# Patient Record
Sex: MALE | Race: WHITE | NOT HISPANIC OR LATINO | Employment: OTHER | URBAN - METROPOLITAN AREA
[De-identification: names, ages, dates, MRNs, and addresses within clinical notes are randomized per-mention and may not be internally consistent; named-entity substitution may affect disease eponyms.]

---

## 2017-01-11 ENCOUNTER — OFFICE VISIT (OUTPATIENT)
Dept: INTERNAL MEDICINE | Facility: CLINIC | Age: 82
End: 2017-01-11
Payer: MEDICARE

## 2017-01-11 ENCOUNTER — LAB VISIT (OUTPATIENT)
Dept: LAB | Facility: HOSPITAL | Age: 82
End: 2017-01-11
Attending: INTERNAL MEDICINE
Payer: MEDICARE

## 2017-01-11 VITALS
HEART RATE: 68 BPM | BODY MASS INDEX: 26.92 KG/M2 | DIASTOLIC BLOOD PRESSURE: 70 MMHG | SYSTOLIC BLOOD PRESSURE: 120 MMHG | WEIGHT: 188.06 LBS | HEIGHT: 70 IN

## 2017-01-11 DIAGNOSIS — E55.9 VITAMIN D DEFICIENCY: ICD-10-CM

## 2017-01-11 DIAGNOSIS — I10 ESSENTIAL HYPERTENSION: ICD-10-CM

## 2017-01-11 DIAGNOSIS — I77.9 BILATERAL CAROTID ARTERY DISEASE: ICD-10-CM

## 2017-01-11 DIAGNOSIS — E04.2 MULTINODULAR GOITER: ICD-10-CM

## 2017-01-11 DIAGNOSIS — I35.0 NONRHEUMATIC AORTIC VALVE STENOSIS: ICD-10-CM

## 2017-01-11 DIAGNOSIS — I65.23 BILATERAL CAROTID ARTERY STENOSIS: ICD-10-CM

## 2017-01-11 DIAGNOSIS — Z00.00 ENCOUNTER FOR WELLNESS EXAMINATION IN ADULT: Primary | ICD-10-CM

## 2017-01-11 LAB
BASOPHILS # BLD AUTO: 0.05 K/UL
BASOPHILS NFR BLD: 0.8 %
BILIRUB UR QL STRIP: NEGATIVE
CLARITY UR REFRACT.AUTO: CLEAR
COLOR UR AUTO: NORMAL
DIFFERENTIAL METHOD: ABNORMAL
EOSINOPHIL # BLD AUTO: 0.1 K/UL
EOSINOPHIL NFR BLD: 1.4 %
ERYTHROCYTE [DISTWIDTH] IN BLOOD BY AUTOMATED COUNT: 14.7 %
GLUCOSE UR QL STRIP: NEGATIVE
HCT VFR BLD AUTO: 41.5 %
HGB BLD-MCNC: 13.8 G/DL
HGB UR QL STRIP: NEGATIVE
KETONES UR QL STRIP: NEGATIVE
LEUKOCYTE ESTERASE UR QL STRIP: NEGATIVE
LYMPHOCYTES # BLD AUTO: 0.9 K/UL
LYMPHOCYTES NFR BLD: 14.6 %
MCH RBC QN AUTO: 31 PG
MCHC RBC AUTO-ENTMCNC: 33.3 %
MCV RBC AUTO: 93 FL
MONOCYTES # BLD AUTO: 0.6 K/UL
MONOCYTES NFR BLD: 9.2 %
NEUTROPHILS # BLD AUTO: 4.7 K/UL
NEUTROPHILS NFR BLD: 74 %
NITRITE UR QL STRIP: NEGATIVE
PH UR STRIP: 6 [PH] (ref 5–8)
PLATELET # BLD AUTO: 192 K/UL
PMV BLD AUTO: 11.7 FL
PROT UR QL STRIP: NEGATIVE
RBC # BLD AUTO: 4.45 M/UL
SP GR UR STRIP: 1 (ref 1–1.03)
URN SPEC COLLECT METH UR: NORMAL
UROBILINOGEN UR STRIP-ACNC: NEGATIVE EU/DL
WBC # BLD AUTO: 6.39 K/UL

## 2017-01-11 PROCEDURE — 85025 COMPLETE CBC W/AUTO DIFF WBC: CPT

## 2017-01-11 PROCEDURE — 36415 COLL VENOUS BLD VENIPUNCTURE: CPT

## 2017-01-11 PROCEDURE — 99999 PR PBB SHADOW E&M-EST. PATIENT-LVL III: CPT | Mod: PBBFAC,,, | Performed by: INTERNAL MEDICINE

## 2017-01-11 PROCEDURE — G0439 PPPS, SUBSEQ VISIT: HCPCS | Mod: ,,, | Performed by: INTERNAL MEDICINE

## 2017-01-11 NOTE — PROGRESS NOTES
CHIEF COMPLAINT:  Physical exam.    HISTORY OF PRESENT ILLNESS:  The patient is an 87-year-old gentleman here for   wellness exam.  The patient does have a history of aortic stenosis.  He does see   Dr. White for this.  His last cardiac echo was in May 2016.  It showed   mild-to-moderate aortic stenosis with JANESSA of 1.23 cm2.  He does have   hyperlipidemia.  He is on CoQ10 as well as pravastatin.  His total cholesterol   was 161, LDL was 84.6.  He does have a history of vitamin D deficiency.  His   last vitamin D level was in November, was 48.  He does have a history of mild   carotid stenosis of 20-39% bilaterally.  The patient is due for a repeat.  He   does have a history of carpal tunnel syndrome involving the left hand as well as   trigger finger involving the left thumb.  He is going to see Dr. Lewis for   this.  In regards to his skin, he sees [facility].  He had Mohs surgery   done twice, once on the nose and once on the left hand.  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  He last saw Dermatology three   months ago.  The patient does see Urology with Dr. Colby and Dr. Hill at   [facility].  He is on testosterone replacement.  He gets a testosterone level every   four months.  He does have implants in place.  In regards to his vision, he does   have macular degeneration.  He sees Dr. Bower who is located near [facility].    REVIEW OF SYSTEMS:  The patient reports weight has been stable between 185-188.    He cannot read small print.  He does have something that blows it up for him to   read.  No auditory change, no dysphagia, no chest pain, no shortness of breath.    The patient does exercise three times a week.  He does do hamstring exercises   for lower and upper extremity.  He does report no nausea, vomiting, no abdominal   pain.  He does have acid reflux.  No problems with bowel or bladder.  He does   see Dr. Hall for his hamstrings.  His skin is going to be  checked as   mentioned above.  The patient reports carpal tunnel involving the left hand.    PHYSICAL EXAMINATION:  GENERAL APPEARANCE:  No acute distress.  HEENT:  Conjunctivae clear.  Pupils were equal.  He does have bilateral   cataracts.  TMs are clear.  Nasal septum is midline without discharge.    Oropharynx is without erythema.  NECK:  Trachea is midline without thyromegaly, without JVD.  PULMONARY:  Good inspiratory, expiratory breath sounds are heard.  Lungs are   clear to auscultation.  CARDIOVASCULAR:  S1, S2 with a II-III/VI systolic ejection murmur heard best at   the right upper sternal border.  2+ carotid pulses without bruits, 2+ dorsal   pedal pulse involving the left foot.  ABDOMEN:  Nontender, nondistended, without hepatosplenomegaly.  :  Exam was deferred since the patient sees Urology at [facility].  EXTREMITIES:  The patient's left great toe was evaluated.  He was seen there   after he injured his toe.  He was being treated for cellulitis that appears to   have resolved.    ASSESSMENT:  1.  Wellness exam.  2.  Hypertension.  3.  Aortic stenosis.  4.  Bilateral carotid stenosis.  5.  History of multinodular goiter.  6.  Vitamin D deficiency.    PLAN:  We will put the patient in for CBC, UA, ultrasound of the carotids.  The   patient already has blood tests scheduled for Dr. White to check a CMP, vitamin   D and lipid panel.      JDS/HN  dd: 01/11/2017 14:19:45 (CST)  td: 01/12/2017 10:18:10 (CST)  Doc ID   #[id number]  Job ID #[id number]    CC:

## 2017-01-11 NOTE — MR AVS SNAPSHOT
[facility] - Internal Medicine  [address]  [facility] [address]  Phone: [phone]  Fax: [phone]                  Misha Marte   2017 11:00 AM   Office Visit    Description:  Male : 1929   Provider:  Ike Ivan MD   Department:  [facility] - [facility] Medicine           Reason for Visit     Annual Exam           Diagnoses this Visit        Comments    Encounter for wellness examination in adult    -  Primary     Essential hypertension         Nonrheumatic aortic valve stenosis         Bilateral carotid artery disease         Multinodular goiter         Vitamin D deficiency         Bilateral carotid artery stenosis                To Do List           Future Appointments        Provider Department Dept Phone    2017 3:00 PM Nikita Ortega MD [facility] Neurology [phone]    2017 11:30 AM Tracee Lewis MD [facility] [phone]      Goals (5 Years of Data)     None      [facility] On Call     [facility] On Call Nurse [facility] -  Assistance  Registered nurses in the [facility] On Call Center provide clinical advisement, health education, appointment booking, and other advisory services.  Call for this free service at [phone].             Medications           Message regarding Medications     Verify the changes and/or additions to your medication regime listed below are the same as discussed with your clinician today.  If any of these changes or additions are incorrect, please notify your healthcare provider.        STOP taking these medications     baclofen (LIORESAL) 10 MG tablet Take 1 tablet (10 mg total) by mouth 3 (three) times daily.           Verify that the below list of medications is an accurate representation of the medications you are currently taking.  If none reported, the list may be blank. If incorrect, please contact your healthcare provider. Carry this list with you in case of emergency.           Current  "Medications     acetaminophen (TYLENOL) 500 MG tablet Take 500 mg by mouth every 6 (six) hours as needed for Pain.    aspirin 81 mg Tab Take 81 mg by mouth once daily.     CALCIUM CARBONATE/VITAMIN D3 (CALCIUM 600 + D,3, ORAL) Take 1 capsule by mouth once daily.    clobetasol (TEMOVATE) 0.05 % external solution Apply topically 2 (two) times daily.     coenzyme Q10 (CO Q-10) 10 mg capsule Take 10 mg by mouth once daily.     desoximetasone (TOPICORT) 0.25 % cream Apply topically as needed.    DOCUSATE CALCIUM (STOOL SOFTENER ORAL) Take by mouth once daily.    ergocalciferol (ERGOCALCIFEROL) 50,000 unit Cap 1 tab every 2 weeks    furosemide (LASIX) 20 MG tablet TAKE 1 TABLET(20 MG) BY MOUTH DAILY AS NEEDED    multivitamin-minerals-lutein (CENTRUM SILVER) Tab Take 1 tablet by mouth once daily.     MUSE 1,000 mcg pellet as needed.     omeprazole (PRILOSEC) 20 MG capsule Take 1 capsule (20 mg total) by mouth once daily.    pravastatin (PRAVACHOL) 20 MG tablet One at nite    ramipril (ALTACE) 10 MG capsule Take 1 capsule (10 mg total) by mouth once daily.    tretinoin (RETIN-A) 0.05 % cream Apply 1 application topically nightly.     VIT A,C & E/LUTEIN/MINERALS (OCUVITE WITH LUTEIN ORAL) Take 1 capsule by mouth once daily.           Clinical Reference Information           Vital Signs - Last Recorded  Most recent update: 1/11/2017 11:50 AM by Chantelle Black MA    BP Pulse Ht Wt BMI    120/70 (BP Location: Right arm, Patient Position: Sitting, BP Method: Manual) 68 5' 10" (1.778 m) 85.3 kg (188 lb 0.8 oz) 26.98 kg/m2      Blood Pressure          Most Recent Value    BP  120/70      Allergies as of 1/11/2017     Codeine    Cortisone    Dexamethasone      Immunizations Administered on Date of Encounter - 1/11/2017     None      Orders Placed During Today's Visit      Normal Orders This Visit    Urinalysis     Future Labs/Procedures Expected by Expires    CBC auto differential  1/11/2017 1/11/2018    US Carotid Bilateral  " 1/11/2017 1/11/2018

## 2017-01-17 ENCOUNTER — HOSPITAL ENCOUNTER (OUTPATIENT)
Dept: RADIOLOGY | Facility: HOSPITAL | Age: 82
Discharge: HOME | End: 2017-01-17
Attending: INTERNAL MEDICINE
Payer: MEDICARE

## 2017-01-17 DIAGNOSIS — I77.9 BILATERAL CAROTID ARTERY DISEASE: ICD-10-CM

## 2017-01-17 DIAGNOSIS — I65.23 BILATERAL CAROTID ARTERY STENOSIS: ICD-10-CM

## 2017-01-17 PROCEDURE — 93880 EXTRACRANIAL BILAT STUDY: CPT | Mod: TC

## 2017-01-17 PROCEDURE — 93880 EXTRACRANIAL BILAT STUDY: CPT | Mod: 26,GC,, | Performed by: RADIOLOGY

## 2017-01-18 ENCOUNTER — TELEPHONE (OUTPATIENT)
Dept: ORTHOPEDICS | Facility: CLINIC | Age: 82
End: 2017-01-18

## 2017-01-18 NOTE — TELEPHONE ENCOUNTER
----- Message from Alie Mckinnon sent at 1/18/2017  1:07 PM CST -----  Contact: Sandra Marte (Spouse)  X   1st Request  _  2nd Request  _  3rd Request        Who: Sandra Marte (Spouse)    Why: Pt spouse states the EMG appt has been rescheduled to 01/25. Pt's spouse states she needs an appt for the pt to be a week from 01/25 for the pt to get his results. An attempt was made but first available is 02/23. Please call, thanks!    What Number to Call Back: [phone]    When to Expect a call back: (Before the end of the day)   -- if call after 3:00 call back will be tomorrow.

## 2017-01-19 ENCOUNTER — PATIENT MESSAGE (OUTPATIENT)
Dept: INTERNAL MEDICINE | Facility: CLINIC | Age: 82
End: 2017-01-19

## 2017-01-23 ENCOUNTER — TELEPHONE (OUTPATIENT)
Dept: INTERNAL MEDICINE | Facility: CLINIC | Age: 82
End: 2017-01-23

## 2017-01-23 NOTE — TELEPHONE ENCOUNTER
----- Message from Ike Ivan MD sent at 1/21/2017  2:43 PM CST -----  Please contact patient. I would like to see him in 6 months along with his wife.

## 2017-01-23 NOTE — TELEPHONE ENCOUNTER
Notified pt and pt states he will call to schedule. Pt and wife was put on the hod list because Dr Ivan schedule is not open yet

## 2017-01-25 ENCOUNTER — PROCEDURE VISIT (OUTPATIENT)
Dept: NEUROLOGY | Facility: CLINIC | Age: 82
End: 2017-01-25
Payer: MEDICARE

## 2017-01-25 ENCOUNTER — TELEPHONE (OUTPATIENT)
Dept: ORTHOPEDICS | Facility: CLINIC | Age: 82
End: 2017-01-25

## 2017-01-25 DIAGNOSIS — R20.0 FINGER NUMBNESS: ICD-10-CM

## 2017-01-25 PROCEDURE — 95870 NDL EMG LMTD STD MUSC 1 XTR: CPT | Mod: PBBFAC | Performed by: PSYCHIATRY & NEUROLOGY

## 2017-01-25 PROCEDURE — 95911 NRV CNDJ TEST 9-10 STUDIES: CPT | Mod: PBBFAC | Performed by: PSYCHIATRY & NEUROLOGY

## 2017-01-25 PROCEDURE — 95886 MUSC TEST DONE W/N TEST COMP: CPT | Mod: 26,S$PBB,, | Performed by: PSYCHIATRY & NEUROLOGY

## 2017-01-25 PROCEDURE — 95885 MUSC TST DONE W/NERV TST LIM: CPT | Mod: 26,59,S$PBB, | Performed by: PSYCHIATRY & NEUROLOGY

## 2017-01-25 PROCEDURE — 95911 NRV CNDJ TEST 9-10 STUDIES: CPT | Mod: 26,S$PBB,, | Performed by: PSYCHIATRY & NEUROLOGY

## 2017-01-29 ENCOUNTER — PATIENT MESSAGE (OUTPATIENT)
Dept: ORTHOPEDICS | Facility: CLINIC | Age: 82
End: 2017-01-29

## 2017-01-31 RX ORDER — OSELTAMIVIR PHOSPHATE 75 MG/1
75 CAPSULE ORAL DAILY
Qty: 10 CAPSULE | Refills: 0 | Status: SHIPPED | OUTPATIENT
Start: 2017-01-31 | End: 2017-02-10

## 2017-02-01 ENCOUNTER — OFFICE VISIT (OUTPATIENT)
Dept: INTERNAL MEDICINE | Facility: CLINIC | Age: 82
End: 2017-02-01
Payer: MEDICARE

## 2017-02-01 VITALS
HEART RATE: 70 BPM | HEIGHT: 70 IN | OXYGEN SATURATION: 98 % | SYSTOLIC BLOOD PRESSURE: 98 MMHG | DIASTOLIC BLOOD PRESSURE: 58 MMHG | TEMPERATURE: 98 F

## 2017-02-01 DIAGNOSIS — J98.8 VIRAL RESPIRATORY ILLNESS: Primary | ICD-10-CM

## 2017-02-01 DIAGNOSIS — B97.89 VIRAL RESPIRATORY ILLNESS: Primary | ICD-10-CM

## 2017-02-01 PROCEDURE — 99213 OFFICE O/P EST LOW 20 MIN: CPT | Mod: PBBFAC | Performed by: INTERNAL MEDICINE

## 2017-02-01 PROCEDURE — 99999 PR PBB SHADOW E&M-EST. PATIENT-LVL III: CPT | Mod: PBBFAC,,, | Performed by: INTERNAL MEDICINE

## 2017-02-01 PROCEDURE — 99213 OFFICE O/P EST LOW 20 MIN: CPT | Mod: S$PBB,,, | Performed by: INTERNAL MEDICINE

## 2017-02-01 RX ORDER — BENZONATATE 200 MG/1
200 CAPSULE ORAL 2 TIMES DAILY PRN
Qty: 20 CAPSULE | Refills: 0 | Status: SHIPPED | OUTPATIENT
Start: 2017-02-01 | End: 2017-02-08

## 2017-02-01 NOTE — PROGRESS NOTES
Subjective:       Patient ID: CLARY Marte Jr. is a 87 y.o. male.    Chief Complaint: URI    HPI Comments: Wife and sis-in -law have influenza.  Dr Ivan rx'd tamiflu for patient.  He has had 2 doses and says he is better.  Used his ventolin inhaler this am and got good relief of wheezing.  Zicam doesn't seem to help    URI    This is a new problem. The current episode started yesterday. The problem has been waxing and waning. There has been no fever. Associated symptoms include congestion, coughing and wheezing. Pertinent negatives include no abdominal pain, chest pain, diarrhea, headaches, nausea, sore throat or vomiting. Associated symptoms comments: fatigue. The treatment provided moderate relief.     Review of Systems   Constitutional: Negative for activity change, appetite change and fever.   HENT: Positive for congestion. Negative for postnasal drip and sore throat.    Respiratory: Positive for cough and wheezing. Negative for shortness of breath.    Cardiovascular: Negative for chest pain and palpitations.   Gastrointestinal: Negative for abdominal pain, blood in stool, constipation, diarrhea, nausea and vomiting.   Genitourinary: Negative for decreased urine volume, difficulty urinating, flank pain and frequency.   Musculoskeletal: Negative for arthralgias.   Neurological: Negative for dizziness, weakness and headaches.       Objective:      Physical Exam   Constitutional: He is oriented to person, place, and time. He appears well-developed and well-nourished. No distress.   HENT:   Head: Normocephalic and atraumatic.   Right Ear: External ear normal.   Left Ear: External ear normal.   Eyes: Conjunctivae and EOM are normal. Pupils are equal, round, and reactive to light.   Neck: Normal range of motion. Neck supple. No thyromegaly present.   Cardiovascular: Normal rate and regular rhythm.    Pulmonary/Chest: Effort normal and breath sounds normal.   Abdominal: Soft. Bowel sounds are normal. He exhibits  no mass. There is no tenderness. There is no rebound and no guarding.   Musculoskeletal: Normal range of motion.   Lymphadenopathy:     He has no cervical adenopathy.   Neurological: He is alert and oriented to person, place, and time. He has normal reflexes. He displays normal reflexes. No cranial nerve deficit. He exhibits normal muscle tone. Coordination normal.   Skin: Skin is warm and dry.       Assessment:       1. Viral respiratory illness        Plan:   CLARY was seen today for uri.    Diagnoses and all orders for this visit:    Viral respiratory illness    Other orders  -     benzonatate (TESSALON) 200 MG capsule; Take 1 capsule (200 mg total) by mouth 2 (two) times daily as needed for Cough.

## 2017-02-01 NOTE — MR AVS SNAPSHOT
[facility]  [address]  [facility] [address]  Phone: [phone]  Fax: [phone]                  CLARY Marte Jr.   2017 2:00 PM   Office Visit    Description:  Male : 1929   Provider:  Rosangela Bowser MD   Department:  [facility] - Internal Medicine           Reason for Visit     URI           Diagnoses this Visit        Comments    Viral respiratory illness    -  Primary            To Do List           Future Appointments        Provider Department Dept Phone    2017 2:15 PM Tracee Lewis MD [facility] [phone]      Goals (5 Years of Data)     None       These Medications        Disp Refills Start End    benzonatate (TESSALON) 200 MG capsule 20 capsule 0 2017    Take 1 capsule (200 mg total) by mouth 2 (two) times daily as needed for Cough. - Oral    Pharmacy: [facility] [address] [place] [address] [address] [place] [facility] [place] Ph #: [phone]         [facility] On Call     [facility] On Call Nurse Care Line -  Assistance  Registered nurses in the [facility] On Call Center provide clinical advisement, health education, appointment booking, and other advisory services.  Call for this free service at [phone].             Medications           Message regarding Medications     Verify the changes and/or additions to your medication regime listed below are the same as discussed with your clinician today.  If any of these changes or additions are incorrect, please notify your healthcare provider.        START taking these NEW medications        Refills    benzonatate (TESSALON) 200 MG capsule 0    Sig: Take 1 capsule (200 mg total) by mouth 2 (two) times daily as needed for Cough.    Class: Normal    Route: Oral           Verify that the below list of medications is an accurate representation of the medications you are currently taking.  If none reported, the list may be blank. If  "incorrect, please contact your healthcare provider. Carry this list with you in case of emergency.           Current Medications     acetaminophen (TYLENOL) 500 MG tablet Take 500 mg by mouth every 6 (six) hours as needed for Pain.    aspirin 81 mg Tab Take 81 mg by mouth once daily.     CALCIUM CARBONATE/VITAMIN D3 (CALCIUM 600 + D,3, ORAL) Take 1 capsule by mouth once daily.    clobetasol (TEMOVATE) 0.05 % external solution Apply topically 2 (two) times daily.     coenzyme Q10 (CO Q-10) 10 mg capsule Take 10 mg by mouth once daily.     desoximetasone (TOPICORT) 0.25 % cream Apply topically as needed.    DOCUSATE CALCIUM (STOOL SOFTENER ORAL) Take by mouth once daily.    ergocalciferol (ERGOCALCIFEROL) 50,000 unit Cap 1 tab every 2 weeks    furosemide (LASIX) 20 MG tablet TAKE 1 TABLET(20 MG) BY MOUTH DAILY AS NEEDED    multivitamin-minerals-lutein (CENTRUM SILVER) Tab Take 1 tablet by mouth once daily.     MUSE 1,000 mcg pellet as needed.     omeprazole (PRILOSEC) 20 MG capsule Take 1 capsule (20 mg total) by mouth once daily.    oseltamivir (TAMIFLU) 75 MG capsule Take 1 capsule (75 mg total) by mouth once daily.    pravastatin (PRAVACHOL) 20 MG tablet One at nite    ramipril (ALTACE) 10 MG capsule Take 1 capsule (10 mg total) by mouth once daily.    tretinoin (RETIN-A) 0.05 % cream Apply 1 application topically nightly.     VIT A,C & E/LUTEIN/MINERALS (OCUVITE WITH LUTEIN ORAL) Take 1 capsule by mouth once daily.    benzonatate (TESSALON) 200 MG capsule Take 1 capsule (200 mg total) by mouth 2 (two) times daily as needed for Cough.           Clinical Reference Information           Vital Signs - Last Recorded  Most recent update: 2/1/2017  2:11 PM by Sandy Dillon MA    BP Pulse Temp Ht SpO2       (!) 98/58 (BP Location: Right arm, Patient Position: Sitting, BP Method: Manual) 70 97.7 °F (36.5 °C) (Oral) 5' 10" (1.778 m) 98%       Blood Pressure          Most Recent Value    BP  (!)  98/58      Allergies as " of 2/1/2017     Codeine    Cortisone    Dexamethasone      Immunizations Administered on Date of Encounter - 2/1/2017     None

## 2017-02-08 ENCOUNTER — HOSPITAL ENCOUNTER (OUTPATIENT)
Dept: RADIOLOGY | Facility: HOSPITAL | Age: 82
Discharge: HOME | End: 2017-02-08
Attending: INTERNAL MEDICINE
Payer: MEDICARE

## 2017-02-08 DIAGNOSIS — R05.9 COUGH: ICD-10-CM

## 2017-02-08 DIAGNOSIS — R05.9 COUGH: Primary | ICD-10-CM

## 2017-02-08 PROCEDURE — 71020 XR CHEST PA AND LATERAL: CPT | Mod: TC

## 2017-02-08 PROCEDURE — 71020 XR CHEST PA AND LATERAL: CPT | Mod: 26,,, | Performed by: RADIOLOGY

## 2017-02-13 ENCOUNTER — TELEPHONE (OUTPATIENT)
Dept: ORTHOPEDICS | Facility: CLINIC | Age: 82
End: 2017-02-13

## 2017-02-14 ENCOUNTER — OFFICE VISIT (OUTPATIENT)
Dept: ORTHOPEDICS | Facility: CLINIC | Age: 82
End: 2017-02-14
Payer: MEDICARE

## 2017-02-14 VITALS
SYSTOLIC BLOOD PRESSURE: 125 MMHG | BODY MASS INDEX: 26.92 KG/M2 | RESPIRATION RATE: 16 BRPM | WEIGHT: 188 LBS | HEIGHT: 70 IN | DIASTOLIC BLOOD PRESSURE: 76 MMHG | HEART RATE: 64 BPM

## 2017-02-14 DIAGNOSIS — R20.0 FINGER NUMBNESS: ICD-10-CM

## 2017-02-14 DIAGNOSIS — M65.30 TRIGGER FINGER OF LEFT HAND, UNSPECIFIED FINGER: Primary | ICD-10-CM

## 2017-02-14 PROCEDURE — 99213 OFFICE O/P EST LOW 20 MIN: CPT | Mod: S$PBB,,, | Performed by: ORTHOPAEDIC SURGERY

## 2017-02-14 PROCEDURE — 99999 PR PBB SHADOW E&M-EST. PATIENT-LVL II: CPT | Mod: PBBFAC,,, | Performed by: ORTHOPAEDIC SURGERY

## 2017-02-14 PROCEDURE — 99212 OFFICE O/P EST SF 10 MIN: CPT | Mod: PBBFAC | Performed by: ORTHOPAEDIC SURGERY

## 2017-02-14 NOTE — PROGRESS NOTES
The patient was seen to go over the EMG results. The patient is still having constant numbness symptoms.     The EMG is positive for Carpal Tunnel Syndrome. It is severe in nature.    Plan: We discussed there results of the EMG in detail. WE discussed conservative treatment as well as surgical treatment.  The Patient is interested in surgery at this time.    I have explained the risks, benefits, and alternatives of the procedure to the patient in great detail. The patient voices understanding and all questions have been answered. The patient agrees with to proceed as planned. Consents were performed in clinic. Pt understands that sensation may not come   Pt also wants his left thumb trigger finger released.

## 2017-03-21 ENCOUNTER — PATIENT MESSAGE (OUTPATIENT)
Dept: ORTHOPEDICS | Facility: CLINIC | Age: 82
End: 2017-03-21

## 2017-03-22 ENCOUNTER — PATIENT MESSAGE (OUTPATIENT)
Dept: INTERNAL MEDICINE | Facility: CLINIC | Age: 82
End: 2017-03-22

## 2017-03-22 DIAGNOSIS — M65.312 TRIGGER FINGER OF LEFT THUMB: Primary | ICD-10-CM

## 2017-03-22 DIAGNOSIS — G56.02 LEFT CARPAL TUNNEL SYNDROME: ICD-10-CM

## 2017-03-23 DIAGNOSIS — M25.529 ELBOW PAIN, UNSPECIFIED LATERALITY: Primary | ICD-10-CM

## 2017-03-24 ENCOUNTER — TELEPHONE (OUTPATIENT)
Dept: INTERNAL MEDICINE | Facility: CLINIC | Age: 82
End: 2017-03-24

## 2017-03-24 ENCOUNTER — TELEPHONE (OUTPATIENT)
Dept: ORTHOPEDICS | Facility: CLINIC | Age: 82
End: 2017-03-24

## 2017-03-24 DIAGNOSIS — R52 PAIN: Primary | ICD-10-CM

## 2017-03-24 NOTE — TELEPHONE ENCOUNTER
Spoke w/patient and his wife. Patient states he fell on his right elbow 3 weeks ago denies pain to wrist or shoulder. Patient offered appointments next week with Dr Sylvester and Danielle/Trixie, they declined appointments offered. Appt for xray scheduled per their request at [facility] then on 4/4/17 w/LS.

## 2017-03-24 NOTE — TELEPHONE ENCOUNTER
----- Message from Ike Ivan MD sent at 3/23/2017  7:52 PM CDT -----  Please contact and refer to Orthopedics. Referral is in. This is for his elbow.

## 2017-03-24 NOTE — TELEPHONE ENCOUNTER
----- Message from Maria Eugenia Richardson sent at 3/24/2017 11:43 AM CDT -----  Contact: pt's wife stephanie [phone]  _  1st Request  _  2nd Request  _  3rd Request        Who: pt    Why: pt's needs to speak to the nurse regarding her 's elbows. Please call stephanie    What Number to Call Back:pt's wife stephanie [phone]    When to Expect a call back: (Before the end of the day)   -- if the call is after 12:00, the call back will be tomorrow.

## 2017-03-28 ENCOUNTER — HOSPITAL ENCOUNTER (OUTPATIENT)
Dept: RADIOLOGY | Facility: HOSPITAL | Age: 82
Discharge: HOME | End: 2017-03-28
Attending: ORTHOPAEDIC SURGERY
Payer: MEDICARE

## 2017-03-28 DIAGNOSIS — R52 PAIN: ICD-10-CM

## 2017-03-28 PROCEDURE — 73080 X-RAY EXAM OF ELBOW: CPT | Mod: TC,RT

## 2017-03-28 PROCEDURE — 73080 X-RAY EXAM OF ELBOW: CPT | Mod: 26,RT,, | Performed by: RADIOLOGY

## 2017-04-04 ENCOUNTER — OFFICE VISIT (OUTPATIENT)
Dept: ORTHOPEDICS | Facility: CLINIC | Age: 82
End: 2017-04-04
Payer: MEDICARE

## 2017-04-04 VITALS
HEIGHT: 70 IN | RESPIRATION RATE: 18 BRPM | DIASTOLIC BLOOD PRESSURE: 70 MMHG | BODY MASS INDEX: 26.92 KG/M2 | HEART RATE: 90 BPM | SYSTOLIC BLOOD PRESSURE: 136 MMHG | WEIGHT: 188 LBS

## 2017-04-04 DIAGNOSIS — G56.02 LEFT CARPAL TUNNEL SYNDROME: ICD-10-CM

## 2017-04-04 DIAGNOSIS — M65.312 TRIGGER FINGER OF LEFT THUMB: ICD-10-CM

## 2017-04-04 DIAGNOSIS — M70.21 OLECRANON BURSITIS OF RIGHT ELBOW: Primary | ICD-10-CM

## 2017-04-04 PROCEDURE — 99213 OFFICE O/P EST LOW 20 MIN: CPT | Mod: S$PBB,,, | Performed by: PHYSICIAN ASSISTANT

## 2017-04-04 PROCEDURE — 99999 PR PBB SHADOW E&M-EST. PATIENT-LVL III: CPT | Mod: PBBFAC,,, | Performed by: PHYSICIAN ASSISTANT

## 2017-04-04 PROCEDURE — 99213 OFFICE O/P EST LOW 20 MIN: CPT | Mod: PBBFAC | Performed by: PHYSICIAN ASSISTANT

## 2017-04-05 RX ORDER — MUPIROCIN 20 MG/G
1 OINTMENT TOPICAL
Status: CANCELLED | OUTPATIENT
Start: 2017-04-05

## 2017-04-05 NOTE — PROGRESS NOTES
This office note has been dictated.   Dictation Confirmation Code: [id number]  Danielle Eduardo PA-C  04/05/2017  7:30 AM  Supervising Physician:  MD CLARY Starks was seen today for pain.    Diagnoses and all orders for this visit:    Olecranon bursitis of right elbow    Left carpal tunnel syndrome    Trigger finger of left thumb

## 2017-04-06 ENCOUNTER — TELEPHONE (OUTPATIENT)
Dept: ORTHOPEDICS | Facility: CLINIC | Age: 82
End: 2017-04-06

## 2017-04-06 NOTE — PROGRESS NOTES
CHIEF COMPLAINT:  Left hand numbness, left thumb locking, but improved and new   right elbow pain.    HISTORY OF PRESENT ILLNESS:  Mr. Marte is a very pleasant 88-year-old   right-hand dominant male presenting today for followup evaluation of his carpal   tunnel and trigger thumb, but he has a new problem in which he fell on to his   elbow four days ago.  He reports that it is not painful and there is just some   sensitivity over the elbow.  He fell directly on to it.  He feels that the   motion is improved.  He has been using ice and that has been helping not taking   any new pain medication for it.  He reports that the trigger thumb has improved   and he is concerned about having surgery on it because he feels that it is   better, but he is still having numbness and tingling in the left hand.  He also   reports that his left hand is still weak and his wife has many questions today,   also about the surgery as well as proceeding with his new right elbow injury.    Past Medical History:   Diagnosis Date    Heart murmur     Hyperlipidemia     Hypertension     Insomnia        Past Surgical History:   Procedure Laterality Date    APPENDECTOMY         Social History     Social History    Marital status:      Spouse name: N/A    Number of children: N/A    Years of education: N/A     Occupational History    Not on file.     Social History Main Topics    Smoking status: Never Smoker    Smokeless tobacco: Never Used    Alcohol use 3.0 oz/week     5 Glasses of wine per week    Drug use: No    Sexual activity: No     Other Topics Concern    Not on file     Social History Narrative       Current Outpatient Prescriptions on File Prior to Visit   Medication Sig Dispense Refill    acetaminophen (TYLENOL) 500 MG tablet Take 500 mg by mouth every 6 (six) hours as needed for Pain.      aspirin 81 mg Tab Take 81 mg by mouth once daily.       CALCIUM CARBONATE/VITAMIN D3 (CALCIUM 600 + D,3, ORAL) Take 1  capsule by mouth once daily.      clobetasol (TEMOVATE) 0.05 % external solution Apply topically 2 (two) times daily.       coenzyme Q10 (CO Q-10) 10 mg capsule Take 10 mg by mouth once daily.       desoximetasone (TOPICORT) 0.25 % cream Apply topically as needed.      DOCUSATE CALCIUM (STOOL SOFTENER ORAL) Take by mouth once daily.      ergocalciferol (ERGOCALCIFEROL) 50,000 unit Cap 1 tab every 2 weeks 6 capsule 3    furosemide (LASIX) 20 MG tablet TAKE 1 TABLET(20 MG) BY MOUTH DAILY AS NEEDED 30 tablet 11    multivitamin-minerals-lutein (CENTRUM SILVER) Tab Take 1 tablet by mouth once daily.       MUSE 1,000 mcg pellet as needed.   6    omeprazole (PRILOSEC) 20 MG capsule Take 1 capsule (20 mg total) by mouth once daily. 30 capsule 11    pravastatin (PRAVACHOL) 20 MG tablet One at nite 90 tablet 3    ramipril (ALTACE) 10 MG capsule Take 1 capsule (10 mg total) by mouth once daily. 90 capsule 3    tretinoin (RETIN-A) 0.05 % cream Apply 1 application topically nightly.       VIT A,C & E/LUTEIN/MINERALS (OCUVITE WITH LUTEIN ORAL) Take 1 capsule by mouth once daily.       No current facility-administered medications on file prior to visit.        Review of patient's allergies indicates:   Allergen Reactions    Codeine      Other reaction(s): Nausea    Cortisone      Other reaction(s): Unknown    Dexamethasone Other (See Comments)     Constant hiccups for days       Review of Systems:  Constitutional: no fever or chills  ENT: no nasal congestion or sore throat  Respiratory: no cough or shortness of breath  Cardiovascular: no chest pain or palpitations  Gastrointestinal: no nausea or vomiting  Genitourinary: no hematuria or dysuria  Integument/Breast: no rash or pruritis  Hematologic/Lymphatic: no easy bruising or lymphadenopathy  Musculoskeletal: see HPI  Neurological: no seizures or tremors  Behavioral/Psych: no auditory or visual hallucinations      PHYSICAL EXAM    Vitals:    04/04/17 1314   BP:  "136/70   Pulse: 90   Resp: 18   Weight: 85.3 kg (188 lb)   Height: 5' 10" (1.778 m)   PainSc: 0-No pain       GENERAL:  Well developed, well nourished, no acute distress.  CARDIOVASCULAR:  Regular rate and rhythm.  LUNGS:  Respirations equal and unlabored.  BEHAVIORAL AND PSYCH:  Mood and affect appropriate.  NEUROLOGIC:  No tremor.  HEENT:  Normocephalic, atraumatic.  MUSCULOSKELETAL:  Upper extremity exam:  Distally, he is neurovascularly intact.    He does have a positive Tinel's and positive Durkan's on the left.  Muscle   atrophy is appreciated at the thenar eminence.  While he does not have   tenderness at the A1 pulley, there is a catch and snap visualized at the IP as   well as the MP joint.  There is significant MP stiffness compared to the other   side.  The elbow on the right, there is only minimal edema.  There is no   erythema, no open wound.  No significant tenderness to palpation.  The olecranon   bursa is slightly edematous, but again no tenderness to palpation.  No   erythema, no warmth.    ASSESSMENT:  1.  Olecranon bursitis of the right elbow status post fall, resolving.  2.  Left carpal tunnel syndrome.  3.  Left trigger thumb, improving.    PLAN:  I discussed with Mr. Marte at this time, the right elbow seems to be   okay.  I reviewed the x-rays, which did not show any fractures or dislocations.    He does had some olecranon spurring as well as on the coronoid process, no   joint effusion was seen.  We discussed that continuing to rest, ice and elevate   the elbow to help improve his pain, as good conservative treatment.  I do not   feel that this will interfere with his surgery on the 19th for his carpal tunnel   and his trigger thumb.  They also wanted to know some information about taking   a trigger thumb off of the surgery consent.  Although as it is not painful, I   did discuss with him that it is still snapping him.  My concern is that with   postoperative swelling, some of the " triggering might return, if we just do the   carpal tunnel.  I still appreciate a visualized saltatory motion of the thumb.    Therefore, I discussed with him continuing to proceed with a trigger thumb   release as it will not affect his recovery postoperatively to have that extra   small incision.  If the trigger thumb completely resolves, we can discontinue   it; however, at this time it is still triggering.  His wife and the patient had   many questions, took me approximately 30 minutes to answer all their questions   about surgery.  We went into occupational therapy and activities of daily living   modifications to help with that and things that they can do.  He will follow up   with me postoperatively.    SUPERVISING PHYSICIAN: Tracee Lewis M.D.    DICTATED BY: Tressa Eduardo PA-C.      CHRISTINAA/IN  dd: 04/05/2017 07:35:54 (CDT)  td: 04/06/2017 00:22:45 (CDT)  Doc ID   #[id number]  Job ID #[id number]    CC:

## 2017-04-06 NOTE — TELEPHONE ENCOUNTER
----- Message from Gem Marshall sent at 4/6/2017  8:13 AM CDT -----  Contact: pt wife   x_  1st Request  _  2nd Request  _  3rd Request      Who:Mrs. Marte     Why: calling in regards to pt surgery     What Number to Call Back: [phone]    When to Expect a call back: (Before the end of the day)   -- if call after 3:00 call back will be tomorrow.

## 2017-04-13 ENCOUNTER — TELEPHONE (OUTPATIENT)
Dept: ORTHOPEDICS | Facility: CLINIC | Age: 82
End: 2017-04-13

## 2017-04-13 NOTE — TELEPHONE ENCOUNTER
----- Message from Ginette Francis sent at 4/13/2017  8:04 AM CDT -----  Contact: Sandra Mrs Marte (Spouse)  x_  1st Request  _  2nd Request  _  3rd Request        Who: Sandra Marte (Spouse)    Why: Pt spouse would like to reschedule surgery because patient was diagnosed with skin cancer on hand . Please call to discuss, Thanks!    What Number to Call Back: [phone] or [phone]    When to Expect a call back: (Before the end of the day)   -- if the call is after 12:00, the call back will be tomorrow.

## 2017-05-29 ENCOUNTER — TELEPHONE (OUTPATIENT)
Dept: ORTHOPEDICS | Facility: CLINIC | Age: 82
End: 2017-05-29

## 2017-05-29 NOTE — TELEPHONE ENCOUNTER
----- Message from Ale Alvarado sent at 5/29/2017  1:30 PM CDT -----  x_  1st Request  _  2nd Request  _  3rd Request        Who: CLARY DUARTE JR. [[id number]]    Why: Pt called they would like to reschedule his surgery. Please call him     What Number to Call Back: [phone]    When to Expect a call back: (Before the end of the day)   -- if the call is after 12:00, the call back will be tomorrow.

## 2017-05-29 NOTE — TELEPHONE ENCOUNTER
ROSLYN on  for Mr. Marte to call us to schedule an office appt. He will need to sign a new consent before we can reschedule his surgery. He can be scheduled with our PA.

## 2017-05-29 NOTE — TELEPHONE ENCOUNTER
Pt wanted to fill out a new consent form by mail, which I explained is not legal. We also need to have a new H&P within 30 days of surgery as well as a valid consent since the previous one will  on the . Pt verbalized understanding and will coordinate appts with his wife and call us to reschedule.

## 2017-05-30 ENCOUNTER — TELEPHONE (OUTPATIENT)
Dept: CARDIOLOGY | Facility: CLINIC | Age: 82
End: 2017-05-30

## 2017-05-31 ENCOUNTER — TELEPHONE (OUTPATIENT)
Dept: INTERNAL MEDICINE | Facility: CLINIC | Age: 82
End: 2017-05-31

## 2017-05-31 NOTE — TELEPHONE ENCOUNTER
----- Message from [employer] sent at 5/30/2017 12:20 PM CDT -----  Contact: Wife of pt called  Wife of pt called, requesting to speak with Kate only. Ph [phone] and [phone]. Pt of Dr. White and LOV 7/1/16. Thank you

## 2017-05-31 NOTE — TELEPHONE ENCOUNTER
----- Message from Ale Arriaga sent at 5/31/2017  3:32 PM CDT -----  Contact: self/ [phone]  Patient called in regards needing to schedule annual appointment but patient also want to know if his wife ([phone]) which is due his annual too can be seen the same day around the same time (I checked through the schedule but there is a gap from 9 to 11:30 am  And they would like to know if doctor can do something about it. Please call and advise.           Thank you!!!

## 2017-07-12 ENCOUNTER — HOSPITAL ENCOUNTER (OUTPATIENT)
Dept: CARDIOLOGY | Facility: CLINIC | Age: 82
Discharge: HOME | End: 2017-07-12
Payer: MEDICARE

## 2017-07-12 DIAGNOSIS — I35.0 NONRHEUMATIC AORTIC VALVE STENOSIS: ICD-10-CM

## 2017-07-12 DIAGNOSIS — I10 ESSENTIAL HYPERTENSION: ICD-10-CM

## 2017-07-12 LAB
AORTIC VALVE STENOSIS: ABNORMAL
DIASTOLIC DYSFUNCTION: YES
ESTIMATED PA SYSTOLIC PRESSURE: 28.94
MITRAL VALVE REGURGITATION: ABNORMAL
RETIRED EF AND QEF - SEE NOTES: 60 (ref 55–65)
TRICUSPID VALVE REGURGITATION: ABNORMAL

## 2017-07-12 PROCEDURE — 93010 ELECTROCARDIOGRAM REPORT: CPT | Mod: S$PBB,,, | Performed by: INTERNAL MEDICINE

## 2017-07-12 PROCEDURE — 93306 TTE W/DOPPLER COMPLETE: CPT | Mod: PBBFAC | Performed by: INTERNAL MEDICINE

## 2017-07-14 ENCOUNTER — TELEPHONE (OUTPATIENT)
Dept: ENDOCRINOLOGY | Facility: CLINIC | Age: 82
End: 2017-07-14

## 2017-07-14 NOTE — TELEPHONE ENCOUNTER
Spoke with patient wife whom states that she is trying to schedule patient and her sister for follow up appointment in November it has to be a Tuesday at her request because she is the only one who drives and she works on Mondays informed patient wife will pass it along to Lucretia and see what she can come up with patient wife verbalized understanding

## 2017-07-17 ENCOUNTER — TELEPHONE (OUTPATIENT)
Dept: ENDOCRINOLOGY | Facility: CLINIC | Age: 82
End: 2017-07-17

## 2017-07-17 NOTE — PROGRESS NOTES
Subjective:   Patient ID:  CLARY Marte Jr. is a 88 y.o. male who presents for evaluation of VHD    HPI:Patient is here for valvular heart disease and atherosclerosis.    The patient has no chest pain, SOB, TIA, palpitations, syncope or pre-syncope.Patient does not exercise.        Review of Systems   Constitution: Negative for chills, decreased appetite, diaphoresis, fever, weakness, malaise/fatigue, night sweats, weight gain and weight loss.   HENT: Negative for congestion, headaches, hoarse voice, nosebleeds, sore throat and tinnitus.    Eyes: Negative for blurred vision, double vision, vision loss in left eye, vision loss in right eye, visual disturbance and visual halos.   Cardiovascular: Negative for chest pain, claudication, cyanosis, dyspnea on exertion, irregular heartbeat, leg swelling, near-syncope, orthopnea, palpitations, paroxysmal nocturnal dyspnea and syncope.   Respiratory: Negative for cough, hemoptysis, shortness of breath, sleep disturbances due to breathing, snoring, sputum production and wheezing.    Endocrine: Negative for cold intolerance, heat intolerance, polydipsia, polyphagia and polyuria.   Hematologic/Lymphatic: Negative for adenopathy and bleeding problem. Does not bruise/bleed easily.   Skin: Negative for color change, dry skin, flushing, itching, nail changes, poor wound healing, rash, skin cancer, suspicious lesions and unusual hair distribution.   Musculoskeletal: Negative for arthritis, back pain, falls, gout, joint pain, joint swelling, muscle cramps, muscle weakness, myalgias and stiffness.   Gastrointestinal: Negative for abdominal pain, anorexia, change in bowel habit, constipation, diarrhea, dysphagia, heartburn, hematemesis, hematochezia, melena and vomiting.   Genitourinary: Negative for decreased libido, dysuria, hematuria, hesitancy and urgency.   Neurological: Negative for excessive daytime sleepiness, dizziness, focal weakness, light-headedness, loss of balance,  "numbness, paresthesias, seizures, sensory change, tremors and vertigo.   Psychiatric/Behavioral: Negative for altered mental status, depression, hallucinations, memory loss, substance abuse and suicidal ideas. The patient does not have insomnia and is not nervous/anxious.    Allergic/Immunologic: Negative for environmental allergies and hives.       Objective: BP (!) 140/65 (BP Location: Left arm, Patient Position: Sitting, BP Method: Automatic)   Pulse 68   Ht 5' 9" (1.753 m)   Wt 87.7 kg (193 lb 5.5 oz)   BMI 28.55 kg/m²      Physical Exam   Constitutional: He is oriented to person, place, and time. He appears well-developed and well-nourished. No distress.   HENT:   Head: Normocephalic.   Eyes: EOM are normal. Pupils are equal, round, and reactive to light.   Neck: Normal range of motion. No thyromegaly present.   Cardiovascular: Normal rate, regular rhythm and intact distal pulses.  Exam reveals no gallop and no friction rub.    Murmur heard.   Harsh midsystolic murmur is present with a grade of 2/6  at the upper right sternal border radiating to the neck  Pulses:       Carotid pulses are 3+ on the right side, and 3+ on the left side.       Radial pulses are 3+ on the right side, and 3+ on the left side.        Femoral pulses are 3+ on the right side, and 3+ on the left side.       Popliteal pulses are 3+ on the right side, and 3+ on the left side.        Dorsalis pedis pulses are 3+ on the right side, and 3+ on the left side.        Posterior tibial pulses are 3+ on the right side, and 3+ on the left side.   Pulmonary/Chest: Effort normal and breath sounds normal. No respiratory distress. He has no wheezes. He has no rales. He exhibits no tenderness.   Abdominal: Soft. He exhibits no distension and no mass. There is no tenderness.   Musculoskeletal: Normal range of motion.   Lymphadenopathy:     He has no cervical adenopathy.   Neurological: He is alert and oriented to person, place, and time.   Skin: Skin " is warm. He is not diaphoretic. No cyanosis. Nails show no clubbing.   Psychiatric: He has a normal mood and affect. His speech is normal and behavior is normal. Judgment and thought content normal. Cognition and memory are normal.       Assessment:     1. Nonrheumatic aortic valve stenosis    2. Carotid artery disease, unspecified laterality    3. Essential hypertension    4. Vitamin D deficiency    5. Overweight (BMI 25.0-29.9)        Plan:   Discussed diet , achieving and maintaining ideal body weight, and exercise.   We reviewed meds in detail.  Reassured      CLARY was seen today for nonrheumatic aortic valve stenosis.    Diagnoses and all orders for this visit:    Nonrheumatic aortic valve stenosis  -     EKG 12-lead; Future; Expected date: 07/18/2018  -     2D echo with color flow doppler; Future; Expected date: 07/18/2018  -     CAR Ultrasound doppler carotid bliateral; Future; Expected date: 07/18/2018    Carotid artery disease, unspecified laterality  -     TSH; Future; Expected date: 07/18/2018  -     2D echo with color flow doppler; Future; Expected date: 07/18/2018  -     CAR Ultrasound doppler carotid bliateral; Future; Expected date: 07/18/2018    Essential hypertension  -     TSH; Future; Expected date: 07/18/2018  -     EKG 12-lead; Future; Expected date: 07/18/2018  -     2D echo with color flow doppler; Future; Expected date: 07/18/2018    Vitamin D deficiency    Overweight (BMI 25.0-29.9)            Return in about 1 year (around 7/18/2018) for with ECG, CFD Cindy White to read, carotids and labs.

## 2017-07-18 ENCOUNTER — OFFICE VISIT (OUTPATIENT)
Dept: CARDIOLOGY | Facility: CLINIC | Age: 82
End: 2017-07-18
Payer: MEDICARE

## 2017-07-18 VITALS
HEART RATE: 68 BPM | DIASTOLIC BLOOD PRESSURE: 65 MMHG | HEIGHT: 69 IN | SYSTOLIC BLOOD PRESSURE: 140 MMHG | WEIGHT: 193.31 LBS | BODY MASS INDEX: 28.63 KG/M2

## 2017-07-18 DIAGNOSIS — I10 ESSENTIAL HYPERTENSION: ICD-10-CM

## 2017-07-18 DIAGNOSIS — I77.9 CAROTID ARTERY DISEASE, UNSPECIFIED LATERALITY: ICD-10-CM

## 2017-07-18 DIAGNOSIS — I35.0 NONRHEUMATIC AORTIC VALVE STENOSIS: Primary | ICD-10-CM

## 2017-07-18 DIAGNOSIS — E55.9 VITAMIN D DEFICIENCY: ICD-10-CM

## 2017-07-18 DIAGNOSIS — E66.3 OVERWEIGHT (BMI 25.0-29.9): ICD-10-CM

## 2017-07-18 PROCEDURE — 99214 OFFICE O/P EST MOD 30 MIN: CPT | Mod: PBBFAC | Performed by: INTERNAL MEDICINE

## 2017-07-18 PROCEDURE — 99999 PR PBB SHADOW E&M-EST. PATIENT-LVL IV: CPT | Mod: PBBFAC,,, | Performed by: INTERNAL MEDICINE

## 2017-07-18 PROCEDURE — 1126F AMNT PAIN NOTED NONE PRSNT: CPT | Mod: ,,, | Performed by: INTERNAL MEDICINE

## 2017-07-18 PROCEDURE — 99214 OFFICE O/P EST MOD 30 MIN: CPT | Mod: S$PBB,,, | Performed by: INTERNAL MEDICINE

## 2017-07-18 PROCEDURE — 1159F MED LIST DOCD IN RCRD: CPT | Mod: ,,, | Performed by: INTERNAL MEDICINE

## 2017-07-18 NOTE — PATIENT INSTRUCTIONS
Discussed diet , achieving and maintaining ideal body weight, and exercise.   We reviewed meds in detail.  Reassured

## 2017-07-19 ENCOUNTER — OFFICE VISIT (OUTPATIENT)
Dept: INTERNAL MEDICINE | Facility: CLINIC | Age: 82
End: 2017-07-19
Payer: MEDICARE

## 2017-07-19 VITALS
DIASTOLIC BLOOD PRESSURE: 62 MMHG | HEIGHT: 71 IN | BODY MASS INDEX: 27.13 KG/M2 | HEART RATE: 64 BPM | SYSTOLIC BLOOD PRESSURE: 124 MMHG | WEIGHT: 193.81 LBS

## 2017-07-19 DIAGNOSIS — I10 ESSENTIAL HYPERTENSION: ICD-10-CM

## 2017-07-19 DIAGNOSIS — E78.5 HYPERLIPIDEMIA, UNSPECIFIED HYPERLIPIDEMIA TYPE: ICD-10-CM

## 2017-07-19 DIAGNOSIS — I35.0 NONRHEUMATIC AORTIC VALVE STENOSIS: Primary | ICD-10-CM

## 2017-07-19 PROCEDURE — 99213 OFFICE O/P EST LOW 20 MIN: CPT | Mod: S$PBB,,, | Performed by: INTERNAL MEDICINE

## 2017-07-19 PROCEDURE — 1159F MED LIST DOCD IN RCRD: CPT | Mod: ,,, | Performed by: INTERNAL MEDICINE

## 2017-07-19 PROCEDURE — 99999 PR PBB SHADOW E&M-EST. PATIENT-LVL III: CPT | Mod: PBBFAC,,, | Performed by: INTERNAL MEDICINE

## 2017-07-19 PROCEDURE — 1126F AMNT PAIN NOTED NONE PRSNT: CPT | Mod: ,,, | Performed by: INTERNAL MEDICINE

## 2017-07-19 PROCEDURE — 99213 OFFICE O/P EST LOW 20 MIN: CPT | Mod: PBBFAC | Performed by: INTERNAL MEDICINE

## 2017-07-20 NOTE — PROGRESS NOTES
CHIEF COMPLAINT:  Followup.    HISTORY OF PRESENT ILLNESS:  The patient is an 88-year-old gentleman who we see   for moderate aortic stenosis, hypertension, hyperlipidemia who comes in today   for six-month checkup.  The patient did see Dr. White yesterday.  He has a   repeat echo scheduled for next year as well as a carotid ultrasound.  The   patient has been checking his blood pressure once a week.  The patient did have   blood tests done prior to today's visit for Dr. White, total cholesterol was   144, LDL 79.8.    REVIEW OF SYSTEMS:  He does report about a five-pound weight gain.  He is not   sure where it is, is eating the same, drinking the same.  He does have problems   with macular degeneration.  No chest pain or shortness of breath.  He does   exercise three times a week.  He does not walk with a walker or cane, no nausea   or vomiting, no abdominal pain, no bowel changes.  He does have problems with   carpal tunnel syndrome involving the left hand.  He does state that he has got   prior surgery for this around September.    PHYSICAL EXAMINATION:  GENERAL APPEARANCE:  No acute distress.  HEENT:  Conjunctivae clear.  He does have bilateral cataracts.  TMs are clear.    Nasal septum is midline without discharge.  Oropharynx is without erythema.  NECK:  Trachea is midline without JVD.  PULMONARY:  Good inspiratory, expiratory breath sounds are heard.  Lungs are   clear to auscultation.  CARDIOVASCULAR:  S1, S2 with a 2-3/6 systolic ejection murmur heard best at   right upper sternal border.  EXTREMITIES:  Did show that he had trace to 1+ ankle edema with the right being   worse than the left.  ABDOMEN:  Nontender, nondistended, without hepatosplenomegaly.    ASSESSMENT:  1. Moderate aortic stenosis.  2. Hypertension.  3. Hyperlipidemia.    PLAN:  The patient's blood tests from Dr. White were reviewed from 07/12/2017, I   will not need to order anything today.  The patient is to follow up in six   months for a  physical.      ABDIRAHMAN/NAVEED  dd: 07/19/2017 20:10:28 (CDT)  td: 07/20/2017 02:13:58 (CDT)  Doc ID   #[id number]  Job ID #[id number]    CC:

## 2017-07-24 ENCOUNTER — TELEPHONE (OUTPATIENT)
Dept: ENDOCRINOLOGY | Facility: CLINIC | Age: 82
End: 2017-07-24

## 2017-07-24 DIAGNOSIS — E04.2 MULTINODULAR GOITER: Primary | ICD-10-CM

## 2017-07-25 RX ORDER — FUROSEMIDE 20 MG/1
TABLET ORAL
Qty: 90 TABLET | Refills: 3 | Status: SHIPPED | OUTPATIENT
Start: 2017-07-25 | End: 2019-02-03 | Stop reason: SDUPTHER

## 2017-07-30 DIAGNOSIS — I35.0 NONRHEUMATIC AORTIC VALVE STENOSIS: ICD-10-CM

## 2017-07-31 RX ORDER — PRAVASTATIN SODIUM 20 MG/1
TABLET ORAL
Qty: 90 TABLET | Refills: 0 | Status: SHIPPED | OUTPATIENT
Start: 2017-07-31 | End: 2017-10-29 | Stop reason: SDUPTHER

## 2017-08-16 ENCOUNTER — TELEPHONE (OUTPATIENT)
Dept: ORTHOPEDICS | Facility: CLINIC | Age: 82
End: 2017-08-16

## 2017-08-16 NOTE — TELEPHONE ENCOUNTER
----- Message from Ramiro Peng sent at 8/16/2017 12:41 PM CDT -----  Contact: Pat, patient's wife  X_  1st Request  _  2nd Request  _  3rd Request        Who: Pat, patient's wife    Why: Patient needs to reschedule her 's surgery. Patient also needs to have consent appt 60 days and physical 30 days before. Please call back to follow up. Patient would like to be seen on Oct 4.    What Number to Call Back: [phone]    When to Expect a call back: (With in 24 hours)

## 2017-10-01 DIAGNOSIS — K21.9 GASTROESOPHAGEAL REFLUX DISEASE, ESOPHAGITIS PRESENCE NOT SPECIFIED: ICD-10-CM

## 2017-10-02 DIAGNOSIS — K21.9 GASTROESOPHAGEAL REFLUX DISEASE, ESOPHAGITIS PRESENCE NOT SPECIFIED: ICD-10-CM

## 2017-10-02 RX ORDER — OMEPRAZOLE 20 MG/1
CAPSULE, DELAYED RELEASE ORAL
Qty: 30 CAPSULE | Refills: 6 | Status: SHIPPED | OUTPATIENT
Start: 2017-10-02 | End: 2017-10-02

## 2017-10-02 RX ORDER — OMEPRAZOLE 20 MG/1
20 CAPSULE, DELAYED RELEASE ORAL DAILY
Qty: 30 CAPSULE | Refills: 5 | Status: SHIPPED | OUTPATIENT
Start: 2017-10-02 | End: 2018-05-18

## 2017-10-03 ENCOUNTER — OFFICE VISIT (OUTPATIENT)
Dept: ORTHOPEDICS | Facility: CLINIC | Age: 82
End: 2017-10-03
Payer: MEDICARE

## 2017-10-03 VITALS — DIASTOLIC BLOOD PRESSURE: 83 MMHG | HEART RATE: 68 BPM | RESPIRATION RATE: 18 BRPM | SYSTOLIC BLOOD PRESSURE: 145 MMHG

## 2017-10-03 DIAGNOSIS — G56.02 CARPAL TUNNEL SYNDROME OF LEFT WRIST: Primary | ICD-10-CM

## 2017-10-03 PROCEDURE — 99999 PR PBB SHADOW E&M-EST. PATIENT-LVL III: CPT | Mod: PBBFAC,,, | Performed by: ORTHOPAEDIC SURGERY

## 2017-10-03 PROCEDURE — 99214 OFFICE O/P EST MOD 30 MIN: CPT | Mod: S$PBB,,, | Performed by: ORTHOPAEDIC SURGERY

## 2017-10-03 PROCEDURE — 99213 OFFICE O/P EST LOW 20 MIN: CPT | Mod: PBBFAC | Performed by: ORTHOPAEDIC SURGERY

## 2017-10-03 RX ORDER — POLYETHYLENE GLYCOL 3350 17 G/17G
POWDER, FOR SOLUTION ORAL
Status: ON HOLD | COMMUNITY
End: 2021-12-16 | Stop reason: SDUPTHER

## 2017-10-03 RX ORDER — MUPIROCIN 20 MG/G
1 OINTMENT TOPICAL
Status: CANCELLED | OUTPATIENT
Start: 2017-10-03

## 2017-10-03 NOTE — PROGRESS NOTES
Subjective:      Patient ID: CLARY Marte Jr. is a 88 y.o. male.    Chief Complaint: Pain of the Left Hand      HPI  CLARY Marte Jr. is a  88 y.o. male presenting today for follow up of left carpal tunnel syndrome and left thumb trigger finger.  He reports that he had to cancel his surgery scheduled for 5/2017 due to having a skin cancer on the dorsal aspect of the left hand that needed to be removed.  He is here today to schedule his left carpal tunnel release.  He reports that the left finger numbness and pain have increased since his last visit.  He has difficulty tying his shoes which is a change since May 2017.  He reports that his finger numbness is now constant in the left long, index, and thumb.  He reports intermittent forearm pain.  He states that the thumb triggering has resolved since his last visit.        Review of patient's allergies indicates:   Allergen Reactions    Codeine      Other reaction(s): Nausea    Cortisone      Other reaction(s): Unknown    Dexamethasone Other (See Comments)     Constant hiccups for days         Current Outpatient Prescriptions   Medication Sig Dispense Refill    acetaminophen (TYLENOL) 500 MG tablet Take 500 mg by mouth every 6 (six) hours as needed for Pain.      aspirin 81 mg Tab Take 81 mg by mouth once daily.       CALCIUM CARBONATE/VITAMIN D3 (CALCIUM 600 + D,3, ORAL) Take 1 capsule by mouth once daily.      clobetasol (TEMOVATE) 0.05 % external solution Apply topically 2 (two) times daily.       coenzyme Q10 (CO Q-10) 10 mg capsule Take 10 mg by mouth once daily.       desoximetasone (TOPICORT) 0.25 % cream Apply topically as needed.      ergocalciferol (ERGOCALCIFEROL) 50,000 unit Cap 1 tab every 2 weeks 6 capsule 3    furosemide (LASIX) 20 MG tablet TAKE 1 TABLET(20 MG) BY MOUTH DAILY AS NEEDED 90 tablet 3    multivitamin-minerals-lutein (CENTRUM SILVER) Tab Take 1 tablet by mouth once daily.       omeprazole (PRILOSEC) 20 MG capsule  Take 1 capsule (20 mg total) by mouth once daily. 30 capsule 5    polyethylene glycol (GLYCOLAX) 17 gram PwPk Take by mouth.      pravastatin (PRAVACHOL) 20 MG tablet TAKE 1 TABLET BY MOUTH AT NIGHT 90 tablet 0    ramipril (ALTACE) 10 MG capsule Take 1 capsule (10 mg total) by mouth once daily. 90 capsule 3    tretinoin (RETIN-A) 0.05 % cream Apply 1 application topically nightly.       VIT A,C & E/LUTEIN/MINERALS (OCUVITE WITH LUTEIN ORAL) Take 1 capsule by mouth once daily.      MUSE 1,000 mcg pellet as needed.   6     No current facility-administered medications for this visit.        Past Medical History:   Diagnosis Date    Heart murmur     Hyperlipidemia     Hypertension     Insomnia        Past Surgical History:   Procedure Laterality Date    APPENDECTOMY           Review of Systems:  Review of Systems   Constitution: Negative for chills and fever.   Skin: Negative for rash and suspicious lesions.   Musculoskeletal:        See HPI   Neurological: Negative for dizziness, headaches and light-headedness.   Psychiatric/Behavioral: Negative for depression. The patient is not nervous/anxious.          OBJECTIVE:     PHYSICAL EXAM:          Vitals:    10/03/17 1040   BP: (!) 145/83   Pulse: 68   Resp: 18     General    Vitals reviewed.  Constitutional: He is oriented to person, place, and time. He appears well-developed and well-nourished.   HENT:   Head: Normocephalic and atraumatic.   Neck: Normal range of motion.   Cardiovascular: Normal rate.    Pulmonary/Chest: Effort normal. No respiratory distress.   Neurological: He is alert and oriented to person, place, and time.   Psychiatric: He has a normal mood and affect. His behavior is normal. Judgment and thought content normal.             Musculoskeletal: Mild thenar atrophy appreciated on the left.  Good range of motion of the fingers and wrist.  No tenderness to palpation.  No left thumb triggering appreciated on exam today.  Neurovascularly  intact-decreased sensation is noted in the median distribution on the left.  Good motor function and 2+ radial pulses.  Positive carpal tunnel compression test on the left.        EMG, 1/2017:  Impression   This study is abnormal. There is electrophysiologic evidence for bilateral median neuropathies at the wrist (carpal tunnel syndrome).     ASSESSMENT/PLAN:   CLARY was seen today for pain.    Diagnoses and all orders for this visit:    Carpal tunnel syndrome of left wrist  -     Vital signs; Standing  -     Diet NPO; Standing  -     Place in Outpatient; Standing  -     Insert peripheral IV; Standing  -     Patient may leave on underwear for knee, ankle, and upper extremity surgery; Standing  -     Place JAYNA hose; Standing  -     Place sequential compression device; Standing    Other orders  -     mupirocin 2 % ointment 1 g; 1 g by Nasal route On call Procedure (Surgery).  -     Medium Risk of VTE; Standing  -     ceFAZolin (ANCEF) 2 g in dextrose 5 % 100 mL IVPB; Inject 2 g into the vein On call Procedure (Surgery).        - Again the risks and indications of carpal tunnel release were discussed with the patient.  His questions were answered.  He signed consent form stating clinic and scheduled surgery.  - Call with any questions or concerns

## 2017-10-09 NOTE — PROGRESS NOTES
I have personally taken the history and examined the patient. I agree with the Hand Surgery PA's note. The plan will be surgical treatment of carpal tunnel syndrome. Pt understands risk and benefits of surgical treatment. He understands that the strength may not return

## 2017-10-16 DIAGNOSIS — G56.02 LEFT CARPAL TUNNEL SYNDROME: Primary | ICD-10-CM

## 2017-10-24 NOTE — PRE-PROCEDURE INSTRUCTIONS
PreOp Instructions given:     - Verbal medication information (what to hold and what to take)   - NPO guidelines   - Arrival place directions given; time to be given the day before procedure by the   Surgeon's Office   - Bathing with antibacterial soap   - Don't wear any jewelry or bring any valuables AM of surgery   - No makeup or moisturizer to face   - No perfume/cologne, powder, lotions or aftershave     Pt. verbalized understanding.     Denies any family history of side effects or issues with anesthesia or sedation.

## 2017-10-25 ENCOUNTER — SURGERY (OUTPATIENT)
Age: 82
End: 2017-10-25

## 2017-10-25 ENCOUNTER — ANESTHESIA EVENT (OUTPATIENT)
Dept: SURGERY | Facility: HOSPITAL | Age: 82
End: 2017-10-25
Payer: MEDICARE

## 2017-10-25 ENCOUNTER — ANESTHESIA (OUTPATIENT)
Dept: SURGERY | Facility: HOSPITAL | Age: 82
End: 2017-10-25
Payer: MEDICARE

## 2017-10-25 ENCOUNTER — HOSPITAL ENCOUNTER (OUTPATIENT)
Facility: HOSPITAL | Age: 82
Discharge: HOME | End: 2017-10-25
Attending: ORTHOPAEDIC SURGERY | Admitting: ORTHOPAEDIC SURGERY
Payer: MEDICARE

## 2017-10-25 VITALS
OXYGEN SATURATION: 98 % | SYSTOLIC BLOOD PRESSURE: 124 MMHG | WEIGHT: 188 LBS | RESPIRATION RATE: 18 BRPM | DIASTOLIC BLOOD PRESSURE: 68 MMHG | HEART RATE: 62 BPM | BODY MASS INDEX: 26.92 KG/M2 | TEMPERATURE: 97 F | HEIGHT: 70 IN

## 2017-10-25 DIAGNOSIS — G56.02 CARPAL TUNNEL SYNDROME OF LEFT WRIST: ICD-10-CM

## 2017-10-25 PROCEDURE — D9220A PRA ANESTHESIA: Mod: ANES,,, | Performed by: ANESTHESIOLOGY

## 2017-10-25 PROCEDURE — 25000003 PHARM REV CODE 250: Performed by: ORTHOPAEDIC SURGERY

## 2017-10-25 PROCEDURE — 25000003 PHARM REV CODE 250: Performed by: PHYSICIAN ASSISTANT

## 2017-10-25 PROCEDURE — 37000009 HC ANESTHESIA EA ADD 15 MINS: Performed by: ORTHOPAEDIC SURGERY

## 2017-10-25 PROCEDURE — 64721 CARPAL TUNNEL SURGERY: CPT | Mod: LT,GC,, | Performed by: ORTHOPAEDIC SURGERY

## 2017-10-25 PROCEDURE — 25000003 PHARM REV CODE 250: Performed by: ANESTHESIOLOGY

## 2017-10-25 PROCEDURE — 37000008 HC ANESTHESIA 1ST 15 MINUTES: Performed by: ORTHOPAEDIC SURGERY

## 2017-10-25 PROCEDURE — 71000033 HC RECOVERY, INTIAL HOUR: Performed by: ORTHOPAEDIC SURGERY

## 2017-10-25 PROCEDURE — D9220A PRA ANESTHESIA: Mod: CRNA,,, | Performed by: NURSE ANESTHETIST, CERTIFIED REGISTERED

## 2017-10-25 PROCEDURE — 36000706: Performed by: ORTHOPAEDIC SURGERY

## 2017-10-25 PROCEDURE — 27201423 OPTIME MED/SURG SUP & DEVICES STERILE SUPPLY: Performed by: ORTHOPAEDIC SURGERY

## 2017-10-25 PROCEDURE — 25000003 PHARM REV CODE 250

## 2017-10-25 PROCEDURE — 63600175 PHARM REV CODE 636 W HCPCS: Performed by: NURSE ANESTHETIST, CERTIFIED REGISTERED

## 2017-10-25 PROCEDURE — 63600175 PHARM REV CODE 636 W HCPCS: Performed by: PHYSICIAN ASSISTANT

## 2017-10-25 PROCEDURE — 71000015 HC POSTOP RECOV 1ST HR: Performed by: ORTHOPAEDIC SURGERY

## 2017-10-25 PROCEDURE — 36000707: Performed by: ORTHOPAEDIC SURGERY

## 2017-10-25 PROCEDURE — 25000003 PHARM REV CODE 250: Performed by: NURSE ANESTHETIST, CERTIFIED REGISTERED

## 2017-10-25 RX ORDER — PROPOFOL 10 MG/ML
VIAL (ML) INTRAVENOUS
Status: DISCONTINUED | OUTPATIENT
Start: 2017-10-25 | End: 2017-10-25

## 2017-10-25 RX ORDER — PROMETHAZINE HYDROCHLORIDE 25 MG/1
25 TABLET ORAL EVERY 6 HOURS PRN
Status: DISCONTINUED | OUTPATIENT
Start: 2017-10-25 | End: 2017-10-25 | Stop reason: HOSPADM

## 2017-10-25 RX ORDER — ONDANSETRON 8 MG/1
8 TABLET, ORALLY DISINTEGRATING ORAL EVERY 8 HOURS PRN
Qty: 42 TABLET | Refills: 0 | Status: SHIPPED | OUTPATIENT
Start: 2017-10-25 | End: 2017-11-14

## 2017-10-25 RX ORDER — LIDOCAINE HYDROCHLORIDE 10 MG/ML
INJECTION, SOLUTION EPIDURAL; INFILTRATION; INTRACAUDAL; PERINEURAL
Status: COMPLETED
Start: 2017-10-25 | End: 2017-10-25

## 2017-10-25 RX ORDER — LIDOCAINE HCL/PF 100 MG/5ML
SYRINGE (ML) INTRAVENOUS
Status: DISCONTINUED | OUTPATIENT
Start: 2017-10-25 | End: 2017-10-25

## 2017-10-25 RX ORDER — ACETAMINOPHEN 325 MG/1
650 TABLET ORAL EVERY 4 HOURS PRN
Status: DISCONTINUED | OUTPATIENT
Start: 2017-10-25 | End: 2017-10-25 | Stop reason: HOSPADM

## 2017-10-25 RX ORDER — HYDROCODONE BITARTRATE AND ACETAMINOPHEN 5; 325 MG/1; MG/1
1 TABLET ORAL EVERY 4 HOURS PRN
Status: DISCONTINUED | OUTPATIENT
Start: 2017-10-25 | End: 2017-10-25 | Stop reason: HOSPADM

## 2017-10-25 RX ORDER — FENTANYL CITRATE 50 UG/ML
25 INJECTION, SOLUTION INTRAMUSCULAR; INTRAVENOUS EVERY 5 MIN PRN
Status: DISCONTINUED | OUTPATIENT
Start: 2017-10-25 | End: 2017-10-25 | Stop reason: HOSPADM

## 2017-10-25 RX ORDER — LIDOCAINE HYDROCHLORIDE 10 MG/ML
INJECTION, SOLUTION EPIDURAL; INFILTRATION; INTRACAUDAL; PERINEURAL
Status: DISCONTINUED | OUTPATIENT
Start: 2017-10-25 | End: 2017-10-25 | Stop reason: HOSPADM

## 2017-10-25 RX ORDER — HYDROCODONE BITARTRATE AND ACETAMINOPHEN 10; 325 MG/1; MG/1
1 TABLET ORAL EVERY 4 HOURS PRN
Qty: 37 TABLET | Refills: 0 | Status: SHIPPED | OUTPATIENT
Start: 2017-10-25 | End: 2017-11-14

## 2017-10-25 RX ORDER — LIDOCAINE HYDROCHLORIDE 10 MG/ML
1 INJECTION, SOLUTION EPIDURAL; INFILTRATION; INTRACAUDAL; PERINEURAL ONCE
Status: COMPLETED | OUTPATIENT
Start: 2017-10-25 | End: 2017-10-25

## 2017-10-25 RX ORDER — BACITRACIN 500 [USP'U]/G
OINTMENT TOPICAL
Status: DISCONTINUED
Start: 2017-10-25 | End: 2017-10-25 | Stop reason: HOSPADM

## 2017-10-25 RX ORDER — MUPIROCIN 20 MG/G
1 OINTMENT TOPICAL 2 TIMES DAILY
Status: DISCONTINUED | OUTPATIENT
Start: 2017-10-25 | End: 2017-10-25 | Stop reason: HOSPADM

## 2017-10-25 RX ORDER — MUPIROCIN 20 MG/G
1 OINTMENT TOPICAL
Status: COMPLETED | OUTPATIENT
Start: 2017-10-25 | End: 2017-10-25

## 2017-10-25 RX ORDER — LIDOCAINE HYDROCHLORIDE 10 MG/ML
INJECTION INFILTRATION; PERINEURAL
Status: DISCONTINUED
Start: 2017-10-25 | End: 2017-10-25 | Stop reason: HOSPADM

## 2017-10-25 RX ORDER — SODIUM CHLORIDE 0.9 % (FLUSH) 0.9 %
3 SYRINGE (ML) INJECTION
Status: DISCONTINUED | OUTPATIENT
Start: 2017-10-25 | End: 2017-10-25 | Stop reason: HOSPADM

## 2017-10-25 RX ORDER — SODIUM CHLORIDE 9 MG/ML
INJECTION, SOLUTION INTRAVENOUS CONTINUOUS
Status: DISCONTINUED | OUTPATIENT
Start: 2017-10-25 | End: 2017-10-25 | Stop reason: HOSPADM

## 2017-10-25 RX ORDER — BACITRACIN ZINC 500 UNIT/G
OINTMENT (GRAM) TOPICAL
Status: DISCONTINUED | OUTPATIENT
Start: 2017-10-25 | End: 2017-10-25 | Stop reason: HOSPADM

## 2017-10-25 RX ORDER — BUPIVACAINE HYDROCHLORIDE 2.5 MG/ML
INJECTION, SOLUTION EPIDURAL; INFILTRATION; INTRACAUDAL
Status: DISCONTINUED
Start: 2017-10-25 | End: 2017-10-25 | Stop reason: HOSPADM

## 2017-10-25 RX ORDER — ONDANSETRON 2 MG/ML
4 INJECTION INTRAMUSCULAR; INTRAVENOUS EVERY 12 HOURS PRN
Status: DISCONTINUED | OUTPATIENT
Start: 2017-10-25 | End: 2017-10-25 | Stop reason: HOSPADM

## 2017-10-25 RX ORDER — CEFAZOLIN SODIUM 2 G/50ML
2 SOLUTION INTRAVENOUS
Status: COMPLETED | OUTPATIENT
Start: 2017-10-25 | End: 2017-10-25

## 2017-10-25 RX ORDER — BUPIVACAINE HYDROCHLORIDE 2.5 MG/ML
INJECTION, SOLUTION EPIDURAL; INFILTRATION; INTRACAUDAL
Status: DISCONTINUED | OUTPATIENT
Start: 2017-10-25 | End: 2017-10-25 | Stop reason: HOSPADM

## 2017-10-25 RX ORDER — HYDROCODONE BITARTRATE AND ACETAMINOPHEN 10; 325 MG/1; MG/1
1 TABLET ORAL EVERY 4 HOURS PRN
Status: DISCONTINUED | OUTPATIENT
Start: 2017-10-25 | End: 2017-10-25 | Stop reason: HOSPADM

## 2017-10-25 RX ORDER — DOCUSATE SODIUM 100 MG/1
100 CAPSULE, LIQUID FILLED ORAL 3 TIMES DAILY
Qty: 90 CAPSULE | Refills: 0 | Status: SHIPPED | OUTPATIENT
Start: 2017-10-25 | End: 2017-11-14

## 2017-10-25 RX ORDER — FENTANYL CITRATE 50 UG/ML
INJECTION, SOLUTION INTRAMUSCULAR; INTRAVENOUS
Status: DISCONTINUED | OUTPATIENT
Start: 2017-10-25 | End: 2017-10-25

## 2017-10-25 RX ORDER — PROPOFOL 10 MG/ML
VIAL (ML) INTRAVENOUS CONTINUOUS PRN
Status: DISCONTINUED | OUTPATIENT
Start: 2017-10-25 | End: 2017-10-25

## 2017-10-25 RX ORDER — ETOMIDATE 2 MG/ML
INJECTION INTRAVENOUS
Status: DISCONTINUED | OUTPATIENT
Start: 2017-10-25 | End: 2017-10-25

## 2017-10-25 RX ADMIN — CEFAZOLIN SODIUM 2 G: 2 SOLUTION INTRAVENOUS at 07:10

## 2017-10-25 RX ADMIN — LIDOCAINE HYDROCHLORIDE 10 ML: 10 INJECTION, SOLUTION EPIDURAL; INFILTRATION; INTRACAUDAL; PERINEURAL at 08:10

## 2017-10-25 RX ADMIN — BACITRACIN ZINC 1 TUBE: 500 OINTMENT TOPICAL at 07:10

## 2017-10-25 RX ADMIN — SODIUM CHLORIDE: 0.9 INJECTION, SOLUTION INTRAVENOUS at 07:10

## 2017-10-25 RX ADMIN — MUPIROCIN 1 G: 20 OINTMENT TOPICAL at 07:10

## 2017-10-25 RX ADMIN — PROPOFOL 50 MG: 10 INJECTION, EMULSION INTRAVENOUS at 07:10

## 2017-10-25 RX ADMIN — PROPOFOL 100 MCG/KG/MIN: 10 INJECTION, EMULSION INTRAVENOUS at 07:10

## 2017-10-25 RX ADMIN — FENTANYL CITRATE 25 MCG: 50 INJECTION, SOLUTION INTRAMUSCULAR; INTRAVENOUS at 08:10

## 2017-10-25 RX ADMIN — LIDOCAINE HYDROCHLORIDE 10 MG: 10 INJECTION, SOLUTION EPIDURAL; INFILTRATION; INTRACAUDAL; PERINEURAL at 07:10

## 2017-10-25 RX ADMIN — LIDOCAINE HYDROCHLORIDE 75 MG: 20 INJECTION, SOLUTION INTRAVENOUS at 07:10

## 2017-10-25 RX ADMIN — ETOMIDATE 4 MG: 2 INJECTION, SOLUTION INTRAVENOUS at 08:10

## 2017-10-25 RX ADMIN — BUPIVACAINE HYDROCHLORIDE 10 ML: 2.5 INJECTION, SOLUTION EPIDURAL; INFILTRATION; INTRACAUDAL; PERINEURAL at 08:10

## 2017-10-25 NOTE — DISCHARGE INSTRUCTIONS
Discharge Instructions for Carpal Tunnel Release  You had a carpal tunnel release procedure to help relieve the symptoms of carpal tunnel syndrome. In carpal tunnel syndrome, a nerve in the wrist is compressed and irritated. This causes numbness and pain in the fingers and hand. Carpal tunnel release relieves the compression of the nerve. Here are instructions that will help you care for your arm and wrist when you are at home.  Home care  · Avoid gripping objects tightly or lifting with your affected arm.  · Wear your bandage, splint, or cast as directed by your doctor.  · Always keep the dressing, splint, or cast dry and clean.  · When showering, cover your hand and  wrist with plastic and use tape or rubberbands to keep the dressing, splint, or cast dry. Shower as necessary.    · Use an ice pack or bag of frozen peas -- or something similar -- wrapped in a thin towel on your wrist to reduce swelling for the first 48 hours. Leave the ice pack on for 20 minutes; then take it off for 20 minutes. Repeat as needed.  · Keep your arm elevated above your heart for 24 to 48 hours after surgery.  · Do the exercises you learned in the hospital, or as instructed by your doctor.  · Take pain medicine as directed.  · Dont drive until your doctor says its OK. Never drive while you are taking opioid pain medicine.  · Ask your doctor when you can return to work. If your job requires heavy lifting, you may not be able to begin working again for several weeks.  Follow-up care  Make a follow-up appointment as directed by your doctor.     When to seek medical care  Call 911 right away if you have any of the following:  · Chest pain  · Shortness of breath  Otherwise, call your doctor immediately if you have any of the following:  · A splint, cast, or dressing that has gotten wet  · Increased bleeding or drainage from the incision (cut)  · Opening of the incision  · Fever above 100.4°F (38.9°C) taken by mouth, or shaking  chills  · Any new numbness in the fingers or thumb  · Blue hand or fingers  · Increased pain with or without activity  · Increased redness, tenderness, or swelling of the incision   Date Last Reviewed: 11/15/2015  © 2789-8930 [employer]. [address] [place] [address]. All rights reserved. This information is not intended as a substitute for professional medical care. Always follow your healthcare professional's instructions.

## 2017-10-25 NOTE — BRIEF OP NOTE
[facility]  Brief Operative Note     SUMMARY     Surgery Date: 10/25/2017     Surgeon(s) and Role:     * Tracee Lewis MD - Primary     * Amos Fonseca MD - Resident - Assisting        Pre-op Diagnosis:  Left carpal tunnel syndrome [G56.02]    Post-op Diagnosis:  Post-Op Diagnosis Codes:     * Left carpal tunnel syndrome [G56.02]    Procedure(s) (LRB):  RELEASE-CARPAL TUNNEL left (Left)    Anesthesia: Local MAC    Description of the findings of the procedure: see op note    Estimated Blood Loss: * No values recorded between 10/25/2017  8:08 AM and 10/25/2017  8:28 AM *         Specimens:   Specimen (12h ago through future)    None          Discharge Note    SUMMARY     Admit Date: 10/25/2017    Discharge Date and Time:  10/25/2017 8:35 AM    Hospital Course (synopsis of major diagnoses, care, treatment, and services provided during the course of the hospital stay): Patient presented for above procedure.  Tolerated it well and was discharged home POD0 after voiding, tolerating diet, ambulating, pain controlled.  Discharge instructions, follow-up appointment, and med rec are below.     Final Diagnosis: Post-Op Diagnosis Codes:     * Left carpal tunnel syndrome [G56.02]    Disposition: Home or Self Care    Follow Up/Patient Instructions:     Medications:  Reconciled Home Medications:   Current Discharge Medication List      START taking these medications    Details   docusate sodium (COLACE) 100 MG capsule Take 1 capsule (100 mg total) by mouth 3 (three) times daily.  Qty: 90 capsule, Refills: 0      hydrocodone-acetaminophen 10-325mg (NORCO)  mg Tab Take 1 tablet by mouth every 4 (four) hours as needed for Pain.  Qty: 37 tablet, Refills: 0      ondansetron (ZOFRAN-ODT) 8 MG TbDL Take 1 tablet (8 mg total) by mouth every 8 (eight) hours as needed.  Qty: 42 tablet, Refills: 0         CONTINUE these medications which have NOT CHANGED    Details   CALCIUM CARBONATE/VITAMIN D3  (CALCIUM 600 + D,3, ORAL) Take 1 capsule by mouth once daily.      coenzyme Q10 (CO Q-10) 10 mg capsule Take 10 mg by mouth once daily.       desoximetasone (TOPICORT) 0.25 % cream Apply topically as needed.      ergocalciferol (ERGOCALCIFEROL) 50,000 unit Cap 1 tab every 2 weeks  Qty: 6 capsule, Refills: 3      furosemide (LASIX) 20 MG tablet TAKE 1 TABLET(20 MG) BY MOUTH DAILY AS NEEDED  Qty: 90 tablet, Refills: 3      multivitamin-minerals-lutein (CENTRUM SILVER) Tab Take 1 tablet by mouth once daily.       omeprazole (PRILOSEC) 20 MG capsule Take 1 capsule (20 mg total) by mouth once daily.  Qty: 30 capsule, Refills: 5    Associated Diagnoses: Gastroesophageal reflux disease, esophagitis presence not specified      pravastatin (PRAVACHOL) 20 MG tablet TAKE 1 TABLET BY MOUTH AT NIGHT  Qty: 90 tablet, Refills: 0    Associated Diagnoses: Nonrheumatic aortic valve stenosis      ramipril (ALTACE) 10 MG capsule Take 1 capsule (10 mg total) by mouth once daily.  Qty: 90 capsule, Refills: 3      tretinoin (RETIN-A) 0.05 % cream Apply 1 application topically nightly.       VIT A,C & E/LUTEIN/MINERALS (OCUVITE WITH LUTEIN ORAL) Take 1 capsule by mouth once daily.      aspirin 81 mg Tab Take 81 mg by mouth once daily.       clobetasol (TEMOVATE) 0.05 % external solution Apply topically 2 (two) times daily.       polyethylene glycol (GLYCOLAX) 17 gram PwPk Take by mouth.         STOP taking these medications       acetaminophen (TYLENOL) 500 MG tablet Comments:   Reason for Stopping:         MUSE 1,000 mcg pellet Comments:   Reason for Stopping:               Discharge Procedure Orders  Diet general     Call MD for:  temperature >100.4     Call MD for:  persistent nausea and vomiting     Call MD for:  severe uncontrolled pain     Call MD for:  difficulty breathing, headache or visual disturbances     Call MD for:  redness, tenderness, or signs of infection (pain, swelling, redness, odor or green/yellow discharge around  incision site)     Call MD for:  hives     Call MD for:  persistent dizziness or light-headedness     Call MD for:  extreme fatigue     Sponge bath only until clinic visit     Keep surgical extremity elevated     Lifting restrictions   Scheduling Instructions: No heavy lifting LUE     No driving, operating heavy equipment or signing legal documents while taking pain medication.     Leave dressing on - Keep it clean, dry, and intact until clinic visit       Follow-up Information     Tracee Lewis MD In 2 weeks.    Specialties:  Hand Surgery, Orthopedic Surgery  Contact information:  [address]  [address]  [facility]  [facility] [id number] [phone]

## 2017-10-25 NOTE — ANESTHESIA PREPROCEDURE EVALUATION
10/25/2017  Misha Marte Jr. is a 88 y.o., male.    Anesthesia Evaluation    I have reviewed the Patient Summary Reports.     I have reviewed the Medications.     Review of Systems  Anesthesia Hx:  No problems with previous Anesthesia Denies Hx of Anesthetic complications  History of prior surgery of interest to airway management or planning:  Denies Personal Hx of Anesthesia complications.   Social:  Non-Smoker    Hematology/Oncology:  Hematology Normal   Oncology Normal     EENT/Dental:EENT/Dental Normal   Cardiovascular:   Hypertension Valvular problems/Murmurs, AS  Denies Angina. PVD ECG has been reviewed.    Pulmonary:   Denies Shortness of breath.    Hepatic/GI:  Hepatic/GI Normal    Musculoskeletal:  Musculoskeletal Normal    Neurological:  Neurology Normal    Endocrine:  Endocrine Normal    Dermatological:  Skin Normal    Psych:  Psychiatric Normal           Physical Exam  General:  Well nourished    Airway/Jaw/Neck:  Airway Findings: Mouth Opening: Normal Tongue: Normal  General Airway Assessment: Adult  Mallampati: II  TM Distance: Normal, at least 6 cm  Jaw/Neck Findings:  Neck ROM: Normal ROM      Dental:  Dental Findings: In tact   Chest/Lungs:  Chest/Lungs Findings: Clear to auscultation, Normal Respiratory Rate     Heart/Vascular:  Heart Findings: Rate: Normal  Rhythm: Regular Rhythm  Heart Murmur  Systolic  Systolic Heart Murmur Description: Holosystolic        Mental Status:  Mental Status Findings:  Cooperative, Alert and Oriented         Anesthesia Plan  Type of Anesthesia, risks & benefits discussed:  Anesthesia Type:  general, MAC  Patient's Preference:   Intra-op Monitoring Plan: standard ASA monitors  Intra-op Monitoring Plan Comments:   Post Op Pain Control Plan:   Post Op Pain Control Plan Comments:   Induction:   IV  Beta Blocker:  Patient is not currently on a Beta-Blocker (No  further documentation required).       Informed Consent: Patient understands risks and agrees with Anesthesia plan.  Questions answered. Anesthesia consent signed with patient.  ASA Score: 3     Day of Surgery Review of History & Physical:    H&P update referred to the surgeon.         Ready For Surgery From Anesthesia Perspective.

## 2017-10-25 NOTE — PLAN OF CARE
Problem: Patient Care Overview  Goal: Plan of Care Review  Outcome: Outcome(s) achieved Date Met: 10/25/17  Discharge instructions given to patient and spouse. Verbalize understanding Tolerating fluids. No complaints noted. Adequate for discharge at this time.

## 2017-10-25 NOTE — OP NOTE
DATE OF PROCEDURE: 10/25/17  SERVICE: Orthopedics.   ATTENDING SURGEON: Tracee Lewis M.D.   ASSISTANT SURGEON: Layton  PREOPERATIVE DIAGNOSIS: left carpal tunnel syndrome.   POSTOPERATIVE DIAGNOSIS: left carpal tunnel syndrome.   PROCEDURE: left carpal tunnel release.   ANESTHESIA: Local placed by surgeon and MAC.   FLUIDS: Lactated Ringers.   BLOOD LOSS: None. No blood was given.   TOURNIQUET TIME: 12 minutes.   PACKS AND DRAINS: None.   IMPLANTS: None.   SPECIMENS: None.   COMPLICATIONS: None.   INDICATIONS FOR PROCEDURE: Mr. Marc is a 88-year-old male who had numbness   and tingling into her left hand. He has failed conservative treatment, EMG   was positive for carpal tunnel syndrome. Therefore, operative intervention was   deemed necessary. Risks and benefits were explained to the patient in clinic   since performed in clinic. Of note he has significant thenar atrophy.   PROCEDURE IN DETAIL: After correct site was marked with the patient's   participation in the holding area, the patient was brought to the Operating   Room, placed in supine position, underwent MAC anesthesia. A well-padded   nonsterile tourniquet was placed on the left arm. A time-out was called for   correct site, procedure and patient to be indicated. Under sterile conditions,   an injection of lidocaine 1% was injected into the carpal tunnel space. The arm   was prepped and draped in normal sterile fashion. The incision was marked out   using Kramer cardinal lines. The arm was exsanguinated using Esmarch.   Tourniquet was insufflated to 250 mmHg and that is where it remained for a total   of 10 minutes. The incision was made down to the palmar fascia. The palmar   fascia was sharply incised. The transverse ligament was identified. It was   very thick in nature. It was sharply incised. Median nerve was identified. A   Mosquito hemostat was passed from the undersurface of the transverse ligament   proximally and distally to free it  from the median nerve. Metzenbaum scissors   were utilized to cut the transverse ligament proximally and distally. Once that   was completely released, a Mosquito hemostat was passed again to confirm a   complete release. Once that was performed, the area was irrigated with copious   amounts of normal saline. Nylon closed the skin. Sterile dressing was applied.   Tourniquet was deflated. Brisk capillary refill ensued. The patient was   transported to the Recovery Room in stable condition.   POSTOPERATIVE PLAN FOR THIS PATIENT: She is to keep her dressing clean, dry and   intact. We will see her back in 2 weeks for stitch removal.

## 2017-10-25 NOTE — TRANSFER OF CARE
"Anesthesia Transfer of Care Note    Patient: Misha Marte Jr.    Procedure(s) Performed: Procedure(s) (LRB):  RELEASE-CARPAL TUNNEL left (Left)    Patient location: PACU    Anesthesia Type: general    Transport from OR: Transported from OR on 6-10 L/min O2 by face mask with adequate spontaneous ventilation    Post pain: adequate analgesia    Post assessment: no apparent anesthetic complications and tolerated procedure well    Post vital signs: stable    Level of consciousness: sedated    Nausea/Vomiting: no nausea/vomiting    Complications: none    Transfer of care protocol was followed      Last vitals:   Visit Vitals  BP (!) 98/57 (BP Location: Left arm, Patient Position: Lying)   Pulse 69   Temp 36.6 °C (97.9 °F) (Temporal)   Resp 19   Ht 5' 10" (1.778 m)   Wt 85.3 kg (188 lb)   SpO2 99%   BMI 26.98 kg/m²     "

## 2017-10-25 NOTE — ANESTHESIA POSTPROCEDURE EVALUATION
"Anesthesia Post Evaluation    Patient: Misha Marte Jr.    Procedure(s) Performed: Procedure(s) (LRB):  RELEASE-CARPAL TUNNEL left (Left)    Final Anesthesia Type: general  Patient location during evaluation: PACU  Patient participation: Yes- Able to Participate  Level of consciousness: awake and alert and oriented  Post-procedure vital signs: reviewed and stable  Pain management: adequate  Airway patency: patent  PONV status at discharge: No PONV  Anesthetic complications: no      Cardiovascular status: blood pressure returned to baseline and hemodynamically stable  Respiratory status: unassisted, spontaneous ventilation and room air  Hydration status: euvolemic  Follow-up not needed.        Visit Vitals  /68 (BP Location: Right arm, Patient Position: Lying)   Pulse 62   Temp 36.3 °C (97.3 °F) (Temporal)   Resp 18   Ht 5' 10" (1.778 m)   Wt 85.3 kg (188 lb)   SpO2 98%   BMI 26.98 kg/m²       Pain/Raymond Score: Pain Assessment Performed: Yes (10/25/2017  8:45 AM)  Presence of Pain: denies (10/25/2017  8:45 AM)  Raymond Score: 10 (10/25/2017  8:45 AM)      "

## 2017-10-25 NOTE — INTERVAL H&P NOTE
The patient has been examined and the H&P has been reviewed:    I concur with the findings and no changes have occurred since H&P was written.    Anesthesia/Surgery risks, benefits and alternative options discussed and understood by patient/family.          Active Hospital Problems    Diagnosis  POA    Carpal tunnel syndrome of left wrist [G56.02]  Yes      Resolved Hospital Problems    Diagnosis Date Resolved POA   No resolved problems to display.

## 2017-10-26 ENCOUNTER — TELEPHONE (OUTPATIENT)
Dept: ORTHOPEDICS | Facility: CLINIC | Age: 82
End: 2017-10-26

## 2017-10-26 NOTE — TELEPHONE ENCOUNTER
pts wife called concerned that his post op ace wrap was coming loose. She re-wrapped it and secured it with coban. She was concerned that she wrapped it too tight, but the patient assured her she didn't. If they determine that it is too tight, they may loosen the outer ace wrap. Pts wife understood the process.

## 2017-10-26 NOTE — TELEPHONE ENCOUNTER
----- Message from Viry De Souza sent at 10/26/2017  8:48 AM CDT -----  _  1st Request  _  2nd Request  _  3rd Request        Who: patient wife Pat    Why: Requesting a call back in regards to pt had surgery yesterday and they are having problems with the pt bandage. It is starting to come off.  Please call     What Number to Call Back:[phone]    When to Expect a call back: (Within 24 hours)    Please return the call at earliest convenience. Thanks!

## 2017-10-29 DIAGNOSIS — I35.0 NONRHEUMATIC AORTIC VALVE STENOSIS: ICD-10-CM

## 2017-10-30 RX ORDER — PRAVASTATIN SODIUM 20 MG/1
TABLET ORAL
Qty: 90 TABLET | Refills: 0 | Status: SHIPPED | OUTPATIENT
Start: 2017-10-30 | End: 2017-11-14 | Stop reason: SDUPTHER

## 2017-11-01 DIAGNOSIS — I35.0 NONRHEUMATIC AORTIC VALVE STENOSIS: ICD-10-CM

## 2017-11-01 RX ORDER — PRAVASTATIN SODIUM 20 MG/1
TABLET ORAL
Qty: 90 TABLET | Refills: 0 | Status: SHIPPED | OUTPATIENT
Start: 2017-11-01 | End: 2018-01-28 | Stop reason: SDUPTHER

## 2017-11-01 RX ORDER — RAMIPRIL 10 MG/1
CAPSULE ORAL
Qty: 90 CAPSULE | Refills: 0 | Status: SHIPPED | OUTPATIENT
Start: 2017-11-01 | End: 2018-01-28 | Stop reason: SDUPTHER

## 2017-11-02 RX ORDER — ERGOCALCIFEROL 1.25 MG/1
CAPSULE ORAL
Qty: 6 CAPSULE | Refills: 0 | Status: SHIPPED | OUTPATIENT
Start: 2017-11-02 | End: 2018-01-22 | Stop reason: SDUPTHER

## 2017-11-07 ENCOUNTER — HOSPITAL ENCOUNTER (OUTPATIENT)
Dept: ENDOCRINOLOGY | Facility: CLINIC | Age: 82
Discharge: HOME | End: 2017-11-07
Attending: INTERNAL MEDICINE
Payer: MEDICARE

## 2017-11-07 DIAGNOSIS — E04.2 MULTINODULAR GOITER: ICD-10-CM

## 2017-11-07 PROCEDURE — 76536 US EXAM OF HEAD AND NECK: CPT | Mod: ,,, | Performed by: INTERNAL MEDICINE

## 2017-11-09 ENCOUNTER — OFFICE VISIT (OUTPATIENT)
Dept: ORTHOPEDICS | Facility: CLINIC | Age: 82
End: 2017-11-09
Payer: MEDICARE

## 2017-11-09 VITALS
DIASTOLIC BLOOD PRESSURE: 84 MMHG | RESPIRATION RATE: 18 BRPM | WEIGHT: 188 LBS | HEIGHT: 70 IN | HEART RATE: 74 BPM | SYSTOLIC BLOOD PRESSURE: 133 MMHG | BODY MASS INDEX: 26.92 KG/M2

## 2017-11-09 DIAGNOSIS — Z47.89 ORTHOPEDIC AFTERCARE: ICD-10-CM

## 2017-11-09 DIAGNOSIS — G56.02 CARPAL TUNNEL SYNDROME OF LEFT WRIST: Primary | ICD-10-CM

## 2017-11-09 PROCEDURE — 99214 OFFICE O/P EST MOD 30 MIN: CPT | Mod: PBBFAC | Performed by: PHYSICIAN ASSISTANT

## 2017-11-09 PROCEDURE — 99024 POSTOP FOLLOW-UP VISIT: CPT | Mod: ,,, | Performed by: PHYSICIAN ASSISTANT

## 2017-11-09 PROCEDURE — 99999 PR PBB SHADOW E&M-EST. PATIENT-LVL IV: CPT | Mod: PBBFAC,,, | Performed by: PHYSICIAN ASSISTANT

## 2017-11-10 ENCOUNTER — TELEPHONE (OUTPATIENT)
Dept: ORTHOPEDICS | Facility: CLINIC | Age: 82
End: 2017-11-10

## 2017-11-14 ENCOUNTER — OFFICE VISIT (OUTPATIENT)
Dept: ENDOCRINOLOGY | Facility: CLINIC | Age: 82
End: 2017-11-14
Payer: MEDICARE

## 2017-11-14 VITALS
WEIGHT: 191.38 LBS | SYSTOLIC BLOOD PRESSURE: 130 MMHG | DIASTOLIC BLOOD PRESSURE: 84 MMHG | BODY MASS INDEX: 27.4 KG/M2 | HEART RATE: 74 BPM | HEIGHT: 70 IN

## 2017-11-14 DIAGNOSIS — M81.0 AGE-RELATED OSTEOPOROSIS WITHOUT CURRENT PATHOLOGICAL FRACTURE: ICD-10-CM

## 2017-11-14 DIAGNOSIS — M85.80 OSTEOPENIA, UNSPECIFIED LOCATION: ICD-10-CM

## 2017-11-14 DIAGNOSIS — R26.9 GAIT ABNORMALITY: ICD-10-CM

## 2017-11-14 DIAGNOSIS — E04.2 MULTINODULAR GOITER: Primary | ICD-10-CM

## 2017-11-14 PROCEDURE — 99214 OFFICE O/P EST MOD 30 MIN: CPT | Mod: PBBFAC | Performed by: INTERNAL MEDICINE

## 2017-11-14 PROCEDURE — 99999 PR PBB SHADOW E&M-EST. PATIENT-LVL IV: CPT | Mod: PBBFAC,,, | Performed by: INTERNAL MEDICINE

## 2017-11-14 PROCEDURE — 99214 OFFICE O/P EST MOD 30 MIN: CPT | Mod: S$PBB,,, | Performed by: INTERNAL MEDICINE

## 2017-11-14 NOTE — PROGRESS NOTES
"Subjective:       Patient ID: Misha Marte Jr. is a 88 y.o. male.    Chief Complaint: Goiter    HPI   Pt is a 84 yo M with a pmh of multinodular goiter coming in for a thyroid follow up. Pt had a neck U/S in 2013 which showed multinodular goiter with stable large cyst in the Rt lobe. He had an FNA >10 yrs ago, which was benign. He is taking vit D 50,000 units every 2 weeks and calcium about 1000 daily for osteoporosis. Pt complains of Rt foot and leg swelling that has improved after he started wearing compression socks. Pt complains Rt hamstring strain that limits his ability to walk. Other symptoms include easy bruisability. Pt denies any visual changes, headache, SOB, CP, palpitations, diarrhea, constipation.   Left carpal tunnel release 3 weeks ago.  Review of Systems   Constitutional: Negative for appetite change and chills.   HENT: Negative for sore throat and trouble swallowing.    Eyes: Negative for visual disturbance.   Respiratory: Negative for cough, chest tightness and shortness of breath.    Cardiovascular: Positive for leg swelling. Negative for chest pain and palpitations.   Gastrointestinal: Negative for constipation and diarrhea.   Endocrine: Negative for cold intolerance and heat intolerance.   Musculoskeletal: Positive for gait problem and myalgias. Negative for arthralgias.   Skin: Negative for rash.   Neurological: Negative for tremors, weakness and light-headedness.       Objective:     Blood pressure 130/84, pulse 74, height 5' 10" (1.778 m), weight 86.8 kg (191 lb 5.8 oz).  Physical Exam   Constitutional: He is oriented to person, place, and time. He appears well-developed and well-nourished.   HENT:   Head: Normocephalic and atraumatic.   Mouth/Throat: Oropharynx is clear and moist.   Eyes: EOM are normal. Pupils are equal, round, and reactive to light.   Neck: Neck supple. No tracheal deviation present. No thyromegaly present.   Cardiovascular: Normal rate and regular rhythm.    Murmur " "heard.  Pulmonary/Chest: Effort normal and breath sounds normal. No respiratory distress.   Abdominal: Soft. Bowel sounds are normal. He exhibits no distension. There is no tenderness.   Musculoskeletal: Normal range of motion. Edema: 1+ pitting edema Rt foot up to mid shin.   Lymphadenopathy:     He has no cervical adenopathy.   Neurological: He is alert and oriented to person, place, and time.   Skin: Skin is warm and dry. No rash noted.   Psychiatric: He has a normal mood and affect.       Blood pressure 130/84, pulse 74, height 5' 10" (1.778 m), weight 86.8 kg (191 lb 5.8 oz).        Lab Results   Component Value Date    TSH 2.790 07/12/2017       Chemistry        Component Value Date/Time     07/12/2017 0955    K 4.9 07/12/2017 0955     07/12/2017 0955    CO2 27 07/12/2017 0955    BUN 16 07/12/2017 0955    CREATININE 1.6 (H) 07/12/2017 0955    GLU 89 07/12/2017 0955        Component Value Date/Time    CALCIUM 9.1 07/12/2017 0955    ALKPHOS 89 07/12/2017 0955    AST 25 07/12/2017 0955    ALT 23 07/12/2017 0955    BILITOT 0.9 07/12/2017 0955        Lab Results   Component Value Date    CALCIUM 9.1 07/12/2017    PHOS 2.7 07/19/2011         Assessment:     86 yo M with a pmh of multinodular goiter coming in for thyroid nodule follow up.   Plan:       1. Multinodular goiter  - last U/S neck 2017 showed multiple cysts which are stable.  - last FNA > 10yrs ago was benign     - TSH normal     2. LE edema  - Stable   - Will continue stockings     3. Vit D maintenance on vit D 50K every 2 weeks.  - Will reorder Vit D       Needs updated BMD 4 years since last one.  "

## 2017-11-19 NOTE — PROGRESS NOTES
"Mr. Marte is here today for a post-operative visit.  He is 15 days status post left carpal tunnel release by Dr. Lewis on 10/25/17. He reports that he is doing well.  Pain is mild and well controlled. Her reports some continued numbness, but says that it is improved and his arm pain is much improved. He denies fever, chills, and sweats since the time of the surgery.     Physical exam:    Vitals:    11/09/17 1308   BP: 133/84   Pulse: 74   Resp: 18   Weight: 85.3 kg (188 lb)   Height: 5' 10" (1.778 m)   PainSc: 0-No pain   PainLoc: Hand     Vital signs are stable, patient is afebrile.  Patient is well dressed and well groomed, no acute distress.  Alert and oriented to person, place, and time.  Post op dressing taken down.  Incision is clean, dry and intact.  There is no erythema or exudate.  There is no sign of any infection. He is NVI. Sutures removed without difficulty. Fair finger ROM.      Assessment: 15 days status post left carpal tunnel release    Plan:  Misha was seen today for post-op evaluation.    Diagnoses and all orders for this visit:    Carpal tunnel syndrome of left wrist    Orthopedic aftercare        - PO instruction reviewed and provided to patient  - Finger ROM handout provided  - Discussed possibility of OT if needed  - Gel brace provided  - Follow up in 4 weeks if needed    "

## 2017-12-08 ENCOUNTER — TELEPHONE (OUTPATIENT)
Dept: NEUROLOGY | Facility: CLINIC | Age: 82
End: 2017-12-08

## 2017-12-08 ENCOUNTER — TELEPHONE (OUTPATIENT)
Dept: ORTHOPEDICS | Facility: CLINIC | Age: 82
End: 2017-12-08

## 2017-12-08 NOTE — TELEPHONE ENCOUNTER
----- Message from Ginette Hollis sent at 12/8/2017  2:14 PM CST -----  Contact: Pat  _tracy  1st Request  _  2nd Request  _  3rd Request        Who: Pat    Why: patient's wife states she would like a call back in regards to the patient's post-op appt and to discuss patient's limitations.     What Number to Call Back: [phone]    When to Expect a call back: (Before the end of the day)   -- if call after 3:00 call back will be tomorrow.

## 2017-12-08 NOTE — TELEPHONE ENCOUNTER
----- Message from Maria Eugenia Richardson sent at 12/8/2017 11:20 AM CST -----  Contact: pt  _  1st Request  _  2nd Request  _  3rd Request        Who: pt    Why: Requesting a call back in regards to cancelling appointment for follow up. Needs nurse to call with restrictions and instructions. Doesn't want to come in for follow up. Please call pt     What Number to Call Back:[phone]    When to Expect a call back: (Within 24 hours)    Please return the call at earliest convenience. Thanks!

## 2017-12-12 ENCOUNTER — OFFICE VISIT (OUTPATIENT)
Dept: ORTHOPEDICS | Facility: CLINIC | Age: 82
End: 2017-12-12
Payer: MEDICARE

## 2017-12-12 VITALS
HEIGHT: 70 IN | DIASTOLIC BLOOD PRESSURE: 74 MMHG | RESPIRATION RATE: 18 BRPM | SYSTOLIC BLOOD PRESSURE: 122 MMHG | BODY MASS INDEX: 27.35 KG/M2 | HEART RATE: 80 BPM | WEIGHT: 191 LBS

## 2017-12-12 DIAGNOSIS — Z47.89 ORTHOPEDIC AFTERCARE: ICD-10-CM

## 2017-12-12 DIAGNOSIS — G56.02 CARPAL TUNNEL SYNDROME OF LEFT WRIST: Primary | ICD-10-CM

## 2017-12-12 PROCEDURE — 99214 OFFICE O/P EST MOD 30 MIN: CPT | Mod: PBBFAC | Performed by: PHYSICIAN ASSISTANT

## 2017-12-12 PROCEDURE — 99999 PR PBB SHADOW E&M-EST. PATIENT-LVL IV: CPT | Mod: PBBFAC,,, | Performed by: PHYSICIAN ASSISTANT

## 2017-12-12 PROCEDURE — 99024 POSTOP FOLLOW-UP VISIT: CPT | Mod: ,,, | Performed by: PHYSICIAN ASSISTANT

## 2017-12-12 NOTE — PROGRESS NOTES
"Mr. Marte is here today for a post-operative visit.  He is 48 days status post left carpal tunnel release by Dr. Lewis on 10/25/17. He reports that he is doing well.  He has no current pain. He reports some continued numbness, but says that it is approximately 50% improved and his arm pain has resolved.  He has been performing his finger ROM exercises.  He denies fever, chills, and sweats since the time of the surgery.     Physical exam:    Vitals:    12/12/17 1307   BP: 122/74   Pulse: 80   Resp: 18   Weight: 86.6 kg (191 lb)   Height: 5' 10" (1.778 m)   PainSc: 0-No pain   PainLoc: Hand     Vital signs are stable, patient is afebrile.  Patient is well dressed and well groomed, no acute distress.  Alert and oriented to person, place, and time.  Incision is healing well - clean, dry and intact. Non-tender. There is no erythema or exudate.  There is no sign of any infection. He is NVI. Good finger ROM.      Assessment: 48 days status post left carpal tunnel release    Plan:  Misha was seen today for post-op evaluation.    Diagnoses and all orders for this visit:    Carpal tunnel syndrome of left wrist    Orthopedic aftercare        - Continue HEP  - Return to normal activity level  - Follow up as needed    "

## 2017-12-14 ENCOUNTER — PATIENT MESSAGE (OUTPATIENT)
Dept: INTERNAL MEDICINE | Facility: CLINIC | Age: 82
End: 2017-12-14

## 2017-12-18 ENCOUNTER — OFFICE VISIT (OUTPATIENT)
Dept: INTERNAL MEDICINE | Facility: CLINIC | Age: 82
End: 2017-12-18
Payer: MEDICARE

## 2017-12-18 VITALS
DIASTOLIC BLOOD PRESSURE: 61 MMHG | OXYGEN SATURATION: 99 % | SYSTOLIC BLOOD PRESSURE: 117 MMHG | HEIGHT: 70 IN | HEART RATE: 69 BPM | BODY MASS INDEX: 26.33 KG/M2 | WEIGHT: 183.88 LBS

## 2017-12-18 DIAGNOSIS — Z48.02 VISIT FOR SUTURE REMOVAL: Primary | ICD-10-CM

## 2017-12-18 PROCEDURE — 99213 OFFICE O/P EST LOW 20 MIN: CPT | Mod: PBBFAC | Performed by: INTERNAL MEDICINE

## 2017-12-18 PROCEDURE — 99999 PR PBB SHADOW E&M-EST. PATIENT-LVL III: CPT | Mod: PBBFAC,,, | Performed by: INTERNAL MEDICINE

## 2017-12-18 PROCEDURE — 99214 OFFICE O/P EST MOD 30 MIN: CPT | Mod: S$PBB,,, | Performed by: INTERNAL MEDICINE

## 2017-12-18 NOTE — PROGRESS NOTES
CHIEF COMPLAINT:  ER followup.    HISTORY OF PRESENT ILLNESS:  The patient is an 88-year-old gentleman who is   currently being treated for hypertension and hyperlipidemia, who comes in today   for suture removal status post fall.  The patient was seen in the Emergency Room   at [facility].  The patient had staples put in place over his scalp.    The following day, he had to go back in to have a suture placed for bleeding.    The patient reports doing well.    REVIEW OF SYSTEMS:  No fever or chills.  He reports otherwise feeling perfect.    Please see the patient entered review of systems.    PHYSICAL EXAMINATION:  HEENT:  Conjunctivae clear.  Pupils are equal.  He does have bilateral   cataracts.  TMs are clear.  Nasal septum is midline without discharge.    Oropharynx is without erythema.  NECK:  Trachea is midline without JVD.  PULMONARY:  Good inspiratory and expiratory breath sounds are heard.  Lungs are   clear to auscultation.  CARDIOVASCULAR:  S1, S2 with a 3/6 systolic ejection murmur heard best at right   upper sternal border.  EXTREMITIES:  With trace edema.  ABDOMEN:  Nontender, nondistended, without hepatosplenomegaly.  SKIN:  The patient's scalp was inspected.  It appeared to be well healed.  No   signs of infection.  The suture was removed as well as the staple.    ASSESSMENT:  1.  Suture removal.  2.  Hypertension, currently stable.    PLAN:  The patient is to keep the area clean with just plain soap and water ____   it dry.  He is to call for any worsening of symptoms or problems.      JDS/HN  dd: 12/18/2017 14:32:00 (CST)  td: 12/19/2017 05:37:57 (CST)  Doc ID   #[id number]  Job ID #[id number]    CC:     Answers for HPI/ROS submitted by the patient on 12/16/2017   activity change: No  unexpected weight change: No  neck pain: No  hearing loss: No  rhinorrhea: No  trouble swallowing: No  eye discharge: No  visual disturbance: No  chest tightness: No  wheezing: No  chest pain: No  palpitations:  No  blood in stool: No  constipation: No  vomiting: No  diarrhea: No  polydipsia: No  polyuria: No  difficulty urinating: No  urgency: No  hematuria: No  joint swelling: No  arthralgias: No  headaches: No  weakness: No  confusion: No  dysphoric mood: No

## 2017-12-18 NOTE — PROGRESS NOTES
Answers for HPI/ROS submitted by the patient on 12/16/2017   activity change: No  unexpected weight change: No  neck pain: No  hearing loss: No  rhinorrhea: No  trouble swallowing: No  eye discharge: No  visual disturbance: No  chest tightness: No  wheezing: No  chest pain: No  palpitations: No  blood in stool: No  constipation: No  vomiting: No  diarrhea: No  polydipsia: No  polyuria: No  difficulty urinating: No  urgency: No  hematuria: No  joint swelling: No  arthralgias: No  headaches: No  weakness: No  confusion: No  dysphoric mood: No

## 2018-01-03 ENCOUNTER — HOSPITAL ENCOUNTER (OUTPATIENT)
Dept: RADIOLOGY | Facility: CLINIC | Age: 83
Discharge: HOME | End: 2018-01-03
Attending: INTERNAL MEDICINE
Payer: MEDICARE

## 2018-01-03 DIAGNOSIS — M81.0 AGE-RELATED OSTEOPOROSIS WITHOUT CURRENT PATHOLOGICAL FRACTURE: ICD-10-CM

## 2018-01-03 DIAGNOSIS — M85.80 OSTEOPENIA, UNSPECIFIED LOCATION: ICD-10-CM

## 2018-01-03 PROCEDURE — 77080 DXA BONE DENSITY AXIAL: CPT | Mod: 26,,, | Performed by: INTERNAL MEDICINE

## 2018-01-03 PROCEDURE — 77080 DXA BONE DENSITY AXIAL: CPT | Mod: TC

## 2018-01-09 RX ORDER — OSELTAMIVIR PHOSPHATE 75 MG/1
75 CAPSULE ORAL 2 TIMES DAILY
Qty: 10 CAPSULE | Refills: 0 | Status: SHIPPED | OUTPATIENT
Start: 2018-01-09 | End: 2018-01-14

## 2018-01-12 ENCOUNTER — OFFICE VISIT (OUTPATIENT)
Dept: INTERNAL MEDICINE | Facility: CLINIC | Age: 83
End: 2018-01-12
Payer: MEDICARE

## 2018-01-12 VITALS
WEIGHT: 183.88 LBS | OXYGEN SATURATION: 99 % | TEMPERATURE: 98 F | SYSTOLIC BLOOD PRESSURE: 102 MMHG | BODY MASS INDEX: 26.33 KG/M2 | HEART RATE: 79 BPM | HEIGHT: 70 IN | DIASTOLIC BLOOD PRESSURE: 66 MMHG

## 2018-01-12 DIAGNOSIS — I35.0 NONRHEUMATIC AORTIC VALVE STENOSIS: ICD-10-CM

## 2018-01-12 DIAGNOSIS — J18.9 ATYPICAL PNEUMONIA: Primary | ICD-10-CM

## 2018-01-12 DIAGNOSIS — R05.9 COUGH: ICD-10-CM

## 2018-01-12 DIAGNOSIS — I10 ESSENTIAL HYPERTENSION: ICD-10-CM

## 2018-01-12 PROCEDURE — 99213 OFFICE O/P EST LOW 20 MIN: CPT | Mod: PBBFAC | Performed by: NURSE PRACTITIONER

## 2018-01-12 PROCEDURE — 99213 OFFICE O/P EST LOW 20 MIN: CPT | Mod: S$PBB,,, | Performed by: NURSE PRACTITIONER

## 2018-01-12 PROCEDURE — 99999 PR PBB SHADOW E&M-EST. PATIENT-LVL III: CPT | Mod: PBBFAC,,, | Performed by: NURSE PRACTITIONER

## 2018-01-12 RX ORDER — BENZONATATE 100 MG/1
100 CAPSULE ORAL 3 TIMES DAILY PRN
Qty: 30 CAPSULE | Refills: 0 | Status: SHIPPED | OUTPATIENT
Start: 2018-01-12 | End: 2018-01-22

## 2018-01-12 RX ORDER — DOXYCYCLINE 100 MG/1
100 CAPSULE ORAL 2 TIMES DAILY
Qty: 14 CAPSULE | Refills: 0 | Status: SHIPPED | OUTPATIENT
Start: 2018-01-12 | End: 2018-05-18

## 2018-01-12 NOTE — PROGRESS NOTES
INTERNAL MEDICINE PROGRESS/URGENT CARE NOTE    CHIEF COMPLAINT     Chief Complaint   Patient presents with    Cough    Chest Congestion    Nasal Congestion     post nasal drip       HPI     Misha Marte Jr. is a 88 y.o. male HTN, HLD, insomnia, and heart murmur who presents for an urgent visit today.  He is an established pt of Dr. Ivan.     Here with c/o cough, congestion, PND x1 week.   +cough- non-productive.   Denies fever and chills.   +mild hoarsness  +weakness       Past Medical History:  Past Medical History:   Diagnosis Date    Heart murmur     Hyperlipidemia     Hypertension     Insomnia        Home Medications:  Prior to Admission medications    Medication Sig Start Date End Date Taking? Authorizing Provider   aspirin 81 mg Tab Take 81 mg by mouth once daily.  6/19/12  Yes Historical Provider, MD   CALCIUM CARBONATE/VITAMIN D3 (CALCIUM 600 + D,3, ORAL) Take 1 capsule by mouth once daily.   Yes Historical Provider, MD   clobetasol (TEMOVATE) 0.05 % external solution Apply topically 2 (two) times daily.  7/12/12  Yes Historical Provider, MD   coenzyme Q10 (CO Q-10) 10 mg capsule Take 10 mg by mouth once daily.  6/19/12  Yes Historical Provider, MD   desoximetasone (TOPICORT) 0.25 % cream Apply topically as needed.   Yes Historical Provider, MD   ergocalciferol (ERGOCALCIFEROL) 50,000 unit Cap TAKE 1 CAPSULE BY MOUTH EVERY 2 WEEKS 11/2/17  Yes Bunny Siddiqui II, MD   furosemide (LASIX) 20 MG tablet TAKE 1 TABLET(20 MG) BY MOUTH DAILY AS NEEDED  Patient taking differently: TAKE 1 TABLET(20 MG) BY MOUTH DAILY 7/25/17  Yes Ike Ivan MD   multivitamin-minerals-lutein (CENTRUM SILVER) Tab Take 1 tablet by mouth once daily.  6/19/12  Yes Historical Provider, MD   omeprazole (PRILOSEC) 20 MG capsule Take 1 capsule (20 mg total) by mouth once daily. 10/2/17 10/2/18 Yes Ike Ivan MD   polyethylene glycol (GLYCOLAX) 17 gram PwPk Take by mouth as needed.    Yes Historical Provider, MD  "  pravastatin (PRAVACHOL) 20 MG tablet TAKE 1 TABLET BY MOUTH AT NIGHT 11/1/17  Yes Gregor White MD   ramipril (ALTACE) 10 MG capsule TAKE 1 CAPSULE(10 MG) BY MOUTH EVERY DAY 11/1/17  Yes Ike Ivan MD   tretinoin (RETIN-A) 0.05 % cream Apply 1 application topically nightly.  6/19/12  Yes Historical Provider, MD   VIT A,C & E/LUTEIN/MINERALS (OCUVITE WITH LUTEIN ORAL) Take 1 capsule by mouth once daily.   Yes Historical Provider, MD   oseltamivir (TAMIFLU) 75 MG capsule Take 1 capsule (75 mg total) by mouth 2 (two) times daily. 1/9/18 1/14/18  Paul Taylor MD       Review of Systems:  Review of Systems   Constitutional: Negative for chills, fatigue and fever.   HENT: Positive for postnasal drip and rhinorrhea. Negative for congestion, sinus pain, sinus pressure, sneezing and sore throat.    Respiratory: Positive for cough. Negative for choking, chest tightness, shortness of breath and wheezing.    Cardiovascular: Negative for chest pain and palpitations.   Gastrointestinal: Negative for abdominal pain, constipation, diarrhea, nausea and vomiting.   Skin: Negative for rash.   Neurological: Negative for dizziness, light-headedness and headaches.       Health Maintainence:   Immunizations:  Health Maintenance       Date Due Completion Date    Zoster Vaccine 02/02/1989 ---    Pneumococcal (65+) (2 of 2 - PPSV23) 03/11/2016 3/11/2015    Lipid Panel 07/12/2022 7/12/2017    TETANUS VACCINE 12/18/2026 12/18/2016           PHYSICAL EXAM     /66 (BP Location: Left arm, Patient Position: Sitting, BP Method: Large (Manual))   Pulse 79   Temp 98.3 °F (36.8 °C) (Oral)   Ht 5' 10" (1.778 m)   Wt 83.4 kg (183 lb 13.8 oz)   SpO2 99%   BMI 26.38 kg/m²     Physical Exam   Constitutional: He is oriented to person, place, and time. He appears well-developed and well-nourished.   HENT:   Head: Normocephalic.   Right Ear: External ear normal.   Left Ear: External ear normal.   Nose: Nose normal.   Mouth/Throat: " Oropharynx is clear and moist. No oropharyngeal exudate.   Eyes: Pupils are equal, round, and reactive to light.   Neck: No JVD present. No tracheal deviation present. No thyromegaly present.   Cardiovascular: Normal rate, regular rhythm and intact distal pulses.  Exam reveals no gallop and no friction rub.    Murmur (RSB ) heard.   Systolic murmur is present with a grade of 5/6   Pulmonary/Chest: No respiratory distress. He has no wheezes. He has rhonchi in the right upper field and the right middle field. He has no rales. He exhibits no tenderness.   Abdominal: Soft. Bowel sounds are normal. He exhibits no distension. There is no tenderness.   Musculoskeletal: Normal range of motion. He exhibits no edema or tenderness.   Lymphadenopathy:     He has no cervical adenopathy.   Neurological: He is alert and oriented to person, place, and time.   Skin: Skin is warm and dry. No rash noted.   Psychiatric: He has a normal mood and affect. His behavior is normal.   Vitals reviewed.      LABS     No results found for: LABA1C, HGBA1C  CMP  Sodium   Date Value Ref Range Status   07/12/2017 139 136 - 145 mmol/L Final     Potassium   Date Value Ref Range Status   07/12/2017 4.9 3.5 - 5.1 mmol/L Final     Chloride   Date Value Ref Range Status   07/12/2017 104 95 - 110 mmol/L Final     CO2   Date Value Ref Range Status   07/12/2017 27 23 - 29 mmol/L Final     Glucose   Date Value Ref Range Status   07/12/2017 89 70 - 110 mg/dL Final     BUN, Bld   Date Value Ref Range Status   07/12/2017 16 8 - 23 mg/dL Final     Creatinine   Date Value Ref Range Status   07/12/2017 1.6 (H) 0.5 - 1.4 mg/dL Final     Calcium   Date Value Ref Range Status   07/12/2017 9.1 8.7 - 10.5 mg/dL Final     Total Protein   Date Value Ref Range Status   07/12/2017 6.8 6.0 - 8.4 g/dL Final     Albumin   Date Value Ref Range Status   07/12/2017 3.6 3.5 - 5.2 g/dL Final     Total Bilirubin   Date Value Ref Range Status   07/12/2017 0.9 0.1 - 1.0 mg/dL Final      Comment:     For infants and newborns, interpretation of results should be based  on gestational age, weight and in agreement with clinical  observations.  Premature Infant recommended reference ranges:  Up to 24 hours.............<8.0 mg/dL  Up to 48 hours............<12.0 mg/dL  3-5 days..................<15.0 mg/dL  6-29 days.................<15.0 mg/dL       Alkaline Phosphatase   Date Value Ref Range Status   07/12/2017 89 55 - 135 U/L Final     AST   Date Value Ref Range Status   07/12/2017 25 10 - 40 U/L Final     ALT   Date Value Ref Range Status   07/12/2017 23 10 - 44 U/L Final     Anion Gap   Date Value Ref Range Status   07/12/2017 8 8 - 16 mmol/L Final     eGFR if    Date Value Ref Range Status   07/12/2017 43.8 (A) >60 mL/min/1.73 m^2 Final     eGFR if non    Date Value Ref Range Status   07/12/2017 37.9 (A) >60 mL/min/1.73 m^2 Final     Comment:     Calculation used to obtain the estimated glomerular filtration  rate (eGFR) is the CKD-EPI equation. Since race is unknown   in our information system, the eGFR values for   -American and Non--American patients are given   for each creatinine result.       Lab Results   Component Value Date    WBC 6.39 01/11/2017    HGB 13.8 (L) 01/11/2017    HCT 41.5 01/11/2017    MCV 93 01/11/2017     01/11/2017     Lab Results   Component Value Date    CHOL 144 07/12/2017    CHOL 161 05/20/2016    CHOL 156 03/11/2015     Lab Results   Component Value Date    HDL 49 07/12/2017    HDL 57 05/20/2016    HDL 57 03/11/2015     Lab Results   Component Value Date    LDLCALC 79.8 07/12/2017    LDLCALC 84.6 05/20/2016    LDLCALC 80.0 03/11/2015     Lab Results   Component Value Date    TRIG 76 07/12/2017    TRIG 97 05/20/2016    TRIG 95 03/11/2015     Lab Results   Component Value Date    CHOLHDL 34.0 07/12/2017    CHOLHDL 35.4 05/20/2016    CHOLHDL 36.5 03/11/2015     Lab Results   Component Value Date    TSH 2.790 07/12/2017        ASSESSMENT/PLAN     Misha Marte Jr. is a 88 y.o. male with  Past Medical History:   Diagnosis Date    Heart murmur     Hyperlipidemia     Hypertension     Insomnia      Atypical pneumonia- start doxycycline bid x 1 week. Mucinex with full glass of water.   -     doxycycline (MONODOX) 100 MG capsule; Take 1 capsule (100 mg total) by mouth 2 (two) times daily.  Dispense: 14 capsule; Refill: 0    Cough- may use benzonatate as needed .  -     benzonatate (TESSALON) 100 MG capsule; Take 1 capsule (100 mg total) by mouth 3 (three) times daily as needed for Cough.  Dispense: 30 capsule; Refill: 0    Essential hypertension- controlled Cont current meds. Low Na diet     Nonrheumatic aortic valve stenosis- murmur auscultated. VS stable. Will f/u with cardiology.           Follow up with PCP and as needed     Patient education provided from Keri. Patient was counseled on when and how to seek emergent care.       Karen GONZALES, GUANAKITO, FNP-c   Department of Internal Medicine - [facility]  2:48 PM

## 2018-01-15 ENCOUNTER — TELEPHONE (OUTPATIENT)
Dept: ENDOCRINOLOGY | Facility: CLINIC | Age: 83
End: 2018-01-15

## 2018-01-15 RX ORDER — ALENDRONATE SODIUM 70 MG/1
TABLET ORAL
Qty: 4 TABLET | Refills: 12 | Status: SHIPPED | OUTPATIENT
Start: 2018-01-15 | End: 2018-02-20 | Stop reason: ALTCHOICE

## 2018-01-18 ENCOUNTER — PATIENT MESSAGE (OUTPATIENT)
Dept: ENDOCRINOLOGY | Facility: CLINIC | Age: 83
End: 2018-01-18

## 2018-01-23 RX ORDER — ERGOCALCIFEROL 1.25 MG/1
CAPSULE ORAL
Qty: 6 CAPSULE | Refills: 6 | Status: SHIPPED | OUTPATIENT
Start: 2018-01-23 | End: 2019-01-27 | Stop reason: SDUPTHER

## 2018-01-28 DIAGNOSIS — I35.0 NONRHEUMATIC AORTIC VALVE STENOSIS: ICD-10-CM

## 2018-01-28 RX ORDER — PRAVASTATIN SODIUM 20 MG/1
TABLET ORAL
Qty: 90 TABLET | Refills: 0 | Status: SHIPPED | OUTPATIENT
Start: 2018-01-28 | End: 2018-04-30 | Stop reason: SDUPTHER

## 2018-01-29 ENCOUNTER — PATIENT MESSAGE (OUTPATIENT)
Dept: ENDOCRINOLOGY | Facility: CLINIC | Age: 83
End: 2018-01-29

## 2018-01-29 RX ORDER — RAMIPRIL 10 MG/1
CAPSULE ORAL
Qty: 90 CAPSULE | Refills: 1 | Status: SHIPPED | OUTPATIENT
Start: 2018-01-29 | End: 2018-08-05 | Stop reason: SDUPTHER

## 2018-01-30 DIAGNOSIS — M81.0 AGE RELATED OSTEOPOROSIS, UNSPECIFIED PATHOLOGICAL FRACTURE PRESENCE: ICD-10-CM

## 2018-02-20 ENCOUNTER — INFUSION (OUTPATIENT)
Dept: INFECTIOUS DISEASES | Facility: HOSPITAL | Age: 83
End: 2018-02-20
Attending: INTERNAL MEDICINE
Payer: MEDICARE

## 2018-02-20 VITALS — BODY MASS INDEX: 26.33 KG/M2 | WEIGHT: 183.88 LBS | HEIGHT: 70 IN

## 2018-02-20 DIAGNOSIS — M81.0 AGE RELATED OSTEOPOROSIS, UNSPECIFIED PATHOLOGICAL FRACTURE PRESENCE: Primary | ICD-10-CM

## 2018-02-20 PROCEDURE — 96372 THER/PROPH/DIAG INJ SC/IM: CPT

## 2018-02-20 PROCEDURE — 63600175 PHARM REV CODE 636 W HCPCS: Mod: JG | Performed by: INTERNAL MEDICINE

## 2018-02-20 RX ADMIN — DENOSUMAB 60 MG: 60 INJECTION SUBCUTANEOUS at 11:02

## 2018-02-20 NOTE — PLAN OF CARE
Problem: Health Knowledge, Opportunity to Enhance (Adult,NICU,[place],Obstetrics,Pediatric)  Goal: Knowledgeable about Health Subject/Topic  Patient will demonstrate the desired outcomes by discharge/transition of care.  Outcome: Ongoing (interventions implemented as appropriate)  PATIENT EDUCATED ON HAND WASHING AND INFECTION CONTROL. PATIENT VERBALIZED UNDERSTANDING

## 2018-02-27 ENCOUNTER — OFFICE VISIT (OUTPATIENT)
Dept: INTERNAL MEDICINE | Facility: CLINIC | Age: 83
End: 2018-02-27
Payer: MEDICARE

## 2018-02-27 ENCOUNTER — LAB VISIT (OUTPATIENT)
Dept: LAB | Facility: HOSPITAL | Age: 83
End: 2018-02-27
Attending: INTERNAL MEDICINE
Payer: MEDICARE

## 2018-02-27 VITALS
WEIGHT: 191 LBS | SYSTOLIC BLOOD PRESSURE: 122 MMHG | DIASTOLIC BLOOD PRESSURE: 62 MMHG | HEART RATE: 65 BPM | OXYGEN SATURATION: 95 % | BODY MASS INDEX: 28.29 KG/M2 | HEIGHT: 69 IN

## 2018-02-27 DIAGNOSIS — E04.2 MULTINODULAR GOITER: ICD-10-CM

## 2018-02-27 DIAGNOSIS — Z00.00 ANNUAL PHYSICAL EXAM: Primary | ICD-10-CM

## 2018-02-27 DIAGNOSIS — I10 ESSENTIAL HYPERTENSION: ICD-10-CM

## 2018-02-27 DIAGNOSIS — Z00.00 ANNUAL PHYSICAL EXAM: ICD-10-CM

## 2018-02-27 DIAGNOSIS — E78.5 HYPERLIPIDEMIA, UNSPECIFIED HYPERLIPIDEMIA TYPE: ICD-10-CM

## 2018-02-27 DIAGNOSIS — I35.0 NONRHEUMATIC AORTIC VALVE STENOSIS: ICD-10-CM

## 2018-02-27 LAB
ALBUMIN SERPL BCP-MCNC: 3.7 G/DL
ALP SERPL-CCNC: 65 U/L
ALT SERPL W/O P-5'-P-CCNC: 26 U/L
ANION GAP SERPL CALC-SCNC: 8 MMOL/L
AST SERPL-CCNC: 29 U/L
BASOPHILS # BLD AUTO: 0.07 K/UL
BASOPHILS NFR BLD: 1.2 %
BILIRUB SERPL-MCNC: 0.7 MG/DL
BUN SERPL-MCNC: 14 MG/DL
CALCIUM SERPL-MCNC: 8.4 MG/DL
CHLORIDE SERPL-SCNC: 106 MMOL/L
CHOLEST SERPL-MCNC: 134 MG/DL
CHOLEST/HDLC SERPL: 3 {RATIO}
CO2 SERPL-SCNC: 23 MMOL/L
CREAT SERPL-MCNC: 1.3 MG/DL
DIFFERENTIAL METHOD: ABNORMAL
EOSINOPHIL # BLD AUTO: 0.1 K/UL
EOSINOPHIL NFR BLD: 2.2 %
ERYTHROCYTE [DISTWIDTH] IN BLOOD BY AUTOMATED COUNT: 13.8 %
EST. GFR  (AFRICAN AMERICAN): 56 ML/MIN/1.73 M^2
EST. GFR  (NON AFRICAN AMERICAN): 48 ML/MIN/1.73 M^2
GLUCOSE SERPL-MCNC: 94 MG/DL
HCT VFR BLD AUTO: 39.9 %
HDLC SERPL-MCNC: 45 MG/DL
HDLC SERPL: 33.6 %
HGB BLD-MCNC: 12.9 G/DL
LDLC SERPL CALC-MCNC: 72.2 MG/DL
LYMPHOCYTES # BLD AUTO: 0.7 K/UL
LYMPHOCYTES NFR BLD: 12.1 %
MCH RBC QN AUTO: 31.5 PG
MCHC RBC AUTO-ENTMCNC: 32.3 G/DL
MCV RBC AUTO: 98 FL
MONOCYTES # BLD AUTO: 0.6 K/UL
MONOCYTES NFR BLD: 9.9 %
NEUTROPHILS # BLD AUTO: 4.4 K/UL
NEUTROPHILS NFR BLD: 73.9 %
NONHDLC SERPL-MCNC: 89 MG/DL
PLATELET # BLD AUTO: 213 K/UL
PMV BLD AUTO: 10.9 FL
POTASSIUM SERPL-SCNC: 5 MMOL/L
PROT SERPL-MCNC: 6.3 G/DL
RBC # BLD AUTO: 4.09 M/UL
SODIUM SERPL-SCNC: 137 MMOL/L
TRIGL SERPL-MCNC: 84 MG/DL
TSH SERPL DL<=0.005 MIU/L-ACNC: 2.5 UIU/ML
WBC # BLD AUTO: 5.97 K/UL

## 2018-02-27 PROCEDURE — 80061 LIPID PANEL: CPT

## 2018-02-27 PROCEDURE — 80053 COMPREHEN METABOLIC PANEL: CPT

## 2018-02-27 PROCEDURE — 99397 PER PM REEVAL EST PAT 65+ YR: CPT | Mod: S$PBB,,, | Performed by: INTERNAL MEDICINE

## 2018-02-27 PROCEDURE — 36415 COLL VENOUS BLD VENIPUNCTURE: CPT

## 2018-02-27 PROCEDURE — 85025 COMPLETE CBC W/AUTO DIFF WBC: CPT

## 2018-02-27 PROCEDURE — 84443 ASSAY THYROID STIM HORMONE: CPT

## 2018-02-27 PROCEDURE — 99999 PR PBB SHADOW E&M-EST. PATIENT-LVL III: CPT | Mod: PBBFAC,,, | Performed by: INTERNAL MEDICINE

## 2018-02-27 PROCEDURE — 99213 OFFICE O/P EST LOW 20 MIN: CPT | Mod: PBBFAC | Performed by: INTERNAL MEDICINE

## 2018-02-27 NOTE — PROGRESS NOTES
CHIEF COMPLAINT:  Physical exam.    HISTORY OF PRESENT ILLNESS:  The patient is an 89-year-old gentleman who comes   in today for annual checkup.  The patient has no new complaints.    REVIEW OF SYSTEMS:  Please see the patient entered review of systems.  In   addition the patient reports his weight has been stable within a few pounds.  He   does have macular degeneration as well as cataracts.  He is being considered   for lens replacements.  He sees Dr. Bower over by at [facility].  No chest pain, no   shortness of breath.  He does go to Urology and sees Dr. Mcgarry  who does do   testosterone implants.  He also gets a prostate exam as well as blood test to   check for his PSA.  The patient does see Endocrinology and gets Prolia every six   months.  He was seen recently by Dr. Siddiqui who referred him to Neurology   because of his gait.  He was concerned this might represent Parkinson's.  The   patient states that since we last saw him, he had carpal tunnel release   involving the left hand.  He has much better dexterity.    PHYSICAL EXAMINATION:  GENERAL APPEARANCE:  No acute distress.  HEENT:  Conjunctivae clear.  He has bilateral cataracts.  TMs are clear.  Nasal   septum is midline without discharge.  Oropharynx is without erythema.  NECK:  Trachea is midline without JVD, without thyromegaly.  PULMONARY:  Good inspiratory, expiratory breath sounds are heard.  Lungs are   clear to auscultation.  CARDIOVASCULAR:  S1, S2 with III/VI systolic ejection murmur heard best at the   right upper sternal border.  2+ carotid pulse.  He does have bilateral bruits   with the right being than the left.  EXTREMITIES:  With trace to 1+ ankle edema.  ABDOMEN:  Nontender, nondistended, without hepatosplenomegaly.    The patient had a carotid ultrasound done in January 2017, which showed no   significant stenosis.    ASSESSMENT:  1.  Annual exam.  2.  Hypertension.  3.  Aortic valve stenosis.  4.  Hyperlipidemia.  5.  Multinodular  goiter.    PLAN:  We will put the patient in for a TSH, lipid panel, CMP and CBC.  He is to   follow up in six months, sooner depending on results of labs.      JDS/HN  dd: 02/27/2018 13:43:44 (CST)  td: 02/28/2018 08:53:53 (CST)  Doc ID   #[id number]  Job ID #[id number]    CC:       Answers for HPI/ROS submitted by the patient on 2/26/2018   activity change: No  unexpected weight change: No  neck pain: No  hearing loss: No  rhinorrhea: No  trouble swallowing: No  eye discharge: No  visual disturbance: No  chest tightness: No  wheezing: No  chest pain: No  palpitations: No  blood in stool: No  constipation: No  vomiting: No  diarrhea: No  polydipsia: No  polyuria: No  difficulty urinating: No  urgency: No  hematuria: No  joint swelling: No  arthralgias: No  headaches: No  weakness: No  confusion: No  dysphoric mood: No

## 2018-02-28 ENCOUNTER — TELEPHONE (OUTPATIENT)
Dept: INTERNAL MEDICINE | Facility: CLINIC | Age: 83
End: 2018-02-28

## 2018-03-06 ENCOUNTER — OFFICE VISIT (OUTPATIENT)
Dept: NEUROLOGY | Facility: CLINIC | Age: 83
End: 2018-03-06
Payer: MEDICARE

## 2018-03-06 VITALS
BODY MASS INDEX: 27.88 KG/M2 | HEIGHT: 69 IN | WEIGHT: 188.25 LBS | DIASTOLIC BLOOD PRESSURE: 85 MMHG | HEART RATE: 100 BPM | SYSTOLIC BLOOD PRESSURE: 134 MMHG

## 2018-03-06 DIAGNOSIS — M81.0 AGE RELATED OSTEOPOROSIS, UNSPECIFIED PATHOLOGICAL FRACTURE PRESENCE: ICD-10-CM

## 2018-03-06 DIAGNOSIS — E55.9 VITAMIN D DEFICIENCY: ICD-10-CM

## 2018-03-06 DIAGNOSIS — R26.9 GAIT ABNORMALITY: Primary | ICD-10-CM

## 2018-03-06 DIAGNOSIS — I10 ESSENTIAL HYPERTENSION: ICD-10-CM

## 2018-03-06 DIAGNOSIS — H25.13 NUCLEAR SCLEROSIS OF BOTH EYES: ICD-10-CM

## 2018-03-06 DIAGNOSIS — E78.49 OTHER HYPERLIPIDEMIA: ICD-10-CM

## 2018-03-06 DIAGNOSIS — G56.02 CARPAL TUNNEL SYNDROME OF LEFT WRIST: ICD-10-CM

## 2018-03-06 DIAGNOSIS — E66.3 OVERWEIGHT (BMI 25.0-29.9): ICD-10-CM

## 2018-03-06 DIAGNOSIS — H35.3134 NONEXUDATIVE AGE-RELATED MACULAR DEGENERATION, BILATERAL, ADVANCED ATROPHIC WITH SUBFOVEAL INVOLVEMENT: ICD-10-CM

## 2018-03-06 PROCEDURE — 99999 PR PBB SHADOW E&M-EST. PATIENT-LVL III: CPT | Mod: PBBFAC,,, | Performed by: PSYCHIATRY & NEUROLOGY

## 2018-03-06 PROCEDURE — 99213 OFFICE O/P EST LOW 20 MIN: CPT | Mod: PBBFAC | Performed by: PSYCHIATRY & NEUROLOGY

## 2018-03-06 PROCEDURE — 99205 OFFICE O/P NEW HI 60 MIN: CPT | Mod: S$PBB,,, | Performed by: PSYCHIATRY & NEUROLOGY

## 2018-03-06 NOTE — ASSESSMENT & PLAN NOTE
Slow and somewhat shuffling with modest arm swing bilaterally. In the context of the rest of the neurological examination I have a low suspicion of Parkinson's Disease based on gait abnormality. I suspect the gait is secondary to scoliosis and the patient's impaired vision. I offered to refer to either Dr. Pride or Dr. Robles for an expertise evaluation but he preferred to wait and see if other PD symptoms developed.

## 2018-03-06 NOTE — ASSESSMENT & PLAN NOTE
On appropriate medications and managed by PCP. We discussed the importance of managing all secondary stroke risk factors, including hyperlipidemia.

## 2018-03-06 NOTE — LETTER
March 6, 2018      Bunny Siddiqui II, MD  [address]  [place] [address]           [facility]  [address]  [facility] [address]  Phone: [phone]  Fax: [phone]          Patient: Misha Marte Jr.   MR Number: [id number]   YOB: 1929   Date of Visit: 3/6/2018       Dear Dr. Bunny Siddiqui II:    Thank you for referring Misha Marte to me for evaluation. Attached you will find relevant portions of my assessment and plan of care.    If you have questions, please do not hesitate to call me. I look forward to following Misha Marte along with you.    Sincerely,    Venancio Oscar MD    Enclosure  CC:  No Recipients    If you would like to receive this communication electronically, please contact [email or web address] or [phone] to request more information on [facility] access.    For providers and/or their staff who would like to refer a patient to [facility], please contact us through our one-stop-shop provider referral line, [facility], at [phone].    If you feel you have received this communication in error or would no longer like to receive these types of communications, please e-mail [email or web address]

## 2018-03-06 NOTE — PROGRESS NOTES
Subjective:     Chief Complaint:  Consult      History of Present Illness:  Misha Marte Jr. is a 89 y.o. male who presents today for initial evaluation of impaired gait and worries of Parkinson's Disease. He was sent on the recommendation of Dr. Siddiqui, his rheumatologist.    Mr. 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 He has scoliosis and poor vision (macular degeneration and bilateral cataracts), both of which he says affect his gait. He has had no falls from standing or while walking, but did recently have a serious scalp laceration from a fall from a seated position (slipped while standing and hit head on table edge). He has no complaints of tremor, no changes in handwriting, no complaints of rigidity.    He was seen in the EMG clinic recently for L CTS assessment, which was positive, and had a recent successful release procedure per Dr. Lewis. He has HTN, HLD, osteoporosis, Vit D deficiency, and thyroid nodules.    Review of Systems  Review of Systems   Constitutional: Negative for activity change, fatigue and fever.   HENT: Negative for hearing loss, trouble swallowing and voice change.    Eyes: Positive for visual disturbance. Negative for pain and redness.   Respiratory: Negative for choking, chest tightness and shortness of breath.    Cardiovascular: Negative for chest pain.   Gastrointestinal: Negative for abdominal pain, nausea and vomiting.   Endocrine: Negative for cold intolerance.   Genitourinary: Negative for frequency.   Musculoskeletal: Positive for back pain and gait problem. Negative for arthralgias, joint swelling, myalgias and neck pain.   Skin: Negative for color change.   Allergic/Immunologic: Negative for immunocompromised state.   Neurological: Negative for dizziness, seizures, speech difficulty, weakness, numbness and headaches.   Hematological: Negative for adenopathy.   Psychiatric/Behavioral: Negative for agitation, behavioral  "problems, dysphoric mood and suicidal ideas.        Objective:     Vitals:    03/06/18 1319   BP: 134/85   Pulse: 100   Weight: 85.4 kg (188 lb 4.4 oz)   Height: 5' 9" (1.753 m)   PainSc: 0-No pain       Neurologic Exam     Mental Status   Oriented to person, place, and time.   Attention: normal.   Speech: speech is normal   Level of consciousness: alert  Knowledge: good.     Cranial Nerves     CN II   Visual fields full to confrontation.   Visual acuity: decreased    CN III, IV, VI   Pupils are equal, round, and reactive to light.  Extraocular motions are normal.     CN V   Facial sensation intact.     CN VII   Facial expression full, symmetric.     CN VIII   Hearing: intact    CN IX, X   CN IX normal.     CN XI   CN XI normal.     CN XII   CN XII normal.     Motor Exam   Muscle bulk: normal  Overall muscle tone: normal (No cogwheel rigidity)  Right arm pronator drift: absent  Left arm pronator drift: absent    Strength   Strength 5/5 throughout.          Sensory Exam   Light touch normal.   Vibration normal.     Gait, Coordination, and Reflexes     Gait  Gait: shuffling (Slow and somewhat shuffling gait but seems secondary to osteoarthritis and scoliosis and diminished vision. Able to make fairly quick turnaround while remaining stable.)    Coordination   Finger to nose coordination: normal    Tremor   Resting tremor: absent  Intention tremor: absent  Action tremor: absent    Reflexes   Right brachioradialis: 1+  Left brachioradialis: 1+  Right biceps: 1+  Left biceps: 1+  Right triceps: 1+  Left triceps: 1+  Right patellar: 1+  Left patellar: 1+  Right achilles: 1+  Left achilles: 1+  Right plantar: normal  Left plantar: normal  Right Jenkins: absent  Left Jenkins: absent  Right ankle clonus: absent  Left ankle clonus: absent  Normal handwriting, neat and well-sized.  Normal concentric [place] drawing without evidence of tremor.       Physical Exam   Constitutional: He is oriented to person, place, and time. He " appears well-developed and well-nourished.   HENT:   Head: Normocephalic and atraumatic.   Eyes: EOM are normal. Pupils are equal, round, and reactive to light.   Neck: Normal range of motion. Neck supple. No thyromegaly present.   Cardiovascular: Normal rate.    Pulmonary/Chest: Effort normal.   Abdominal: Soft.   Lymphadenopathy:     He has no cervical adenopathy.   Neurological: He is oriented to person, place, and time. He has normal strength. He has a normal Finger-Nose-Finger Test.   Reflex Scores:       Tricep reflexes are 1+ on the right side and 1+ on the left side.       Bicep reflexes are 1+ on the right side and 1+ on the left side.       Brachioradialis reflexes are 1+ on the right side and 1+ on the left side.       Patellar reflexes are 1+ on the right side and 1+ on the left side.       Achilles reflexes are 1+ on the right side and 1+ on the left side.  Skin: Skin is warm and dry.   Psychiatric: He has a normal mood and affect. His speech is normal and behavior is normal. Thought content normal.   Vitals reviewed.        Lab Results   Component Value Date    WBC 5.97 02/27/2018    HGB 12.9 (L) 02/27/2018    HCT 39.9 (L) 02/27/2018     02/27/2018    CHOL 134 02/27/2018    TRIG 84 02/27/2018    HDL 45 02/27/2018    ALT 26 02/27/2018    AST 29 02/27/2018     02/27/2018    K 5.0 02/27/2018     02/27/2018    CREATININE 1.3 02/27/2018    BUN 14 02/27/2018    CO2 23 02/27/2018    TSH 2.505 02/27/2018    [id number] 12/22/2011         Assessment and Plan:     Problem List Items Addressed This Visit     Hypertension    Current Assessment & Plan     On appropriate medications and managed by PCP. We discussed the importance of managing all secondary stroke risk factors, including hypertension.           Vitamin D deficiency    Nonexudative age-related macular degeneration, bilateral, advanced atrophic with subfoveal involvement    NS (nuclear sclerosis)    Overweight (BMI 25.0-29.9)     Carpal tunnel syndrome of left wrist    Current Assessment & Plan     s/p release procedure recently. Well-healed and improved symptoms.         Gait abnormality - Primary    Current Assessment & Plan     Slow and somewhat shuffling with modest arm swing bilaterally. In the context of the rest of the neurological examination I have a low suspicion of Parkinson's Disease based on gait abnormality. I suspect the gait is secondary to scoliosis and the patient's impaired vision. I offered to refer to either Dr. Pride or Dr. Robles for an expertise evaluation but he preferred to wait and see if other PD symptoms developed.         Age related osteoporosis    Other hyperlipidemia    Current Assessment & Plan     On appropriate medications and managed by PCP. We discussed the importance of managing all secondary stroke risk factors, including hyperlipidemia.                   Venancio Oscar MD  [facility]

## 2018-04-30 DIAGNOSIS — I35.0 NONRHEUMATIC AORTIC VALVE STENOSIS: ICD-10-CM

## 2018-05-01 RX ORDER — PRAVASTATIN SODIUM 20 MG/1
TABLET ORAL
Qty: 90 TABLET | Refills: 0 | Status: SHIPPED | OUTPATIENT
Start: 2018-05-01 | End: 2018-07-29 | Stop reason: SDUPTHER

## 2018-05-16 ENCOUNTER — TELEPHONE (OUTPATIENT)
Dept: SPINE | Facility: CLINIC | Age: 83
End: 2018-05-16

## 2018-05-17 NOTE — PROGRESS NOTES
Subjective:      Patient ID: Misha Marte Jr. is a 89 y.o. male.    Chief Complaint: Back Pain    Referred by: No ref. provider found     Mr Marte is an 88 yo female here for follow up of his gait instability and leg pain.  He was last seen by me on 11/16/2016 and he was walking better and using a cane as needed.  He has been doing well until last week.  Last week he woke up with a catch in the lower back.  The pain was not constant, but he was fearful of falling.  The pain is not with sitting.  The pain happens with getting up from a chair, and moving, and coughing and sneezing.  He has been to chiropractor in the past.  He has had the pain in the past.  He cannot take cortisone because it gets hiccups.  The pain is more on the right than the left.  He still does his HEP from therapy.  The pain is a stabbing pain and does not linger.  The pain is 0/10 now, worst 7/10 and he will sit down and it will ease.      X-ray lumbar 5/2/2016  Examination of the lumbar spine a five-view examination with flexion/extension views demonstrates  that all of the lumbar vertebral bodies are of normal size and stature with loss of of intervertebral disk space height predominantly at the L2-L3 level.  Subchondral sclerosis L2-L3.  Pedicles are intact. .  Atherosclerotic change present within the aorta.  Mild anterolisthesis of L4 on L5 with retrolisthesis of L2 on L3 retrolisthesis of which is accentuated with extension. Paravertebral soft tissues are unremarkable.  Impression    Degenerative changes as noted above.     X-ray pelvis 5/2/2016  Finding: AP radiographs of the pelvis demonstrate moderate bilateral joint space loss of the hips.  No fracture or dislocation.  Normal mineralization is present.  No osseous destructive process.    Past Medical History:  No date: Heart murmur  No date: Hyperlipidemia  No date: Hypertension  No date: Insomnia    Past Surgical History:  No date: APPENDECTOMY  No date: CARPAL TUNNEL  RELEASE  No date: COLONOSCOPY  No date: TONSILLECTOMY, ADENOIDECTOMY    Review of patient's family history indicates:  Problem: Heart disease      Relation: Mother       Age of Onset: (Not Specified)   Problem: Heart attack      Relation: Mother       Age of Onset: (Not Specified)   Problem: Stroke      Relation: Father       Age of Onset: (Not Specified)   Problem: Hypertension      Relation: Neg Hx       Age of Onset: (Not Specified)       Social History    Marital status:              Spouse name:                       Years of education:                 Number of children:               Social History Main Topics    Smoking status: Never Smoker                                                                Smokeless tobacco: Never Used                        Alcohol use: Yes           1.2 oz/week       Glasses of wine: 2 per week    Drug use: No              Sexual activity: No                       Current Outpatient Prescriptions:  aspirin 81 mg Tab, Take 81 mg by mouth once daily. , Disp: , Rfl:   CALCIUM CARBONATE/VITAMIN D3 (CALCIUM 600 + D,3, ORAL), Take 1 capsule by mouth once daily., Disp: , Rfl:   clobetasol (TEMOVATE) 0.05 % external solution, Apply topically 2 (two) times daily. , Disp: , Rfl:   coenzyme Q10 (CO Q-10) 10 mg capsule, Take 10 mg by mouth once daily. , Disp: , Rfl:   desoximetasone (TOPICORT) 0.25 % cream, Apply topically as needed., Disp: , Rfl:   doxycycline (MONODOX) 100 MG capsule, Take 1 capsule (100 mg total) by mouth 2 (two) times daily., Disp: 14 capsule, Rfl: 0  ergocalciferol (ERGOCALCIFEROL) 50,000 unit Cap, TAKE 1 CAPSULE BY MOUTH EVERY 2 WEEKS, Disp: 6 capsule, Rfl: 6  furosemide (LASIX) 20 MG tablet, TAKE 1 TABLET(20 MG) BY MOUTH DAILY AS NEEDED (Patient taking differently: TAKE 1 TABLET(20 MG) BY MOUTH DAILY), Disp: 90 tablet, Rfl: 3  multivitamin-minerals-lutein (CENTRUM SILVER) Tab, Take 1 tablet by mouth once daily. , Disp: , Rfl:   omeprazole (PRILOSEC) 20 MG  capsule, Take 1 capsule (20 mg total) by mouth once daily., Disp: 30 capsule, Rfl: 5  polyethylene glycol (GLYCOLAX) 17 gram PwPk, Take by mouth as needed. , Disp: , Rfl:   pravastatin (PRAVACHOL) 20 MG tablet, TAKE 1 TABLET BY MOUTH AT NIGHT, Disp: 90 tablet, Rfl: 0  ramipril (ALTACE) 10 MG capsule, TAKE 1 CAPSULE(10 MG) BY MOUTH EVERY DAY, Disp: 90 capsule, Rfl: 1  tretinoin (RETIN-A) 0.05 % cream, Apply 1 application topically nightly. , Disp: , Rfl:   VIT A,C & E/LUTEIN/MINERALS (OCUVITE WITH LUTEIN ORAL), Take 1 capsule by mouth once daily., Disp: , Rfl:     No current facility-administered medications for this visit.       Review of patient's allergies indicates:   -- Cortisone -- Other (See Comments)    --  hiccups   -- Dexamethasone -- Other (See Comments)    --  Constant hiccups for days   -- Codeine     --  Other reaction(s): Nausea                Review of Systems   Constitution: Negative for weight gain and weight loss.   Eyes: Positive for visual disturbance.   Cardiovascular: Positive for leg swelling (right). Negative for chest pain.   Respiratory: Negative for shortness of breath.    Musculoskeletal: Positive for back pain. Negative for joint pain and joint swelling.   Gastrointestinal: Negative for abdominal pain and bowel incontinence.   Genitourinary: Negative for bladder incontinence.   Neurological: Negative for numbness and paresthesias.           Objective:          General    Vitals reviewed.  Constitutional: He is oriented to person, place, and time. He appears well-developed and well-nourished.   HENT:   Head: Normocephalic and atraumatic.   Pulmonary/Chest: Effort normal.   Neurological: He is alert and oriented to person, place, and time.   Psychiatric: He has a normal mood and affect. His behavior is normal. Judgment and thought content normal.     General Musculoskeletal Exam   Gait: abnormal (slow with flexed posture and flexed hips and knees, short step length)         Right Hip Exam      Range of Motion   Extension: abnormal   Internal Rotation: abnormal   External Rotation: abnormal   Left Hip Exam     Range of Motion   Extension: abnormal   Internal Rotation: abnormal   External Rotation: abnormal       Back (L-Spine & T-Spine) / Neck (C-Spine) Exam     Back (L-Spine & T-Spine) Range of Motion   Extension:  0 abnormal   Flexion: 50   Lateral Bend Right: 10   Lateral Bend Left: 10   Rotation Right: 20   Rotation Left: 20     Spinal Sensation   Right Side Sensation  C-Spine Level: normal   L-Spine Level: normal  S-Spine Level: normal  Left Side Sensation  C-Spine Level: normal  L-Spine Level: normal  S-Spine Level: normal    Back (L-Spine & T-Spine) Tests   Right Side Tests  Straight leg raise:      Sitting SLR: > 70 degrees      Left Side Tests  Straight leg raise:     Sitting SLR: > 70 degrees          Other He has no scoliosis .  Spinal Kyphosis:  present    Comments:  Right LE swelling with pitting edema      Muscle Strength   Right Upper Extremity   Biceps: 5/5/5   Deltoid:  5/5  Triceps:  5/5  Wrist Extension: 5/5/5   Finger Flexors:  5/5  Left Upper Extremity  Biceps: 5/5/5   Deltoid:  5/5  Triceps:  5/5  Wrist Extension: 5/5/5   Finger Flexors:  5/5  Right Lower Extremity   Hip Flexion: 5/5   Quadriceps:  5/5   Anterior tibial:  5/5/5  EHL:  5/5  Left Lower Extremity   Hip Flexion: 5/5   Quadriceps:  5/5   Anterior tibial:  5/5/5   EHL:  5/5    Reflexes     Left Side  Biceps:  2+  Triceps:  2+  Brachioradialis:  2+  Quadriceps:  2+  Achilles:  2+  Left Jenkins's Sign:  Absent  Babinski Sign:  absent    Right Side   Biceps:  2+  Triceps:  2+  Brachioradialis:  2+  Quadriceps:  2+  Achilles:  2+  Right Jenkins's Sign:  absent  Babinski Sign:  absent    Vascular Exam     Right Pulses        Carotid:                  2+    Left Pulses        Carotid:                  2+        Assessment:       Encounter Diagnoses   Name Primary?    Osteoarthritis of spine with radiculopathy, lumbar region  Yes    DISH (diffuse idiopathic skeletal hyperostosis)     Gait instability     Primary osteoarthritis of both hips     Pain and swelling of lower leg, right          Plan:       Misha was seen today for back pain.    Diagnoses and all orders for this visit:    Osteoarthritis of spine with radiculopathy, lumbar region  -     Ambulatory Referral to Physical/Occupational Therapy    DISH (diffuse idiopathic skeletal hyperostosis)  -     Ambulatory Referral to Physical/Occupational Therapy    Gait instability  -     Ambulatory Referral to Physical/Occupational Therapy    Primary osteoarthritis of both hips  -     Ambulatory Referral to Physical/Occupational Therapy    Pain and swelling of lower leg, right  -     VAS US Venous Leg Right; Future         1.  PT for back strengthening and core strengthening, gait training, myofascial release and HEP    2.  aleve 2 pills twice a day with food for 3 days then stop (he does not take often due to kidneys.  Kidney functtion is normal.  We discussed taking with food)  3.  Tylenol 1-2 twice a day.    4.  x-ray of the lumbar spine shows significant degenerative changes with significant DISH, we will not repeat at this time  5.  Cane as needed  6.  Right LE swelling.  We will get an US there is pitting edema, likely PVD  7.  RTC 8 weeks

## 2018-05-18 ENCOUNTER — OFFICE VISIT (OUTPATIENT)
Dept: SPINE | Facility: CLINIC | Age: 83
End: 2018-05-18
Attending: PHYSICAL MEDICINE & REHABILITATION
Payer: MEDICARE

## 2018-05-18 ENCOUNTER — TELEPHONE (OUTPATIENT)
Dept: SPINE | Facility: CLINIC | Age: 83
End: 2018-05-18

## 2018-05-18 VITALS
DIASTOLIC BLOOD PRESSURE: 73 MMHG | WEIGHT: 189 LBS | HEIGHT: 69 IN | SYSTOLIC BLOOD PRESSURE: 160 MMHG | HEART RATE: 80 BPM | BODY MASS INDEX: 27.99 KG/M2

## 2018-05-18 DIAGNOSIS — M16.0 PRIMARY OSTEOARTHRITIS OF BOTH HIPS: ICD-10-CM

## 2018-05-18 DIAGNOSIS — R26.81 GAIT INSTABILITY: ICD-10-CM

## 2018-05-18 DIAGNOSIS — M79.89 PAIN AND SWELLING OF LOWER LEG, RIGHT: ICD-10-CM

## 2018-05-18 DIAGNOSIS — M47.26 OSTEOARTHRITIS OF SPINE WITH RADICULOPATHY, LUMBAR REGION: Primary | ICD-10-CM

## 2018-05-18 DIAGNOSIS — M79.661 PAIN AND SWELLING OF LOWER LEG, RIGHT: ICD-10-CM

## 2018-05-18 DIAGNOSIS — M48.10 DISH (DIFFUSE IDIOPATHIC SKELETAL HYPEROSTOSIS): ICD-10-CM

## 2018-05-18 PROCEDURE — 99214 OFFICE O/P EST MOD 30 MIN: CPT | Mod: S$PBB,,, | Performed by: PHYSICAL MEDICINE & REHABILITATION

## 2018-05-18 PROCEDURE — 99999 PR PBB SHADOW E&M-EST. PATIENT-LVL III: CPT | Mod: PBBFAC,,, | Performed by: PHYSICAL MEDICINE & REHABILITATION

## 2018-05-18 PROCEDURE — 99213 OFFICE O/P EST LOW 20 MIN: CPT | Mod: PBBFAC | Performed by: PHYSICAL MEDICINE & REHABILITATION

## 2018-05-18 RX ORDER — HYDROGEN PEROXIDE 3 %
20 SOLUTION, NON-ORAL MISCELLANEOUS
COMMUNITY
End: 2018-10-10

## 2018-05-18 NOTE — TELEPHONE ENCOUNTER
Called spoke with patient wife reschedule appointment for 5/29 at 1pm for ultrasosund  ----- Message from Trang Davis sent at 5/18/2018  1:39 PM CDT -----  Contact: Pat wife            Name of Who is Calling: CLARY DUARTE JR. [[id number]]    What is the request in detail: pt wife is calling to change appt... Please advise      Can the clinic reply by [facility]: no      What Number to Call Back if not in [facility]: [phone]

## 2018-05-29 ENCOUNTER — HOSPITAL ENCOUNTER (OUTPATIENT)
Dept: VASCULAR SURGERY | Facility: CLINIC | Age: 83
Discharge: HOME | End: 2018-05-29
Payer: MEDICARE

## 2018-05-29 DIAGNOSIS — M79.89 PAIN AND SWELLING OF LOWER LEG, RIGHT: ICD-10-CM

## 2018-05-29 DIAGNOSIS — M79.661 PAIN AND SWELLING OF LOWER LEG, RIGHT: ICD-10-CM

## 2018-05-29 PROCEDURE — 93971 EXTREMITY STUDY: CPT | Mod: PBBFAC | Performed by: SURGERY

## 2018-05-29 PROCEDURE — 93971 EXTREMITY STUDY: CPT | Mod: 26,S$PBB,, | Performed by: SURGERY

## 2018-06-12 ENCOUNTER — OFFICE VISIT (OUTPATIENT)
Dept: INTERNAL MEDICINE | Facility: CLINIC | Age: 83
End: 2018-06-12
Payer: MEDICARE

## 2018-06-12 VITALS
HEART RATE: 83 BPM | HEIGHT: 69 IN | SYSTOLIC BLOOD PRESSURE: 138 MMHG | TEMPERATURE: 98 F | OXYGEN SATURATION: 97 % | WEIGHT: 187.81 LBS | BODY MASS INDEX: 27.82 KG/M2 | DIASTOLIC BLOOD PRESSURE: 68 MMHG

## 2018-06-12 DIAGNOSIS — E04.2 MULTINODULAR GOITER: ICD-10-CM

## 2018-06-12 DIAGNOSIS — E78.5 HYPERLIPIDEMIA, UNSPECIFIED HYPERLIPIDEMIA TYPE: ICD-10-CM

## 2018-06-12 DIAGNOSIS — I35.0 AORTIC STENOSIS, MODERATE: Primary | ICD-10-CM

## 2018-06-12 DIAGNOSIS — I10 ESSENTIAL HYPERTENSION: ICD-10-CM

## 2018-06-12 PROCEDURE — 99213 OFFICE O/P EST LOW 20 MIN: CPT | Mod: S$PBB,,, | Performed by: INTERNAL MEDICINE

## 2018-06-12 PROCEDURE — 99213 OFFICE O/P EST LOW 20 MIN: CPT | Mod: PBBFAC | Performed by: INTERNAL MEDICINE

## 2018-06-12 PROCEDURE — 99999 PR PBB SHADOW E&M-EST. PATIENT-LVL III: CPT | Mod: PBBFAC,,, | Performed by: INTERNAL MEDICINE

## 2018-06-12 NOTE — PROGRESS NOTES
CHIEF COMPLAINT:  Followup.    HISTORY OF PRESENT ILLNESS:  The patient is an 89-year-old gentleman who comes   in today for followup of hypertension, hyperlipidemia.  The patient is currently   on pravastatin 20 mg once a day for his cholesterol.  The patient did have   blood test done back in February, which showed that his total cholesterol was   134, LDL was 72 and HDL was 45.  The patient is being treated for hypertension.    He is on Lasix as well as ramipril.  No problems with medication reported.  No   dizziness or lightheadedness.  He does check it at home and is always clinical   within the normal range.  The patient is getting physical therapy currently   through Dr. Hall, which he found has helped with his balance.  Finally, the   patient does have problems with reflux.  He is on Nexium.  This appears to be   under control with the medication.    REVIEW OF SYSTEMS:  The patient reports no chest pain or shortness of breath.    He does not formerly worked up, but does go to physical therapy two times a   week, which he states does work him pretty hard.  His reflux is under good   control.  He goes to see [facility] once a year.  He sees Dr. Colby, who   checks him for his prostate and ____, who does his hormonal replacement.  The   patient also follows up with Dr. Siddiqui for his thyroid.  He does have a   history of a thyroid nodule.  Dr. Siddiqui reviews ultrasound on him as well.    PHYSICAL EXAMINATION:  GENERAL APPEARANCE:  No acute distress.  HEENT:  Conjunctivae clear.  Pupils are equal.  TMs are clear.  Nasal septum is   midline without discharge.  Oropharynx is without erythema.  NECK:  Trachea was midline.  PULMONARY:  Good inspiratory, expiratory breath sounds are heard.  Lungs are   clear to auscultation.  CARDIOVASCULAR:  S1, S2 with 2-3/6 systolic ejection murmur heard best at right   upper sternal border.  EXTREMITIES:  With trace to 1+ ankle edema.  ABDOMEN:  Nontender, nondistended  without hepatosplenomegaly.    ASSESSMENT:  1.  Hypertension, currently stable.  2.  Aortic stenosis, which appears to be moderate.  The patient does have a   followup with Dr. White.  3.  Hyperlipidemia, currently at goal.  4.  History of goiter.    PLAN:  No change in medication at this time.  The patient is to follow up with   us in four months for reevaluation.      ABDIRAHMAN/IN  dd: 06/12/2018 12:26:52 (CDT)  td: 06/13/2018 11:19:06 (CDT)  Doc ID   #[id number]  Job ID #[id number]    CC:

## 2018-06-19 ENCOUNTER — OFFICE VISIT (OUTPATIENT)
Dept: DERMATOLOGY | Facility: CLINIC | Age: 83
End: 2018-06-19
Payer: MEDICARE

## 2018-06-19 DIAGNOSIS — L57.0 AK (ACTINIC KERATOSIS): Primary | ICD-10-CM

## 2018-06-19 DIAGNOSIS — Z85.828 HISTORY OF NONMELANOMA SKIN CANCER: ICD-10-CM

## 2018-06-19 DIAGNOSIS — L72.0 MILIA: ICD-10-CM

## 2018-06-19 DIAGNOSIS — L82.1 SEBORRHEIC KERATOSIS: ICD-10-CM

## 2018-06-19 DIAGNOSIS — D18.00 ANGIOMA: ICD-10-CM

## 2018-06-19 PROCEDURE — 17000 DESTRUCT PREMALG LESION: CPT | Mod: S$PBB,,, | Performed by: DERMATOLOGY

## 2018-06-19 PROCEDURE — 17003 DESTRUCT PREMALG LES 2-14: CPT | Mod: S$PBB,,, | Performed by: DERMATOLOGY

## 2018-06-19 PROCEDURE — 99999 PR PBB SHADOW E&M-EST. PATIENT-LVL II: CPT | Mod: PBBFAC,,, | Performed by: DERMATOLOGY

## 2018-06-19 PROCEDURE — 99203 OFFICE O/P NEW LOW 30 MIN: CPT | Mod: 25,S$PBB,, | Performed by: DERMATOLOGY

## 2018-06-19 PROCEDURE — 17003 DESTRUCT PREMALG LES 2-14: CPT | Mod: PBBFAC | Performed by: DERMATOLOGY

## 2018-06-19 PROCEDURE — 99212 OFFICE O/P EST SF 10 MIN: CPT | Mod: PBBFAC | Performed by: DERMATOLOGY

## 2018-06-19 PROCEDURE — 17000 DESTRUCT PREMALG LESION: CPT | Mod: PBBFAC | Performed by: DERMATOLOGY

## 2018-06-19 NOTE — PROGRESS NOTES
Subjective:       Patient ID:  Misha Marte Jr. is a 89 y.o. male who presents for   Chief Complaint   Patient presents with    Skin Check     Here for TBSE  This is a high risk patient here to check for the development of new lesions.  + nmsc in past  Last seen by Dr. Camacho 1 month ago    Wife has very long list of pt problems with her        Review of Systems   Skin: Positive for daily sunscreen use (spf 15), activity-related sunscreen use and wears hat. Negative for recent sunburn.   Hematologic/Lymphatic: Bruises/bleeds easily (on asa).        Objective:    Physical Exam   Constitutional: He appears well-developed and well-nourished. No distress.   Genitourinary:         Neurological: He is alert and oriented to person, place, and time. He is not disoriented.   Psychiatric: He has a normal mood and affect.   Skin:   Areas Examined (abnormalities noted in diagram):   Scalp / Hair Palpated and Inspected  Head / Face Inspection Performed  Neck Inspection Performed  Chest / Axilla Inspection Performed  Abdomen Inspection Performed  Genitals / Buttocks / Groin Inspection Performed  Back Inspection Performed  RUE Inspected  LUE Inspection Performed  RLE Inspected  LLE Inspection Performed  Nails and Digits Inspection Performed                       Diagram Legend     Erythematous scaling macule/papule c/w actinic keratosis       Vascular papule c/w angioma      Pigmented verrucoid papule/plaque c/w seborrheic keratosis      Yellow umbilicated papule c/w sebaceous hyperplasia      Irregularly shaped tan macule c/w lentigo     1-2 mm smooth white papules consistent with Milia      Movable subcutaneous cyst with punctum c/w epidermal inclusion cyst      Subcutaneous movable cyst c/w pilar cyst      Firm pink to brown papule c/w dermatofibroma      Pedunculated fleshy papule(s) c/w skin tag(s)      Evenly pigmented macule c/w junctional nevus     Mildly variegated pigmented, slightly irregular-bordered macule c/w  mildly atypical nevus      Flesh colored to evenly pigmented papule c/w intradermal nevus       Pink pearly papule/plaque c/w basal cell carcinoma      Erythematous hyperkeratotic cursted plaque c/w SCC      Surgical scar with no sign of skin cancer recurrence      Open and closed comedones      Inflammatory papules and pustules      Verrucoid papule consistent consistent with wart     Erythematous eczematous patches and plaques     Dystrophic onycholytic nail with subungual debris c/w onychomycosis     Umbilicated papule    Erythematous-base heme-crusted tan verrucoid plaque consistent with inflamed seborrheic keratosis     Erythematous Silvery Scaling Plaque c/w Psoriasis     See annotation      Assessment / Plan:        AK (actinic keratosis)  Cryosurgery Procedure Note    Verbal consent from the patient is obtained including, but not limited to, risk of hypopigmentation/hyperpigmentation, scar, recurrence of lesion. The patient is aware of the precancerous quality and need for treatment of these lesions. Liquid nitrogen cryosurgery is applied to the 9 actinic keratoses, as detailed in the physical exam, to produce a freeze injury. The patient is aware that blisters may form and is instructed on wound care with gentle cleansing and use of vaseline ointment to keep moist until healed. The patient is supplied a handout on cryosurgery and is instructed to call if lesions do not completely resolve.      Seborrheic keratosis  These are benign inherited growths without a malignant potential. Reassurance given to patient. No treatment is necessary.       Angioma  This is a benign vascular lesion. Reassurance given. No treatment required.       Milia  Reassurance given to patient. No treatment is necessary.   Treatment of benign, asymptomatic lesions may be considered cosmetic.      History of nonmelanoma skin cancer  Area(s) of previous NMSC evaluated with no signs of recurrence.    Total body skin examination performed  today including at least 12 points as noted in physical examination. No lesions suspicious for malignancy noted.             Follow-up in about 6 months (around 12/19/2018).

## 2018-07-08 ENCOUNTER — PATIENT MESSAGE (OUTPATIENT)
Dept: INTERNAL MEDICINE | Facility: CLINIC | Age: 83
End: 2018-07-08

## 2018-07-29 DIAGNOSIS — I35.0 NONRHEUMATIC AORTIC VALVE STENOSIS: ICD-10-CM

## 2018-07-30 RX ORDER — PRAVASTATIN SODIUM 20 MG/1
TABLET ORAL
Qty: 90 TABLET | Refills: 0 | Status: SHIPPED | OUTPATIENT
Start: 2018-07-30 | End: 2018-10-31

## 2018-08-05 RX ORDER — RAMIPRIL 10 MG/1
CAPSULE ORAL
Qty: 90 CAPSULE | Refills: 1 | Status: SHIPPED | OUTPATIENT
Start: 2018-08-05 | End: 2019-02-03 | Stop reason: SDUPTHER

## 2018-08-14 ENCOUNTER — TELEPHONE (OUTPATIENT)
Dept: SPINE | Facility: CLINIC | Age: 83
End: 2018-08-14

## 2018-08-15 ENCOUNTER — OFFICE VISIT (OUTPATIENT)
Dept: SPINE | Facility: CLINIC | Age: 83
End: 2018-08-15
Attending: PHYSICAL MEDICINE & REHABILITATION
Payer: MEDICARE

## 2018-08-15 VITALS
BODY MASS INDEX: 27.99 KG/M2 | SYSTOLIC BLOOD PRESSURE: 123 MMHG | HEIGHT: 69 IN | WEIGHT: 189 LBS | DIASTOLIC BLOOD PRESSURE: 72 MMHG | HEART RATE: 80 BPM

## 2018-08-15 DIAGNOSIS — R26.81 GAIT INSTABILITY: ICD-10-CM

## 2018-08-15 DIAGNOSIS — M48.10 DISH (DIFFUSE IDIOPATHIC SKELETAL HYPEROSTOSIS): ICD-10-CM

## 2018-08-15 DIAGNOSIS — M16.0 PRIMARY OSTEOARTHRITIS OF BOTH HIPS: ICD-10-CM

## 2018-08-15 DIAGNOSIS — M47.26 OSTEOARTHRITIS OF SPINE WITH RADICULOPATHY, LUMBAR REGION: Primary | ICD-10-CM

## 2018-08-15 PROCEDURE — 99213 OFFICE O/P EST LOW 20 MIN: CPT | Mod: PBBFAC | Performed by: PHYSICAL MEDICINE & REHABILITATION

## 2018-08-15 PROCEDURE — 99214 OFFICE O/P EST MOD 30 MIN: CPT | Mod: S$PBB,,, | Performed by: PHYSICAL MEDICINE & REHABILITATION

## 2018-08-15 PROCEDURE — 99999 PR PBB SHADOW E&M-EST. PATIENT-LVL III: CPT | Mod: PBBFAC,,, | Performed by: PHYSICAL MEDICINE & REHABILITATION

## 2018-08-28 ENCOUNTER — INFUSION (OUTPATIENT)
Dept: INFECTIOUS DISEASES | Facility: HOSPITAL | Age: 83
End: 2018-08-28
Attending: INTERNAL MEDICINE
Payer: MEDICARE

## 2018-08-28 VITALS — BODY MASS INDEX: 27.99 KG/M2 | HEIGHT: 69 IN | WEIGHT: 189 LBS

## 2018-08-28 DIAGNOSIS — M81.0 AGE RELATED OSTEOPOROSIS, UNSPECIFIED PATHOLOGICAL FRACTURE PRESENCE: Primary | ICD-10-CM

## 2018-08-28 PROCEDURE — 96372 THER/PROPH/DIAG INJ SC/IM: CPT

## 2018-08-28 PROCEDURE — 63600175 PHARM REV CODE 636 W HCPCS: Mod: JG | Performed by: INTERNAL MEDICINE

## 2018-08-28 RX ADMIN — DENOSUMAB 60 MG: 60 INJECTION SUBCUTANEOUS at 12:08

## 2018-08-28 NOTE — PLAN OF CARE
Problem: Health Knowledge, Opportunity to Enhance (Adult,NICU,[place],Obstetrics,Pediatric)  Goal: Knowledgeable about Health Subject/Topic  Patient will demonstrate the desired outcomes by discharge/transition of care.  Outcome: Ongoing (interventions implemented as appropriate)  PATIENT EDUCATED ON HAND WASHING AND INFECTION CONTROL. PATIENT VERBALIZED UNDERSTANDING

## 2018-09-14 ENCOUNTER — PATIENT MESSAGE (OUTPATIENT)
Dept: CARDIOLOGY | Facility: CLINIC | Age: 83
End: 2018-09-14

## 2018-09-21 ENCOUNTER — PATIENT MESSAGE (OUTPATIENT)
Dept: ENDOCRINOLOGY | Facility: CLINIC | Age: 83
End: 2018-09-21

## 2018-10-10 ENCOUNTER — OFFICE VISIT (OUTPATIENT)
Dept: INTERNAL MEDICINE | Facility: CLINIC | Age: 83
End: 2018-10-10
Payer: MEDICARE

## 2018-10-10 VITALS
WEIGHT: 187.63 LBS | HEART RATE: 66 BPM | BODY MASS INDEX: 27.79 KG/M2 | OXYGEN SATURATION: 99 % | HEIGHT: 69 IN | DIASTOLIC BLOOD PRESSURE: 62 MMHG | TEMPERATURE: 98 F | SYSTOLIC BLOOD PRESSURE: 116 MMHG

## 2018-10-10 DIAGNOSIS — E04.2 MULTINODULAR GOITER: ICD-10-CM

## 2018-10-10 DIAGNOSIS — R60.0 EDEMA OF RIGHT LOWER EXTREMITY: ICD-10-CM

## 2018-10-10 DIAGNOSIS — I10 ESSENTIAL HYPERTENSION: ICD-10-CM

## 2018-10-10 DIAGNOSIS — I35.0 NONRHEUMATIC AORTIC VALVE STENOSIS: Primary | ICD-10-CM

## 2018-10-10 DIAGNOSIS — E78.5 HYPERLIPIDEMIA, UNSPECIFIED HYPERLIPIDEMIA TYPE: ICD-10-CM

## 2018-10-10 PROCEDURE — 99214 OFFICE O/P EST MOD 30 MIN: CPT | Mod: S$PBB,,, | Performed by: INTERNAL MEDICINE

## 2018-10-10 PROCEDURE — 99999 PR PBB SHADOW E&M-EST. PATIENT-LVL III: CPT | Mod: PBBFAC,,, | Performed by: INTERNAL MEDICINE

## 2018-10-10 PROCEDURE — 99213 OFFICE O/P EST LOW 20 MIN: CPT | Mod: PBBFAC | Performed by: INTERNAL MEDICINE

## 2018-10-10 RX ORDER — ESOMEPRAZOLE MAGNESIUM 40 MG/1
40 CAPSULE, DELAYED RELEASE ORAL DAILY
COMMUNITY
End: 2019-12-05 | Stop reason: SDUPTHER

## 2018-10-11 NOTE — PROGRESS NOTES
CHIEF COMPLAINT:  Followup.    HISTORY OF PRESENT ILLNESS:  The patient is an 89-year-old gentleman who is   currently being treated for hypertension, hyperlipidemia, aortic stenosis, who   comes in today for annual checkup.  The patient does have a history of aortic   stenosis.  This is being followed by Dr. White.  He also sees Dr. Colby at   [facility].  He was seen yesterday.  He is scheduled for testosterone   implants tomorrow.  He has been on testosterone replacement for the past three   to four years.  The patient reports no new complaints.    REVIEW OF SYSTEMS:  No chest pain, no shortness of breath, no nausea or   vomiting, no abdominal pain, no bowel changes.    PHYSICAL EXAMINATION:  GENERAL APPEARANCE:  No acute distress.  HEENT:  Trachea is midline without JVD.  PULMONARY:  Good inspiratory and expiratory breath sounds are heard.  Lungs are   clear to auscultation.  CARDIOVASCULAR:  S1, S2 with a 2/6 systolic ejection murmur heard best at the   right upper sternal border.  EXTREMITIES:  With 1+ edema on the left, 1-2+ on the right.  ABDOMEN:  Nontender, nondistended, without hepatosplenomegaly.    COMMENTS:  The patient did have an ultrasound done of the leg in May of this   year to rule out DVT.    ASSESSMENT:  1.  Non-rheumatic aortic valve stenosis.  2.  Hypertension.  3.  Hyperlipidemia.  4.  History of multinodular goiter.  5.  Edema of the right lower extremity.    PLAN:  The patient did have a cardiac echo ordered by Dr. White, but the order   appears to be .  We will go ahead and reorder that test.  We will   schedule a CBC, CMP, lipid panel and TSH.      ABDIRAHMAN/NAVEED  dd: 10/11/2018 07:04:37 (CDT)  td: 10/12/2018 02:17:26 (CDT)  Doc ID   #[id number]  Job ID #[id number]    CC:

## 2018-10-15 ENCOUNTER — TELEPHONE (OUTPATIENT)
Dept: INTERNAL MEDICINE | Facility: CLINIC | Age: 83
End: 2018-10-15

## 2018-10-15 NOTE — TELEPHONE ENCOUNTER
----- Message from Ravindra Ayala sent at 10/12/2018  2:25 PM CDT -----  Contact: Pat wife [phone]  Pt will like to speak to the doctor or nurse in regrads to appointment.

## 2018-10-24 ENCOUNTER — TELEPHONE (OUTPATIENT)
Dept: INTERNAL MEDICINE | Facility: CLINIC | Age: 83
End: 2018-10-24

## 2018-10-24 ENCOUNTER — HOSPITAL ENCOUNTER (OUTPATIENT)
Dept: CARDIOLOGY | Facility: CLINIC | Age: 83
Discharge: HOME | End: 2018-10-24
Attending: INTERNAL MEDICINE
Payer: MEDICARE

## 2018-10-24 ENCOUNTER — CLINICAL SUPPORT (OUTPATIENT)
Dept: CARDIOLOGY | Facility: CLINIC | Age: 83
End: 2018-10-24
Attending: INTERNAL MEDICINE
Payer: MEDICARE

## 2018-10-24 DIAGNOSIS — I10 ESSENTIAL HYPERTENSION: ICD-10-CM

## 2018-10-24 DIAGNOSIS — I77.9 CAROTID ARTERY DISEASE, UNSPECIFIED LATERALITY: ICD-10-CM

## 2018-10-24 DIAGNOSIS — I35.0 NONRHEUMATIC AORTIC VALVE STENOSIS: ICD-10-CM

## 2018-10-24 LAB
AORTIC VALVE REGURGITATION: ABNORMAL
AORTIC VALVE STENOSIS: ABNORMAL
ESTIMATED PA SYSTOLIC PRESSURE: 27.04
INTERNAL CAROTID STENOSIS: NORMAL
MITRAL VALVE REGURGITATION: ABNORMAL
RETIRED EF AND QEF - SEE NOTES: 65 (ref 55–65)
TRICUSPID VALVE REGURGITATION: ABNORMAL

## 2018-10-24 PROCEDURE — 93306 TTE W/DOPPLER COMPLETE: CPT | Mod: PBBFAC | Performed by: INTERNAL MEDICINE

## 2018-10-24 PROCEDURE — 93880 EXTRACRANIAL BILAT STUDY: CPT | Mod: PBBFAC | Performed by: INTERNAL MEDICINE

## 2018-10-29 NOTE — PROGRESS NOTES
Subjective:   Patient ID:  Misha Marte Jr. is a 89 y.o. male who presents for follow-up of CVD    HPI:The patient is here for CAD risk factors.    The patient has no chest pain, SOB, TIA, palpitations, syncope or pre-syncope.Patient does not exercise but is active.        Review of Systems   Constitution: Negative for chills, decreased appetite, diaphoresis, fever, weakness, malaise/fatigue, night sweats, weight gain and weight loss.   HENT: Negative for congestion, hoarse voice, nosebleeds, sore throat and tinnitus.    Eyes: Negative for blurred vision, double vision, vision loss in left eye, vision loss in right eye, visual disturbance and visual halos.   Cardiovascular: Negative for chest pain, claudication, cyanosis, dyspnea on exertion, irregular heartbeat, leg swelling, near-syncope, orthopnea, palpitations, paroxysmal nocturnal dyspnea and syncope.   Respiratory: Negative for cough, hemoptysis, shortness of breath, sleep disturbances due to breathing, snoring, sputum production and wheezing.    Endocrine: Negative for cold intolerance, heat intolerance, polydipsia, polyphagia and polyuria.   Hematologic/Lymphatic: Negative for adenopathy and bleeding problem. Does not bruise/bleed easily.   Skin: Negative for color change, dry skin, flushing, itching, nail changes, poor wound healing, rash, skin cancer, suspicious lesions and unusual hair distribution.   Musculoskeletal: Negative for arthritis, back pain, falls, gout, joint pain, joint swelling, muscle cramps, muscle weakness, myalgias and stiffness.   Gastrointestinal: Negative for abdominal pain, anorexia, change in bowel habit, constipation, diarrhea, dysphagia, heartburn, hematemesis, hematochezia, melena and vomiting.   Genitourinary: Negative for decreased libido, dysuria, hematuria, hesitancy and urgency.   Neurological: Negative for excessive daytime sleepiness, dizziness, focal weakness, headaches, light-headedness, loss of balance, numbness,  "paresthesias, seizures, sensory change, tremors and vertigo.   Psychiatric/Behavioral: Negative for altered mental status, depression, hallucinations, memory loss, substance abuse and suicidal ideas. The patient does not have insomnia and is not nervous/anxious.    Allergic/Immunologic: Negative for environmental allergies and hives.       Objective: /67 (BP Location: Left arm, Patient Position: Sitting, BP Method: Large (Automatic))   Pulse 69   Ht 5' 9" (1.753 m)   Wt 86.6 kg (190 lb 14.7 oz)   BMI 28.19 kg/m²      Physical Exam   Constitutional: He is oriented to person, place, and time. He appears well-developed and well-nourished. No distress.   HENT:   Head: Normocephalic.   Eyes: EOM are normal. Pupils are equal, round, and reactive to light.   Neck: Normal range of motion. No thyromegaly present.   Cardiovascular: Normal rate, regular rhythm and intact distal pulses. Exam reveals no gallop and no friction rub.   Murmur heard.   Systolic murmur is present with a grade of 3/6.  Pulses:       Carotid pulses are 3+ on the right side, and 3+ on the left side.       Radial pulses are 3+ on the right side, and 3+ on the left side.        Femoral pulses are 3+ on the right side, and 3+ on the left side.       Popliteal pulses are 3+ on the right side, and 3+ on the left side.        Dorsalis pedis pulses are 3+ on the right side, and 3+ on the left side.        Posterior tibial pulses are 3+ on the right side, and 3+ on the left side.   Pulmonary/Chest: Effort normal and breath sounds normal. No respiratory distress. He has no wheezes. He has no rales. He exhibits no tenderness.   Abdominal: Soft. He exhibits no distension and no mass. There is no tenderness.   Musculoskeletal: Normal range of motion.   Lymphadenopathy:     He has no cervical adenopathy.   Neurological: He is alert and oriented to person, place, and time.   Skin: Skin is warm. He is not diaphoretic. No cyanosis. Nails show no clubbing. "   Psychiatric: He has a normal mood and affect. His speech is normal and behavior is normal. Judgment and thought content normal. Cognition and memory are normal.       Assessment:     1. Nonrheumatic aortic valve stenosis    2. Other hyperlipidemia    3. Stenosis of carotid artery, unspecified laterality    4. Overweight (BMI 25.0-29.9)    5. Essential hypertension    6. Vitamin D deficiency        Plan:   Discussed diet , achieving and maintaining ideal body weight, and exercise.   We reviewed meds in detail.  Reassured-discussed goals , options , plan  Increase pravastatin to 40 mg-2 20s or one 40    Misha was seen today for nonrheumatic aortic valve stenosis.    Diagnoses and all orders for this visit:    Nonrheumatic aortic valve stenosis  -     Lipid panel; Future; Expected date: 02/06/2019  -     Comprehensive metabolic panel; Future; Expected date: 02/06/2019  -     EKG 12-lead; Future; Expected date: 06/30/2019  -     2D echo with color flow doppler; Future; Expected date: 06/30/2019  -     pravastatin (PRAVACHOL) 20 MG tablet; Take 2 tablets (40 mg total) by mouth once daily.    Other hyperlipidemia  -     Lipid panel; Future; Expected date: 02/06/2019  -     Comprehensive metabolic panel; Future; Expected date: 02/06/2019  -     EKG 12-lead; Future; Expected date: 06/30/2019  -     2D echo with color flow doppler; Future; Expected date: 06/30/2019    Stenosis of carotid artery, unspecified laterality  -     Lipid panel; Future; Expected date: 02/06/2019  -     Comprehensive metabolic panel; Future; Expected date: 02/06/2019    Overweight (BMI 25.0-29.9)    Essential hypertension  -     Lipid panel; Future; Expected date: 02/06/2019  -     Comprehensive metabolic panel; Future; Expected date: 02/06/2019  -     EKG 12-lead; Future; Expected date: 06/30/2019  -     2D echo with color flow doppler; Future; Expected date: 06/30/2019    Vitamin D deficiency            Follow-up in about 8 months (around  6/30/2019) for with ECG and CFD; LABS 14 WEEKS.

## 2018-10-31 ENCOUNTER — HOSPITAL ENCOUNTER (OUTPATIENT)
Dept: CARDIOLOGY | Facility: CLINIC | Age: 83
Discharge: HOME | End: 2018-10-31
Payer: MEDICARE

## 2018-10-31 ENCOUNTER — OFFICE VISIT (OUTPATIENT)
Dept: CARDIOLOGY | Facility: CLINIC | Age: 83
End: 2018-10-31
Payer: MEDICARE

## 2018-10-31 ENCOUNTER — PES CALL (OUTPATIENT)
Dept: ADMINISTRATIVE | Facility: CLINIC | Age: 83
End: 2018-10-31

## 2018-10-31 VITALS
WEIGHT: 190.94 LBS | BODY MASS INDEX: 28.28 KG/M2 | HEIGHT: 69 IN | HEART RATE: 69 BPM | SYSTOLIC BLOOD PRESSURE: 125 MMHG | DIASTOLIC BLOOD PRESSURE: 67 MMHG

## 2018-10-31 DIAGNOSIS — E66.3 OVERWEIGHT (BMI 25.0-29.9): ICD-10-CM

## 2018-10-31 DIAGNOSIS — I10 ESSENTIAL HYPERTENSION: ICD-10-CM

## 2018-10-31 DIAGNOSIS — I35.0 NONRHEUMATIC AORTIC VALVE STENOSIS: ICD-10-CM

## 2018-10-31 DIAGNOSIS — I65.29 STENOSIS OF CAROTID ARTERY, UNSPECIFIED LATERALITY: ICD-10-CM

## 2018-10-31 DIAGNOSIS — E55.9 VITAMIN D DEFICIENCY: ICD-10-CM

## 2018-10-31 DIAGNOSIS — E78.49 OTHER HYPERLIPIDEMIA: ICD-10-CM

## 2018-10-31 DIAGNOSIS — I35.0 NONRHEUMATIC AORTIC VALVE STENOSIS: Primary | ICD-10-CM

## 2018-10-31 PROCEDURE — 99215 OFFICE O/P EST HI 40 MIN: CPT | Mod: S$PBB,,, | Performed by: INTERNAL MEDICINE

## 2018-10-31 PROCEDURE — 99999 PR PBB SHADOW E&M-EST. PATIENT-LVL III: CPT | Mod: PBBFAC,,, | Performed by: INTERNAL MEDICINE

## 2018-10-31 PROCEDURE — 93005 ELECTROCARDIOGRAM TRACING: CPT | Mod: PBBFAC | Performed by: INTERNAL MEDICINE

## 2018-10-31 PROCEDURE — 99213 OFFICE O/P EST LOW 20 MIN: CPT | Mod: PBBFAC,25 | Performed by: INTERNAL MEDICINE

## 2018-10-31 PROCEDURE — 93010 ELECTROCARDIOGRAM REPORT: CPT | Mod: S$PBB,,, | Performed by: INTERNAL MEDICINE

## 2018-10-31 RX ORDER — PRAVASTATIN SODIUM 20 MG/1
40 TABLET ORAL DAILY
Qty: 90 TABLET | Refills: 3 | Status: SHIPPED | OUTPATIENT
Start: 2018-10-31 | End: 2019-10-31 | Stop reason: SDUPTHER

## 2018-10-31 NOTE — PATIENT INSTRUCTIONS
Discussed diet , achieving and maintaining ideal body weight, and exercise.   We reviewed meds in detail.  Reassured-discussed goals , options , plan  Increase pravastatin to 40 mg-2 20s or one 40

## 2018-12-04 ENCOUNTER — OFFICE VISIT (OUTPATIENT)
Dept: DERMATOLOGY | Facility: CLINIC | Age: 83
End: 2018-12-04
Payer: MEDICARE

## 2018-12-04 DIAGNOSIS — L72.0 MILIA: ICD-10-CM

## 2018-12-04 DIAGNOSIS — L82.1 SEBORRHEIC KERATOSIS: ICD-10-CM

## 2018-12-04 DIAGNOSIS — L57.0 AK (ACTINIC KERATOSIS): Primary | ICD-10-CM

## 2018-12-04 DIAGNOSIS — D18.00 ANGIOMA: ICD-10-CM

## 2018-12-04 DIAGNOSIS — Z85.828 HISTORY OF NONMELANOMA SKIN CANCER: ICD-10-CM

## 2018-12-04 PROCEDURE — 99212 OFFICE O/P EST SF 10 MIN: CPT | Mod: PBBFAC,25 | Performed by: DERMATOLOGY

## 2018-12-04 PROCEDURE — 17003 DESTRUCT PREMALG LES 2-14: CPT | Mod: PBBFAC | Performed by: DERMATOLOGY

## 2018-12-04 PROCEDURE — 99999 PR PBB SHADOW E&M-EST. PATIENT-LVL II: CPT | Mod: PBBFAC,,, | Performed by: DERMATOLOGY

## 2018-12-04 PROCEDURE — 99213 OFFICE O/P EST LOW 20 MIN: CPT | Mod: 25,S$PBB,, | Performed by: DERMATOLOGY

## 2018-12-04 PROCEDURE — 17003 DESTRUCT PREMALG LES 2-14: CPT | Mod: S$PBB,,, | Performed by: DERMATOLOGY

## 2018-12-04 PROCEDURE — 17000 DESTRUCT PREMALG LESION: CPT | Mod: PBBFAC | Performed by: DERMATOLOGY

## 2018-12-04 PROCEDURE — 17000 DESTRUCT PREMALG LESION: CPT | Mod: S$PBB,,, | Performed by: DERMATOLOGY

## 2018-12-04 RX ORDER — PNEUMOCOCCAL 13-VALENT CONJUGATE VACCINE 2.2; 2.2; 2.2; 2.2; 2.2; 4.4; 2.2; 2.2; 2.2; 2.2; 2.2; 2.2; 2.2 UG/.5ML; UG/.5ML; UG/.5ML; UG/.5ML; UG/.5ML; UG/.5ML; UG/.5ML; UG/.5ML; UG/.5ML; UG/.5ML; UG/.5ML; UG/.5ML; UG/.5ML
INJECTION, SUSPENSION INTRAMUSCULAR
Refills: 0 | COMMUNITY
Start: 2018-11-06 | End: 2019-03-12 | Stop reason: CLARIF

## 2018-12-04 NOTE — PATIENT INSTRUCTIONS

## 2018-12-04 NOTE — PROGRESS NOTES
Subjective:       Patient ID:  Misha Marte Jr. is a 89 y.o. male who presents for   Chief Complaint   Patient presents with    Skin Check     TBSE     History of Present Illness: The patient presents for follow up of skin check.    The patient was last seen on: 6/19/18 for cryosurgery to actinic keratoses which have resolved.   Pt brought list of complaints  Other skin complaints: many    This is a high risk patient here to check for the development of new lesions.            Review of Systems   Skin: Positive for daily sunscreen use (spf 15), activity-related sunscreen use and wears hat (sometimes). Negative for recent sunburn.   Hematologic/Lymphatic: Bruises/bleeds easily (on asa).        Objective:    Physical Exam   Constitutional: He appears well-developed and well-nourished. No distress.   Genitourinary:         Neurological: He is alert and oriented to person, place, and time. He is not disoriented.   Psychiatric: He has a normal mood and affect.   Skin:   Areas Examined (abnormalities noted in diagram):   Scalp / Hair Palpated and Inspected  Head / Face Inspection Performed  Neck Inspection Performed  Chest / Axilla Inspection Performed  Back Inspection Performed  RUE Inspected  LUE Inspection Performed  RLE Inspected  Nails and Digits Inspection Performed                       Diagram Legend     Erythematous scaling macule/papule c/w actinic keratosis       Vascular papule c/w angioma      Pigmented verrucoid papule/plaque c/w seborrheic keratosis      Yellow umbilicated papule c/w sebaceous hyperplasia      Irregularly shaped tan macule c/w lentigo     1-2 mm smooth white papules consistent with Milia      Movable subcutaneous cyst with punctum c/w epidermal inclusion cyst      Subcutaneous movable cyst c/w pilar cyst      Firm pink to brown papule c/w dermatofibroma      Pedunculated fleshy papule(s) c/w skin tag(s)      Evenly pigmented macule c/w junctional nevus     Mildly variegated  pigmented, slightly irregular-bordered macule c/w mildly atypical nevus      Flesh colored to evenly pigmented papule c/w intradermal nevus       Pink pearly papule/plaque c/w basal cell carcinoma      Erythematous hyperkeratotic cursted plaque c/w SCC      Surgical scar with no sign of skin cancer recurrence      Open and closed comedones      Inflammatory papules and pustules      Verrucoid papule consistent consistent with wart     Erythematous eczematous patches and plaques     Dystrophic onycholytic nail with subungual debris c/w onychomycosis     Umbilicated papule    Erythematous-base heme-crusted tan verrucoid plaque consistent with inflamed seborrheic keratosis     Erythematous Silvery Scaling Plaque c/w Psoriasis     See annotation      Assessment / Plan:        AK (actinic keratosis)  Cryosurgery Procedure Note    Verbal consent from the patient is obtained including, but not limited to, risk of hypopigmentation/hyperpigmentation, scar, recurrence of lesion. The patient is aware of the precancerous quality and need for treatment of these lesions. Liquid nitrogen cryosurgery is applied to the 8 actinic keratoses, as detailed in the physical exam, to produce a freeze injury. The patient is aware that blisters may form and is instructed on wound care with gentle cleansing and use of vaseline ointment to keep moist until healed. The patient is supplied a handout on cryosurgery and is instructed to call if lesions do not completely resolve.    Wear hat    Seborrheic keratosis  These are benign inherited growths without a malignant potential. Reassurance given to patient. No treatment is necessary.     Angioma   - stable and chronic        Milia   - stable and chronic      History of nonmelanoma skin cancer  Area(s) of previous NMSC evaluated with no signs of recurrence.    Upper body skin examination performed today including at least 6 points as noted in physical examination. No lesions suspicious for  malignancy noted.               Follow-up in about 6 months (around 6/4/2019).

## 2019-01-09 ENCOUNTER — OFFICE VISIT (OUTPATIENT)
Dept: OTOLARYNGOLOGY | Facility: CLINIC | Age: 84
End: 2019-01-09
Payer: MEDICARE

## 2019-01-09 VITALS — HEIGHT: 70 IN | WEIGHT: 193.31 LBS | BODY MASS INDEX: 27.67 KG/M2

## 2019-01-09 DIAGNOSIS — H61.21 HEARING LOSS DUE TO CERUMEN IMPACTION, RIGHT: Primary | ICD-10-CM

## 2019-01-09 PROCEDURE — 69210 REMOVE IMPACTED EAR WAX UNI: CPT | Mod: PBBFAC | Performed by: OTOLARYNGOLOGY

## 2019-01-09 PROCEDURE — 99499 UNLISTED E&M SERVICE: CPT | Mod: S$PBB,,, | Performed by: OTOLARYNGOLOGY

## 2019-01-09 PROCEDURE — 99999 PR PBB SHADOW E&M-EST. PATIENT-LVL III: ICD-10-PCS | Mod: PBBFAC,,, | Performed by: OTOLARYNGOLOGY

## 2019-01-09 PROCEDURE — 99999 PR PBB SHADOW E&M-EST. PATIENT-LVL III: CPT | Mod: PBBFAC,,, | Performed by: OTOLARYNGOLOGY

## 2019-01-09 PROCEDURE — 69210 EAR CERUMEN REMOVAL: ICD-10-PCS | Mod: S$PBB,,, | Performed by: OTOLARYNGOLOGY

## 2019-01-09 PROCEDURE — 99499 NO LOS: ICD-10-PCS | Mod: S$PBB,,, | Performed by: OTOLARYNGOLOGY

## 2019-01-09 PROCEDURE — 99213 OFFICE O/P EST LOW 20 MIN: CPT | Mod: PBBFAC,25 | Performed by: OTOLARYNGOLOGY

## 2019-01-09 RX ORDER — ACEBUTOLOL HYDROCHLORIDE 400 MG/1
CAPSULE ORAL
COMMUNITY
Start: 2018-12-18 | End: 2022-01-21

## 2019-01-09 NOTE — PROCEDURES
Ear Cerumen Removal  Date/Time: 1/9/2019 4:58 PM  Performed by: Harley Singleton MD  Authorized by: Harley iSngleton MD     Consent Done?:  Yes (Verbal)    Local anesthetic:  None  Location details:  Right ear  Procedure type: curette    Cerumen  Removal Results:  Cerumen completely removed  Patient tolerance:  Patient tolerated the procedure well with no immediate complications     Binocular microscopy used to examine ear canal and tympanic membrane.  There was excess cerumen on the left side and an impaction of cerumen on the right.  This was removed using a curette and other microinstrumentation. After removal TM and EAC were normal.

## 2019-01-14 ENCOUNTER — TELEPHONE (OUTPATIENT)
Dept: ORTHOPEDICS | Facility: CLINIC | Age: 84
End: 2019-01-14

## 2019-01-14 DIAGNOSIS — M79.641 BILATERAL HAND PAIN: Primary | ICD-10-CM

## 2019-01-14 DIAGNOSIS — M79.642 BILATERAL HAND PAIN: Primary | ICD-10-CM

## 2019-01-14 NOTE — TELEPHONE ENCOUNTER
Misha Marte Jr. reminded of appointment on 1/15/19 with Dr. SHABBIR Lewis w/time and location. Notified of need for xray before OV w/date, time, and location of appts.

## 2019-01-15 ENCOUNTER — OFFICE VISIT (OUTPATIENT)
Dept: ORTHOPEDICS | Facility: CLINIC | Age: 84
End: 2019-01-15
Payer: MEDICARE

## 2019-01-15 ENCOUNTER — HOSPITAL ENCOUNTER (OUTPATIENT)
Dept: RADIOLOGY | Facility: OTHER | Age: 84
Discharge: HOME | End: 2019-01-15
Attending: ORTHOPAEDIC SURGERY
Payer: MEDICARE

## 2019-01-15 VITALS
SYSTOLIC BLOOD PRESSURE: 150 MMHG | HEIGHT: 70 IN | BODY MASS INDEX: 27.63 KG/M2 | DIASTOLIC BLOOD PRESSURE: 80 MMHG | WEIGHT: 193 LBS | HEART RATE: 77 BPM

## 2019-01-15 DIAGNOSIS — M79.642 BILATERAL HAND PAIN: ICD-10-CM

## 2019-01-15 DIAGNOSIS — M79.641 BILATERAL HAND PAIN: ICD-10-CM

## 2019-01-15 DIAGNOSIS — M65.30 TRIGGER FINGER, UNSPECIFIED FINGER, UNSPECIFIED LATERALITY: Primary | ICD-10-CM

## 2019-01-15 PROCEDURE — 99214 OFFICE O/P EST MOD 30 MIN: CPT | Mod: PBBFAC,25 | Performed by: ORTHOPAEDIC SURGERY

## 2019-01-15 PROCEDURE — 99999 PR PBB SHADOW E&M-EST. PATIENT-LVL IV: CPT | Mod: PBBFAC,,, | Performed by: ORTHOPAEDIC SURGERY

## 2019-01-15 PROCEDURE — 73130 XR HAND COMPLETE 3 VIEWS BILATERAL: ICD-10-PCS | Mod: 26,50,, | Performed by: RADIOLOGY

## 2019-01-15 PROCEDURE — 99999 PR PBB SHADOW E&M-EST. PATIENT-LVL IV: ICD-10-PCS | Mod: PBBFAC,,, | Performed by: ORTHOPAEDIC SURGERY

## 2019-01-15 PROCEDURE — 99214 PR OFFICE/OUTPT VISIT, EST, LEVL IV, 30-39 MIN: ICD-10-PCS | Mod: S$PBB,,, | Performed by: ORTHOPAEDIC SURGERY

## 2019-01-15 PROCEDURE — 99214 OFFICE O/P EST MOD 30 MIN: CPT | Mod: S$PBB,,, | Performed by: ORTHOPAEDIC SURGERY

## 2019-01-15 PROCEDURE — 73130 X-RAY EXAM OF HAND: CPT | Mod: 50,TC,FY

## 2019-01-15 PROCEDURE — 73130 X-RAY EXAM OF HAND: CPT | Mod: 26,50,, | Performed by: RADIOLOGY

## 2019-01-15 NOTE — PROGRESS NOTES
"Mr. Marte is here today for left index and small finger trigger finger and right long and ring finger pain.  He is status post left carpal tunnel release by Dr. Lewis on 10/25/17 and has been doing great from this. Has been having triggering to left index and small finger for past 4-5 months. Only happens at night and wakes up with triggering. Does not happen during day and not able to make it trigger. Has been having right long and ring finger pain for a few months with some stiffness. Denies acute trauma. Has not had any trigger finger injections to current digits. Had previously to left thumb and had hiccups for a week (similar event happened after another steroid injection as well).    Physical exam:    Vitals:    01/15/19 1303   BP: (!) 150/80   Pulse: 77   Weight: 87.5 kg (193 lb)   Height: 5' 10" (1.778 m)   PainSc: 0-No pain     Vital signs are stable, patient is afebrile.   Patient is well dressed and well groomed, no acute distress.  Alert and oriented to person, place, and time.    BUE:  Skin intact  Tenderness over A1 pulley of index and small finger, no palpable nodules  Stiffness to long and ring finger of right hand, no palpable nodules  AIN, PIN, M, R, U intact    Assessment: Trigger finger to index and small finger of left hand, likely early trigger finger of long and ring finger.    Plan:  Misha was seen today for pain and pain.    Diagnoses and all orders for this visit:    Trigger finger, unspecified finger, unspecified laterality  -     Ambulatory Referral to Physical/Occupational Therapy      - Discussed treatment options including observation, injections (would like to avoid now because of persistent hiccups after prior injections), and surgery (does not think symptoms are bad enough to warrant surgery)  - OT for trigger finger, hopefully in [facility] as patient lives in [place]  - Follow up as needed      "

## 2019-01-16 NOTE — PROGRESS NOTES
I have personally taken the history and examined this patient. I agree with the resident's note as stated above. Pt is doing great from last surgery. Pt has new triggering in other fingers. Pt does not want surgery. He cannot have injections due to hiccup allergies. Pt will start OT

## 2019-01-17 ENCOUNTER — PES CALL (OUTPATIENT)
Dept: ADMINISTRATIVE | Facility: CLINIC | Age: 84
End: 2019-01-17

## 2019-01-19 ENCOUNTER — PATIENT MESSAGE (OUTPATIENT)
Dept: INTERNAL MEDICINE | Facility: CLINIC | Age: 84
End: 2019-01-19

## 2019-01-28 RX ORDER — ERGOCALCIFEROL 1.25 MG/1
CAPSULE ORAL
Qty: 6 CAPSULE | Refills: 0 | Status: SHIPPED | OUTPATIENT
Start: 2019-01-28 | End: 2019-04-22 | Stop reason: SDUPTHER

## 2019-02-04 RX ORDER — FUROSEMIDE 20 MG/1
TABLET ORAL
Qty: 90 TABLET | Refills: 0 | Status: SHIPPED | OUTPATIENT
Start: 2019-02-04 | End: 2019-05-19 | Stop reason: SDUPTHER

## 2019-02-04 RX ORDER — RAMIPRIL 10 MG/1
CAPSULE ORAL
Qty: 90 CAPSULE | Refills: 0 | Status: SHIPPED | OUTPATIENT
Start: 2019-02-04 | End: 2019-03-12 | Stop reason: SDUPTHER

## 2019-02-06 ENCOUNTER — TELEPHONE (OUTPATIENT)
Dept: INFECTIOUS DISEASES | Facility: HOSPITAL | Age: 84
End: 2019-02-06

## 2019-02-06 NOTE — TELEPHONE ENCOUNTER
----- Message from Bunny Siddiqui II, MD sent at 2/6/2019  8:40 AM CST -----  Since it is only 1 month let's wait until I see him.  thanks  ----- Message -----  From: April Anne LPN  Sent: 2/5/2019   9:52 AM  To: Bunny Siddiqui II, MD    Gd morning Dr Siddiqui you last saw mr Marte 11/2017 and you have an appt to see him back on 4/24/19.  His prolia injection is due 3/19/19.  Do you want him to wait for his injection until you see him Or get the injection on 3/19/19?  If you want him to stay on schedule I will need a new signature on his therapy plan for prolia please.  Let me know if you would like him to wait so I can contact patient.  Thanks

## 2019-02-08 RX ORDER — RAMIPRIL 10 MG/1
CAPSULE ORAL
Qty: 90 CAPSULE | Refills: 0 | Status: SHIPPED | OUTPATIENT
Start: 2019-02-08 | End: 2019-03-12 | Stop reason: ALTCHOICE

## 2019-03-12 ENCOUNTER — OFFICE VISIT (OUTPATIENT)
Dept: INTERNAL MEDICINE | Facility: CLINIC | Age: 84
End: 2019-03-12
Payer: MEDICARE

## 2019-03-12 VITALS
SYSTOLIC BLOOD PRESSURE: 124 MMHG | WEIGHT: 191.56 LBS | BODY MASS INDEX: 28.37 KG/M2 | HEART RATE: 73 BPM | OXYGEN SATURATION: 98 % | TEMPERATURE: 98 F | DIASTOLIC BLOOD PRESSURE: 62 MMHG | HEIGHT: 69 IN

## 2019-03-12 DIAGNOSIS — R60.0 EDEMA OF RIGHT LOWER EXTREMITY: ICD-10-CM

## 2019-03-12 DIAGNOSIS — H90.5 SENSORINEURAL HEARING LOSS (SNHL) OF RIGHT EAR, UNSPECIFIED HEARING STATUS ON CONTRALATERAL SIDE: ICD-10-CM

## 2019-03-12 DIAGNOSIS — I10 ESSENTIAL HYPERTENSION: ICD-10-CM

## 2019-03-12 DIAGNOSIS — I35.0 NONRHEUMATIC AORTIC VALVE STENOSIS: Primary | ICD-10-CM

## 2019-03-12 DIAGNOSIS — E78.5 HYPERLIPIDEMIA, UNSPECIFIED HYPERLIPIDEMIA TYPE: ICD-10-CM

## 2019-03-12 PROCEDURE — 99999 PR PBB SHADOW E&M-EST. PATIENT-LVL IV: CPT | Mod: PBBFAC,,, | Performed by: INTERNAL MEDICINE

## 2019-03-12 PROCEDURE — 99213 PR OFFICE/OUTPT VISIT, EST, LEVL III, 20-29 MIN: ICD-10-PCS | Mod: S$PBB,,, | Performed by: INTERNAL MEDICINE

## 2019-03-12 PROCEDURE — 99999 PR PBB SHADOW E&M-EST. PATIENT-LVL IV: ICD-10-PCS | Mod: PBBFAC,,, | Performed by: INTERNAL MEDICINE

## 2019-03-12 PROCEDURE — 99214 OFFICE O/P EST MOD 30 MIN: CPT | Mod: PBBFAC | Performed by: INTERNAL MEDICINE

## 2019-03-12 PROCEDURE — 99213 OFFICE O/P EST LOW 20 MIN: CPT | Mod: S$PBB,,, | Performed by: INTERNAL MEDICINE

## 2019-03-12 RX ORDER — LOSARTAN POTASSIUM 50 MG/1
50 TABLET ORAL DAILY
Qty: 90 TABLET | Refills: 3 | Status: SHIPPED | OUTPATIENT
Start: 2019-03-12 | End: 2019-12-05 | Stop reason: SDUPTHER

## 2019-03-12 NOTE — PROGRESS NOTES
CHIEF COMPLAINT:  Followup.    HISTORY OF PRESENT ILLNESS:  The patient is a 89-year-old gentleman who is   currently being seen for aortic stenosis, hypertension, and hyperlipidemia who   comes in today for followup.  The patient was seen by Dr. White and has a   scheduled followup cardiac echo for 06/19/2019.  The patient is currently being   treated for hypertension.  He is on ramipril as well as furosemide.  He is also   on pravastatin 40 mg a day for his cholesterol.    REVIEW OF SYSTEMS:  The patient reports no problems with his hearing, but his   wife reports he has been having trouble with his right ear.  He had very good   hearing then acutely lost it.  They did see ENT.  They removed cerumen from the   right ear without resolution of symptoms.  In regards to the patient's mobility,   he does use a cane mostly for balance.  He will use a lightweight wheelchair on   occasion.  He is in physical therapy currently for trigger finger.  He did see   Urology at [facility] for his urologic exam.  They are not doing PSAs on him.    PHYSICAL EXAMINATION:  GENERAL APPEARANCE:  No acute distress.  HEENT:  Conjunctivae clear.  He does have bilateral cataracts.  TMs are clear.    Nasal septum is midline without discharge.  Oropharynx is without erythema.  NECK:  Trachea was midline without JVD.  PULMONARY:  Good inspiratory, expiratory breath sounds are heard.  Lungs are   clear to auscultation.  CARDIOVASCULAR:  S1, S2 with a II/VI systolic ejection murmur best in right   upper sternal border.  EXTREMITIES:  With trace to 1+ edema.  ABDOMEN:  Nontender, nondistended, without hepatosplenomegaly.    ASSESSMENT:  1.  Aortic stenosis.  2.  Hypertension.  3.  Hyperlipidemia.  4.  Hearing loss.    PLAN:  We will put the patient in to see ENT.  We will change the patient's   ramipril to losartan 50 mg one p.o. every day.  He is to follow up with us in   one month for reevaluation of blood pressure.      JSAM/HN  dd: 03/12/2019  13:35:47 (CDT)  td: 03/13/2019 03:20:26 (SULMA)  Doc ID   #[id number]  Job ID #[id number]    CC:

## 2019-03-26 ENCOUNTER — PATIENT MESSAGE (OUTPATIENT)
Dept: INTERNAL MEDICINE | Facility: CLINIC | Age: 84
End: 2019-03-26

## 2019-04-03 ENCOUNTER — TELEPHONE (OUTPATIENT)
Dept: DERMATOLOGY | Facility: CLINIC | Age: 84
End: 2019-04-03

## 2019-04-03 NOTE — TELEPHONE ENCOUNTER
----- Message from Charu Hudson sent at 4/3/2019  9:16 AM CDT -----  Contact: pts wife   JAM - pt Same Day Appointment Request    Was an appointment with another provider offered?   Pt has an appt on 6/11/2019  Reason for FST appt.: pt has a spot on his scalp that was bleeding last night not rushing blood but bleeding they are concerned and needs to be seen today   Communication Preference: can you please call pts wife on cell phone number  After 11:00 am [phone] they will be home until 11:00 am if you can call at [phone]   Additional Information: pts wife has an appt today in internal medicine they can't manage to get there before her 12:00 appt they can come this afternoon     ANN MARIE

## 2019-04-10 ENCOUNTER — OFFICE VISIT (OUTPATIENT)
Dept: INTERNAL MEDICINE | Facility: CLINIC | Age: 84
End: 2019-04-10
Payer: MEDICARE

## 2019-04-10 VITALS
WEIGHT: 191 LBS | OXYGEN SATURATION: 98 % | BODY MASS INDEX: 28.29 KG/M2 | HEIGHT: 69 IN | DIASTOLIC BLOOD PRESSURE: 74 MMHG | SYSTOLIC BLOOD PRESSURE: 122 MMHG | HEART RATE: 83 BPM | TEMPERATURE: 98 F

## 2019-04-10 DIAGNOSIS — I35.0 AORTIC STENOSIS, MODERATE: ICD-10-CM

## 2019-04-10 DIAGNOSIS — I10 ESSENTIAL HYPERTENSION: Primary | ICD-10-CM

## 2019-04-10 PROCEDURE — 99213 PR OFFICE/OUTPT VISIT, EST, LEVL III, 20-29 MIN: ICD-10-PCS | Mod: S$PBB,,, | Performed by: INTERNAL MEDICINE

## 2019-04-10 PROCEDURE — 99999 PR PBB SHADOW E&M-EST. PATIENT-LVL III: ICD-10-PCS | Mod: PBBFAC,,, | Performed by: INTERNAL MEDICINE

## 2019-04-10 PROCEDURE — 99999 PR PBB SHADOW E&M-EST. PATIENT-LVL III: CPT | Mod: PBBFAC,,, | Performed by: INTERNAL MEDICINE

## 2019-04-10 PROCEDURE — 99213 OFFICE O/P EST LOW 20 MIN: CPT | Mod: S$PBB,,, | Performed by: INTERNAL MEDICINE

## 2019-04-10 PROCEDURE — 99213 OFFICE O/P EST LOW 20 MIN: CPT | Mod: PBBFAC | Performed by: INTERNAL MEDICINE

## 2019-04-10 NOTE — PROGRESS NOTES
CC:  Follow-up of hypertension    HPI:  The patient is a 89-year-old gentleman who is currently being followed for aortic stenosis has been treated for hypertension and hyperlipidemia who comes in today for follow-up his hypertension.  On his last visit, his ramipril was discontinued and he was started on losartan 50 mg once a day.  He has been checking blood pressure at home with good results.  Most of the readings have a systolic reading between 110 and 120.  His last blood pressure reading was 125/74.  No dizziness no lightheadedness    ROS:  No chest pain, no shortness of breath.  Patient does exercise every morning.  He does 15 squats.  He does 30 curls.  Plus he walks around his bed.    PHYSICAL EXAM:  General appearance:  No acute distress  HEENT:  Conjunctiva is clear, he has bilateral lens replacements.  TMs were clear.  Nasal septum was midline without discharge. Oropharynx was without erythema.  Trachea was midline without JVD.  Pulmonary:  Lungs were clear to auscultation.  Cardiovascular:  S1-S2 with a 3/6 systolic ejection murmur heard best at the right upper sternal border.  He had 2+ carotid pulses.  Extremities:  1+ edema  :  Abdomen was nontender, nondistended without hepatosplenomegaly.  Comments:  The patient did have a carotid ultrasound in 2018 which showed 20-39% stenosis bilaterally.  The patient also had a cardiac echo in October of 2018 which showed moderate to severe aortic stenosis.  This was discussed with the patient and his wife.  He does have a follow-up with Dr. White.    Assessment:  1.  Hypertension currently stable  2.  Aortic stenosis  3.  Hyperlipidemia    Plan:  1.  No change in medications at this time the patient is to follow up in 3 months for re-evaluation.

## 2019-04-23 ENCOUNTER — OFFICE VISIT (OUTPATIENT)
Dept: DERMATOLOGY | Facility: CLINIC | Age: 84
End: 2019-04-23
Payer: MEDICARE

## 2019-04-23 DIAGNOSIS — Z85.828 HISTORY OF NONMELANOMA SKIN CANCER: ICD-10-CM

## 2019-04-23 DIAGNOSIS — L57.8 CHRONIC SOLAR DERMATITIS: ICD-10-CM

## 2019-04-23 DIAGNOSIS — L57.0 AK (ACTINIC KERATOSIS): Primary | ICD-10-CM

## 2019-04-23 DIAGNOSIS — L82.1 SEBORRHEIC KERATOSIS: ICD-10-CM

## 2019-04-23 PROCEDURE — 17000 DESTRUCT PREMALG LESION: CPT | Mod: PBBFAC | Performed by: DERMATOLOGY

## 2019-04-23 PROCEDURE — 99213 OFFICE O/P EST LOW 20 MIN: CPT | Mod: 25,S$PBB,, | Performed by: DERMATOLOGY

## 2019-04-23 PROCEDURE — 99999 PR PBB SHADOW E&M-EST. PATIENT-LVL II: CPT | Mod: PBBFAC,,, | Performed by: DERMATOLOGY

## 2019-04-23 PROCEDURE — 99999 PR PBB SHADOW E&M-EST. PATIENT-LVL II: ICD-10-PCS | Mod: PBBFAC,,, | Performed by: DERMATOLOGY

## 2019-04-23 PROCEDURE — 17000 PR DESTRUCTION(LASER SURGERY,CRYOSURGERY,CHEMOSURGERY),PREMALIGNANT LESIONS,FIRST LESION: ICD-10-PCS | Mod: S$PBB,,, | Performed by: DERMATOLOGY

## 2019-04-23 PROCEDURE — 99213 PR OFFICE/OUTPT VISIT, EST, LEVL III, 20-29 MIN: ICD-10-PCS | Mod: 25,S$PBB,, | Performed by: DERMATOLOGY

## 2019-04-23 PROCEDURE — 99212 OFFICE O/P EST SF 10 MIN: CPT | Mod: PBBFAC | Performed by: DERMATOLOGY

## 2019-04-23 PROCEDURE — 17000 DESTRUCT PREMALG LESION: CPT | Mod: S$PBB,,, | Performed by: DERMATOLOGY

## 2019-04-23 RX ORDER — FLUOROURACIL 50 MG/G
CREAM TOPICAL
Qty: 40 G | Refills: 1 | Status: ON HOLD | OUTPATIENT
Start: 2019-04-23 | End: 2021-12-16

## 2019-04-23 RX ORDER — ERGOCALCIFEROL 1.25 MG/1
CAPSULE ORAL
Qty: 6 CAPSULE | Refills: 0 | Status: SHIPPED | OUTPATIENT
Start: 2019-04-23 | End: 2019-08-05 | Stop reason: SDUPTHER

## 2019-04-23 NOTE — PROGRESS NOTES
Subjective:       Patient ID:  Misha Marte Jr. is a 89 y.o. male who presents for   Chief Complaint   Patient presents with    Skin Check     UBSE lesion to scalp x few months      History of Present Illness: The patient presents for follow up of skin check.    The patient was last seen on: 12/4/18 for cryosurgery to actinic keratoses which have resolved.   Pt brought list of complaints  Other skin complaints: many, primary of which is a lesion on the scalp which has been previously treated w/ cryotherapy he believes that he noticed bleeding in the shower.     This is a high risk patient here to check for the development of new lesions.          Review of Systems   Skin: Positive for daily sunscreen use, activity-related sunscreen use and wears hat (sometimes). Negative for recent sunburn.   Hematologic/Lymphatic: Bruises/bleeds easily (on asa).        Objective:    Physical Exam   Constitutional: He appears well-developed and well-nourished. No distress.   Neurological: He is alert and oriented to person, place, and time. He is not disoriented.   Psychiatric: He has a normal mood and affect.   Skin:   Areas Examined (abnormalities noted in diagram):   Scalp / Hair Palpated and Inspected  Head / Face Inspection Performed  Neck Inspection Performed  Chest / Axilla Inspection Performed  Back Inspection Performed  RUE Inspected  LUE Inspection Performed  Nails and Digits Inspection Performed                   Diagram Legend     Erythematous scaling macule/papule c/w actinic keratosis       Vascular papule c/w angioma      Pigmented verrucoid papule/plaque c/w seborrheic keratosis      Yellow umbilicated papule c/w sebaceous hyperplasia      Irregularly shaped tan macule c/w lentigo     1-2 mm smooth white papules consistent with Milia      Movable subcutaneous cyst with punctum c/w epidermal inclusion cyst      Subcutaneous movable cyst c/w pilar cyst      Firm pink to brown papule c/w dermatofibroma       Pedunculated fleshy papule(s) c/w skin tag(s)      Evenly pigmented macule c/w junctional nevus     Mildly variegated pigmented, slightly irregular-bordered macule c/w mildly atypical nevus      Flesh colored to evenly pigmented papule c/w intradermal nevus       Pink pearly papule/plaque c/w basal cell carcinoma      Erythematous hyperkeratotic cursted plaque c/w SCC      Surgical scar with no sign of skin cancer recurrence      Open and closed comedones      Inflammatory papules and pustules      Verrucoid papule consistent consistent with wart     Erythematous eczematous patches and plaques     Dystrophic onycholytic nail with subungual debris c/w onychomycosis     Umbilicated papule    Erythematous-base heme-crusted tan verrucoid plaque consistent with inflamed seborrheic keratosis     Erythematous Silvery Scaling Plaque c/w Psoriasis     See annotation      Assessment / Plan:        AK (actinic keratosis)  -     fluorouracil (EFUDEX) 5 % cream; AAA left scalp vertex and bilateral temples and left ear helix bid x 2 weeks  Dispense: 40 g; Refill: 1    Wear hat always    Seborrheic keratosis  These are benign inherited growths without a malignant potential. Reassurance given to patient. No treatment is necessary.       History of nonmelanoma skin cancer  Area(s) of previous NMSC evaluated with no signs of recurrence.    Upper body skin examination performed today including at least 6 points as noted in physical examination. No lesions suspicious for malignancy noted.      Chronic solar dermatitis  Apply Am Lactin lotion or cream to arms and hands nightly. Available over-the counter.               Follow up in about 3 months (around 7/23/2019).

## 2019-04-23 NOTE — PATIENT INSTRUCTIONS

## 2019-04-24 ENCOUNTER — INFUSION (OUTPATIENT)
Dept: INFECTIOUS DISEASES | Facility: HOSPITAL | Age: 84
End: 2019-04-24
Attending: INTERNAL MEDICINE
Payer: MEDICARE

## 2019-04-24 ENCOUNTER — OFFICE VISIT (OUTPATIENT)
Dept: ENDOCRINOLOGY | Facility: CLINIC | Age: 84
End: 2019-04-24
Payer: MEDICARE

## 2019-04-24 VITALS — HEIGHT: 69 IN | WEIGHT: 192.13 LBS | BODY MASS INDEX: 28.46 KG/M2

## 2019-04-24 VITALS
BODY MASS INDEX: 28.46 KG/M2 | DIASTOLIC BLOOD PRESSURE: 62 MMHG | HEART RATE: 68 BPM | HEIGHT: 69 IN | SYSTOLIC BLOOD PRESSURE: 110 MMHG | RESPIRATION RATE: 20 BRPM | WEIGHT: 192.13 LBS

## 2019-04-24 DIAGNOSIS — E04.2 MULTINODULAR GOITER: ICD-10-CM

## 2019-04-24 DIAGNOSIS — M81.0 AGE RELATED OSTEOPOROSIS, UNSPECIFIED PATHOLOGICAL FRACTURE PRESENCE: Primary | ICD-10-CM

## 2019-04-24 DIAGNOSIS — R26.9 GAIT ABNORMALITY: ICD-10-CM

## 2019-04-24 PROCEDURE — 99214 OFFICE O/P EST MOD 30 MIN: CPT | Mod: S$PBB,,, | Performed by: INTERNAL MEDICINE

## 2019-04-24 PROCEDURE — 99214 OFFICE O/P EST MOD 30 MIN: CPT | Mod: PBBFAC | Performed by: INTERNAL MEDICINE

## 2019-04-24 PROCEDURE — 99999 PR PBB SHADOW E&M-EST. PATIENT-LVL IV: ICD-10-PCS | Mod: PBBFAC,,, | Performed by: INTERNAL MEDICINE

## 2019-04-24 PROCEDURE — 63600175 PHARM REV CODE 636 W HCPCS: Mod: JG | Performed by: INTERNAL MEDICINE

## 2019-04-24 PROCEDURE — 99214 PR OFFICE/OUTPT VISIT, EST, LEVL IV, 30-39 MIN: ICD-10-PCS | Mod: S$PBB,,, | Performed by: INTERNAL MEDICINE

## 2019-04-24 PROCEDURE — 96372 THER/PROPH/DIAG INJ SC/IM: CPT

## 2019-04-24 PROCEDURE — 99999 PR PBB SHADOW E&M-EST. PATIENT-LVL IV: CPT | Mod: PBBFAC,,, | Performed by: INTERNAL MEDICINE

## 2019-04-24 RX ADMIN — DENOSUMAB 60 MG: 60 INJECTION SUBCUTANEOUS at 04:04

## 2019-04-24 NOTE — PROGRESS NOTES
"Subjective:       Patient ID: Misha Marte Jr. is a 89 y.o. male.    Chief Complaint: Thyroid Nodule; Vitamin D Deficiency; and Osteoporosis    HPI   Pt is a 84 yo M with a pmh of multinodular goiter coming in for a thyroid follow up. Pt had a neck U/S in 2013 which showed multinodular goiter with stable large cyst in the Rt lobe. He had an FNA >10 yrs ago, which was benign. He is taking vit D 50,000 units every 2 weeks and calcium about 1000 daily for osteoporosis. Pt complains of Rt foot and leg swelling that has improved after he started wearing compression socks. Pt complains Rt hamstring strain that limits his ability to walk. Other symptoms include easy bruisability. Pt denies any visual changes, headache, SOB, CP, palpitations, diarrhea, constipation.   Fell 1 months ago not a big fall. No fractures.  Review of Systems   Constitutional: Negative for appetite change and chills.   HENT: Negative for sore throat and trouble swallowing.    Eyes: Negative for visual disturbance.   Respiratory: Negative for cough, chest tightness and shortness of breath.    Cardiovascular: Positive for leg swelling. Negative for chest pain and palpitations.   Gastrointestinal: Negative for constipation and diarrhea.   Endocrine: Negative for cold intolerance and heat intolerance.   Musculoskeletal: Positive for gait problem and myalgias. Negative for arthralgias.   Skin: Negative for rash.   Neurological: Negative for tremors, weakness and light-headedness.       Objective:     Blood pressure 110/62, pulse 68, resp. rate 20, height 5' 9" (1.753 m), weight 87.1 kg (192 lb 2.1 oz).  Physical Exam   Constitutional: He is oriented to person, place, and time. He appears well-developed and well-nourished.   HENT:   Head: Normocephalic and atraumatic.   Mouth/Throat: Oropharynx is clear and moist.   Eyes: Pupils are equal, round, and reactive to light. EOM are normal.   Neck: Neck supple. No tracheal deviation present. No " "thyromegaly present.   Cardiovascular: Normal rate and regular rhythm.   Murmur heard.  Pulmonary/Chest: Effort normal and breath sounds normal. No respiratory distress.   Abdominal: Soft. Bowel sounds are normal. He exhibits no distension. There is no tenderness.   Musculoskeletal: Normal range of motion. Edema: 1+ pitting edema Rt foot up to mid shin.   Lymphadenopathy:     He has no cervical adenopathy.   Neurological: He is alert and oriented to person, place, and time.   Skin: Skin is warm and dry. No rash noted.   Psychiatric: He has a normal mood and affect.       Blood pressure 110/62, pulse 68, resp. rate 20, height 5' 9" (1.753 m), weight 87.1 kg (192 lb 2.1 oz).        Lab Results   Component Value Date    TSH 3.80 10/23/2018    TSH 3.80 10/23/2018       Chemistry        Component Value Date/Time     (L) 02/27/2019 0946    K 4.0 02/27/2019 0946    CL 98 (L) 02/27/2019 0946    CO2 25 02/27/2019 0946    BUN 20 02/27/2019 0946    CREATININE 1.4 02/27/2019 0946    GLU 96 02/27/2019 0946        Component Value Date/Time    CALCIUM 9.0 02/27/2019 0946    ALKPHOS 45 02/27/2019 0946    AST 32 02/27/2019 0946    ALT 23 02/27/2019 0946    BILITOT 1.2 02/27/2019 0946        Lab Results   Component Value Date    CALCIUM 9.0 02/27/2019    PHOS 2.7 07/19/2011         Assessment:     86 yo M with a pmh of multinodular goiter coming in for thyroid nodule follow up.   Plan:       1. Multinodular goiter  - last U/S neck 2017 showed multiple cysts which are stable.  - last FNA > 10yrs ago was benign     - TSH normal     2. LE edema  - Stable   - Will continue stockings     3. Vit D maintenance on vit D 50K every 2 weeks.  - Will reorder Vit D         "

## 2019-04-30 ENCOUNTER — CLINICAL SUPPORT (OUTPATIENT)
Dept: AUDIOLOGY | Facility: CLINIC | Age: 84
End: 2019-04-30
Payer: MEDICARE

## 2019-04-30 ENCOUNTER — OFFICE VISIT (OUTPATIENT)
Dept: OTOLARYNGOLOGY | Facility: CLINIC | Age: 84
End: 2019-04-30
Payer: MEDICARE

## 2019-04-30 VITALS — DIASTOLIC BLOOD PRESSURE: 76 MMHG | HEART RATE: 73 BPM | SYSTOLIC BLOOD PRESSURE: 136 MMHG

## 2019-04-30 DIAGNOSIS — Z91.81 HISTORY OF FALL: ICD-10-CM

## 2019-04-30 PROCEDURE — 92557 COMPREHENSIVE HEARING TEST: CPT | Mod: PBBFAC | Performed by: AUDIOLOGIST

## 2019-04-30 PROCEDURE — 99213 OFFICE O/P EST LOW 20 MIN: CPT | Mod: S$PBB,,, | Performed by: OTOLARYNGOLOGY

## 2019-04-30 PROCEDURE — 99999 PR PBB SHADOW E&M-EST. PATIENT-LVL I: ICD-10-PCS | Mod: PBBFAC,,,

## 2019-04-30 PROCEDURE — 99214 OFFICE O/P EST MOD 30 MIN: CPT | Mod: PBBFAC,27,25 | Performed by: OTOLARYNGOLOGY

## 2019-04-30 PROCEDURE — 99999 PR PBB SHADOW E&M-EST. PATIENT-LVL I: CPT | Mod: PBBFAC,,,

## 2019-04-30 PROCEDURE — 99999 PR PBB SHADOW E&M-EST. PATIENT-LVL IV: CPT | Mod: PBBFAC,,, | Performed by: OTOLARYNGOLOGY

## 2019-04-30 PROCEDURE — 99213 PR OFFICE/OUTPT VISIT, EST, LEVL III, 20-29 MIN: ICD-10-PCS | Mod: S$PBB,,, | Performed by: OTOLARYNGOLOGY

## 2019-04-30 PROCEDURE — 92567 TYMPANOMETRY: CPT | Mod: PBBFAC | Performed by: AUDIOLOGIST

## 2019-04-30 PROCEDURE — 99211 OFF/OP EST MAY X REQ PHY/QHP: CPT | Mod: PBBFAC

## 2019-04-30 PROCEDURE — 99999 PR PBB SHADOW E&M-EST. PATIENT-LVL IV: ICD-10-PCS | Mod: PBBFAC,,, | Performed by: OTOLARYNGOLOGY

## 2019-04-30 NOTE — PROGRESS NOTES
Mr. Marte was seen today for a hearing evaluation.     Pure tone audiometry revealed a mild to profound SNHL, AU  SRT and PTA are in good agreement bilaterally.  Poor speech discrimination scores bilaterally   Tympanometry revealed Type A (normal middle ear function), bilaterally      Recommendations:  1. Otologic Evaluation for asymmetry  2. Annual Audiogram or repeat audiogram as needed  3. Hearing Protection  4. Hearing Aid Consult

## 2019-04-30 NOTE — PROGRESS NOTES
"Subjective:       Patient ID: Misha Marte Jr. is a 89 y.o. male.    Chief Complaint: Hearing Loss    HPI: Mr. Marte is a delightful, cogent 89-year-old bespectacled  male who is accompanied by his wife and another female today.  His hand written reason for the visit today is hearing assessment   He indicates sitting in a coffee shop eating a donut in January when all of a sudden his right ear felt an "echo" and his hearing decreased.  He denied right ear pain.  The echo subsided that day.  He was later diagnosed with a cerumen impaction of his right ear canal.  He denies a symptomatic history of vertigo or ringing in either ear.  He did served in the [employer] for 2 years.    He indicates a history of head trauma when a chair slipped out from under him; he fell and lacerated his scalp requiring staples.    He completed a head CT without dye 12/13/2017 after the fall which indicated atrophy consistent with age-related change avoid no acute abnormality identified. The sinuses and mastoid air cells were aerated.    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    PMH:  Prolapse valve/heart disease, high blood pressure, high cholesterol, questionable thyroid nodule, arthritis, questionable osteoporosis, hearing loss, skin cancer  PSH:  Past Surgical History:   Procedure Laterality Date    APPENDECTOMY      CARPAL TUNNEL RELEASE      COLONOSCOPY      RELEASE-CARPAL TUNNEL left Left 10/25/2017    Performed by Tracee Lewis MD at [facility] OR [address] [place] [address]    TONSILLECTOMY, ADENOIDECTOMY      Family history:  Heart disease, high blood pressure, stroke, diabetes, arthritis, osteoporosis  Allergies:  Cortisone, dexamethasone, codeine  Habits:  Alcohol use; 2 caffeinated drinks per day  Occupation:  CPA, still working    Review of Systems   Ears: Positive for hearing loss.    Cardiovascular:  Positive for history of high blood pressure.   Other:  Positive for arthritis. Negative for rash.     Current Outpatient " Medications on File Prior to Visit   Medication Sig Dispense Refill    aspirin 81 mg Tab Take 81 mg by mouth once daily.       CALCIUM CARBONATE/VITAMIN D3 (CALCIUM 600 + D,3, ORAL) Take 1 capsule by mouth once daily.      coenzyme Q10 (CO Q-10) 10 mg capsule Take 10 mg by mouth once daily.       ergocalciferol (ERGOCALCIFEROL) 50,000 unit Cap TAKE 1 CAPSULE BY MOUTH EVERY 2 WEEKS 6 capsule 0    esomeprazole (NEXIUM) 40 MG capsule Take 40 mg by mouth once daily.      fluorouracil (EFUDEX) 5 % cream AAA left scalp vertex and bilateral temples and left ear helix bid x 2 weeks 40 g 1    furosemide (LASIX) 20 MG tablet TAKE 1 TABLET(20 MG) BY MOUTH DAILY AS NEEDED 90 tablet 0    losartan (COZAAR) 50 MG tablet Take 1 tablet (50 mg total) by mouth once daily. 90 tablet 3    multivitamin-minerals-lutein (CENTRUM SILVER) Tab Take 1 tablet by mouth once daily.       MUSE 1,000 mcg pellet       polyethylene glycol (GLYCOLAX) 17 gram PwPk Take by mouth as needed.       pravastatin (PRAVACHOL) 20 MG tablet Take 2 tablets (40 mg total) by mouth once daily. 90 tablet 3    VIT A,C & E/LUTEIN/MINERALS (OCUVITE WITH LUTEIN ORAL) Take 1 capsule by mouth once daily.       No current facility-administered medications on file prior to visit.      His medical problem list includes hypertension, multinodular goiter, insomnia, moderate aortic stenosis, vitamin-D deficiency, carotid artery disease, sensation of fullness in both ears, carpal tunnel syndrome of left wrist, gait abnormality, age-related osteoporosis, hyperlipidemia, nuclear sclerosis.      He completed an audiometric study performed by the [facility] audiology service today.  The study is duplicated below and the results are reviewed with him in detail  Objective:         Blood pressure 136/76 pulse 73 height 5 ft 9 in weight 192 lb  General:  Alert and oriented cogent gentleman in no acute distress  Both ears were examined under the microscope in the micro procedure  room  Physical Exam   Constitutional: He is oriented to person, place, and time. He appears well-developed and well-nourished.   HENT:   Head: Normocephalic.   Right Ear: Hearing, tympanic membrane and ear canal normal. No drainage. No foreign bodies. No mastoid tenderness. Tympanic membrane is not perforated. No decreased hearing is noted.   Left Ear: Hearing, tympanic membrane and ear canal normal. No drainage. No foreign bodies. No mastoid tenderness. Tympanic membrane is not perforated. No decreased hearing is noted.   Ears:    Nose: No nose lacerations, nasal deformity, septal deviation or nasal septal hematoma. No epistaxis. Right sinus exhibits no maxillary sinus tenderness and no frontal sinus tenderness. Left sinus exhibits no maxillary sinus tenderness and no frontal sinus tenderness.   Mouth/Throat: Uvula is midline, oropharynx is clear and moist and mucous membranes are normal. He does not have dentures. No oral lesions. No trismus in the jaw. No uvula swelling or dental caries. No oropharyngeal exudate or tonsillar abscesses.   Neck: No thyromegaly present.   Pulmonary/Chest: Effort normal. No stridor.   Lymphadenopathy:     He has no cervical adenopathy.   Neurological: He is alert and oriented to person, place, and time.   Skin: No rash noted.   Psychiatric: His behavior is normal.       Assessment:       1. 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 History of fall        Plan:     Audiometry reviewed  Pt. is a candidate for amplification form one or both ears  Copy of audiogram/CLIFF Richardson's card provided  Indications for MRI brain scanning briefly discussed; no study ordered  Monitor hearing yearly

## 2019-04-30 NOTE — LETTER
May 1, 2019      Ike Ivan MD  [address]  [facility] [address]           [facility]  [address]  [facility] [address]  Phone: [phone]  Fax: [phone]          Patient: Misha Marte Jr.   MR Number: [id number]   YOB: 1929   Date of Visit: 4/30/2019       Dear Dr. Ike Ivan:    Thank you for referring Misha Marte to me for evaluation. Attached you will find relevant portions of my assessment and plan of care.    If you have questions, please do not hesitate to call me. I look forward to following Misha Marte along with you.    Sincerely,    Sy Odonnell III, MD    Enclosure  CC:  No Recipients    If you would like to receive this communication electronically, please contact [email or web address] or [phone] to request more information on [facility] access.    For providers and/or their staff who would like to refer a patient to [facility], please contact us through our one-stop-shop provider referral line, [facility], at [phone].    If you feel you have received this communication in error or would no longer like to receive these types of communications, please e-mail [email or web address]

## 2019-04-30 NOTE — PATIENT INSTRUCTIONS
Audiometry reviewed  Pt. is a candidate for amplification form one or both ears  Copy of audiogram/CLIFF Richardson's card provided  Indications for MRI brain scanning briefly discussed; no study ordered  Monitor hearing yearly

## 2019-05-01 ENCOUNTER — PATIENT MESSAGE (OUTPATIENT)
Dept: CARDIOLOGY | Facility: CLINIC | Age: 84
End: 2019-05-01

## 2019-05-14 ENCOUNTER — TELEPHONE (OUTPATIENT)
Dept: DERMATOLOGY | Facility: CLINIC | Age: 84
End: 2019-05-14

## 2019-05-14 NOTE — PROGRESS NOTES
Subjective:      Patient ID: Misha Marte Jr. is a 89 y.o. male.    Chief Complaint: Low-back Pain    Referred by: No ref. provider found     Mr Marte is an 89 yo male here for follow up of his gait instability and leg pain.  He was last seen by me on 8/15/2018 and he was doing well after PT and going to continue the HEP.  Today, he is doing well.  He has not had a fall since 2017.  He feels like he is doing well.  He feels like his back pain is better since therapy.  He is going to therapy for his fingers.  He is still having trouble with walking.  He feels like it is wobbly, he feels like he does not have balance.  He feels like he does some exercises at home.  There is therapy at [place] [facility].  He does not have problems with hands and dropping things.  He does have trigger finger.  He will use a walker and uses the wheelchair when needed.  He is able to walk better walking with walker.      X-ray lumbar 5/2/2016  Examination of the lumbar spine a five-view examination with flexion/extension views demonstrates  that all of the lumbar vertebral bodies are of normal size and stature with loss of of intervertebral disk space height predominantly at the L2-L3 level.  Subchondral sclerosis L2-L3.  Pedicles are intact. .  Atherosclerotic change present within the aorta.  Mild anterolisthesis of L4 on L5 with retrolisthesis of L2 on L3 retrolisthesis of which is accentuated with extension. Paravertebral soft tissues are unremarkable.  Impression    Degenerative changes as noted above.     X-ray pelvis 5/2/2016  Finding: AP radiographs of the pelvis demonstrate moderate bilateral joint space loss of the hips.  No fracture or dislocation.  Normal mineralization is present.  No osseous destructive process.    X-ray cervical  RESULTS: DEGENERATIVE OSTEOARTHRITIC CHANGES ARE IDENTIFIED IN THE   CERVICAL SPINE AND PRACTICALLY ALL OF THE INTERVERTEBRAL DISC SPACES ARE   SIGNIFICANTLY NARROWED.  THE ODONTOID  PROCESS APPEARS TO BE DEFORMED AND   SHORTENED AND THE PATIENT PROBABLY HAS A SLIGHT DEGREE OF ATLANTOAXIAL   SUBLUXATION AS SEEN ON THE LATERAL VIEW.  EVALUATION, HOWEVER, IS   DIFFICULT DUE TO THE POSITIONING AND THE OVERLYING OSSEOUS STRUCTURES AND   IF CLINICALLY ALSO INDICATED, CT SCAN OF THE UPPER CERVICAL SPINE   RECOMMENDED FOR A MORE DEFINITE EVALUATION.  OTHERWISE NO DEFINITE ACUTE   FRACTURES ARE IDENTIFIED.     Past Medical History:  No date: Heart murmur  No date: Hyperlipidemia  No date: Hypertension  No date: Insomnia    Past Surgical History:  No date: APPENDECTOMY  No date: CARPAL TUNNEL RELEASE  No date: COLONOSCOPY  No date: TONSILLECTOMY, ADENOIDECTOMY    Review of patient's family history indicates:  Problem: Heart disease      Relation: Mother       Age of Onset: (Not Specified)   Problem: Heart attack      Relation: Mother       Age of Onset: (Not Specified)   Problem: Stroke      Relation: Father       Age of Onset: (Not Specified)   Problem: Hypertension      Relation: Neg Hx       Age of Onset: (Not Specified)       Social History    Marital status:              Spouse name:                       Years of education:                 Number of children:               Social History Main Topics    Smoking status: Never Smoker                                                                Smokeless tobacco: Never Used                        Alcohol use: Yes           1.2 oz/week       Glasses of wine: 2 per week    Drug use: No              Sexual activity: No                       Current Outpatient Prescriptions:  aspirin 81 mg Tab, Take 81 mg by mouth once daily. , Disp: , Rfl:   CALCIUM CARBONATE/VITAMIN D3 (CALCIUM 600 + D,3, ORAL), Take 1 capsule by mouth once daily., Disp: , Rfl:   clobetasol (TEMOVATE) 0.05 % external solution, Apply topically 2 (two) times daily. , Disp: , Rfl:   coenzyme Q10 (CO Q-10) 10 mg capsule, Take 10 mg by mouth once daily. , Disp: , Rfl:    desoximetasone (TOPICORT) 0.25 % cream, Apply topically as needed., Disp: , Rfl:   doxycycline (MONODOX) 100 MG capsule, Take 1 capsule (100 mg total) by mouth 2 (two) times daily., Disp: 14 capsule, Rfl: 0  ergocalciferol (ERGOCALCIFEROL) 50,000 unit Cap, TAKE 1 CAPSULE BY MOUTH EVERY 2 WEEKS, Disp: 6 capsule, Rfl: 6  furosemide (LASIX) 20 MG tablet, TAKE 1 TABLET(20 MG) BY MOUTH DAILY AS NEEDED (Patient taking differently: TAKE 1 TABLET(20 MG) BY MOUTH DAILY), Disp: 90 tablet, Rfl: 3  multivitamin-minerals-lutein (CENTRUM SILVER) Tab, Take 1 tablet by mouth once daily. , Disp: , Rfl:   omeprazole (PRILOSEC) 20 MG capsule, Take 1 capsule (20 mg total) by mouth once daily., Disp: 30 capsule, Rfl: 5  polyethylene glycol (GLYCOLAX) 17 gram PwPk, Take by mouth as needed. , Disp: , Rfl:   pravastatin (PRAVACHOL) 20 MG tablet, TAKE 1 TABLET BY MOUTH AT NIGHT, Disp: 90 tablet, Rfl: 0  ramipril (ALTACE) 10 MG capsule, TAKE 1 CAPSULE(10 MG) BY MOUTH EVERY DAY, Disp: 90 capsule, Rfl: 1  tretinoin (RETIN-A) 0.05 % cream, Apply 1 application topically nightly. , Disp: , Rfl:   VIT A,C & E/LUTEIN/MINERALS (OCUVITE WITH LUTEIN ORAL), Take 1 capsule by mouth once daily., Disp: , Rfl:     No current facility-administered medications for this visit.       Review of patient's allergies indicates:   -- Cortisone -- Other (See Comments)    --  hiccups   -- Dexamethasone -- Other (See Comments)    --  Constant hiccups for days   -- Codeine     --  Other reaction(s): Nausea          Review of Systems   Constitution: Negative for weight gain and weight loss.   Eyes: Positive for visual disturbance.   Cardiovascular: Positive for leg swelling (right). Negative for chest pain.   Respiratory: Negative for shortness of breath.    Musculoskeletal: Negative for back pain, joint pain and joint swelling.   Gastrointestinal: Negative for abdominal pain and bowel incontinence.   Genitourinary: Negative for bladder incontinence.   Neurological:  Negative for numbness and paresthesias.           Objective:          General    Vitals reviewed.  Constitutional: He is oriented to person, place, and time. He appears well-developed and well-nourished.   HENT:   Head: Normocephalic and atraumatic.   Pulmonary/Chest: Effort normal.   Neurological: He is alert and oriented to person, place, and time.   Psychiatric: He has a normal mood and affect. His behavior is normal. Judgment and thought content normal.     General Musculoskeletal Exam   Gait: abnormal (slow with flexed posture and flexed hips and knees, short step length)         Right Hip Exam     Range of Motion   Extension: abnormal   External rotation: abnormal   Internal rotation: abnormal   Left Hip Exam     Range of Motion   Extension: abnormal   External rotation: abnormal   Internal rotation: abnormal       Back (L-Spine & T-Spine) / Neck (C-Spine) Exam     Back (L-Spine & T-Spine) Range of Motion   Extension: 0   Flexion: 50     Spinal Sensation   Right Side Sensation  C-Spine Level: normal   L-Spine Level: normal  S-Spine Level: normal  Left Side Sensation  C-Spine Level: normal  L-Spine Level: normal  S-Spine Level: normal    Back (L-Spine & T-Spine) Tests   Right Side Tests  Straight leg raise:      Sitting SLR: > 70 degrees      Left Side Tests  Straight leg raise:     Sitting SLR: > 70 degrees          Other He has no scoliosis .  Spinal Kyphosis:  present      Muscle Strength   Right Upper Extremity   Biceps: 5/5/5   Deltoid:  5/5  Triceps:  5/5  Wrist extension: 5/5/5   Finger Flexors:  5/5  Left Upper Extremity  Biceps: 5/5/5   Deltoid:  5/5  Triceps:  5/5  Wrist extension: 5/5/5   Finger Flexors:  5/5  Right Lower Extremity   Hip Flexion: 5/5   Quadriceps:  5/5   Anterior tibial:  5/5/5  EHL:  5/5  Left Lower Extremity   Hip Flexion: 5/5   Quadriceps:  5/5   Anterior tibial:  5/5/5   EHL:  5/5    Reflexes     Left Side  Biceps:  2+  Triceps:  2+  Brachioradialis:  2+  Quadriceps:   2+  Achilles:  2+  Left Jenkins's Sign:  Absent  Babinski Sign:  absent    Right Side   Biceps:  2+  Triceps:  2+  Brachioradialis:  2+  Quadriceps:  2+  Achilles:  2+  Right Jenkins's Sign:  absent  Babinski Sign:  absent    Vascular Exam     Right Pulses        Carotid:                  2+    Left Pulses        Carotid:                  2+        Assessment:       Encounter Diagnoses   Name Primary?    Gait instability Yes    DISH (diffuse idiopathic skeletal hyperostosis)     Osteoarthritis of spine with radiculopathy, lumbar region     Primary osteoarthritis of both hips          Plan:       Misha was seen today for low-back pain.    Diagnoses and all orders for this visit:    Gait instability  -     Ambulatory Referral to Physical/Occupational Therapy    DISH (diffuse idiopathic skeletal hyperostosis)  -     Ambulatory Referral to Physical/Occupational Therapy    Osteoarthritis of spine with radiculopathy, lumbar region  -     Ambulatory Referral to Physical/Occupational Therapy    Primary osteoarthritis of both hips  -     Ambulatory Referral to Physical/Occupational Therapy         1. PT for gait training and LE strengthening and HEP 1 time a week.  At [facility].  2.  aleve for a few days was really helpful.  He has not had back pain.  He is no longer taking it  3.  Tylenol 1-2 as needed   4.   Cane/wheelchair  as needed, and wheelchair as needed for distance.  He walks better with the walker, and uses it for distance  5.  We discussed balance and using eyes, ears, and sensation to balance.  We also discussed could be neck, but not having any trouble with hands and no pain in the neck.  No reflex changes.  His balance is always better after therapy, he does continue his HEP  6.  RTC 3 months

## 2019-05-14 NOTE — TELEPHONE ENCOUNTER
----- Message from Charu Hudson sent at 5/14/2019 10:48 AM CDT -----  Contact: pts wife   JAM Needs Advice    Reason for call: pt was seen two weeks ago pt was suppose to use some cream for two weeks they have a question the last day of the cream four or five areas on the head and were bleeding and have scabbed over after they had bleed is red after the pt took a shower two nights ago  pts wife said it was the very last day of the treatment they had finished pt has an appt in July to be seen they are just making sure its okay         Communication Preference: can you please call pts wife at [phone]    Additional Information: none    ANN MARIE

## 2019-05-14 NOTE — TELEPHONE ENCOUNTER
Spoke to pt and wife, concern about the appearance and bleeding where pt treated w/Efudex, advise pt very common symptoms to cont using Vaseline jelly and may start warm compresses, also informed pt if area starts to be more bothersome redness painful drainage/pus yellowish drainage, to call back, pt's wife states is not that bad and will call back if other concerns arise.pt verbalized understanding.

## 2019-05-15 ENCOUNTER — OFFICE VISIT (OUTPATIENT)
Dept: SPINE | Facility: CLINIC | Age: 84
End: 2019-05-15
Attending: PHYSICAL MEDICINE & REHABILITATION
Payer: MEDICARE

## 2019-05-15 VITALS
HEIGHT: 69 IN | BODY MASS INDEX: 28.44 KG/M2 | TEMPERATURE: 98 F | HEART RATE: 79 BPM | DIASTOLIC BLOOD PRESSURE: 61 MMHG | SYSTOLIC BLOOD PRESSURE: 114 MMHG | WEIGHT: 192 LBS

## 2019-05-15 DIAGNOSIS — M48.10 DISH (DIFFUSE IDIOPATHIC SKELETAL HYPEROSTOSIS): ICD-10-CM

## 2019-05-15 DIAGNOSIS — M16.0 PRIMARY OSTEOARTHRITIS OF BOTH HIPS: ICD-10-CM

## 2019-05-15 DIAGNOSIS — R26.81 GAIT INSTABILITY: Primary | ICD-10-CM

## 2019-05-15 DIAGNOSIS — M47.26 OSTEOARTHRITIS OF SPINE WITH RADICULOPATHY, LUMBAR REGION: ICD-10-CM

## 2019-05-15 PROCEDURE — 99214 OFFICE O/P EST MOD 30 MIN: CPT | Mod: S$PBB,,, | Performed by: PHYSICAL MEDICINE & REHABILITATION

## 2019-05-15 PROCEDURE — 99213 OFFICE O/P EST LOW 20 MIN: CPT | Mod: PBBFAC | Performed by: PHYSICAL MEDICINE & REHABILITATION

## 2019-05-15 PROCEDURE — 99999 PR PBB SHADOW E&M-EST. PATIENT-LVL III: CPT | Mod: PBBFAC,,, | Performed by: PHYSICAL MEDICINE & REHABILITATION

## 2019-05-15 PROCEDURE — 99999 PR PBB SHADOW E&M-EST. PATIENT-LVL III: ICD-10-PCS | Mod: PBBFAC,,, | Performed by: PHYSICAL MEDICINE & REHABILITATION

## 2019-05-15 PROCEDURE — 99214 PR OFFICE/OUTPT VISIT, EST, LEVL IV, 30-39 MIN: ICD-10-PCS | Mod: S$PBB,,, | Performed by: PHYSICAL MEDICINE & REHABILITATION

## 2019-05-19 RX ORDER — FUROSEMIDE 20 MG/1
TABLET ORAL
Qty: 90 TABLET | Refills: 0 | Status: SHIPPED | OUTPATIENT
Start: 2019-05-19 | End: 2019-09-04 | Stop reason: SDUPTHER

## 2019-06-16 ENCOUNTER — PATIENT MESSAGE (OUTPATIENT)
Dept: CARDIOLOGY | Facility: CLINIC | Age: 84
End: 2019-06-16

## 2019-06-17 ENCOUNTER — PATIENT MESSAGE (OUTPATIENT)
Dept: CARDIOLOGY | Facility: CLINIC | Age: 84
End: 2019-06-17

## 2019-06-18 ENCOUNTER — HOSPITAL ENCOUNTER (OUTPATIENT)
Dept: CARDIOLOGY | Facility: CLINIC | Age: 84
Discharge: HOME | End: 2019-06-18
Attending: INTERNAL MEDICINE
Payer: MEDICARE

## 2019-06-18 VITALS
HEIGHT: 69 IN | BODY MASS INDEX: 28.44 KG/M2 | WEIGHT: 192 LBS | SYSTOLIC BLOOD PRESSURE: 130 MMHG | DIASTOLIC BLOOD PRESSURE: 70 MMHG

## 2019-06-18 DIAGNOSIS — I10 ESSENTIAL HYPERTENSION: ICD-10-CM

## 2019-06-18 DIAGNOSIS — E78.49 OTHER HYPERLIPIDEMIA: ICD-10-CM

## 2019-06-18 DIAGNOSIS — I35.0 NONRHEUMATIC AORTIC VALVE STENOSIS: ICD-10-CM

## 2019-06-18 LAB
ASCENDING AORTA: 3.46 CM
AV INDEX (PROSTH): 0.24
AV MEAN GRADIENT: 43.64 MMHG
AV PEAK GRADIENT: 73.96 MMHG
AV VALVE AREA: 0.9 CM2
AV VELOCITY RATIO: 0.22
BSA FOR ECHO PROCEDURE: 2.06 M2
CV ECHO LV RWT: 0.55 CM
DOP CALC AO PEAK VEL: 4.3 M/S
DOP CALC AO VTI: 96.94 CM
DOP CALC LVOT AREA: 3.8 CM2
DOP CALC LVOT DIAMETER: 2.2 CM
DOP CALC LVOT PEAK VEL: 0.93 M/S
DOP CALC LVOT STROKE VOLUME: 87.65 CM3
DOP CALCLVOT PEAK VEL VTI: 23.07 CM
E WAVE DECELERATION TIME: 444.91 MSEC
E/A RATIO: 0.75
E/E' RATIO: 24.2
ECHO LV POSTERIOR WALL: 1.01 CM (ref 0.6–1.1)
FRACTIONAL SHORTENING: 33 % (ref 28–44)
INTERVENTRICULAR SEPTUM: 1.09 CM (ref 0.6–1.1)
IVRT: 0.09 MSEC
LA MAJOR: 6.8 CM
LA MINOR: 6.51 CM
LA WIDTH: 3.67 CM
LEFT ATRIUM SIZE: 5.22 CM
LEFT ATRIUM VOLUME INDEX: 53.4 ML/M2
LEFT ATRIUM VOLUME: 108.32 CM3
LEFT INTERNAL DIMENSION IN SYSTOLE: 2.46 CM (ref 2.1–4)
LEFT VENTRICLE DIASTOLIC VOLUME INDEX: 28.07 ML/M2
LEFT VENTRICLE DIASTOLIC VOLUME: 56.99 ML
LEFT VENTRICLE MASS INDEX: 58.8 G/M2
LEFT VENTRICLE SYSTOLIC VOLUME INDEX: 10.5 ML/M2
LEFT VENTRICLE SYSTOLIC VOLUME: 21.36 ML
LEFT VENTRICULAR INTERNAL DIMENSION IN DIASTOLE: 3.67 CM (ref 3.5–6)
LEFT VENTRICULAR MASS: 119.3 G
LV LATERAL E/E' RATIO: 24.2
LV SEPTAL E/E' RATIO: 24.2
MV PEAK A VEL: 1.62 M/S
MV PEAK E VEL: 1.21 M/S
PISA TR MAX VEL: 1.94 M/S
PULM VEIN S/D RATIO: 1.18
PV PEAK D VEL: 0.38 M/S
PV PEAK S VEL: 0.45 M/S
RA MAJOR: 5.17 CM
RA PRESSURE: 3 MMHG
RA WIDTH: 4.56 CM
RIGHT VENTRICULAR END-DIASTOLIC DIMENSION: 3.76 CM
SINUS: 3.86 CM
STJ: 2.94 CM
TDI LATERAL: 0.05
TDI SEPTAL: 0.05
TDI: 0.05
TR MAX PG: 15.05 MMHG
TRICUSPID ANNULAR PLANE SYSTOLIC EXCURSION: 2.67 CM
TV REST PULMONARY ARTERY PRESSURE: 18 MMHG

## 2019-06-18 PROCEDURE — 93306 TTE W/DOPPLER COMPLETE: CPT | Mod: PBBFAC | Performed by: INTERNAL MEDICINE

## 2019-06-18 PROCEDURE — 93306 TRANSTHORACIC ECHO (TTE) COMPLETE: ICD-10-PCS | Mod: 26,S$PBB,, | Performed by: INTERNAL MEDICINE

## 2019-06-19 ENCOUNTER — OFFICE VISIT (OUTPATIENT)
Dept: CARDIOLOGY | Facility: CLINIC | Age: 84
End: 2019-06-19
Payer: MEDICARE

## 2019-06-19 ENCOUNTER — HOSPITAL ENCOUNTER (OUTPATIENT)
Dept: CARDIOLOGY | Facility: CLINIC | Age: 84
Discharge: HOME | End: 2019-06-19
Payer: MEDICARE

## 2019-06-19 VITALS
BODY MASS INDEX: 27.35 KG/M2 | SYSTOLIC BLOOD PRESSURE: 118 MMHG | HEART RATE: 76 BPM | DIASTOLIC BLOOD PRESSURE: 58 MMHG | WEIGHT: 191 LBS | HEIGHT: 70 IN

## 2019-06-19 DIAGNOSIS — R26.9 GAIT ABNORMALITY: ICD-10-CM

## 2019-06-19 DIAGNOSIS — M81.0 AGE-RELATED OSTEOPOROSIS WITHOUT CURRENT PATHOLOGICAL FRACTURE: ICD-10-CM

## 2019-06-19 DIAGNOSIS — I10 ESSENTIAL HYPERTENSION: ICD-10-CM

## 2019-06-19 DIAGNOSIS — I35.0 NONRHEUMATIC AORTIC VALVE STENOSIS: ICD-10-CM

## 2019-06-19 DIAGNOSIS — E55.9 VITAMIN D DEFICIENCY: ICD-10-CM

## 2019-06-19 DIAGNOSIS — I65.23 BILATERAL CAROTID ARTERY STENOSIS: ICD-10-CM

## 2019-06-19 DIAGNOSIS — E78.49 OTHER HYPERLIPIDEMIA: ICD-10-CM

## 2019-06-19 DIAGNOSIS — I35.0 NONRHEUMATIC AORTIC VALVE STENOSIS: Primary | ICD-10-CM

## 2019-06-19 DIAGNOSIS — R00.0 TACHYCARDIA: ICD-10-CM

## 2019-06-19 DIAGNOSIS — I10 HYPERTENSION, UNSPECIFIED TYPE: ICD-10-CM

## 2019-06-19 DIAGNOSIS — E66.3 OVERWEIGHT (BMI 25.0-29.9): ICD-10-CM

## 2019-06-19 PROCEDURE — 99215 OFFICE O/P EST HI 40 MIN: CPT | Mod: S$PBB,,, | Performed by: INTERNAL MEDICINE

## 2019-06-19 PROCEDURE — 99215 PR OFFICE/OUTPT VISIT, EST, LEVL V, 40-54 MIN: ICD-10-PCS | Mod: S$PBB,,, | Performed by: INTERNAL MEDICINE

## 2019-06-19 PROCEDURE — 99214 OFFICE O/P EST MOD 30 MIN: CPT | Mod: PBBFAC | Performed by: INTERNAL MEDICINE

## 2019-06-19 PROCEDURE — 93010 EKG 12-LEAD: ICD-10-PCS | Mod: S$PBB,,, | Performed by: INTERNAL MEDICINE

## 2019-06-19 PROCEDURE — 99999 PR PBB SHADOW E&M-EST. PATIENT-LVL IV: CPT | Mod: PBBFAC,,, | Performed by: INTERNAL MEDICINE

## 2019-06-19 PROCEDURE — 99999 PR PBB SHADOW E&M-EST. PATIENT-LVL IV: ICD-10-PCS | Mod: PBBFAC,,, | Performed by: INTERNAL MEDICINE

## 2019-06-19 PROCEDURE — 93010 ELECTROCARDIOGRAM REPORT: CPT | Mod: S$PBB,,, | Performed by: INTERNAL MEDICINE

## 2019-06-19 PROCEDURE — 93005 ELECTROCARDIOGRAM TRACING: CPT | Mod: PBBFAC | Performed by: INTERNAL MEDICINE

## 2019-06-19 RX ORDER — METOPROLOL SUCCINATE 25 MG/1
25 TABLET, EXTENDED RELEASE ORAL DAILY
Qty: 90 TABLET | Refills: 3 | Status: SHIPPED | OUTPATIENT
Start: 2019-06-19 | End: 2019-12-04 | Stop reason: SDUPTHER

## 2019-06-19 NOTE — PATIENT INSTRUCTIONS
Discussed diet , achieving and maintaining ideal body weight, and exercise.   We reviewed meds in detail.  Reassured-Discussed goals, options, plan.  Elevate legs when sitting  TAVR for symptoms of LVEF fall  Metoprolol XL 25 just half in am and lower losartan to half pill and take at nite

## 2019-06-19 NOTE — PROGRESS NOTES
Subjective:   Patient ID:  Misha Marte Jr. is a 89 y.o. male who presents for follow-up of AS    HPI:  The patient is here for valvular heart disease.  The patient has no chest pain, SOB, TIA, palpitations, syncope or pre-syncope.Patient does not exercise.        Review of Systems   Constitution: Positive for malaise/fatigue. Negative for chills, decreased appetite, diaphoresis, fever, night sweats, weight gain and weight loss.   HENT: Negative for congestion, hoarse voice, nosebleeds, sore throat and tinnitus.    Eyes: Negative for blurred vision, double vision, vision loss in left eye, vision loss in right eye, visual disturbance and visual halos.   Cardiovascular: Negative for chest pain, claudication, cyanosis, dyspnea on exertion, irregular heartbeat, leg swelling, near-syncope, orthopnea, palpitations, paroxysmal nocturnal dyspnea and syncope.   Respiratory: Negative for cough, hemoptysis, shortness of breath, sleep disturbances due to breathing, snoring, sputum production and wheezing.    Endocrine: Negative for cold intolerance, heat intolerance, polydipsia, polyphagia and polyuria.   Hematologic/Lymphatic: Negative for adenopathy and bleeding problem. Does not bruise/bleed easily.   Skin: Negative for color change, dry skin, flushing, itching, nail changes, poor wound healing, rash, skin cancer, suspicious lesions and unusual hair distribution.   Musculoskeletal: Negative for arthritis, back pain, falls, gout, joint pain, joint swelling, muscle cramps, muscle weakness, myalgias and stiffness.   Gastrointestinal: Negative for abdominal pain, anorexia, change in bowel habit, constipation, diarrhea, dysphagia, heartburn, hematemesis, hematochezia, melena and vomiting.   Genitourinary: Negative for decreased libido, dysuria, hematuria, hesitancy and urgency.   Neurological: Negative for excessive daytime sleepiness, dizziness, focal weakness, headaches, light-headedness, loss of balance, numbness,  "paresthesias, seizures, sensory change, tremors, vertigo and weakness.   Psychiatric/Behavioral: Negative for altered mental status, depression, hallucinations, memory loss, substance abuse and suicidal ideas. The patient does not have insomnia and is not nervous/anxious.    Allergic/Immunologic: Negative for environmental allergies and hives.       Objective: BP (!) 118/58   Pulse 76   Ht 5' 10" (1.778 m)   Wt 86.6 kg (191 lb) Comment: Pt states he weighs 191lb  BMI 27.41 kg/m²      Physical Exam   Constitutional: He is oriented to person, place, and time. He appears well-developed and well-nourished. No distress.   HENT:   Head: Normocephalic.   Eyes: Pupils are equal, round, and reactive to light. EOM are normal.   Neck: Normal range of motion. No thyromegaly present.   Cardiovascular: Normal rate, regular rhythm and intact distal pulses. Exam reveals no gallop and no friction rub.   Murmur heard.   Systolic murmur is present with a grade of 2/6.  Pulses:       Carotid pulses are 3+ on the right side, and 3+ on the left side.       Radial pulses are 3+ on the right side, and 3+ on the left side.        Femoral pulses are 3+ on the right side, and 3+ on the left side.       Popliteal pulses are 3+ on the right side, and 3+ on the left side.        Dorsalis pedis pulses are 3+ on the right side, and 3+ on the left side.        Posterior tibial pulses are 3+ on the right side, and 3+ on the left side.   Pulmonary/Chest: Effort normal and breath sounds normal. No respiratory distress. He has no wheezes. He has no rales. He exhibits no tenderness.   Abdominal: Soft. He exhibits no distension and no mass. There is no tenderness.   Musculoskeletal: Normal range of motion.   Lymphadenopathy:     He has no cervical adenopathy.   Neurological: He is alert and oriented to person, place, and time.   Skin: Skin is warm. He is not diaphoretic. No cyanosis. Nails show no clubbing.   Psychiatric: He has a normal mood and " affect. His speech is normal and behavior is normal. Judgment and thought content normal. Cognition and memory are normal.   Pulse faster with more ectopy but see p-waves    Assessment:     1. Nonrheumatic aortic valve stenosis    2. Bilateral carotid artery stenosis    3. Overweight (BMI 25.0-29.9)    4. Other hyperlipidemia    5. Vitamin D deficiency    6. Hypertension, unspecified type    7. Gait abnormality    8. Age-related osteoporosis without current pathological fracture    9. Tachycardia        Plan:   Discussed diet , achieving and maintaining ideal body weight, and exercise.   We reviewed meds in detail.  Reassured-Discussed goals, options, plan.  Elevate legs when sitting  TAVR for symptoms of LVEF fall  Metoprolol XL 25 just half in am and lower losartan to half pill and take at nite      Misha was seen today for cardiology nonrheumatic aortic valve stenosis.    Diagnoses and all orders for this visit:    Nonrheumatic aortic valve stenosis  -     metoprolol succinate (TOPROL-XL) 25 MG 24 hr tablet; Take 1 tablet (25 mg total) by mouth once daily. .5-1 once or twice daily or as directed  -     Holter monitor - 48 hour; Future; Expected date: 06/24/2019  -     EKG 12-lead; Standing  -     Basic metabolic panel; Future; Expected date: 12/11/2019  -     Transthoracic echo (TTE) 2D with Color Flow; Future; Expected date: 12/11/2019    Bilateral carotid artery stenosis    Overweight (BMI 25.0-29.9)    Other hyperlipidemia    Vitamin D deficiency    Hypertension, unspecified type  -     metoprolol succinate (TOPROL-XL) 25 MG 24 hr tablet; Take 1 tablet (25 mg total) by mouth once daily. .5-1 once or twice daily or as directed  -     EKG 12-lead; Standing  -     Basic metabolic panel; Future; Expected date: 12/11/2019    Gait abnormality    Age-related osteoporosis without current pathological fracture    Tachycardia  -     metoprolol succinate (TOPROL-XL) 25 MG 24 hr tablet; Take 1 tablet (25 mg total) by  mouth once daily. .5-1 once or twice daily or as directed  -     Holter monitor - 48 hour; Future; Expected date: 06/24/2019  -     EKG 12-lead; Standing  -     Basic metabolic panel; Future; Expected date: 12/11/2019            Follow up in about 6 months (around 12/19/2019) for labs, ECG,CFD Cindy day Lavie to read; ECG and 48 hour Holter next week.

## 2019-06-23 ENCOUNTER — PATIENT MESSAGE (OUTPATIENT)
Dept: CARDIOLOGY | Facility: CLINIC | Age: 84
End: 2019-06-23

## 2019-06-27 ENCOUNTER — CLINICAL SUPPORT (OUTPATIENT)
Dept: CARDIOLOGY | Facility: HOSPITAL | Age: 84
End: 2019-06-27
Attending: INTERNAL MEDICINE
Payer: MEDICARE

## 2019-06-27 ENCOUNTER — HOSPITAL ENCOUNTER (OUTPATIENT)
Dept: CARDIOLOGY | Facility: CLINIC | Age: 84
Discharge: HOME | End: 2019-06-27
Payer: MEDICARE

## 2019-06-27 DIAGNOSIS — R00.0 TACHYCARDIA: ICD-10-CM

## 2019-06-27 DIAGNOSIS — I35.0 NONRHEUMATIC AORTIC VALVE STENOSIS: ICD-10-CM

## 2019-06-27 DIAGNOSIS — I10 HYPERTENSION, UNSPECIFIED TYPE: ICD-10-CM

## 2019-06-27 PROCEDURE — 93227 XTRNL ECG REC<48 HR R&I: CPT | Mod: ,,, | Performed by: INTERNAL MEDICINE

## 2019-06-27 PROCEDURE — 93010 ELECTROCARDIOGRAM REPORT: CPT | Mod: S$PBB,,, | Performed by: INTERNAL MEDICINE

## 2019-06-27 PROCEDURE — 93005 ELECTROCARDIOGRAM TRACING: CPT | Mod: PBBFAC,59 | Performed by: INTERNAL MEDICINE

## 2019-06-27 PROCEDURE — 93227 HOLTER MONITOR - 48 HOUR (CUPID ONLY): ICD-10-PCS | Mod: ,,, | Performed by: INTERNAL MEDICINE

## 2019-06-27 PROCEDURE — 93010 EKG 12-LEAD: ICD-10-PCS | Mod: S$PBB,,, | Performed by: INTERNAL MEDICINE

## 2019-06-27 PROCEDURE — 93225 XTRNL ECG REC<48 HRS REC: CPT

## 2019-07-02 LAB
OHS CV EVENT MONITOR DAY: 0
OHS CV HOLTER LENGTH DECIMAL HOURS: 48
OHS CV HOLTER LENGTH HOURS: 48
OHS CV HOLTER LENGTH MINUTES: 0

## 2019-07-25 ENCOUNTER — PATIENT MESSAGE (OUTPATIENT)
Dept: DERMATOLOGY | Facility: CLINIC | Age: 84
End: 2019-07-25

## 2019-07-26 ENCOUNTER — TELEPHONE (OUTPATIENT)
Dept: DERMATOLOGY | Facility: CLINIC | Age: 84
End: 2019-07-26

## 2019-07-26 NOTE — TELEPHONE ENCOUNTER
Scheduled patient appointment per message received in the Dermatology department. Patient acknowledges appointment made and confirms.

## 2019-07-26 NOTE — TELEPHONE ENCOUNTER
----- Message from Stacy Fraser MA sent at 7/26/2019 12:26 PM CDT -----  Contact: PTs Wife      ----- Message -----  From: Haritha Arthur  Sent: 7/26/2019  12:04 PM  To: Trixie Chávez MA, [facility] Staff    Wife called regarding appointment being switched from 7/30 to 7/29 for 1:10 pm   PT and Wife prefer to leave it on 7/30, unable to bring PT on Mondays.       Callback: [phone]

## 2019-07-30 ENCOUNTER — OFFICE VISIT (OUTPATIENT)
Dept: DERMATOLOGY | Facility: CLINIC | Age: 84
End: 2019-07-30
Payer: MEDICARE

## 2019-07-30 DIAGNOSIS — L82.1 SEBORRHEIC KERATOSIS: ICD-10-CM

## 2019-07-30 DIAGNOSIS — L57.0 AK (ACTINIC KERATOSIS): Primary | ICD-10-CM

## 2019-07-30 PROCEDURE — 17000 DESTRUCT PREMALG LESION: CPT | Mod: S$PBB,,, | Performed by: DERMATOLOGY

## 2019-07-30 PROCEDURE — 99212 OFFICE O/P EST SF 10 MIN: CPT | Mod: PBBFAC,25 | Performed by: DERMATOLOGY

## 2019-07-30 PROCEDURE — 17000 PR DESTRUCTION(LASER SURGERY,CRYOSURGERY,CHEMOSURGERY),PREMALIGNANT LESIONS,FIRST LESION: ICD-10-PCS | Mod: S$PBB,,, | Performed by: DERMATOLOGY

## 2019-07-30 PROCEDURE — 17000 DESTRUCT PREMALG LESION: CPT | Mod: PBBFAC | Performed by: DERMATOLOGY

## 2019-07-30 PROCEDURE — 99213 PR OFFICE/OUTPT VISIT, EST, LEVL III, 20-29 MIN: ICD-10-PCS | Mod: 25,S$PBB,, | Performed by: DERMATOLOGY

## 2019-07-30 PROCEDURE — 99999 PR PBB SHADOW E&M-EST. PATIENT-LVL II: ICD-10-PCS | Mod: PBBFAC,,, | Performed by: DERMATOLOGY

## 2019-07-30 PROCEDURE — 99999 PR PBB SHADOW E&M-EST. PATIENT-LVL II: CPT | Mod: PBBFAC,,, | Performed by: DERMATOLOGY

## 2019-07-30 PROCEDURE — 99213 OFFICE O/P EST LOW 20 MIN: CPT | Mod: 25,S$PBB,, | Performed by: DERMATOLOGY

## 2019-07-30 NOTE — ASSESSMENT & PLAN NOTE
Today's Plan:      Cryosurgery Procedure Note    Verbal consent from the patient is obtained including, but not limited to, risk of hypopigmentation/hyperpigmentation, scar, recurrence of lesion. The patient is aware of the precancerous quality and need for treatment of these lesions. Liquid nitrogen cryosurgery is applied to the 1 actinic keratoses, as detailed in the physical exam, to produce a freeze injury. The patient is aware that blisters may form and is instructed on wound care with gentle cleansing and use of vaseline ointment to keep moist until healed. The patient is supplied a handout on cryosurgery and is instructed to call if lesions do not completely resolve.    Cont wear hat always

## 2019-07-30 NOTE — PROGRESS NOTES
Subjective:       Patient ID:  Misha Marte Jr. is a 89 y.o. male who presents for   Chief Complaint   Patient presents with    Lesion     follow up efudex     History of Present Illness: The patient presents for follow up of skin check.    The patient was last seen on: 4/19 for treatment of ak's with efudex bid x 2 weeks bilateral temples and focal area on scalp  H/o nmsc  Other skin complaints: few scaling and slightly bleeding lesions on scalp        Review of Systems   Skin: Positive for daily sunscreen use, activity-related sunscreen use and wears hat (always). Negative for recent sunburn.   Hematologic/Lymphatic: Bruises/bleeds easily (on asa).        Objective:    Physical Exam   Constitutional: He appears well-developed and well-nourished. No distress.   Neurological: He is alert and oriented to person, place, and time. He is not disoriented.   Psychiatric: He has a normal mood and affect.   Skin:   Areas Examined (abnormalities noted in diagram):   Scalp / Hair Palpated and Inspected  Head / Face Inspection Performed              Diagram Legend     Erythematous scaling macule/papule c/w actinic keratosis       Vascular papule c/w angioma      Pigmented verrucoid papule/plaque c/w seborrheic keratosis      Yellow umbilicated papule c/w sebaceous hyperplasia      Irregularly shaped tan macule c/w lentigo     1-2 mm smooth white papules consistent with Milia      Movable subcutaneous cyst with punctum c/w epidermal inclusion cyst      Subcutaneous movable cyst c/w pilar cyst      Firm pink to brown papule c/w dermatofibroma      Pedunculated fleshy papule(s) c/w skin tag(s)      Evenly pigmented macule c/w junctional nevus     Mildly variegated pigmented, slightly irregular-bordered macule c/w mildly atypical nevus      Flesh colored to evenly pigmented papule c/w intradermal nevus       Pink pearly papule/plaque c/w basal cell carcinoma      Erythematous hyperkeratotic cursted plaque c/w SCC       Surgical scar with no sign of skin cancer recurrence      Open and closed comedones      Inflammatory papules and pustules      Verrucoid papule consistent consistent with wart     Erythematous eczematous patches and plaques     Dystrophic onycholytic nail with subungual debris c/w onychomycosis     Umbilicated papule    Erythematous-base heme-crusted tan verrucoid plaque consistent with inflamed seborrheic keratosis     Erythematous Silvery Scaling Plaque c/w Psoriasis     See annotation      Assessment / Plan:        AK (actinic keratosis)    Seborrheic keratosis  These are benign inherited growths without a malignant potential. Reassurance given to patient. No treatment is necessary.         AK (actinic keratosis)  Today's Plan:      Cryosurgery Procedure Note    Verbal consent from the patient is obtained including, but not limited to, risk of hypopigmentation/hyperpigmentation, scar, recurrence of lesion. The patient is aware of the precancerous quality and need for treatment of these lesions. Liquid nitrogen cryosurgery is applied to the 1 actinic keratoses, as detailed in the physical exam, to produce a freeze injury. The patient is aware that blisters may form and is instructed on wound care with gentle cleansing and use of vaseline ointment to keep moist until healed. The patient is supplied a handout on cryosurgery and is instructed to call if lesions do not completely resolve.    Cont wear hat always    small excoriation right scalp appears 2/2 trauma - will follow and have asked pt and pt's wife to msg me if gets worse or does not resolve    Follow up in about 6 months (around 1/30/2020).

## 2019-07-30 NOTE — PATIENT INSTRUCTIONS

## 2019-08-03 ENCOUNTER — PATIENT MESSAGE (OUTPATIENT)
Dept: INTERNAL MEDICINE | Facility: CLINIC | Age: 84
End: 2019-08-03

## 2019-08-05 RX ORDER — ERGOCALCIFEROL 1.25 MG/1
CAPSULE ORAL
Qty: 12 CAPSULE | Refills: 3 | Status: SHIPPED | OUTPATIENT
Start: 2019-08-05 | End: 2019-12-05 | Stop reason: SDUPTHER

## 2019-08-12 ENCOUNTER — PES CALL (OUTPATIENT)
Dept: ADMINISTRATIVE | Facility: CLINIC | Age: 84
End: 2019-08-12

## 2019-08-16 ENCOUNTER — PES CALL (OUTPATIENT)
Dept: ADMINISTRATIVE | Facility: CLINIC | Age: 84
End: 2019-08-16

## 2019-09-04 RX ORDER — FUROSEMIDE 20 MG/1
TABLET ORAL
Qty: 90 TABLET | Refills: 3 | Status: SHIPPED | OUTPATIENT
Start: 2019-09-04 | End: 2019-12-05 | Stop reason: SDUPTHER

## 2019-09-04 NOTE — TELEPHONE ENCOUNTER
----- Message from Elisa Eid sent at 9/4/2019 12:19 PM CDT -----  Contact: wife; Pat [phone]  Patient is calling for an RX refill or new RX.  Is this a refill or new RX:  Refill  RX name and strength: Furosemide (LASIX) 20 MG tablet  Directions (copy/paste from chart):    Is this a 30 day or 90 day RX:  90  Local pharmacy or mail order pharmacy:  Local   Pharmacy name and phone # (copy/paste from chart): [facility]  [phone]    Comments: This has been requested about 5 days now. Need this today

## 2019-09-18 ENCOUNTER — OFFICE VISIT (OUTPATIENT)
Dept: INTERNAL MEDICINE | Facility: CLINIC | Age: 84
End: 2019-09-18
Payer: MEDICARE

## 2019-09-18 VITALS
WEIGHT: 192.44 LBS | BODY MASS INDEX: 27.55 KG/M2 | HEIGHT: 70 IN | DIASTOLIC BLOOD PRESSURE: 70 MMHG | SYSTOLIC BLOOD PRESSURE: 130 MMHG | HEART RATE: 65 BPM | TEMPERATURE: 98 F | OXYGEN SATURATION: 95 %

## 2019-09-18 DIAGNOSIS — E78.5 HYPERLIPIDEMIA, UNSPECIFIED HYPERLIPIDEMIA TYPE: ICD-10-CM

## 2019-09-18 DIAGNOSIS — I10 ESSENTIAL HYPERTENSION: Primary | ICD-10-CM

## 2019-09-18 DIAGNOSIS — I35.0 AORTIC STENOSIS, MODERATE: ICD-10-CM

## 2019-09-18 PROCEDURE — 99213 OFFICE O/P EST LOW 20 MIN: CPT | Mod: PBBFAC | Performed by: INTERNAL MEDICINE

## 2019-09-18 PROCEDURE — 99213 PR OFFICE/OUTPT VISIT, EST, LEVL III, 20-29 MIN: ICD-10-PCS | Mod: S$PBB,,, | Performed by: INTERNAL MEDICINE

## 2019-09-18 PROCEDURE — 99999 PR PBB SHADOW E&M-EST. PATIENT-LVL III: ICD-10-PCS | Mod: PBBFAC,,, | Performed by: INTERNAL MEDICINE

## 2019-09-18 PROCEDURE — 99999 PR PBB SHADOW E&M-EST. PATIENT-LVL III: CPT | Mod: PBBFAC,,, | Performed by: INTERNAL MEDICINE

## 2019-09-18 PROCEDURE — 99213 OFFICE O/P EST LOW 20 MIN: CPT | Mod: S$PBB,,, | Performed by: INTERNAL MEDICINE

## 2019-09-19 NOTE — PROGRESS NOTES
CC:  Follow-up of hypertension.    HPI:  The patient is a 89-year-old gentleman who is currently being treated for hypertension, hyperlipidemia and aortic stenosis presents today for follow-up hypertension.  The patient was changed from ramipril to losartan.  He does check his blood pressure at home.  He reports no problems with the new medication.    ROS:  No chest pain.  No shortness of breath.  He checks his blood pressure 2 times a week.  The systolic is usually in the 120s and the diastolic is in the 80s.    Physical exam:  HEENT:  Trachea is midline without JVD.  Pulmonary:  Good inspiratory expiratory breath sounds are heard.  Lungs are clear to auscultation.  Cardiovascular:  S1-S2 with a 2 to 3/6 systolic ejection murmur heard best at the right upper sternal border.  GI:  Abdomen was nontender, nondistended without hepatosplenomegaly.  Extremities:  The patient has trace to 1+ ankle edema.    Assessment:  1.  Hypertension currently stable with changing from ramipril to losartan  2.  Aortic stenosis.    Plan:  1.  No change in medications at this time  2.  Will schedule flu shot today for the patient his wife and sister-in-law.  3.  Schedule follow-up approximately 4 months.

## 2019-10-01 ENCOUNTER — PATIENT MESSAGE (OUTPATIENT)
Dept: SPINE | Facility: CLINIC | Age: 84
End: 2019-10-01

## 2019-10-02 ENCOUNTER — TELEPHONE (OUTPATIENT)
Dept: SPINE | Facility: CLINIC | Age: 84
End: 2019-10-02

## 2019-10-02 NOTE — TELEPHONE ENCOUNTER
Staff contacted patient and left a message on VM informing patient that he will be placed on a waiting list and when there's a cancellation the office will contact him with an appt.

## 2019-10-02 NOTE — TELEPHONE ENCOUNTER
----- Message from Ginette Francis sent at 10/2/2019 12:39 PM CDT -----  Contact: CLARY DUARTE JR. [[id number]]  Name of Who is Calling: CLARY DUARTE JR. [[id number]]      What is the request in detail: patient would like schedule follow up visit. Please call     Can the clinic reply by [facility]: no    What Number to Call Back if not in [facility]: [phone]

## 2019-10-03 ENCOUNTER — PATIENT MESSAGE (OUTPATIENT)
Dept: SPINE | Facility: CLINIC | Age: 84
End: 2019-10-03

## 2019-10-06 ENCOUNTER — PATIENT MESSAGE (OUTPATIENT)
Dept: CARDIOLOGY | Facility: CLINIC | Age: 84
End: 2019-10-06

## 2019-10-07 ENCOUNTER — TELEPHONE (OUTPATIENT)
Dept: CARDIOLOGY | Facility: CLINIC | Age: 84
End: 2019-10-07

## 2019-10-07 DIAGNOSIS — I10 HYPERTENSION, UNSPECIFIED TYPE: Primary | ICD-10-CM

## 2019-10-29 ENCOUNTER — INFUSION (OUTPATIENT)
Dept: INFECTIOUS DISEASES | Facility: HOSPITAL | Age: 84
End: 2019-10-29
Attending: INTERNAL MEDICINE
Payer: MEDICARE

## 2019-10-29 VITALS — BODY MASS INDEX: 27.55 KG/M2 | HEIGHT: 70 IN | WEIGHT: 192.44 LBS

## 2019-10-29 DIAGNOSIS — M81.0 AGE RELATED OSTEOPOROSIS, UNSPECIFIED PATHOLOGICAL FRACTURE PRESENCE: Primary | ICD-10-CM

## 2019-10-29 DIAGNOSIS — E04.2 MULTINODULAR GOITER: ICD-10-CM

## 2019-10-29 PROCEDURE — 63600175 PHARM REV CODE 636 W HCPCS: Mod: JG | Performed by: INTERNAL MEDICINE

## 2019-10-29 PROCEDURE — 96372 THER/PROPH/DIAG INJ SC/IM: CPT

## 2019-10-29 RX ADMIN — DENOSUMAB 60 MG: 60 INJECTION SUBCUTANEOUS at 02:10

## 2019-10-31 DIAGNOSIS — I35.0 NONRHEUMATIC AORTIC VALVE STENOSIS: ICD-10-CM

## 2019-10-31 RX ORDER — PRAVASTATIN SODIUM 20 MG/1
40 TABLET ORAL DAILY
Qty: 90 TABLET | Refills: 3 | Status: SHIPPED | OUTPATIENT
Start: 2019-10-31 | End: 2019-12-04 | Stop reason: SDUPTHER

## 2019-11-22 ENCOUNTER — PATIENT MESSAGE (OUTPATIENT)
Dept: INTERNAL MEDICINE | Facility: CLINIC | Age: 84
End: 2019-11-22

## 2019-12-03 ENCOUNTER — HOSPITAL ENCOUNTER (OUTPATIENT)
Dept: CARDIOLOGY | Facility: CLINIC | Age: 84
Discharge: HOME | End: 2019-12-03
Attending: INTERNAL MEDICINE
Payer: MEDICARE

## 2019-12-03 VITALS
BODY MASS INDEX: 27.49 KG/M2 | HEART RATE: 65 BPM | HEIGHT: 70 IN | WEIGHT: 192 LBS | SYSTOLIC BLOOD PRESSURE: 106 MMHG | DIASTOLIC BLOOD PRESSURE: 54 MMHG

## 2019-12-03 DIAGNOSIS — I35.0 NONRHEUMATIC AORTIC VALVE STENOSIS: ICD-10-CM

## 2019-12-03 LAB
ASCENDING AORTA: 3.88 CM
AV INDEX (PROSTH): 0.22
AV MEAN GRADIENT: 46 MMHG
AV PEAK GRADIENT: 69 MMHG
AV VALVE AREA: 0.99 CM2
AV VELOCITY RATIO: 0.22
BSA FOR ECHO PROCEDURE: 2.07 M2
CV ECHO LV RWT: 0.4 CM
DOP CALC AO PEAK VEL: 4.16 M/S
DOP CALC AO VTI: 96.2 CM
DOP CALC LVOT AREA: 4.6 CM2
DOP CALC LVOT DIAMETER: 2.41 CM
DOP CALC LVOT PEAK VEL: 0.9 M/S
DOP CALC LVOT STROKE VOLUME: 95.47 CM3
DOP CALCLVOT PEAK VEL VTI: 20.94 CM
E WAVE DECELERATION TIME: 380.63 MSEC
E/A RATIO: 0.81
E/E' RATIO: 50 M/S
ECHO LV POSTERIOR WALL: 1 CM (ref 0.6–1.1)
FRACTIONAL SHORTENING: 34 % (ref 28–44)
INTERVENTRICULAR SEPTUM: 0.67 CM (ref 0.6–1.1)
IVRT: 0.08 MSEC
LA MAJOR: 5.76 CM
LA MINOR: 5.66 CM
LA WIDTH: 3.9 CM
LEFT ATRIUM SIZE: 4.05 CM
LEFT ATRIUM VOLUME INDEX: 37.4 ML/M2
LEFT ATRIUM VOLUME: 76.66 CM3
LEFT INTERNAL DIMENSION IN SYSTOLE: 3.25 CM (ref 2.1–4)
LEFT VENTRICLE DIASTOLIC VOLUME INDEX: 56.41 ML/M2
LEFT VENTRICLE DIASTOLIC VOLUME: 115.71 ML
LEFT VENTRICLE MASS INDEX: 69 G/M2
LEFT VENTRICLE SYSTOLIC VOLUME INDEX: 20.8 ML/M2
LEFT VENTRICLE SYSTOLIC VOLUME: 42.6 ML
LEFT VENTRICULAR INTERNAL DIMENSION IN DIASTOLE: 4.95 CM (ref 3.5–6)
LEFT VENTRICULAR MASS: 141.07 G
LV LATERAL E/E' RATIO: 62.5 M/S
LV SEPTAL E/E' RATIO: 41.67 M/S
MV MEAN GRADIENT: 4 MMHG
MV PEAK A VEL: 1.55 M/S
MV PEAK E VEL: 1.25 M/S
MV STENOSIS PRESSURE HALF TIME: 110 MS
MV VALVE AREA P 1/2 METHOD: 2 CM2
PISA TR MAX VEL: 2.48 M/S
PULM VEIN S/D RATIO: 1.78
PV PEAK D VEL: 0.23 M/S
PV PEAK S VEL: 0.41 M/S
RA MAJOR: 4.77 CM
RA PRESSURE: 3 MMHG
RA WIDTH: 4.5 CM
RIGHT VENTRICULAR END-DIASTOLIC DIMENSION: 3.52 CM
RV TISSUE DOPPLER FREE WALL SYSTOLIC VELOCITY 1 (APICAL 4 CHAMBER VIEW): 12.12 CM/S
SINUS: 3.8 CM
STJ: 2.52 CM
TDI LATERAL: 0.02 M/S
TDI SEPTAL: 0.03 M/S
TDI: 0.03 M/S
TR MAX PG: 25 MMHG
TRICUSPID ANNULAR PLANE SYSTOLIC EXCURSION: 2.94 CM
TV REST PULMONARY ARTERY PRESSURE: 28 MMHG

## 2019-12-03 PROCEDURE — 93306 TRANSTHORACIC ECHO (TTE) COMPLETE (CUPID ONLY): ICD-10-PCS | Mod: 26,S$PBB,, | Performed by: INTERNAL MEDICINE

## 2019-12-03 PROCEDURE — 93306 TTE W/DOPPLER COMPLETE: CPT | Mod: PBBFAC | Performed by: INTERNAL MEDICINE

## 2019-12-04 ENCOUNTER — PATIENT MESSAGE (OUTPATIENT)
Dept: CARDIOLOGY | Facility: CLINIC | Age: 84
End: 2019-12-04

## 2019-12-04 ENCOUNTER — PATIENT MESSAGE (OUTPATIENT)
Dept: INTERNAL MEDICINE | Facility: CLINIC | Age: 84
End: 2019-12-04

## 2019-12-04 DIAGNOSIS — I35.0 NONRHEUMATIC AORTIC VALVE STENOSIS: ICD-10-CM

## 2019-12-04 DIAGNOSIS — I10 HYPERTENSION, UNSPECIFIED TYPE: ICD-10-CM

## 2019-12-04 DIAGNOSIS — R00.0 TACHYCARDIA: ICD-10-CM

## 2019-12-04 DIAGNOSIS — E55.9 VITAMIN D DEFICIENCY: Primary | ICD-10-CM

## 2019-12-04 DIAGNOSIS — I10 ESSENTIAL HYPERTENSION: ICD-10-CM

## 2019-12-05 RX ORDER — METOPROLOL SUCCINATE 25 MG/1
25 TABLET, EXTENDED RELEASE ORAL DAILY
Qty: 90 TABLET | Refills: 3 | Status: SHIPPED | OUTPATIENT
Start: 2019-12-05 | End: 2020-09-22

## 2019-12-05 RX ORDER — PRAVASTATIN SODIUM 20 MG/1
40 TABLET ORAL DAILY
Qty: 90 TABLET | Refills: 3 | Status: SHIPPED | OUTPATIENT
Start: 2019-12-05 | End: 2019-12-09 | Stop reason: SDUPTHER

## 2019-12-06 ENCOUNTER — PATIENT MESSAGE (OUTPATIENT)
Dept: INTERNAL MEDICINE | Facility: CLINIC | Age: 84
End: 2019-12-06

## 2019-12-06 DIAGNOSIS — I10 ESSENTIAL HYPERTENSION: ICD-10-CM

## 2019-12-06 RX ORDER — LOSARTAN POTASSIUM 50 MG/1
50 TABLET ORAL DAILY
Qty: 90 TABLET | Refills: 3 | Status: CANCELLED | OUTPATIENT
Start: 2019-12-06 | End: 2020-12-05

## 2019-12-06 RX ORDER — LOSARTAN POTASSIUM 50 MG/1
50 TABLET ORAL DAILY
Qty: 90 TABLET | Refills: 3 | Status: SHIPPED | OUTPATIENT
Start: 2019-12-06 | End: 2019-12-29

## 2019-12-06 RX ORDER — FUROSEMIDE 20 MG/1
TABLET ORAL
Qty: 90 TABLET | Refills: 1 | Status: SHIPPED | OUTPATIENT
Start: 2019-12-06 | End: 2020-05-25

## 2019-12-06 RX ORDER — ERGOCALCIFEROL 1.25 MG/1
CAPSULE ORAL
Qty: 6 CAPSULE | Refills: 0 | Status: SHIPPED | OUTPATIENT
Start: 2019-12-06 | End: 2022-01-21

## 2019-12-06 RX ORDER — ESOMEPRAZOLE MAGNESIUM 40 MG/1
40 CAPSULE, DELAYED RELEASE ORAL DAILY
Qty: 90 CAPSULE | Refills: 0 | Status: SHIPPED | OUTPATIENT
Start: 2019-12-06

## 2019-12-06 RX ORDER — FUROSEMIDE 20 MG/1
TABLET ORAL
Qty: 90 TABLET | Refills: 3 | Status: CANCELLED | OUTPATIENT
Start: 2019-12-06

## 2019-12-06 NOTE — TELEPHONE ENCOUNTER
Refill Authorization Note     is requesting a refill authorization.    Brief assessment and rationale for refill: DEFER: not prescribed recently (omeprazole) / APPROVE: needs labs      Medication-related problems identified:   Requires labs  Drug-disease interaction    Medication Therapy Plan: Omeprazole not prescribed recently; pt also has osteoporosis which may worsen with continued use of omeprazole; NTBO (Vitamin D), last CMP commented as stable per Dr. reno, will approve others per protocol                              Comments:   Requested Prescriptions   Pending Prescriptions Disp Refills    esomeprazole (NEXIUM) 40 MG capsule 90 capsule 3     Sig: Take 1 capsule (40 mg total) by mouth once daily.       Gastroenterology: Proton Pump Inhibitors Failed - 12/5/2019 10:08 AM        Failed - Osteoporosis is not on problem list        Failed - GERD is on problem list         Passed - Patient is at least 18 years old        Passed - Plavix is not on active medication list        Passed - Valid encounter within last 12 months     Recent Outpatient Visits            2 months ago Essential hypertension    [facility] Medicine Ike Ivan MD    4 months ago AK (actinic keratosis)    [facility] - Dermatology Mindy Hankins MD    5 months ago Nonrheumatic aortic valve stenosis    [facility] - Cardiology Gregor Reno MD    6 months ago Gait instability    [facility] [place] [address] Chanda Hall MD    7 months ago 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    [facility] - Otorhinolaryngology Sy Odonnell III, MD          Future Appointments              In 4 days MD [facility] - Endocrinology [address] [place], [facility]    In 4 days EKG, APPT [facility], [facility]    In 4 days MD [facility] - Cardiology, [facility]    In 1 month MD [facility] - Dermatology, [facility]    In 2 months [facility] [place] [address], [facility]    In 5 months EPO, INJECTION [facility]              Signed Prescriptions Disp Refills    ergocalciferol (ERGOCALCIFEROL) 50,000 unit Cap 6 capsule 0     Sig: TAKE 1 CAPSULE BY MOUTH EVERY 2 WEEKS       Endocrinology:  Vitamins - Vitamin D Supplementation Failed - 12/5/2019 10:08 AM        Failed - Vit D is 20 or above and within 360 days     Vit D, 25-Hydroxy   Date Value Ref Range Status   07/12/2017 48 30 - 96 ng/mL Final     Comment:     Vitamin D deficiency.........<10 ng/mL                              Vitamin D insufficiency......10-29 ng/mL       Vitamin D sufficiency........> or equal to 30 ng/mL  Vitamin D toxicity............>100 ng/mL     11/01/2016 48 30 - 96 ng/mL Final     Comment:     Vitamin D deficiency.........<10 ng/mL                              Vitamin D insufficiency......10-29 ng/mL       Vitamin D sufficiency........> or equal to 30 ng/mL  Vitamin D toxicity............>100 ng/mL     05/20/2016 57 30 - 96 ng/mL Final     Comment:     Vitamin D deficiency.........<10 ng/mL                              Vitamin D insufficiency......10-29 ng/mL       Vitamin D sufficiency........> or equal to 30 ng/mL  Vitamin D toxicity............>100 ng/mL                Passed - Patient is at least 18 years old        Passed - Hypercalcemia is not present on problem list        Passed - Office visit in past 12 months or future 90 days     Recent Outpatient Visits            2 months ago Essential hypertension    [facility] Medicine Ike Ivan MD    4 months ago AK (actinic keratosis)    [facility] - Dermatology Mindy Hankins MD    5 months ago Nonrheumatic aortic valve stenosis    [facility] - Cardiology Gregor White MD    6 months ago Gait instability    [facility] [place] [address] Chanda Hall MD    7 months ago 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    [facility] - Otorhinolaryngology Sy Odonnell  III, MD          Future Appointments              In 4 days MD [facility] - Endocrinology [address] [place], [facility]    In 4 days EKG, APPT [facility]    In 4 days MD [facility] - Cardiology, [facility]    In 1 month MD [facility] - Dermatology, [facility]    In 2 months Chanda Hall MD [facility] [place] [address], [facility]    In 5 months EPO, INJECTION [facility]                Passed - Ca in normal range and within 360 days     Calcium   Date Value Ref Range Status   12/03/2019 9.0 8.7 - 10.5 mg/dL Final   09/24/2019 8.6 8.6 - 10.0 mg/dL Final   02/27/2019 9.0 8.6 - 10.0 mg/dL Final             furosemide (LASIX) 20 MG tablet 90 tablet 1     Sig: TAKE 1 TABLET(20 MG) BY MOUTH DAILY AS NEEDED       Cardiovascular:  Diuretics - Loop Failed - 12/5/2019 10:08 AM        Failed - Cr is 1.4 or below and within 180 days     Creatinine   Date Value Ref Range Status   12/03/2019 1.5 (H) 0.5 - 1.4 mg/dL Final   09/24/2019 1.4 0.5 - 1.4 mg/dL Final   02/27/2019 1.4 0.5 - 1.4 mg/dL Final              Failed - eGFR in normal range and within 180 days     eGFR if non    Date Value Ref Range Status   12/03/2019 40.4 (A) >60 mL/min/1.73 m^2 Final     Comment:     Calculation used to obtain the estimated glomerular filtration  rate (eGFR) is the CKD-EPI equation.      09/24/2019 43.9 (A) >60 mL/min/1.73 m^2 Final     Comment:     Calculation used to obtain the estimated glomerular filtration  rate (eGFR) is the CKD-EPI equation.      02/27/2019 43.9 (A) >60 mL/min/1.73 m^2 Final     Comment:     Calculation used to obtain the estimated glomerular filtration  rate (eGFR) is the CKD-EPI equation.                 Passed - Patient is at least 18 years old        Passed - Last BP in normal range within 360 days     BP Readings from Last 3 Encounters:   12/03/19 (!) 106/54   09/18/19 130/70   06/19/19 (!) 118/58               Passed - Office visit in past 12 months or future 90 days     Recent Outpatient Visits            2 months ago Essential hypertension    [facility] Medicine Ike Ivan MD    4 months ago AK (actinic keratosis)    [facility] - Dermatology Mindy Hankins MD    5 months ago Nonrheumatic aortic valve stenosis    [facility] - Cardiology Gregor White MD    6 months ago Gait instability    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 [place] [address], [facility]    In 5 months EPO, INJECTION [facility]                Passed - K in normal range and within 180 days     Potassium   Date Value Ref Range Status   12/03/2019 4.3 3.5 - 5.1 mmol/L Final   09/24/2019 4.1 3.5 - 5.1 mmol/L Final   02/27/2019 4.0 3.5 - 5.1 mmol/L Final              Passed - Na in normal range and within 180 days     Sodium   Date Value Ref Range Status   12/03/2019 138 136 - 145 mmol/L Final   09/24/2019 137 136 - 145 mmol/L Final   02/27/2019 133 (L) 136 - 145 mmol/L Final             losartan (COZAAR) 50 MG tablet 90 tablet 3     Sig: Take 1 tablet (50 mg total) by mouth once daily.       Cardiovascular:  Angiotensin Receptor Blockers Failed - 12/5/2019 10:08 AM        Failed - Cr is 1.4 or below and within 360 days     Creatinine   Date Value Ref Range Status   12/03/2019 1.5 (H) 0.5 - 1.4 mg/dL Final   09/24/2019 1.4 0.5 - 1.4 mg/dL Final   02/27/2019 1.4 0.5 - 1.4 mg/dL Final              Passed - Patient is  at least 18 years old        Passed - Last BP in normal range within 360 days     BP Readings from Last 3 Encounters:   12/03/19 (!) 106/54   09/18/19 130/70   06/19/19 (!) 118/58              Passed - Office visit in past 12 months or future 90 days     Recent Outpatient Visits            2 months ago Essential hypertension    [facility] Ike Ivan MD    4 months ago AK (actinic keratosis)    [facility] - Dermatology Mindy Hankins MD    5 months ago Nonrheumatic aortic valve stenosis    [facility] Cardiology Gregor White MD    6 months ago Gait instability    [facility] [place] [address] Chanda Hall MD    7 months ago 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    [facility] - Otorhinolaryngology Sy Odonnell III, MD          Future Appointments              In 4 days MD [facility] - Endocrinology [address] [place], [facility]    In 4 days EKG, APPT [facility]    In 4 days MD [facility] - Cardiology, [facility]    In 1 month MD [facility] - Dermatology, [address]    In 2 months Chanda Hall MD [facility] [place] [address], [facility]    In 5 months EPO, INJECTION [facility]                Passed - K in normal range and within 360 days     Potassium   Date Value Ref Range Status   12/03/2019 4.3 3.5 - 5.1 mmol/L Final   09/24/2019 4.1 3.5 - 5.1 mmol/L Final   02/27/2019 4.0 3.5 - 5.1 mmol/L Final              Passed - eGFR within 360 days     eGFR if non    Date Value Ref Range Status   12/03/2019 40.4 (A) >60 mL/min/1.73 m^2 Final     Comment:     Calculation used to obtain the estimated glomerular filtration  rate (eGFR) is the CKD-EPI equation.      09/24/2019 43.9 (A) >60 mL/min/1.73 m^2 Final     Comment:     Calculation used to obtain the estimated glomerular filtration  rate (eGFR) is the CKD-EPI equation.      02/27/2019 43.9 (A) >60  mL/min/1.73 m^2 Final     Comment:     Calculation used to obtain the estimated glomerular filtration  rate (eGFR) is the CKD-EPI equation.

## 2019-12-09 DIAGNOSIS — I35.0 NONRHEUMATIC AORTIC VALVE STENOSIS: ICD-10-CM

## 2019-12-09 RX ORDER — PRAVASTATIN SODIUM 20 MG/1
40 TABLET ORAL DAILY
Qty: 90 TABLET | Refills: 3 | Status: SHIPPED | OUTPATIENT
Start: 2019-12-09 | End: 2019-12-16 | Stop reason: SDUPTHER

## 2019-12-09 NOTE — PROGRESS NOTES
Subjective:   Patient ID:  Misha Marte Jr. is a 89 y.o. male who presents for follow-up of CVD    HPI: Patient is here for valvular heart disease.   The patient has no chest pain, SOB, TIA, palpitations, syncope or pre-syncope.Patient does not exercise but remains active.        Review of Systems   Constitution: Negative for chills, decreased appetite, diaphoresis, fever, malaise/fatigue, night sweats, weight gain and weight loss.   HENT: Negative for congestion, hoarse voice, nosebleeds, sore throat and tinnitus.    Eyes: Negative for blurred vision, double vision, vision loss in left eye, vision loss in right eye, visual disturbance and visual halos.   Cardiovascular: Negative for chest pain, claudication, cyanosis, dyspnea on exertion, irregular heartbeat, leg swelling, near-syncope, orthopnea, palpitations, paroxysmal nocturnal dyspnea and syncope.   Respiratory: Negative for cough, hemoptysis, shortness of breath, sleep disturbances due to breathing, snoring, sputum production and wheezing.    Endocrine: Negative for cold intolerance, heat intolerance, polydipsia, polyphagia and polyuria.   Hematologic/Lymphatic: Negative for adenopathy and bleeding problem. Does not bruise/bleed easily.   Skin: Negative for color change, dry skin, flushing, itching, nail changes, poor wound healing, rash, skin cancer, suspicious lesions and unusual hair distribution.   Musculoskeletal: Positive for arthritis and joint pain. Negative for back pain, falls, gout, joint swelling, muscle cramps, muscle weakness, myalgias and stiffness.   Gastrointestinal: Negative for abdominal pain, anorexia, change in bowel habit, constipation, diarrhea, dysphagia, heartburn, hematemesis, hematochezia, melena and vomiting.   Genitourinary: Negative for decreased libido, dysuria, hematuria, hesitancy and urgency.   Neurological: Negative for excessive daytime sleepiness, dizziness, focal weakness, headaches, light-headedness, loss of  "balance, numbness, paresthesias, seizures, sensory change, tremors, vertigo and weakness.   Psychiatric/Behavioral: Negative for altered mental status, depression, hallucinations, memory loss, substance abuse and suicidal ideas. The patient does not have insomnia and is not nervous/anxious.    Allergic/Immunologic: Negative for environmental allergies and hives.       Objective: /70 (BP Location: Left arm, Patient Position: Sitting, BP Method: Medium (Automatic))   Pulse 68   Ht 5' 10" (1.778 m)   Wt 88.4 kg (194 lb 14.2 oz)   BMI 27.96 kg/m²      Physical Exam   Constitutional: He is oriented to person, place, and time. He appears well-developed and well-nourished. No distress.   HENT:   Head: Normocephalic.   Eyes: Pupils are equal, round, and reactive to light. EOM are normal.   Neck: Normal range of motion. No thyromegaly present.   Cardiovascular: Normal rate, regular rhythm and intact distal pulses. Exam reveals no gallop and no friction rub.   Murmur heard.   Harsh midsystolic murmur is present with a grade of 2/6 at the upper right sternal border radiating to the neck.  Pulses:       Carotid pulses are 3+ on the right side, and 3+ on the left side.       Radial pulses are 3+ on the right side, and 3+ on the left side.        Femoral pulses are 3+ on the right side, and 3+ on the left side.       Popliteal pulses are 3+ on the right side, and 3+ on the left side.        Dorsalis pedis pulses are 3+ on the right side, and 3+ on the left side.        Posterior tibial pulses are 3+ on the right side, and 3+ on the left side.   Pulmonary/Chest: Effort normal and breath sounds normal. No respiratory distress. He has no wheezes. He has no rales. He exhibits no tenderness.   Abdominal: Soft. He exhibits no distension and no mass. There is no tenderness.   Musculoskeletal: Normal range of motion. He exhibits edema.   Lymphadenopathy:     He has no cervical adenopathy.   Neurological: He is alert and " oriented to person, place, and time.   Skin: Skin is warm. He is not diaphoretic. No cyanosis. Nails show no clubbing.   Psychiatric: He has a normal mood and affect. His speech is normal and behavior is normal. Judgment and thought content normal. Cognition and memory are normal.   Left leg 0-trace edema; 1-2 + on right    Assessment:     1. Nonrheumatic aortic valve stenosis    2. Bilateral carotid artery stenosis    3. Essential hypertension    4. Other hyperlipidemia    5. Overweight (BMI 25.0-29.9)    6. Vitamin D deficiency    7. 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   We reviewed meds in detail.  Reassured-Discussed goals, options, plan.        Misha was seen today for nonrheumatic aortic valve stenosis.    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

## 2019-12-09 NOTE — PROGRESS NOTES
Subjective:      Patient ID: Misha Marte Jr. is a 89 y.o. male.    Chief Complaint:  Osteoporosis      History of Present Illness  Mr. Marte is a 89 year old gentleman with multinodular goiter, osteoporosis and vitamin D deficiency.    MNG diagnosed over ten years ago. FNA was benign over ten years ago with benign cytology. Today denies obstructive symptoms.   Last US done two years ago, complex cyst 1.5 cm in size, smaller complex subcentimeter nodule.    For his osteoporosis, osteopenia with high FRAX calculation (TH -1.4 and FN -1.2) denies falls or fractures. Reports gradual height loss over about two inches or new back pain.   Last prolia injection 10/29/2019. Tolerated injection well, no muscle aches.    First injection 2/2018    Vitamin D levels not checked recently.   Supplements:  Vitamin D 50K every other week  Calcium   Diet is pretty good. Seldom eat red meat. Eats some vegetables, milk shakes (twice a week)  Exercise: walks around his house without use of cane. Any significant distance is difficult. (completed walking therapy in [place])     Review of Systems   Constitutional: Negative for chills and fever.   HENT: Negative for congestion and sinus pressure.    Eyes: Negative for visual disturbance.   Respiratory: Negative for chest tightness and shortness of breath.    Cardiovascular: Negative for chest pain and palpitations.   Gastrointestinal: Negative for abdominal distention, diarrhea, nausea and vomiting.   Genitourinary: Negative for dysuria and flank pain.   Musculoskeletal: Negative for back pain.   Skin: Negative for rash.   Neurological: Negative for weakness.   Hematological: Does not bruise/bleed easily.   Psychiatric/Behavioral: Negative for sleep disturbance.       Objective:   Physical Exam   Constitutional: He is oriented to person, place, and time. He appears well-developed and well-nourished.   HENT:   Head: Normocephalic and atraumatic.   Eyes: Pupils are equal, round,  "and reactive to light. Conjunctivae and EOM are normal.   Neck: Normal range of motion. Neck supple. No tracheal deviation present. No thyromegaly present.   Cardiovascular: Normal rate.   Murmur heard.  Pulmonary/Chest: Effort normal and breath sounds normal.   Abdominal: Soft. Bowel sounds are normal.   Musculoskeletal: He exhibits edema (  right > left).   Lymphadenopathy:     He has no cervical adenopathy.   Neurological: He is alert and oriented to person, place, and time.   Skin: Skin is warm and dry.     Vitals:    12/10/19 0959   BP: 100/72   Pulse: 69   Weight: 87.1 kg (192 lb 0.3 oz)   Height: 5' 10" (1.778 m)       BP Readings from Last 3 Encounters:   12/10/19 100/72   12/03/19 (!) 106/54   09/18/19 130/70     Wt Readings from Last 1 Encounters:   12/10/19 0959 87.1 kg (192 lb 0.3 oz)         Body mass index is 27.55 kg/m².    Lab Review:   No results found for: HGBA1C  Lab Results   Component Value Date    CHOL 156 09/24/2019    HDL 62 09/24/2019    LDLCALC 78 09/24/2019    TRIG 80 09/24/2019    CHOLHDL 39.7 09/24/2019     Lab Results   Component Value Date     12/03/2019    K 4.3 12/03/2019     12/03/2019    CO2 25 12/03/2019     12/03/2019    BUN 13 12/03/2019    CREATININE 1.5 (H) 12/03/2019    CALCIUM 9.0 12/03/2019    PROT 6.5 09/24/2019    ALBUMIN 4.0 09/24/2019    BILITOT 1.1 09/24/2019    ALKPHOS 42 09/24/2019    AST 28 09/24/2019    ALT 23 09/24/2019    ANIONGAP 9 12/03/2019    ESTGFRAFRICA 46.7 (A) 12/03/2019    EGFRNONAA 40.4 (A) 12/03/2019    TSH 3.80 10/23/2018    TSH 3.80 10/23/2018   Results for CALRY DUARTE JR. (MRN [id number]) as of 12/10/2019 10:26   Ref. Range 7/12/2017 09:55   Vit D, 25-Hydroxy Latest Ref Range: 30 - 96 ng/mL 48       US 2017  THYROID GLAND is not enlarged.  Vascularity is normal.    RIGHT LOBE of the thyroid measures:4.97x1.67x2.15 cm.  Complex cyst measures 1.58x0.92x1.26 cm.  ISTHMUS:024 cm    LEFT LOBE measures:4.51x1.54x169 cm.  Complex " nodule measures 0.6x0.43x0.62 cm.  LYMPH NODES:Benign    COMPARISON:3/19/2013    1/2018 BONE MINERAL DENSITY RESULTS:  Lumbar Spine: Lumbar bone mineral density L1-L4 is 1.155g/cm2, which is a t-score of 0.6. The z-score is 1.9.    Total Hip: The total hip bone mineral density is 0.818g/cm2.  The t-score is -1.4, and the z-score is -0.2.  Femoral neck BMD is 0.764g/cm2 and the t-score is -1.2.    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

## 2019-12-09 NOTE — TELEPHONE ENCOUNTER
Please schedule patient for Labs (VITAMIN d)    Please also check with your provider if any further labs need to be added and scheduled together.    Thanks !

## 2019-12-10 ENCOUNTER — OFFICE VISIT (OUTPATIENT)
Dept: ENDOCRINOLOGY | Facility: CLINIC | Age: 84
End: 2019-12-10
Payer: MEDICARE

## 2019-12-10 ENCOUNTER — HOSPITAL ENCOUNTER (OUTPATIENT)
Dept: CARDIOLOGY | Facility: CLINIC | Age: 84
Discharge: HOME | End: 2019-12-10
Payer: MEDICARE

## 2019-12-10 ENCOUNTER — TELEPHONE (OUTPATIENT)
Dept: ENDOCRINOLOGY | Facility: CLINIC | Age: 84
End: 2019-12-10

## 2019-12-10 ENCOUNTER — OFFICE VISIT (OUTPATIENT)
Dept: CARDIOLOGY | Facility: CLINIC | Age: 84
End: 2019-12-10
Payer: MEDICARE

## 2019-12-10 VITALS
SYSTOLIC BLOOD PRESSURE: 100 MMHG | DIASTOLIC BLOOD PRESSURE: 72 MMHG | HEART RATE: 69 BPM | WEIGHT: 192 LBS | HEIGHT: 70 IN | BODY MASS INDEX: 27.49 KG/M2

## 2019-12-10 VITALS
HEIGHT: 70 IN | WEIGHT: 194.88 LBS | SYSTOLIC BLOOD PRESSURE: 132 MMHG | DIASTOLIC BLOOD PRESSURE: 70 MMHG | BODY MASS INDEX: 27.9 KG/M2 | HEART RATE: 68 BPM

## 2019-12-10 DIAGNOSIS — E55.9 VITAMIN D DEFICIENCY: ICD-10-CM

## 2019-12-10 DIAGNOSIS — I35.0 NONRHEUMATIC AORTIC VALVE STENOSIS: Primary | ICD-10-CM

## 2019-12-10 DIAGNOSIS — I10 HYPERTENSION, UNSPECIFIED TYPE: ICD-10-CM

## 2019-12-10 DIAGNOSIS — R00.0 TACHYCARDIA: ICD-10-CM

## 2019-12-10 DIAGNOSIS — I35.0 NONRHEUMATIC AORTIC VALVE STENOSIS: ICD-10-CM

## 2019-12-10 DIAGNOSIS — I65.23 BILATERAL CAROTID ARTERY STENOSIS: ICD-10-CM

## 2019-12-10 DIAGNOSIS — E66.3 OVERWEIGHT (BMI 25.0-29.9): ICD-10-CM

## 2019-12-10 DIAGNOSIS — E78.49 OTHER HYPERLIPIDEMIA: ICD-10-CM

## 2019-12-10 DIAGNOSIS — E04.2 MULTINODULAR GOITER: ICD-10-CM

## 2019-12-10 DIAGNOSIS — I10 ESSENTIAL HYPERTENSION: ICD-10-CM

## 2019-12-10 DIAGNOSIS — R60.0 EDEMA OF BOTH LOWER LEGS DUE TO PERIPHERAL VENOUS INSUFFICIENCY: ICD-10-CM

## 2019-12-10 DIAGNOSIS — M81.0 AGE-RELATED OSTEOPOROSIS WITHOUT CURRENT PATHOLOGICAL FRACTURE: ICD-10-CM

## 2019-12-10 DIAGNOSIS — I87.2 EDEMA OF BOTH LOWER LEGS DUE TO PERIPHERAL VENOUS INSUFFICIENCY: ICD-10-CM

## 2019-12-10 PROCEDURE — 99213 OFFICE O/P EST LOW 20 MIN: CPT | Mod: PBBFAC,25,27 | Performed by: INTERNAL MEDICINE

## 2019-12-10 PROCEDURE — 99999 PR PBB SHADOW E&M-EST. PATIENT-LVL III: ICD-10-PCS | Mod: PBBFAC,,, | Performed by: INTERNAL MEDICINE

## 2019-12-10 PROCEDURE — 99213 OFFICE O/P EST LOW 20 MIN: CPT | Mod: PBBFAC,25 | Performed by: INTERNAL MEDICINE

## 2019-12-10 PROCEDURE — 99999 PR PBB SHADOW E&M-EST. PATIENT-LVL III: CPT | Mod: PBBFAC,,, | Performed by: INTERNAL MEDICINE

## 2019-12-10 PROCEDURE — 99215 PR OFFICE/OUTPT VISIT, EST, LEVL V, 40-54 MIN: ICD-10-PCS | Mod: S$PBB,,, | Performed by: INTERNAL MEDICINE

## 2019-12-10 PROCEDURE — 99215 OFFICE O/P EST HI 40 MIN: CPT | Mod: S$PBB,,, | Performed by: INTERNAL MEDICINE

## 2019-12-10 PROCEDURE — 1159F PR MEDICATION LIST DOCUMENTED IN MEDICAL RECORD: ICD-10-PCS | Mod: ,,, | Performed by: INTERNAL MEDICINE

## 2019-12-10 PROCEDURE — 1126F AMNT PAIN NOTED NONE PRSNT: CPT | Mod: ,,, | Performed by: INTERNAL MEDICINE

## 2019-12-10 PROCEDURE — 1126F PR PAIN SEVERITY QUANTIFIED, NO PAIN PRESENT: ICD-10-PCS | Mod: ,,, | Performed by: INTERNAL MEDICINE

## 2019-12-10 PROCEDURE — 93010 EKG 12-LEAD: ICD-10-PCS | Mod: S$PBB,,, | Performed by: INTERNAL MEDICINE

## 2019-12-10 PROCEDURE — 99214 PR OFFICE/OUTPT VISIT, EST, LEVL IV, 30-39 MIN: ICD-10-PCS | Mod: S$PBB,,, | Performed by: INTERNAL MEDICINE

## 2019-12-10 PROCEDURE — 93010 ELECTROCARDIOGRAM REPORT: CPT | Mod: S$PBB,,, | Performed by: INTERNAL MEDICINE

## 2019-12-10 PROCEDURE — 1159F MED LIST DOCD IN RCRD: CPT | Mod: ,,, | Performed by: INTERNAL MEDICINE

## 2019-12-10 PROCEDURE — 99214 OFFICE O/P EST MOD 30 MIN: CPT | Mod: S$PBB,,, | Performed by: INTERNAL MEDICINE

## 2019-12-10 PROCEDURE — 93005 ELECTROCARDIOGRAM TRACING: CPT | Mod: PBBFAC | Performed by: INTERNAL MEDICINE

## 2019-12-10 NOTE — ASSESSMENT & PLAN NOTE
+ scoliosis of the spine  Poor balance, decreased vision (macular degeneration) which increases risk for falls     Continue prolia  Needs vitamin D levels, which has been ordered    OK to take over the counter vitamin D 1000 IUs daily

## 2019-12-10 NOTE — PATIENT INSTRUCTIONS
Discussed diet , achieving and maintaining ideal body weight, and exercise.   We reviewed meds in detail.  Reassured-Discussed goals, options, plan.  Elevate legs when sitting; check if left leg has any am swelling

## 2019-12-10 NOTE — TELEPHONE ENCOUNTER
Left message for Charo Clayton to call back regarding a sooner appointment    ----- Message from Evelyn Beltran MD sent at 12/10/2019 10:49 AM CST -----  Charo clayton ([birth year]) is their family member, they say she has an appt with me at 10 AM and they have a really hard time coming here at thtat time. I can see them later but I could not find her on my schedule.

## 2019-12-10 NOTE — Clinical Note
Charo clayton ([birth year]) is their family member, they say she has an appt with me at 10 AM and they have a really hard time coming here at thtat time. I can see them later but I could not find her on my schedule.

## 2019-12-10 NOTE — ASSESSMENT & PLAN NOTE
Appears clinically euthyroid   Needs updated TSH  US of the gland done 2017 no changes   FNA aspirations in the past were benign   Repeat US next year

## 2019-12-16 ENCOUNTER — PATIENT MESSAGE (OUTPATIENT)
Dept: CARDIOLOGY | Facility: CLINIC | Age: 84
End: 2019-12-16

## 2019-12-16 DIAGNOSIS — I35.0 NONRHEUMATIC AORTIC VALVE STENOSIS: ICD-10-CM

## 2019-12-16 RX ORDER — PRAVASTATIN SODIUM 40 MG/1
40 TABLET ORAL DAILY
Qty: 90 TABLET | Refills: 2 | Status: SHIPPED | OUTPATIENT
Start: 2019-12-16 | End: 2019-12-16 | Stop reason: SDUPTHER

## 2019-12-17 RX ORDER — PRAVASTATIN SODIUM 40 MG/1
40 TABLET ORAL DAILY
Qty: 90 TABLET | Refills: 3 | Status: SHIPPED | OUTPATIENT
Start: 2019-12-17 | End: 2020-11-05

## 2019-12-24 ENCOUNTER — TELEPHONE (OUTPATIENT)
Dept: INTERNAL MEDICINE | Facility: CLINIC | Age: 84
End: 2019-12-24

## 2019-12-24 NOTE — TELEPHONE ENCOUNTER
Prescription already filled for 90 day supply for a year and e-prescribed to [facility].     Disp Refills Start End KIMBERLY   losartan (COZAAR) 50 MG tablet 90 tablet 3 12/6/2019 12/5/2020      E-Prescribing Status: Receipt confirmed by pharmacy (12/6/2019  2:50 PM CST)   Pharmacy     [facility] - [facility] [id number]    Associated Diagnoses     Essential hypertension

## 2019-12-24 NOTE — TELEPHONE ENCOUNTER
----- Message from Maryann Lomas sent at 12/24/2019  9:29 AM CST -----  Contact: [phone]  Type: Rx    Name of medication(s): losartan (COZAAR) 25 MG tablet    Is this a refill? New rx?  refill    Who prescribed medication?  Dr. Ivan    Pharmacy Name, Phone, & Location: [facility] - [facility] [address]     Comments:   Please advise. thank you.

## 2019-12-27 ENCOUNTER — PATIENT MESSAGE (OUTPATIENT)
Dept: INTERNAL MEDICINE | Facility: CLINIC | Age: 84
End: 2019-12-27

## 2019-12-29 DIAGNOSIS — I10 ESSENTIAL HYPERTENSION: ICD-10-CM

## 2019-12-29 RX ORDER — LOSARTAN POTASSIUM 25 MG/1
25 TABLET ORAL DAILY
Qty: 90 TABLET | Refills: 3 | Status: SHIPPED | OUTPATIENT
Start: 2019-12-29 | End: 2020-11-05

## 2020-01-07 ENCOUNTER — HOSPITAL ENCOUNTER (OUTPATIENT)
Dept: RADIOLOGY | Facility: CLINIC | Age: 85
Discharge: HOME | End: 2020-01-07
Attending: INTERNAL MEDICINE
Payer: MEDICARE

## 2020-01-07 ENCOUNTER — OFFICE VISIT (OUTPATIENT)
Dept: DERMATOLOGY | Facility: CLINIC | Age: 85
End: 2020-01-07
Payer: MEDICARE

## 2020-01-07 DIAGNOSIS — D18.01 ANGIOMA OF SKIN: ICD-10-CM

## 2020-01-07 DIAGNOSIS — L82.1 SEBORRHEIC KERATOSIS: ICD-10-CM

## 2020-01-07 DIAGNOSIS — L72.0 MILIA: ICD-10-CM

## 2020-01-07 DIAGNOSIS — M81.0 AGE-RELATED OSTEOPOROSIS WITHOUT CURRENT PATHOLOGICAL FRACTURE: ICD-10-CM

## 2020-01-07 DIAGNOSIS — L57.8 CHRONIC SOLAR DERMATITIS: ICD-10-CM

## 2020-01-07 DIAGNOSIS — L57.0 AK (ACTINIC KERATOSIS): Primary | ICD-10-CM

## 2020-01-07 DIAGNOSIS — Z85.828 HISTORY OF NONMELANOMA SKIN CANCER: ICD-10-CM

## 2020-01-07 PROCEDURE — 99999 PR PBB SHADOW E&M-EST. PATIENT-LVL II: CPT | Mod: PBBFAC,,, | Performed by: DERMATOLOGY

## 2020-01-07 PROCEDURE — 17000 DESTRUCT PREMALG LESION: CPT | Mod: PBBFAC | Performed by: DERMATOLOGY

## 2020-01-07 PROCEDURE — 99213 OFFICE O/P EST LOW 20 MIN: CPT | Mod: 25,S$PBB,, | Performed by: DERMATOLOGY

## 2020-01-07 PROCEDURE — 99999 PR PBB SHADOW E&M-EST. PATIENT-LVL II: ICD-10-PCS | Mod: PBBFAC,,, | Performed by: DERMATOLOGY

## 2020-01-07 PROCEDURE — 17000 PR DESTRUCTION(LASER SURGERY,CRYOSURGERY,CHEMOSURGERY),PREMALIGNANT LESIONS,FIRST LESION: ICD-10-PCS | Mod: S$PBB,,, | Performed by: DERMATOLOGY

## 2020-01-07 PROCEDURE — 1159F MED LIST DOCD IN RCRD: CPT | Mod: ,,, | Performed by: DERMATOLOGY

## 2020-01-07 PROCEDURE — 77080 DXA BONE DENSITY AXIAL: CPT | Mod: 26,,, | Performed by: INTERNAL MEDICINE

## 2020-01-07 PROCEDURE — 77080 DEXA BONE DENSITY SPINE HIP: ICD-10-PCS | Mod: 26,,, | Performed by: INTERNAL MEDICINE

## 2020-01-07 PROCEDURE — 99213 PR OFFICE/OUTPT VISIT, EST, LEVL III, 20-29 MIN: ICD-10-PCS | Mod: 25,S$PBB,, | Performed by: DERMATOLOGY

## 2020-01-07 PROCEDURE — 1126F PR PAIN SEVERITY QUANTIFIED, NO PAIN PRESENT: ICD-10-PCS | Mod: ,,, | Performed by: DERMATOLOGY

## 2020-01-07 PROCEDURE — 17003 DESTRUCTION, PREMALIGNANT LESIONS; SECOND THROUGH 14 LESIONS: ICD-10-PCS | Mod: S$PBB,,, | Performed by: DERMATOLOGY

## 2020-01-07 PROCEDURE — 1126F AMNT PAIN NOTED NONE PRSNT: CPT | Mod: ,,, | Performed by: DERMATOLOGY

## 2020-01-07 PROCEDURE — 17000 DESTRUCT PREMALG LESION: CPT | Mod: S$PBB,,, | Performed by: DERMATOLOGY

## 2020-01-07 PROCEDURE — 17003 DESTRUCT PREMALG LES 2-14: CPT | Mod: PBBFAC | Performed by: DERMATOLOGY

## 2020-01-07 PROCEDURE — 77080 DXA BONE DENSITY AXIAL: CPT | Mod: TC

## 2020-01-07 PROCEDURE — 17003 DESTRUCT PREMALG LES 2-14: CPT | Mod: S$PBB,,, | Performed by: DERMATOLOGY

## 2020-01-07 PROCEDURE — 1159F PR MEDICATION LIST DOCUMENTED IN MEDICAL RECORD: ICD-10-PCS | Mod: ,,, | Performed by: DERMATOLOGY

## 2020-01-07 PROCEDURE — 99212 OFFICE O/P EST SF 10 MIN: CPT | Mod: PBBFAC,25 | Performed by: DERMATOLOGY

## 2020-01-07 NOTE — PROGRESS NOTES
Subjective:       Patient ID:  Misha Marte Jr. is a 89 y.o. male who presents for   Chief Complaint   Patient presents with    Skin Check     UBSE    Lesion     right eyebrow     History of Present Illness: The patient presents for follow up of skin check.    The patient was last seen on: 7/30/19 for cryosurgery to actinic keratoses which have resolved.   This is a high risk patient here to check for the development of new lesions.    Other skin complaints:   Patient complains of lesion(s)  Location: lesion right eyebrow  Duration: 6 months  Symptoms: rough  Relieving factors/Previous treatments: none    Using am lactin for arms and hands biw.      Also pt has a list of concerns          Review of Systems   Skin: Positive for daily sunscreen use, activity-related sunscreen use and wears hat (always ). Negative for recent sunburn.   Hematologic/Lymphatic: Bruises/bleeds easily (on asa).        Objective:    Physical Exam   Constitutional: He appears well-developed and well-nourished. No distress.   Neurological: He is alert and oriented to person, place, and time. He is not disoriented.   Psychiatric: He has a normal mood and affect.   Skin:   Areas Examined (abnormalities noted in diagram):   Scalp / Hair Palpated and Inspected  Head / Face Inspection Performed  Neck Inspection Performed  Chest / Axilla Inspection Performed  Back Inspection Performed  RUE Inspected  LUE Inspection Performed  Nails and Digits Inspection Performed                       Diagram Legend     Erythematous scaling macule/papule c/w actinic keratosis       Vascular papule c/w angioma      Pigmented verrucoid papule/plaque c/w seborrheic keratosis      Yellow umbilicated papule c/w sebaceous hyperplasia      Irregularly shaped tan macule c/w lentigo     1-2 mm smooth white papules consistent with Milia      Movable subcutaneous cyst with punctum c/w epidermal inclusion cyst      Subcutaneous movable cyst c/w pilar cyst      Firm  pink to brown papule c/w dermatofibroma      Pedunculated fleshy papule(s) c/w skin tag(s)      Evenly pigmented macule c/w junctional nevus     Mildly variegated pigmented, slightly irregular-bordered macule c/w mildly atypical nevus      Flesh colored to evenly pigmented papule c/w intradermal nevus       Pink pearly papule/plaque c/w basal cell carcinoma      Erythematous hyperkeratotic cursted plaque c/w SCC      Surgical scar with no sign of skin cancer recurrence      Open and closed comedones      Inflammatory papules and pustules      Verrucoid papule consistent consistent with wart     Erythematous eczematous patches and plaques     Dystrophic onycholytic nail with subungual debris c/w onychomycosis     Umbilicated papule    Erythematous-base heme-crusted tan verrucoid plaque consistent with inflamed seborrheic keratosis     Erythematous Silvery Scaling Plaque c/w Psoriasis     See annotation      Assessment / Plan:        AK (actinic keratosis)  Cryosurgery Procedure Note    Verbal consent from the patient is obtained including, but not limited to, risk of hypopigmentation/hyperpigmentation, scar, recurrence of lesion. The patient is aware of the precancerous quality and need for treatment of these lesions. Liquid nitrogen cryosurgery is applied to the 8 actinic keratoses, as detailed in the physical exam, to produce a freeze injury. The patient is aware that blisters may form and is instructed on wound care with gentle cleansing and use of vaseline ointment to keep moist until healed. The patient is supplied a handout on cryosurgery and is instructed to call if lesions do not completely resolve.    Cont wear hat always    Seborrheic keratosis  These are benign inherited growths without a malignant potential. Reassurance given to patient. No treatment is necessary.     Chronic solar dermatitis  Encourage Am Lactin lotion or cream to arms and hands nightly. Available over-the counter.    Angioma of  skin  This is a benign vascular lesion. Reassurance given. No treatment required.     Milia  Reassurance given to patient. No treatment is necessary.   Treatment of benign, asymptomatic lesions may be considered cosmetic.    History of nonmelanoma skin cancer  Area(s) of previous NMSC evaluated with no signs of recurrence.    Upper body skin examination performed today including at least 6 points as noted in physical examination. No lesions suspicious for malignancy noted.             Follow up in about 1 year (around 1/7/2021).

## 2020-01-07 NOTE — PATIENT INSTRUCTIONS

## 2020-01-26 ENCOUNTER — PATIENT MESSAGE (OUTPATIENT)
Dept: INTERNAL MEDICINE | Facility: CLINIC | Age: 85
End: 2020-01-26

## 2020-01-29 ENCOUNTER — PATIENT MESSAGE (OUTPATIENT)
Dept: SPINE | Facility: CLINIC | Age: 85
End: 2020-01-29

## 2020-02-04 ENCOUNTER — HOSPITAL ENCOUNTER (OUTPATIENT)
Dept: RADIOLOGY | Facility: HOSPITAL | Age: 85
Discharge: HOME | End: 2020-02-04
Attending: INTERNAL MEDICINE
Payer: MEDICARE

## 2020-02-04 ENCOUNTER — OFFICE VISIT (OUTPATIENT)
Dept: INTERNAL MEDICINE | Facility: CLINIC | Age: 85
End: 2020-02-04
Payer: MEDICARE

## 2020-02-04 VITALS
BODY MASS INDEX: 27.71 KG/M2 | WEIGHT: 193.56 LBS | DIASTOLIC BLOOD PRESSURE: 66 MMHG | HEART RATE: 72 BPM | HEIGHT: 70 IN | TEMPERATURE: 98 F | SYSTOLIC BLOOD PRESSURE: 128 MMHG

## 2020-02-04 DIAGNOSIS — E78.5 HYPERLIPIDEMIA, UNSPECIFIED HYPERLIPIDEMIA TYPE: ICD-10-CM

## 2020-02-04 DIAGNOSIS — I35.0 AORTIC STENOSIS, MODERATE: ICD-10-CM

## 2020-02-04 DIAGNOSIS — E04.2 MULTINODULAR GOITER: ICD-10-CM

## 2020-02-04 DIAGNOSIS — I10 ESSENTIAL HYPERTENSION: ICD-10-CM

## 2020-02-04 DIAGNOSIS — R22.32 MASS OF ARM, LEFT: Primary | ICD-10-CM

## 2020-02-04 PROCEDURE — 99214 PR OFFICE/OUTPT VISIT, EST, LEVL IV, 30-39 MIN: ICD-10-PCS | Mod: S$PBB,,, | Performed by: INTERNAL MEDICINE

## 2020-02-04 PROCEDURE — 99213 OFFICE O/P EST LOW 20 MIN: CPT | Mod: PBBFAC | Performed by: INTERNAL MEDICINE

## 2020-02-04 PROCEDURE — 99214 OFFICE O/P EST MOD 30 MIN: CPT | Mod: S$PBB,,, | Performed by: INTERNAL MEDICINE

## 2020-02-04 PROCEDURE — 76536 US EXAM OF HEAD AND NECK: CPT | Mod: TC

## 2020-02-04 PROCEDURE — 76536 US EXAM OF HEAD AND NECK: CPT | Mod: 26,,, | Performed by: RADIOLOGY

## 2020-02-04 PROCEDURE — 99999 PR PBB SHADOW E&M-EST. PATIENT-LVL III: ICD-10-PCS | Mod: PBBFAC,,, | Performed by: INTERNAL MEDICINE

## 2020-02-04 PROCEDURE — 99999 PR PBB SHADOW E&M-EST. PATIENT-LVL III: CPT | Mod: PBBFAC,,, | Performed by: INTERNAL MEDICINE

## 2020-02-04 PROCEDURE — 76536 US SOFT TISSUE HEAD NECK THYROID: ICD-10-PCS | Mod: 26,,, | Performed by: RADIOLOGY

## 2020-02-04 NOTE — PROGRESS NOTES
CC:  Lump on forearm    HPI:  The patient is a 89-year-old male who we see for aortic stenosis, hypertension, hyperlipidemia and CKD stage 3 presents today with complaint of a lump on his left forearm.  He has not noticed it before.  He was concerned might represent a lymph node.  It is not painful.    ROS:  Please see patient and review of [place] for HPI/ROS submitted by the patient on 1/28/2020   activity change: No  unexpected weight change: No  neck pain: No  hearing loss: No  rhinorrhea: No  trouble swallowing: No  eye discharge: No  visual disturbance: No  chest tightness: No  wheezing: No  chest pain: No  palpitations: No  blood in stool: No  constipation: No  vomiting: No  diarrhea: No  polydipsia: No  polyuria: No  difficulty urinating: No  urgency: No  hematuria: No  joint swelling: No  arthralgias: No  headaches: No  weakness: No  confusion: No  dysphoric mood: No    Physical exam:  General appearance:  No acute distress.  HEENT:  Trachea is midline without JVD.  Pulmonary:  Good inspiratory, expiratory breath sounds are heard.  His lungs are clear to auscultation.  Cardiovascular:  S1-S2 with a 2 to 3/6 systolic ejection murmur heard best at the right upper sternal border.  2+ carotid pulses.  Extremities:  Trace to 1+ ankle edema  GI:  Abdomen was nontender, nondistended without hepatosplenomegaly  Ortho:  The patient's left forearm was evaluated.  The patient had a soft movable soft tissue mass near the antecubital fossa.  There is approximately 2-3 cm in size.  Comments:  The patient's blood tests from December of 2019 reviewed.  His creatinine was 1.5.    Assessment:  1.  Left forearm mass  2.  CKD stage 3  4.  Hypertension currently stable  5.  Aortic stenosis  6.  Hyperlipidemia    Plan:  1.  Will schedule ultrasound of the left forearm for the mass in question  2.  Will schedule a CMP and lipid panel to be done in 2 months.

## 2020-02-12 ENCOUNTER — TELEPHONE (OUTPATIENT)
Dept: INTERNAL MEDICINE | Facility: CLINIC | Age: 85
End: 2020-02-12

## 2020-02-12 NOTE — TELEPHONE ENCOUNTER
----- Message from Ike Ivan MD sent at 2/11/2020  5:40 PM CST -----  Please call the patient regarding his abnormal result. His ultrasound showed that he had a pocket of body fluid . It appears to be benign. I would leave it alone unless it gets bigger.

## 2020-02-20 ENCOUNTER — PATIENT MESSAGE (OUTPATIENT)
Dept: DERMATOLOGY | Facility: CLINIC | Age: 85
End: 2020-02-20

## 2020-02-27 ENCOUNTER — OFFICE VISIT (OUTPATIENT)
Dept: DERMATOLOGY | Facility: CLINIC | Age: 85
End: 2020-02-27
Payer: MEDICARE

## 2020-02-27 DIAGNOSIS — L82.1 SEBORRHEIC KERATOSIS: Primary | ICD-10-CM

## 2020-02-27 PROCEDURE — 99213 PR OFFICE/OUTPT VISIT, EST, LEVL III, 20-29 MIN: ICD-10-PCS | Mod: S$PBB,,, | Performed by: DERMATOLOGY

## 2020-02-27 PROCEDURE — 99999 PR PBB SHADOW E&M-EST. PATIENT-LVL II: CPT | Mod: PBBFAC,,, | Performed by: DERMATOLOGY

## 2020-02-27 PROCEDURE — 99999 PR PBB SHADOW E&M-EST. PATIENT-LVL II: ICD-10-PCS | Mod: PBBFAC,,, | Performed by: DERMATOLOGY

## 2020-02-27 PROCEDURE — 99213 OFFICE O/P EST LOW 20 MIN: CPT | Mod: S$PBB,,, | Performed by: DERMATOLOGY

## 2020-02-27 PROCEDURE — 99212 OFFICE O/P EST SF 10 MIN: CPT | Mod: PBBFAC | Performed by: DERMATOLOGY

## 2020-02-27 NOTE — PROGRESS NOTES
Subjective:       Patient ID:  Misha Marte Jr. is a 89 y.o. male who presents for   Chief Complaint   Patient presents with    Lesion     Seen 1/7/20 for cryo to ak's    Lesion  - Initial  Affected locations: left arm  Duration: 2 weeks  Signs / symptoms: scaling  Timing: constant  Aggravated by: nothing  Relieving factors/Treatments tried: nothing        Review of Systems   Skin: Positive for daily sunscreen use, activity-related sunscreen use and wears hat (always ). Negative for recent sunburn.   Hematologic/Lymphatic: Bruises/bleeds easily (on asa).        Objective:    Physical Exam   Constitutional: He appears well-developed and well-nourished. No distress.   Neurological: He is alert and oriented to person, place, and time. He is not disoriented.   Psychiatric: He has a normal mood and affect.   Skin:   Areas Examined (abnormalities noted in diagram):   LUE Inspection Performed              Diagram Legend     Erythematous scaling macule/papule c/w actinic keratosis       Vascular papule c/w angioma      Pigmented verrucoid papule/plaque c/w seborrheic keratosis      Yellow umbilicated papule c/w sebaceous hyperplasia      Irregularly shaped tan macule c/w lentigo     1-2 mm smooth white papules consistent with Milia      Movable subcutaneous cyst with punctum c/w epidermal inclusion cyst      Subcutaneous movable cyst c/w pilar cyst      Firm pink to brown papule c/w dermatofibroma      Pedunculated fleshy papule(s) c/w skin tag(s)      Evenly pigmented macule c/w junctional nevus     Mildly variegated pigmented, slightly irregular-bordered macule c/w mildly atypical nevus      Flesh colored to evenly pigmented papule c/w intradermal nevus       Pink pearly papule/plaque c/w basal cell carcinoma      Erythematous hyperkeratotic cursted plaque c/w SCC      Surgical scar with no sign of skin cancer recurrence      Open and closed comedones      Inflammatory papules and pustules      Verrucoid papule  consistent consistent with wart     Erythematous eczematous patches and plaques     Dystrophic onycholytic nail with subungual debris c/w onychomycosis     Umbilicated papule    Erythematous-base heme-crusted tan verrucoid plaque consistent with inflamed seborrheic keratosis     Erythematous Silvery Scaling Plaque c/w Psoriasis     See annotation      Assessment / Plan:        Seborrheic keratosis - left arm  These are benign inherited growths without a malignant potential. Reassurance given to patient. No treatment is necessary.                Follow up if symptoms worsen or fail to improve.

## 2020-04-27 NOTE — PROGRESS NOTES
Established Patient - Audio Only Telehealth Visit     The patient location is:[place]  The chief complaint leading to consultation is: follow up of gait instability  Visit type: Virtual visit with audio only (telephone)  Time: 9 min     The reason for the audio only service rather than synchronous audio and video virtual visit was related to technical difficulties or patient preference/necessity.     Each patient to whom I provide medical services by telemedicine is:  (1) informed of the relationship between the physician and patient and the respective role of any other health care provider with respect to management of the patient; and (2) notified that they may decline to receive medical services by telemedicine and may withdraw from such care at any time. Patient verbally consented to receive this service via voice-only telephone call.       HPI:   Mr Marte is an 89 yo male here for follow up of his gait instability and leg pain.  He was last seen by me on 5/15/2019 and he was doing well after PT and going to continue the HEP.  Today, He has been doing well.  He felt like therapy went well in [facility].  He felt like it was doing well.  He would like to go back to therapy.  He has been walking daily up and down the side walk pushing transport chair.  He tries to sit outside 30 min.  He has not had any back problems.  He feels like he has reached a plateau with walking. He is walking around house with no assistive divice.  He uses a cane when he needs it, he was using walker last visit.  He will push his transport chair when walking down the street for balance     X-ray lumbar 5/2/2016  Examination of the lumbar spine a five-view examination with flexion/extension views demonstrates  that all of the lumbar vertebral bodies are of normal size and stature with loss of of intervertebral disk space height predominantly at the L2-L3 level.  Subchondral sclerosis L2-L3.  Pedicles are intact. .  Atherosclerotic  change present within the aorta.  Mild anterolisthesis of L4 on L5 with retrolisthesis of L2 on L3 retrolisthesis of which is accentuated with extension. Paravertebral soft tissues are unremarkable.  Impression    Degenerative changes as noted above.     X-ray pelvis 5/2/2016  Finding: AP radiographs of the pelvis demonstrate moderate bilateral joint space loss of the hips.  No fracture or dislocation.  Normal mineralization is present.  No osseous destructive process.     X-ray cervical  RESULTS: DEGENERATIVE OSTEOARTHRITIC CHANGES ARE IDENTIFIED IN THE   CERVICAL SPINE AND PRACTICALLY ALL OF THE INTERVERTEBRAL DISC SPACES ARE   SIGNIFICANTLY NARROWED.  THE ODONTOID PROCESS APPEARS TO BE DEFORMED AND   SHORTENED AND THE PATIENT PROBABLY HAS A SLIGHT DEGREE OF ATLANTOAXIAL   SUBLUXATION AS SEEN ON THE LATERAL VIEW.  EVALUATION, HOWEVER, IS   DIFFICULT DUE TO THE POSITIONING AND THE OVERLYING OSSEOUS STRUCTURES AND   IF CLINICALLY ALSO INDICATED, CT SCAN OF THE UPPER CERVICAL SPINE   RECOMMENDED FOR A MORE DEFINITE EVALUATION.  OTHERWISE NO DEFINITE ACUTE   FRACTURES ARE IDENTIFIED.      Past Medical History:   Diagnosis Date    Heart murmur     Hyperlipidemia     Hypertension     Insomnia        Past Surgical History:   Procedure Laterality Date    APPENDECTOMY      CARPAL TUNNEL RELEASE      COLONOSCOPY      TONSILLECTOMY, ADENOIDECTOMY         Family History   Problem Relation Age of Onset    Heart disease Mother     Heart attack Mother     Stroke Father     Hypertension Neg Hx     Melanoma Neg Hx        Social History     Socioeconomic History    Marital status:      Spouse name: Not on file    Number of children: Not on file    Years of education: Not on file    Highest education level: Not on file   Occupational History    Not on file   Social Needs    Financial resource strain: Not on file    Food insecurity:     Worry: Not on file     Inability: Not on file    Transportation needs:      Medical: Not on file     Non-medical: Not on file   Tobacco Use    Smoking status: Never Smoker    Smokeless tobacco: Never Used   Substance and Sexual Activity    Alcohol use: Yes     Alcohol/week: 2.0 standard drinks     Types: 2 Glasses of wine per week    Drug use: No    Sexual activity: Never   Lifestyle    Physical activity:     Days per week: Not on file     Minutes per session: Not on file    Stress: Not on file   Relationships    Social connections:     Talks on phone: Not on file     Gets together: Not on file     Attends Quaker service: Not on file     Active member of club or organization: Not on file     Attends meetings of clubs or organizations: Not on file     Relationship status: Not on file   Other Topics Concern    Not on file   Social History Narrative    Not on file       Current Outpatient Medications   Medication Sig Dispense Refill    aspirin 81 mg Tab Take 81 mg by mouth once daily.       CALCIUM CARBONATE/VITAMIN D3 (CALCIUM 600 + D,3, ORAL) Take 1 capsule by mouth once daily.      coenzyme Q10 (CO Q-10) 10 mg capsule Take 10 mg by mouth once daily.       ergocalciferol (ERGOCALCIFEROL) 50,000 unit Cap TAKE 1 CAPSULE BY MOUTH EVERY 2 WEEKS 6 capsule 0    esomeprazole (NEXIUM) 40 MG capsule Take 1 capsule (40 mg total) by mouth once daily. 90 capsule 0    fluorouracil (EFUDEX) 5 % cream AAA left scalp vertex and bilateral temples and left ear helix bid x 2 weeks 40 g 1    furosemide (LASIX) 20 MG tablet TAKE 1 TABLET(20 MG) BY MOUTH DAILY AS NEEDED 90 tablet 1    losartan (COZAAR) 25 MG tablet Take 1 tablet (25 mg total) by mouth once daily. 90 tablet 3    metoprolol succinate (TOPROL-XL) 25 MG 24 hr tablet Take 1 tablet (25 mg total) by mouth once daily. .5-1 once or twice daily or as directed 90 tablet 3    multivitamin-minerals-lutein (CENTRUM SILVER) Tab Take 1 tablet by mouth once daily.       MUSE 1,000 mcg pellet       polyethylene glycol (GLYCOLAX) 17  gram PwPk Take by mouth as needed.       pravastatin (PRAVACHOL) 40 MG tablet Take 1 tablet (40 mg total) by mouth once daily. 90 tablet 3    VIT A,C & E/LUTEIN/MINERALS (OCUVITE WITH LUTEIN ORAL) Take 1 capsule by mouth once daily.       No current facility-administered medications for this visit.        Review of patient's allergies indicates:   Allergen Reactions    Cortisone Other (See Comments)     hiccups    Dexamethasone Other (See Comments)     Constant hiccups for days    Codeine Other (See Comments)     Other reaction(s): Nausea       Review of Systems:  Constitutional: no fever or chills  Eyes: no visual changes  Respiratory: no cough or shorness of breath  Cardiovascular: no chest pain or palpitations  Gastrointestinal: no nausea or vomiting, no abdominal pain or change in bowel habits  Musculoskeletal: no arthralgias or myalgias, trouble walking  Neurological: no seizures or tremors        Assessment and plan:     Gait instability    DISH (diffuse idiopathic skeletal hyperostosis)    Osteoarthritis of spine with radiculopathy, lumbar region    Primary osteoarthritis of both hips    1. when he feels comfortable and covid less he will call and we can place an order to PT for gait training and LE strengthening and HEP 1 time a week.  At [facility].  2.  aleve for a few days was really helpful.  He has not had back pain.  He is no longer taking it  3.  Tylenol 1-2 as needed   4.   Cane/wheelchair  as needed, and wheelchair as needed for distance.  He uses a cane more fr distance  5.  RTC PRN, he will call when ready for PT           This service was not originating from a related E/M service provided within the previous 7 days nor will  to an E/M service or procedure within the next 24 hours or my soonest available appointment.  Prevailing standard of care was able to be met in this audio-only visit.

## 2020-04-28 ENCOUNTER — OFFICE VISIT (OUTPATIENT)
Dept: SPINE | Facility: CLINIC | Age: 85
End: 2020-04-28
Attending: PHYSICAL MEDICINE & REHABILITATION
Payer: MEDICARE

## 2020-04-28 DIAGNOSIS — M16.0 PRIMARY OSTEOARTHRITIS OF BOTH HIPS: ICD-10-CM

## 2020-04-28 DIAGNOSIS — M48.10 DISH (DIFFUSE IDIOPATHIC SKELETAL HYPEROSTOSIS): ICD-10-CM

## 2020-04-28 DIAGNOSIS — R26.81 GAIT INSTABILITY: Primary | ICD-10-CM

## 2020-04-28 DIAGNOSIS — M47.26 OSTEOARTHRITIS OF SPINE WITH RADICULOPATHY, LUMBAR REGION: ICD-10-CM

## 2020-04-28 PROCEDURE — 99441 PR PHYSICIAN TELEPHONE EVALUATION 5-10 MIN: ICD-10-PCS | Mod: 95,,, | Performed by: PHYSICAL MEDICINE & REHABILITATION

## 2020-04-28 PROCEDURE — 99441 PR PHYSICIAN TELEPHONE EVALUATION 5-10 MIN: CPT | Mod: 95,,, | Performed by: PHYSICAL MEDICINE & REHABILITATION

## 2020-05-25 RX ORDER — FUROSEMIDE 20 MG/1
TABLET ORAL
Qty: 90 TABLET | Refills: 0 | Status: SHIPPED | OUTPATIENT
Start: 2020-05-25 | End: 2020-09-22

## 2020-05-25 RX ORDER — FUROSEMIDE 20 MG/1
TABLET ORAL
Qty: 90 TABLET | Refills: 0 | Status: CANCELLED | OUTPATIENT
Start: 2020-05-25

## 2020-05-25 NOTE — TELEPHONE ENCOUNTER
Refill Authorization Note    is requesting a refill authorization.    Brief assessment and rationale for refill: APPROVE: prr          Medication Therapy Plan: SCr appears stable    Medication reconciliation completed: No                         Comments:      Requested Prescriptions   Signed Prescriptions Disp Refills    furosemide (LASIX) 20 MG tablet 90 tablet 0     Sig: TAKE 1 TABLET(20 MG) BY MOUTH DAILY AS NEEDED       Cardiovascular:  Diuretics - Loop Failed - 5/22/2020  2:24 AM        Failed - Cr is 1.3 or below and within 180 days     Creatinine   Date Value Ref Range Status   12/03/2019 1.5 (H) 0.5 - 1.4 mg/dL Final   09/24/2019 1.4 0.5 - 1.4 mg/dL Final   02/27/2019 1.4 0.5 - 1.4 mg/dL Final              Failed - eGFR in normal range and within 180 days     eGFR if non    Date Value Ref Range Status   12/03/2019 40.4 (A) >60 mL/min/1.73 m^2 Final     Comment:     Calculation used to obtain the estimated glomerular filtration  rate (eGFR) is the CKD-EPI equation.      09/24/2019 43.9 (A) >60 mL/min/1.73 m^2 Final     Comment:     Calculation used to obtain the estimated glomerular filtration  rate (eGFR) is the CKD-EPI equation.      02/27/2019 43.9 (A) >60 mL/min/1.73 m^2 Final     Comment:     Calculation used to obtain the estimated glomerular filtration  rate (eGFR) is the CKD-EPI equation.        eGFR if    Date Value Ref Range Status   12/03/2019 46.7 (A) >60 mL/min/1.73 m^2 Final   09/24/2019 50.8 (A) >60 mL/min/1.73 m^2 Final   02/27/2019 50.8 (A) >60 mL/min/1.73 m^2 Final              Passed - Patient is at least 18 years old        Passed - Last BP in normal range within 360 days.     BP Readings from Last 3 Encounters:   02/04/20 128/66   12/10/19 132/70   12/10/19 100/72              Passed - Office visit in past 12 months or future 90 days.     Recent Outpatient Visits            3 weeks ago Gait instability    [facility]  Chanda Hall MD    2 months ago Seborrheic keratosis    [facility] Mindy Hankins MD    3 months ago Mass of arm, left    [facility] Ike Ivan MD    4 months ago AK (actinic keratosis)    [facility] Mindy Hankins MD    5 months ago Nonrheumatic aortic valve stenosis    [facility] Gregor White MD                    Passed - K in normal range and within 180 days     Potassium   Date Value Ref Range Status   12/03/2019 4.3 3.5 - 5.1 mmol/L Final   09/24/2019 4.1 3.5 - 5.1 mmol/L Final   02/27/2019 4.0 3.5 - 5.1 mmol/L Final              Passed - Na is between 130 and 148 and within 180 days     Sodium   Date Value Ref Range Status   12/03/2019 138 136 - 145 mmol/L Final   09/24/2019 137 136 - 145 mmol/L Final   02/27/2019 133 (L) 136 - 145 mmol/L Final               Appointments  past 12m or future 3m with PCP    Date Provider   Last Visit   2/4/2020 Ike Ivan MD   Next Visit   Visit date not found Ike Ivan MD   ED visits in past 90 days: [unfilled]     Note composed:8:00 AM 05/25/2020

## 2020-07-17 DIAGNOSIS — Z71.89 COMPLEX CARE COORDINATION: ICD-10-CM

## 2020-08-26 ENCOUNTER — TELEPHONE (OUTPATIENT)
Dept: INFECTIOUS DISEASES | Facility: HOSPITAL | Age: 85
End: 2020-08-26

## 2020-08-27 NOTE — PROGRESS NOTES
Results for CLARY DUARTE JR. (MRN [id number]) as of 8/27/2020 15:27   Ref. Range 12/10/2019 11:16   Vit D, 25-Hydroxy Latest Ref Range: 30 - 96 ng/mL 41   Results for CLARY DUARTE JR. (MRN [id number]) as of 8/27/2020 15:27   Ref. Range 12/3/2019 12:16   Sodium Latest Ref Range: 136 - 145 mmol/L 138   Potassium Latest Ref Range: 3.5 - 5.1 mmol/L 4.3   Chloride Latest Ref Range: 95 - 110 mmol/L 104   CO2 Latest Ref Range: 23 - 29 mmol/L 25   Anion Gap Latest Ref Range: 8 - 16 mmol/L 9   BUN, Bld Latest Ref Range: 8 - 23 mg/dL 13   Creatinine Latest Ref Range: 0.5 - 1.4 mg/dL 1.5 (H)   eGFR if non African American Latest Ref Range: >60 mL/min/1.73 m^2 40.4 (A)   eGFR if African American Latest Ref Range: >60 mL/min/1.73 m^2 46.7 (A)   Glucose Latest Ref Range: 70 - 110 mg/dL 101   Calcium Latest Ref Range: 8.7 - 10.5 mg/dL 9.0       Continue prolia  BMD updated 1/2020  Vitamin D and CKD   Needs updated labs and appointment in 12/2020

## 2020-09-08 ENCOUNTER — INFUSION (OUTPATIENT)
Dept: INFECTIOUS DISEASES | Facility: HOSPITAL | Age: 85
End: 2020-09-08
Attending: INTERNAL MEDICINE
Payer: MEDICARE

## 2020-09-08 VITALS
DIASTOLIC BLOOD PRESSURE: 67 MMHG | TEMPERATURE: 97 F | SYSTOLIC BLOOD PRESSURE: 105 MMHG | WEIGHT: 194.25 LBS | BODY MASS INDEX: 27.81 KG/M2 | HEIGHT: 70 IN | RESPIRATION RATE: 18 BRPM | HEART RATE: 76 BPM | OXYGEN SATURATION: 96 %

## 2020-09-08 DIAGNOSIS — M81.0 AGE RELATED OSTEOPOROSIS, UNSPECIFIED PATHOLOGICAL FRACTURE PRESENCE: Primary | ICD-10-CM

## 2020-09-08 PROCEDURE — 96372 THER/PROPH/DIAG INJ SC/IM: CPT

## 2020-09-08 PROCEDURE — 63600175 PHARM REV CODE 636 W HCPCS: Mod: JG | Performed by: INTERNAL MEDICINE

## 2020-09-08 RX ADMIN — DENOSUMAB 60 MG: 60 INJECTION SUBCUTANEOUS at 01:09

## 2020-09-21 ENCOUNTER — PATIENT MESSAGE (OUTPATIENT)
Dept: INTERNAL MEDICINE | Facility: CLINIC | Age: 85
End: 2020-09-21

## 2020-09-21 ENCOUNTER — PATIENT MESSAGE (OUTPATIENT)
Dept: CARDIOLOGY | Facility: CLINIC | Age: 85
End: 2020-09-21

## 2020-09-22 DIAGNOSIS — I35.0 AORTIC VALVE STENOSIS, ETIOLOGY OF CARDIAC VALVE DISEASE UNSPECIFIED: Primary | ICD-10-CM

## 2020-09-22 NOTE — TELEPHONE ENCOUNTER
2/4/2020 was Mr. Misha Marte last office visit with you    4/3/2019 was Mrs. Pat Marte last office visit      8/6/2019 was Ms. Pérez Mancilla last appointment.    I know since it has been well over a year since Mrs. Marte and Ms. Ortez's last visiet it is time for them to come in but would you like for Mr. Marte to schedule an annual visit as well?

## 2020-09-28 ENCOUNTER — TELEPHONE (OUTPATIENT)
Dept: INTERNAL MEDICINE | Facility: CLINIC | Age: 85
End: 2020-09-28

## 2020-09-28 ENCOUNTER — PATIENT MESSAGE (OUTPATIENT)
Dept: INTERNAL MEDICINE | Facility: CLINIC | Age: 85
End: 2020-09-28

## 2020-09-28 NOTE — TELEPHONE ENCOUNTER
----- Message from Ike Iavn MD sent at 9/27/2020  4:18 PM CDT -----  Regarding: Follow up appointment  Please schedule a follow up appointment with me for November.

## 2020-09-29 ENCOUNTER — PATIENT MESSAGE (OUTPATIENT)
Dept: OTHER | Facility: OTHER | Age: 85
End: 2020-09-29

## 2020-10-08 ENCOUNTER — PATIENT MESSAGE (OUTPATIENT)
Dept: OTOLARYNGOLOGY | Facility: CLINIC | Age: 85
End: 2020-10-08

## 2020-10-09 ENCOUNTER — PATIENT OUTREACH (OUTPATIENT)
Dept: ADMINISTRATIVE | Facility: OTHER | Age: 85
End: 2020-10-09

## 2020-10-09 NOTE — PROGRESS NOTES
LINKS immunization registry updated  Care Everywhere updated  Health Maintenance updated  Chart reviewed for overdue Proactive [facility] Encounters ([facility]) health maintenance testing (CRS, Breast Ca, Diabetic Eye Exam)   Orders entered:N/A

## 2020-10-12 ENCOUNTER — OFFICE VISIT (OUTPATIENT)
Dept: OTOLARYNGOLOGY | Facility: CLINIC | Age: 85
End: 2020-10-12
Payer: MEDICARE

## 2020-10-12 VITALS — HEART RATE: 73 BPM | SYSTOLIC BLOOD PRESSURE: 126 MMHG | DIASTOLIC BLOOD PRESSURE: 78 MMHG

## 2020-10-12 DIAGNOSIS — Z78.9 HISTORY OF EXCESSIVE CERUMEN: Primary | ICD-10-CM

## 2020-10-12 DIAGNOSIS — H61.23 IMPACTED CERUMEN, BILATERAL: ICD-10-CM

## 2020-10-12 DIAGNOSIS — Z86.69 HISTORY OF HEARING LOSS: ICD-10-CM

## 2020-10-12 PROCEDURE — 99214 OFFICE O/P EST MOD 30 MIN: CPT | Mod: PBBFAC | Performed by: OTOLARYNGOLOGY

## 2020-10-12 PROCEDURE — 69210 PR REMOVAL IMPACTED CERUMEN REQUIRING INSTRUMENTATION, UNILATERAL: ICD-10-PCS | Mod: S$PBB,,, | Performed by: OTOLARYNGOLOGY

## 2020-10-12 PROCEDURE — 99499 NO LOS: ICD-10-PCS | Mod: S$PBB,,, | Performed by: OTOLARYNGOLOGY

## 2020-10-12 PROCEDURE — 69210 REMOVE IMPACTED EAR WAX UNI: CPT | Mod: 50,PBBFAC | Performed by: OTOLARYNGOLOGY

## 2020-10-12 PROCEDURE — 99999 PR PBB SHADOW E&M-EST. PATIENT-LVL IV: CPT | Mod: PBBFAC,,, | Performed by: OTOLARYNGOLOGY

## 2020-10-12 PROCEDURE — 69210 REMOVE IMPACTED EAR WAX UNI: CPT | Mod: S$PBB,,, | Performed by: OTOLARYNGOLOGY

## 2020-10-12 PROCEDURE — 99499 UNLISTED E&M SERVICE: CPT | Mod: S$PBB,,, | Performed by: OTOLARYNGOLOGY

## 2020-10-12 PROCEDURE — 99999 PR PBB SHADOW E&M-EST. PATIENT-LVL IV: ICD-10-PCS | Mod: PBBFAC,,, | Performed by: OTOLARYNGOLOGY

## 2020-10-12 NOTE — PATIENT INSTRUCTIONS
Cerumen removed from both ecas  Pt. directed to CLIFF Richardson's office for scheduling for HAC/audiometry

## 2020-10-12 NOTE — PROGRESS NOTES
CC:Ear cleaning +   HPI: Mr. Marte is a 89 year old CM with a heart murmur and hypertension who is accompanied by two caretaker relatives.  He obtained a type of hearing aid (using [employer]) for his right ear which helps his hearing somewhat.   He is asking my advice about proper amplification. He is apparently interested in amplification for one or both ears.  His last audiogram of 4/2019 documented his left ear greater than right sloping sensorineural hearing loss with left ear 55 decibel SRT score and 28% discrimination score vs. right ear 35 decibel SRT score and 64% discrimination scores.  Tympanometry was within normal limits then.      Past Medical History:   Diagnosis Date    Heart murmur     Hyperlipidemia     Hypertension     Insomnia      Past Surgical History:   Procedure Laterality Date    APPENDECTOMY      CARPAL TUNNEL RELEASE      COLONOSCOPY      TONSILLECTOMY, ADENOIDECTOMY       Current Outpatient Medications on File Prior to Visit   Medication Sig Dispense Refill    aspirin 81 mg Tab Take 81 mg by mouth once daily.       CALCIUM CARBONATE/VITAMIN D3 (CALCIUM 600 + D,3, ORAL) Take 1 capsule by mouth once daily.      coenzyme Q10 (CO Q-10) 10 mg capsule Take 10 mg by mouth once daily.       ergocalciferol (ERGOCALCIFEROL) 50,000 unit Cap TAKE 1 CAPSULE BY MOUTH EVERY 2 WEEKS 6 capsule 0    esomeprazole (NEXIUM) 40 MG capsule Take 1 capsule (40 mg total) by mouth once daily. 90 capsule 0    fluorouracil (EFUDEX) 5 % cream AAA left scalp vertex and bilateral temples and left ear helix bid x 2 weeks 40 g 1    furosemide (LASIX) 20 MG tablet TAKE 1 TABLET EVERY DAY AS NEEDED 90 tablet 0    losartan (COZAAR) 25 MG tablet Take 1 tablet (25 mg total) by mouth once daily. 90 tablet 3    metoprolol succinate (TOPROL-XL) 25 MG 24 hr tablet TAKE 1/2 TO 1 TABLET EVERY DAY OR AS DIRECTED 90 tablet 3    multivitamin-minerals-lutein (CENTRUM SILVER) Tab Take 1 tablet by mouth once daily.        MUSE 1,000 mcg pellet       polyethylene glycol (GLYCOLAX) 17 gram PwPk Take by mouth as needed.       pravastatin (PRAVACHOL) 40 MG tablet Take 1 tablet (40 mg total) by mouth once daily. 90 tablet 3    VIT A,C & E/LUTEIN/MINERALS (OCUVITE WITH LUTEIN ORAL) Take 1 capsule by mouth once daily.       No current facility-administered medications on file prior to visit.    Answers for HPI/ROS submitted by the patient on 10/12/2020   None of these: Yes  hearing loss: Yes  None of these : Yes  None of these: Yes  Foot swelling?: Yes  None of these: Yes  None of these: Yes  None of these: Yes  None of these : Yes  None of these: Yes  None of these : Yes  None of these: Yes  Bruises or bleeds easily: Yes  None of these: Yes    PE:Gen.: Alert and oriented CM in no acute distress  Both ears are examined under the microscope in the microprocedure room.  Procedures:  Some nonocclusive cerumen is carefully extracted from the right ear canal with a blunt curette and suction.  The right TM is intact and visualized.  Larger volume of cerumen is carefully removed from the semi occluded left ear canal with a blunt curette.  Left TM is intact when visualized.    DIAGNOSIS:     ICD-10-CM ICD-9-CM    1. History of excessive cerumen  Z78.9 V49.89    2. Impacted cerumen, bilateral  H61.23 380.4    3. History of hearing loss  Z86.69 V12.49      PLAN: Cerumen removed from both ecas  Pt. directed to CLIFF Richardson's office for scheduling for HAC/audiometry

## 2020-10-30 ENCOUNTER — HOSPITAL ENCOUNTER (OUTPATIENT)
Dept: CARDIOLOGY | Facility: HOSPITAL | Age: 85
Discharge: HOME | End: 2020-10-30
Attending: INTERNAL MEDICINE
Payer: MEDICARE

## 2020-10-30 VITALS
DIASTOLIC BLOOD PRESSURE: 80 MMHG | HEIGHT: 72 IN | SYSTOLIC BLOOD PRESSURE: 150 MMHG | HEART RATE: 63 BPM | BODY MASS INDEX: 26.28 KG/M2 | WEIGHT: 194 LBS

## 2020-10-30 DIAGNOSIS — I35.0 AORTIC VALVE STENOSIS, ETIOLOGY OF CARDIAC VALVE DISEASE UNSPECIFIED: ICD-10-CM

## 2020-10-30 LAB
ASCENDING AORTA: 3.33 CM
AV INDEX (PROSTH): 0.22
AV MEAN GRADIENT: 41 MMHG
AV PEAK GRADIENT: 62 MMHG
AV VALVE AREA: 1.01 CM2
AV VELOCITY RATIO: 0.19
BSA FOR ECHO PROCEDURE: 2.11 M2
CV ECHO LV RWT: 0.46 CM
DOP CALC AO PEAK VEL: 3.94 M/S
DOP CALC AO VTI: 87.58 CM
DOP CALC LVOT AREA: 4.5 CM2
DOP CALC LVOT DIAMETER: 2.4 CM
DOP CALC LVOT PEAK VEL: 0.75 M/S
DOP CALC LVOT STROKE VOLUME: 88.08 CM3
DOP CALCLVOT PEAK VEL VTI: 19.48 CM
E WAVE DECELERATION TIME: 374.97 MSEC
E/A RATIO: 0.9
E/E' RATIO: 47.67 M/S
ECHO LV POSTERIOR WALL: 0.99 CM (ref 0.6–1.1)
FRACTIONAL SHORTENING: 32 % (ref 28–44)
INTERVENTRICULAR SEPTUM: 1.16 CM (ref 0.6–1.1)
LA MAJOR: 5.57 CM
LA MINOR: 5.29 CM
LA WIDTH: 3.52 CM
LEFT ATRIUM SIZE: 4.45 CM
LEFT ATRIUM VOLUME INDEX: 34.4 ML/M2
LEFT ATRIUM VOLUME: 72.25 CM3
LEFT INTERNAL DIMENSION IN SYSTOLE: 2.96 CM (ref 2.1–4)
LEFT VENTRICLE DIASTOLIC VOLUME INDEX: 40.09 ML/M2
LEFT VENTRICLE DIASTOLIC VOLUME: 84.3 ML
LEFT VENTRICLE MASS INDEX: 76 G/M2
LEFT VENTRICLE SYSTOLIC VOLUME INDEX: 16.2 ML/M2
LEFT VENTRICLE SYSTOLIC VOLUME: 34 ML
LEFT VENTRICULAR INTERNAL DIMENSION IN DIASTOLE: 4.33 CM (ref 3.5–6)
LEFT VENTRICULAR MASS: 159.44 G
LV LATERAL E/E' RATIO: 71.5 M/S
LV SEPTAL E/E' RATIO: 35.75 M/S
MV MEAN GRADIENT: 5 MMHG
MV PEAK A VEL: 1.59 M/S
MV PEAK E VEL: 1.43 M/S
MV STENOSIS PRESSURE HALF TIME: 108.74 MS
MV VALVE AREA P 1/2 METHOD: 2.02 CM2
PISA TR MAX VEL: 2.92 M/S
PULM VEIN S/D RATIO: 0.89
PV PEAK D VEL: 0.45 M/S
PV PEAK S VEL: 0.4 M/S
RA MAJOR: 5.16 CM
RA WIDTH: 3.19 CM
RIGHT VENTRICULAR END-DIASTOLIC DIMENSION: 3.16 CM
RV TISSUE DOPPLER FREE WALL SYSTOLIC VELOCITY 1 (APICAL 4 CHAMBER VIEW): 12.67 CM/S
SINUS: 2.97 CM
STJ: 2.93 CM
TDI LATERAL: 0.02 M/S
TDI SEPTAL: 0.04 M/S
TDI: 0.03 M/S
TR MAX PG: 34 MMHG
TRICUSPID ANNULAR PLANE SYSTOLIC EXCURSION: 2.05 CM

## 2020-10-30 PROCEDURE — 93306 TTE W/DOPPLER COMPLETE: CPT | Mod: 26,,, | Performed by: INTERNAL MEDICINE

## 2020-10-30 PROCEDURE — 93306 ECHO (CUPID ONLY): ICD-10-PCS | Mod: 26,,, | Performed by: INTERNAL MEDICINE

## 2020-10-30 PROCEDURE — 93306 TTE W/DOPPLER COMPLETE: CPT

## 2020-11-03 ENCOUNTER — OFFICE VISIT (OUTPATIENT)
Dept: DERMATOLOGY | Facility: CLINIC | Age: 85
End: 2020-11-03
Payer: MEDICARE

## 2020-11-03 DIAGNOSIS — L57.8 CHRONIC SOLAR DERMATITIS: ICD-10-CM

## 2020-11-03 DIAGNOSIS — L57.0 AK (ACTINIC KERATOSIS): Primary | ICD-10-CM

## 2020-11-03 DIAGNOSIS — Z12.83 SCREENING EXAM FOR SKIN CANCER: ICD-10-CM

## 2020-11-03 DIAGNOSIS — L72.0 MILIA: ICD-10-CM

## 2020-11-03 DIAGNOSIS — L82.1 SEBORRHEIC KERATOSIS: ICD-10-CM

## 2020-11-03 PROCEDURE — 99213 OFFICE O/P EST LOW 20 MIN: CPT | Mod: PBBFAC,25 | Performed by: DERMATOLOGY

## 2020-11-03 PROCEDURE — 17000 PR DESTRUCTION(LASER SURGERY,CRYOSURGERY,CHEMOSURGERY),PREMALIGNANT LESIONS,FIRST LESION: ICD-10-PCS | Mod: S$PBB,,, | Performed by: DERMATOLOGY

## 2020-11-03 PROCEDURE — 99999 PR PBB SHADOW E&M-EST. PATIENT-LVL III: CPT | Mod: PBBFAC,,, | Performed by: DERMATOLOGY

## 2020-11-03 PROCEDURE — 17000 DESTRUCT PREMALG LESION: CPT | Mod: PBBFAC | Performed by: DERMATOLOGY

## 2020-11-03 PROCEDURE — 99213 PR OFFICE/OUTPT VISIT, EST, LEVL III, 20-29 MIN: ICD-10-PCS | Mod: 25,S$PBB,, | Performed by: DERMATOLOGY

## 2020-11-03 PROCEDURE — 99999 PR PBB SHADOW E&M-EST. PATIENT-LVL III: ICD-10-PCS | Mod: PBBFAC,,, | Performed by: DERMATOLOGY

## 2020-11-03 PROCEDURE — 17003 DESTRUCTION, PREMALIGNANT LESIONS; SECOND THROUGH 14 LESIONS: ICD-10-PCS | Mod: S$PBB,,, | Performed by: DERMATOLOGY

## 2020-11-03 PROCEDURE — 17003 DESTRUCT PREMALG LES 2-14: CPT | Mod: S$PBB,,, | Performed by: DERMATOLOGY

## 2020-11-03 PROCEDURE — 17000 DESTRUCT PREMALG LESION: CPT | Mod: S$PBB,,, | Performed by: DERMATOLOGY

## 2020-11-03 PROCEDURE — 99213 OFFICE O/P EST LOW 20 MIN: CPT | Mod: 25,S$PBB,, | Performed by: DERMATOLOGY

## 2020-11-03 PROCEDURE — 17003 DESTRUCT PREMALG LES 2-14: CPT | Mod: PBBFAC | Performed by: DERMATOLOGY

## 2020-11-03 NOTE — PROGRESS NOTES
Subjective:       Patient ID:  Misha Marte Jr. is a 89 y.o. male who presents for   Chief Complaint   Patient presents with    Skin Check     ubse     History of Present Illness: The patient presents for follow up of skin check.    The patient was last seen on: 2/27/2020 for eval of sk left arm  Other skin complaints: several - wants legs checked    No h/o nmsc; h/o ak's    Using am lactin for arms and hands prn.        Review of Systems   Skin: Positive for daily sunscreen use, activity-related sunscreen use and wears hat (always ). Negative for recent sunburn.   Hematologic/Lymphatic: Bruises/bleeds easily (on asa).        Objective:    Physical Exam   Constitutional: He appears well-developed and well-nourished. No distress.   Neurological: He is alert and oriented to person, place, and time. He is not disoriented.   Psychiatric: He has a normal mood and affect.   Skin:   Areas Examined (abnormalities noted in diagram):   Scalp / Hair Palpated and Inspected  Head / Face Inspection Performed  Neck Inspection Performed  Chest / Axilla Inspection Performed  Back Inspection Performed  RUE Inspected  LUE Inspection Performed  Nails and Digits Inspection Performed                       Diagram Legend     Erythematous scaling macule/papule c/w actinic keratosis       Vascular papule c/w angioma      Pigmented verrucoid papule/plaque c/w seborrheic keratosis      Yellow umbilicated papule c/w sebaceous hyperplasia      Irregularly shaped tan macule c/w lentigo     1-2 mm smooth white papules consistent with Milia      Movable subcutaneous cyst with punctum c/w epidermal inclusion cyst      Subcutaneous movable cyst c/w pilar cyst      Firm pink to brown papule c/w dermatofibroma      Pedunculated fleshy papule(s) c/w skin tag(s)      Evenly pigmented macule c/w junctional nevus     Mildly variegated pigmented, slightly irregular-bordered macule c/w mildly atypical nevus      Flesh colored to evenly pigmented  papule c/w intradermal nevus       Pink pearly papule/plaque c/w basal cell carcinoma      Erythematous hyperkeratotic cursted plaque c/w SCC      Surgical scar with no sign of skin cancer recurrence      Open and closed comedones      Inflammatory papules and pustules      Verrucoid papule consistent consistent with wart     Erythematous eczematous patches and plaques     Dystrophic onycholytic nail with subungual debris c/w onychomycosis     Umbilicated papule    Erythematous-base heme-crusted tan verrucoid plaque consistent with inflamed seborrheic keratosis     Erythematous Silvery Scaling Plaque c/w Psoriasis     See annotation      Assessment / Plan:        AK (actinic keratosis)  Cryosurgery Procedure Note    Verbal consent from the patient is obtained including, but not limited to, risk of hypopigmentation/hyperpigmentation, scar, recurrence of lesion. The patient is aware of the precancerous quality and need for treatment of these lesions. Liquid nitrogen cryosurgery is applied to the 9 actinic keratoses, as detailed in the physical exam, to produce a freeze injury. The patient is aware that blisters may form and is instructed on wound care with gentle cleansing and use of vaseline ointment to keep moist until healed. The patient is supplied a handout on cryosurgery and is instructed to call if lesions do not completely resolve.    Cont wear hat always    Seborrheic keratosis  These are benign inherited growths without a malignant potential. Reassurance given to patient. No treatment is necessary.     Milia  Reassurance given to patient. No treatment is necessary.       Chronic solar dermatitis  Apply Am Lactin lotion or cream to arms and hands nightly. Available over-the counter.      Screening for skin cancer    Upper body skin examination performed today including at least 6 points as noted in physical examination. No lesions suspicious for malignancy noted.               Follow up in about 6 months  (around 5/3/2021).

## 2020-11-03 NOTE — PATIENT INSTRUCTIONS

## 2020-11-04 ENCOUNTER — PATIENT MESSAGE (OUTPATIENT)
Dept: DERMATOLOGY | Facility: CLINIC | Age: 85
End: 2020-11-04

## 2020-11-04 DIAGNOSIS — I10 ESSENTIAL HYPERTENSION: ICD-10-CM

## 2020-11-05 RX ORDER — LOSARTAN POTASSIUM 25 MG/1
TABLET ORAL
Qty: 90 TABLET | Refills: 0 | Status: SHIPPED | OUTPATIENT
Start: 2020-11-05 | End: 2021-01-27

## 2020-11-05 RX ORDER — FUROSEMIDE 20 MG/1
TABLET ORAL
Qty: 90 TABLET | Refills: 0 | Status: SHIPPED | OUTPATIENT
Start: 2020-11-05 | End: 2021-02-10

## 2020-11-05 NOTE — TELEPHONE ENCOUNTER
Care Due:                  Date            Visit Type   Department     Provider  --------------------------------------------------------------------------------                                ESTABLISHED   [facility] INTERNAL  Last Visit: 02-      PATIENT      MEDICINE       CHRISTIE WAGNER                              [facility]                              FOLLOWUP/OF  [facility] INTERNAL  Next Visit: 11-      FICE VISIT   MEDICINE       CHRISTIE WAGNER                                                            Last  Test          Frequency    Reason                     Performed    Due Date  --------------------------------------------------------------------------------    CMP.........  12 months..  losartan.................  02- 11-    Powered by [employer]. Reference number: [id number]. 11/04/2020 9:34:03 PM   CST   Patient

## 2020-11-05 NOTE — PROGRESS NOTES
Refill Routing Note   Medication(s) are not appropriate for processing by [facility] for the following reason(s):     - Required laboratory values are abnormal  - Required vitals are abnormal    [facility] actions taken in this encounter: Defer       Medication Therapy Plan: CDMR: FLOS/FOVS  Medication reconciliation completed: No   Automatic Epic Generated Protocol Data:        Requested Prescriptions   Pending Prescriptions Disp Refills    furosemide (LASIX) 20 MG tablet [Pharmacy Med Name: FUROSEMIDE 20 MG Tablet] 90 tablet 0     Sig: TAKE 1 TABLET EVERY DAY AS NEEDED       Cardiovascular:  Diuretics - Loop Failed - 11/4/2020  9:33 PM        Failed - Last BP in normal range within 360 days.     BP Readings from Last 3 Encounters:   10/30/20 (!) 150/80   10/12/20 126/78   09/08/20 105/67              Failed - Cr is 1.4 or below and within 360 days     Creatinine   Date Value Ref Range Status   12/03/2019 1.5 (H) 0.5 - 1.4 mg/dL Final   09/24/2019 1.4 0.5 - 1.4 mg/dL Final   02/27/2019 1.4 0.5 - 1.4 mg/dL Final              Passed - Patient is at least 18 years old        Passed - Office visit in past 12 months or future 90 days     Recent Outpatient Visits            2 days ago AK (actinic keratosis)    [facility] [address] Mindy Hankins MD    3 weeks ago History of excessive cerumen    [facility] 4th Fl Sy Odonnell III, MD    6 months ago Gait instability    [facility] Chanda Hall MD    8 months ago Seborrheic keratosis    [facility] [address] Mindy Hankins MD    9 months ago Mass of arm, left    [facility] Ike Ivan MD          Future Appointments              In 6 days EKG, [id number]    In 6 days MD [facility] 3[facility], [facility]    In 1 week LAB, [facility], [facility]    In 2 weeks Ike Ivan MD  [facility], [facility]    In 4 months [facility]                Passed - K in normal range and within 360 days     Potassium   Date Value Ref Range Status   12/03/2019 4.3 3.5 - 5.1 mmol/L Final   09/24/2019 4.1 3.5 - 5.1 mmol/L Final   02/27/2019 4.0 3.5 - 5.1 mmol/L Final              Passed - Na is between 130 and 148 and within 360 days     Sodium   Date Value Ref Range Status   12/03/2019 138 136 - 145 mmol/L Final   09/24/2019 137 136 - 145 mmol/L Final   02/27/2019 133 (L) 136 - 145 mmol/L Final              Passed - eGFR within 360 days     eGFR if non    Date Value Ref Range Status   12/03/2019 40.4 (A) >60 mL/min/1.73 m^2 Final     Comment:     Calculation used to obtain the estimated glomerular filtration  rate (eGFR) is the CKD-EPI equation.      09/24/2019 43.9 (A) >60 mL/min/1.73 m^2 Final     Comment:     Calculation used to obtain the estimated glomerular filtration  rate (eGFR) is the CKD-EPI equation.      02/27/2019 43.9 (A) >60 mL/min/1.73 m^2 Final     Comment:     Calculation used to obtain the estimated glomerular filtration  rate (eGFR) is the CKD-EPI equation.        eGFR if    Date Value Ref Range Status   12/03/2019 46.7 (A) >60 mL/min/1.73 m^2 Final   09/24/2019 50.8 (A) >60 mL/min/1.73 m^2 Final   02/27/2019 50.8 (A) >60 mL/min/1.73 m^2 Final                losartan (COZAAR) 25 MG tablet [Pharmacy Med Name: LOSARTAN POTASSIUM 25 MG Tablet] 90 tablet 0     Sig: TAKE 1 TABLET EVERY DAY       Cardiovascular:  Angiotensin Receptor Blockers Failed - 11/4/2020  9:33 PM        Failed - Last BP in normal range within 360 days.     BP Readings from Last 3 Encounters:   10/30/20 (!) 150/80   10/12/20 126/78   09/08/20 105/67              Failed - Cr is 1.4 or below and within 360 days     Creatinine   Date Value Ref Range Status   12/03/2019 1.5 (H) 0.5 - 1.4 mg/dL Final   09/24/2019 1.4 0.5 -  1.4 mg/dL Final   02/27/2019 1.4 0.5 - 1.4 mg/dL Final              Passed - Patient is at least 18 years old        Passed - Office visit in past 12 months or future 90 days     Recent Outpatient Visits            2 days ago AK (actinic keratosis)    [facility] [address] Mindy Hankins MD    3 weeks ago History of excessive cerumen    [facility] 4th Fl Sy Odonnell III, MD    6 months ago Gait instability    [facility] Chanda Hall MD    8 months ago Seborrheic keratosis    [facility] [address] Mindy Hankins MD    9 months ago Mass of arm, left    [facility] Ike Ivan MD          Future Appointments              In 6 days EKG, [id number]    In 6 days MD [facility] 3[facility], [facility]    In 1 week LAB, [facility], [facility]    In 2 weeks MD [facility], [facility]    In 4 months [facility]                Passed - K in normal range and within 360 days     Potassium   Date Value Ref Range Status   12/03/2019 4.3 3.5 - 5.1 mmol/L Final   09/24/2019 4.1 3.5 - 5.1 mmol/L Final   02/27/2019 4.0 3.5 - 5.1 mmol/L Final              Passed - eGFR within 360 days     eGFR if non    Date Value Ref Range Status   12/03/2019 40.4 (A) >60 mL/min/1.73 m^2 Final     Comment:     Calculation used to obtain the estimated glomerular filtration  rate (eGFR) is the CKD-EPI equation.      09/24/2019 43.9 (A) >60 mL/min/1.73 m^2 Final     Comment:     Calculation used to obtain the estimated glomerular filtration  rate (eGFR) is the CKD-EPI equation.      02/27/2019 43.9 (A) >60 mL/min/1.73 m^2 Final     Comment:     Calculation used to obtain the estimated glomerular filtration  rate (eGFR) is the CKD-EPI equation.        eGFR if   American   Date Value Ref Range Status   12/03/2019 46.7 (A) >60 mL/min/1.73 m^2 Final   09/24/2019 50.8 (A) >60 mL/min/1.73 m^2 Final   02/27/2019 50.8 (A) >60 mL/min/1.73 m^2 Final                    Appointments  past 12m or future 3m with PCP    Date Provider   Last Visit   2/4/2020 Ike Ivan MD   Next Visit   11/25/2020 Ike Ivan MD   ED visits in past 90 days: 0        Note composed:10:37 AM 11/05/2020

## 2020-11-11 ENCOUNTER — HOSPITAL ENCOUNTER (OUTPATIENT)
Dept: CARDIOLOGY | Facility: CLINIC | Age: 85
Discharge: HOME | End: 2020-11-11
Payer: MEDICARE

## 2020-11-11 ENCOUNTER — OFFICE VISIT (OUTPATIENT)
Dept: CARDIOLOGY | Facility: CLINIC | Age: 85
End: 2020-11-11
Payer: MEDICARE

## 2020-11-11 VITALS
BODY MASS INDEX: 26.55 KG/M2 | HEART RATE: 60 BPM | HEIGHT: 71 IN | DIASTOLIC BLOOD PRESSURE: 62 MMHG | SYSTOLIC BLOOD PRESSURE: 123 MMHG | WEIGHT: 189.63 LBS

## 2020-11-11 DIAGNOSIS — R60.0 EDEMA OF BOTH LOWER LEGS DUE TO PERIPHERAL VENOUS INSUFFICIENCY: ICD-10-CM

## 2020-11-11 DIAGNOSIS — E66.3 OVERWEIGHT (BMI 25.0-29.9): ICD-10-CM

## 2020-11-11 DIAGNOSIS — E78.49 OTHER HYPERLIPIDEMIA: ICD-10-CM

## 2020-11-11 DIAGNOSIS — E55.9 VITAMIN D DEFICIENCY: ICD-10-CM

## 2020-11-11 DIAGNOSIS — I35.0 NONRHEUMATIC AORTIC VALVE STENOSIS: Primary | ICD-10-CM

## 2020-11-11 DIAGNOSIS — I35.0 AORTIC VALVE STENOSIS, ETIOLOGY OF CARDIAC VALVE DISEASE UNSPECIFIED: ICD-10-CM

## 2020-11-11 DIAGNOSIS — I10 ESSENTIAL HYPERTENSION: ICD-10-CM

## 2020-11-11 DIAGNOSIS — I87.2 EDEMA OF BOTH LOWER LEGS DUE TO PERIPHERAL VENOUS INSUFFICIENCY: ICD-10-CM

## 2020-11-11 DIAGNOSIS — I65.23 BILATERAL CAROTID ARTERY STENOSIS: ICD-10-CM

## 2020-11-11 PROCEDURE — 99214 PR OFFICE/OUTPT VISIT, EST, LEVL IV, 30-39 MIN: ICD-10-PCS | Mod: S$PBB,,, | Performed by: INTERNAL MEDICINE

## 2020-11-11 PROCEDURE — 99999 PR PBB SHADOW E&M-EST. PATIENT-LVL III: ICD-10-PCS | Mod: PBBFAC,,, | Performed by: INTERNAL MEDICINE

## 2020-11-11 PROCEDURE — 99213 OFFICE O/P EST LOW 20 MIN: CPT | Mod: PBBFAC,25 | Performed by: INTERNAL MEDICINE

## 2020-11-11 PROCEDURE — 99214 OFFICE O/P EST MOD 30 MIN: CPT | Mod: S$PBB,,, | Performed by: INTERNAL MEDICINE

## 2020-11-11 PROCEDURE — 99999 PR PBB SHADOW E&M-EST. PATIENT-LVL III: CPT | Mod: PBBFAC,,, | Performed by: INTERNAL MEDICINE

## 2020-11-11 PROCEDURE — 93010 EKG 12-LEAD: ICD-10-PCS | Mod: S$PBB,,, | Performed by: INTERNAL MEDICINE

## 2020-11-11 PROCEDURE — 93005 ELECTROCARDIOGRAM TRACING: CPT | Mod: PBBFAC | Performed by: INTERNAL MEDICINE

## 2020-11-11 PROCEDURE — 93010 ELECTROCARDIOGRAM REPORT: CPT | Mod: S$PBB,,, | Performed by: INTERNAL MEDICINE

## 2020-11-11 NOTE — PATIENT INSTRUCTIONS
Discussed diet , achieving and maintaining ideal body weight, and exercise.   We reviewed meds in detail.  Reassured-Discussed goals, options, plan.

## 2020-11-11 NOTE — PROGRESS NOTES
Subjective:   Patient ID:  Misha Marte Jr. is a 89 y.o. male who presents for follow-up of VHD    HPI: Patient is here for valvular heart disease.   The patient has no chest pain, SOB, TIA, palpitations, syncope or pre-syncope.Patient does not exercise.        Review of Systems   Constitution: Negative for chills, decreased appetite, diaphoresis, fever, malaise/fatigue, night sweats, weight gain and weight loss.   HENT: Negative for congestion, hoarse voice, nosebleeds, sore throat and tinnitus.    Eyes: Negative for blurred vision, double vision, vision loss in left eye, vision loss in right eye, visual disturbance and visual halos.   Cardiovascular: Negative for chest pain, claudication, cyanosis, dyspnea on exertion, irregular heartbeat, leg swelling, near-syncope, orthopnea, palpitations, paroxysmal nocturnal dyspnea and syncope.   Respiratory: Negative for cough, hemoptysis, shortness of breath, sleep disturbances due to breathing, snoring, sputum production and wheezing.    Endocrine: Negative for cold intolerance, heat intolerance, polydipsia, polyphagia and polyuria.   Hematologic/Lymphatic: Negative for adenopathy and bleeding problem. Does not bruise/bleed easily.   Skin: Negative for color change, dry skin, flushing, itching, nail changes, poor wound healing, rash, skin cancer, suspicious lesions and unusual hair distribution.   Musculoskeletal: Negative for arthritis, back pain, falls, gout, joint pain, joint swelling, muscle cramps, muscle weakness, myalgias and stiffness.   Gastrointestinal: Negative for abdominal pain, anorexia, change in bowel habit, constipation, diarrhea, dysphagia, heartburn, hematemesis, hematochezia, melena and vomiting.   Genitourinary: Negative for decreased libido, dysuria, hematuria, hesitancy and urgency.   Neurological: Negative for excessive daytime sleepiness, dizziness, focal weakness, headaches, light-headedness, loss of balance, numbness, paresthesias,  "seizures, sensory change, tremors, vertigo and weakness.   Psychiatric/Behavioral: Negative for altered mental status, depression, hallucinations, memory loss, substance abuse and suicidal ideas. The patient does not have insomnia and is not nervous/anxious.    Allergic/Immunologic: Negative for environmental allergies and hives.       Objective: /62 (BP Location: Left arm, Patient Position: Sitting, BP Method: Medium (Automatic))   Pulse 60   Ht 5' 11" (1.803 m)   Wt 86 kg (189 lb 9.5 oz)   BMI 26.44 kg/m²      Physical Exam   Constitutional: He is oriented to person, place, and time. He appears well-developed and well-nourished. No distress.   HENT:   Head: Normocephalic.   Eyes: Pupils are equal, round, and reactive to light. EOM are normal.   Neck: Normal range of motion. No thyromegaly present.   Cardiovascular: Normal rate, regular rhythm and intact distal pulses. Exam reveals no gallop and no friction rub.   Murmur heard.   Systolic murmur is present with a grade of 2/6.  Pulses:       Carotid pulses are 3+ on the right side and 3+ on the left side.       Radial pulses are 3+ on the right side and 3+ on the left side.        Femoral pulses are 3+ on the right side and 3+ on the left side.       Popliteal pulses are 3+ on the right side and 3+ on the left side.        Dorsalis pedis pulses are 3+ on the right side and 3+ on the left side.        Posterior tibial pulses are 3+ on the right side and 3+ on the left side.   Pulmonary/Chest: Effort normal and breath sounds normal. No respiratory distress. He has no wheezes. He has no rales. He exhibits no tenderness.   Abdominal: Soft. He exhibits no distension and no mass. There is no abdominal tenderness.   Musculoskeletal: Normal range of motion.   Lymphadenopathy:     He has no cervical adenopathy.   Neurological: He is alert and oriented to person, place, and time.   Skin: Skin is warm. He is not diaphoretic. No cyanosis. Nails show no clubbing. "   Psychiatric: He has a normal mood and affect. His speech is normal and behavior is normal. Judgment and thought content normal. Cognition and memory are normal.       Assessment:     1. Nonrheumatic aortic valve stenosis    2. Essential hypertension    3. Vitamin D deficiency    4. Bilateral carotid artery stenosis    5. Overweight (BMI 25.0-29.9)    6. Other hyperlipidemia    7. 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   We reviewed meds in detail.  Reassured-Discussed goals, options, plan.       Misha was seen today for nonrheumatic aortic valve stenosis.    Diagnoses and all orders for this visit:    Nonrheumatic aortic valve stenosis  -     Lipid Panel; Future; Expected date: 01/11/2022  -     Comprehensive Metabolic Panel; Future; Expected date: 01/11/2022  -     TSH; Future; Expected date: 01/11/2022  -     EKG 12-lead; Future; Expected date: 01/11/2022  -     Echo Color Flow Doppler? Yes; Standing    Essential hypertension  -     Comprehensive Metabolic Panel; Future; Expected date: 01/11/2022  -     TSH; Future; Expected date: 01/11/2022  -     EKG 12-lead; Future; Expected date: 01/11/2022  -     Echo Color Flow Doppler? 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            Follow up in about 15 months (around 2/11/2022) for with ECG, WILL White to read and labs; WILL White in 7 months too.

## 2020-11-16 ENCOUNTER — PATIENT MESSAGE (OUTPATIENT)
Dept: DERMATOLOGY | Facility: CLINIC | Age: 85
End: 2020-11-16

## 2020-11-19 ENCOUNTER — PATIENT MESSAGE (OUTPATIENT)
Dept: CARDIOLOGY | Facility: CLINIC | Age: 85
End: 2020-11-19

## 2020-11-25 ENCOUNTER — LAB VISIT (OUTPATIENT)
Dept: LAB | Facility: HOSPITAL | Age: 85
End: 2020-11-25
Attending: INTERNAL MEDICINE
Payer: MEDICARE

## 2020-11-25 ENCOUNTER — OFFICE VISIT (OUTPATIENT)
Dept: INTERNAL MEDICINE | Facility: CLINIC | Age: 85
End: 2020-11-25
Payer: MEDICARE

## 2020-11-25 VITALS
WEIGHT: 189.81 LBS | RESPIRATION RATE: 20 BRPM | SYSTOLIC BLOOD PRESSURE: 134 MMHG | OXYGEN SATURATION: 98 % | HEART RATE: 73 BPM | TEMPERATURE: 98 F | BODY MASS INDEX: 26.57 KG/M2 | HEIGHT: 71 IN | DIASTOLIC BLOOD PRESSURE: 62 MMHG

## 2020-11-25 DIAGNOSIS — E55.9 VITAMIN D DEFICIENCY: ICD-10-CM

## 2020-11-25 DIAGNOSIS — E61.1 IRON DEFICIENCY: ICD-10-CM

## 2020-11-25 DIAGNOSIS — I35.0 AORTIC STENOSIS, MODERATE: Primary | ICD-10-CM

## 2020-11-25 DIAGNOSIS — R60.0 EDEMA OF RIGHT LOWER EXTREMITY: ICD-10-CM

## 2020-11-25 DIAGNOSIS — E78.5 HYPERLIPIDEMIA, UNSPECIFIED HYPERLIPIDEMIA TYPE: ICD-10-CM

## 2020-11-25 DIAGNOSIS — I10 ESSENTIAL HYPERTENSION: ICD-10-CM

## 2020-11-25 DIAGNOSIS — H90.5 SENSORINEURAL HEARING LOSS (SNHL) OF RIGHT EAR, UNSPECIFIED HEARING STATUS ON CONTRALATERAL SIDE: ICD-10-CM

## 2020-11-25 DIAGNOSIS — I35.0 AORTIC STENOSIS, MODERATE: ICD-10-CM

## 2020-11-25 LAB
25(OH)D3+25(OH)D2 SERPL-MCNC: 46 NG/ML (ref 30–96)
BASOPHILS # BLD AUTO: 0.08 K/UL (ref 0–0.2)
BASOPHILS NFR BLD: 1.4 % (ref 0–1.9)
DIFFERENTIAL METHOD: ABNORMAL
EOSINOPHIL # BLD AUTO: 0.1 K/UL (ref 0–0.5)
EOSINOPHIL NFR BLD: 1.2 % (ref 0–8)
ERYTHROCYTE [DISTWIDTH] IN BLOOD BY AUTOMATED COUNT: 13.3 % (ref 11.5–14.5)
HCT VFR BLD AUTO: 38.7 % (ref 40–54)
HGB BLD-MCNC: 12.3 G/DL (ref 14–18)
IMM GRANULOCYTES # BLD AUTO: 0.03 K/UL (ref 0–0.04)
IMM GRANULOCYTES NFR BLD AUTO: 0.5 % (ref 0–0.5)
IRON SERPL-MCNC: 81 UG/DL (ref 45–160)
LYMPHOCYTES # BLD AUTO: 0.8 K/UL (ref 1–4.8)
LYMPHOCYTES NFR BLD: 13.6 % (ref 18–48)
MCH RBC QN AUTO: 31.4 PG (ref 27–31)
MCHC RBC AUTO-ENTMCNC: 31.8 G/DL (ref 32–36)
MCV RBC AUTO: 99 FL (ref 82–98)
MONOCYTES # BLD AUTO: 0.4 K/UL (ref 0.3–1)
MONOCYTES NFR BLD: 7.4 % (ref 4–15)
NEUTROPHILS # BLD AUTO: 4.3 K/UL (ref 1.8–7.7)
NEUTROPHILS NFR BLD: 75.9 % (ref 38–73)
NRBC BLD-RTO: 0 /100 WBC
PLATELET # BLD AUTO: 204 K/UL (ref 150–350)
PMV BLD AUTO: 11.2 FL (ref 9.2–12.9)
RBC # BLD AUTO: 3.92 M/UL (ref 4.6–6.2)
WBC # BLD AUTO: 5.65 K/UL (ref 3.9–12.7)

## 2020-11-25 PROCEDURE — 99214 PR OFFICE/OUTPT VISIT, EST, LEVL IV, 30-39 MIN: ICD-10-PCS | Mod: S$PBB,,, | Performed by: INTERNAL MEDICINE

## 2020-11-25 PROCEDURE — 99999 PR PBB SHADOW E&M-EST. PATIENT-LVL V: CPT | Mod: PBBFAC,,, | Performed by: INTERNAL MEDICINE

## 2020-11-25 PROCEDURE — 82306 VITAMIN D 25 HYDROXY: CPT

## 2020-11-25 PROCEDURE — 85025 COMPLETE CBC W/AUTO DIFF WBC: CPT

## 2020-11-25 PROCEDURE — 83540 ASSAY OF IRON: CPT

## 2020-11-25 PROCEDURE — 99214 OFFICE O/P EST MOD 30 MIN: CPT | Mod: S$PBB,,, | Performed by: INTERNAL MEDICINE

## 2020-11-25 PROCEDURE — 99215 OFFICE O/P EST HI 40 MIN: CPT | Mod: PBBFAC | Performed by: INTERNAL MEDICINE

## 2020-11-25 PROCEDURE — 36415 COLL VENOUS BLD VENIPUNCTURE: CPT

## 2020-11-25 PROCEDURE — 99999 PR PBB SHADOW E&M-EST. PATIENT-LVL V: ICD-10-PCS | Mod: PBBFAC,,, | Performed by: INTERNAL MEDICINE

## 2020-11-25 RX ORDER — INFLUENZA A VIRUS A/MICHIGAN/45/2015 X-275 (H1N1) ANTIGEN (FORMALDEHYDE INACTIVATED), INFLUENZA A VIRUS A/SINGAPORE/INFIMH-16-0019/2016 IVR-186 (H3N2) ANTIGEN (FORMALDEHYDE INACTIVATED), INFLUENZA B VIRUS B/PHUKET/3073/2013 ANTIGEN (FORMALDEHYDE INACTIVATED), AND INFLUENZA B VIRUS B/MARYLAND/15/2016 BX-69A ANTIGEN (FORMALDEHYDE INACTIVATED) 60; 60; 60; 60 UG/.7ML; UG/.7ML; UG/.7ML; UG/.7ML
INJECTION, SUSPENSION INTRAMUSCULAR
Status: ON HOLD | COMMUNITY
Start: 2020-09-30 | End: 2021-12-16

## 2020-11-25 NOTE — PROGRESS NOTES
CC:  Follow-up    HPI:  The patient is a 89-year-old male with moderate to severe aortic stenosis, hypertension, vitamin-D deficiency, bilateral cataracts, spinal stenosis and hyperlipidemia presents today for follow-up.  Patient was getting testosterone injections every 2 weeks at [facility].  He has not been there for several months because of the pandemic.  They do not have  parking said they have not been able to go because they cannot traverse the distance.  They are considering looking into coming here for this.  He also sees a ophthalmologist at [facility] for macular degeneration and cataracts.  He is considering about changing over to here as well.  The patient did see his cardiologist recently for checkup.  He also saw his endocrinologist for his Prolia.  He did see dermatology as well.    ROS:  Patient reports weight is staying stable.  He did see ENT.  He does wear hearing aid.  He does have macular degeneration.  He does have cataracts.  No chest pain.  No shortness of breath.  He does walk a lot at home.  Plus he exercise with dumbbells.  No nausea or vomiting.  No abdominal pain.  No bowel changes.  No bladder changes.  He is will using a walker more at home.  He feels more stable with this.  The patient has been taking an over-the-counter iron supplement.    Physical exam:  General appearance:  No acute distress  HEENT:  Conjunctiva is clear.  Pupils equal.  He does have bilateral cataracts.  TMs are clear.  Nasal septum is midline without discharge.  Oropharynx is without erythema.  Trachea is midline without JVD.  Pulmonary:  Good inspiratory, expiratory breath sounds are heard.  Lungs are clear auscultation.  Cardiovascular:  S1-S2 with a 3/6 systolic ejection murmur heard best at the right upper sternal border.  He has 2+ carotid pulses with radiation of bruit into the right carotid not as loud in the left.  1+ edema.  GI:  Abdomen is nontender, nondistended without  hepatosplenomegaly      Assessment:  1.  Moderate to severe aortic stenosis  2.  Hypertension  3.  Hyperlipidemia  4.  Macular degeneration  5.  Vitamin-D deficiency  6.  Iron deficiency    Plan:  1.  Will check a CBC, vitamin-D level and serum iron level today  2.  The patient will follow up pending test results

## 2020-11-29 ENCOUNTER — PATIENT MESSAGE (OUTPATIENT)
Dept: DERMATOLOGY | Facility: CLINIC | Age: 85
End: 2020-11-29

## 2020-12-08 ENCOUNTER — OFFICE VISIT (OUTPATIENT)
Dept: DERMATOLOGY | Facility: CLINIC | Age: 85
End: 2020-12-08
Payer: MEDICARE

## 2020-12-08 DIAGNOSIS — L82.1 SEBORRHEIC KERATOSIS: ICD-10-CM

## 2020-12-08 DIAGNOSIS — L57.0 AK (ACTINIC KERATOSIS): Primary | ICD-10-CM

## 2020-12-08 PROCEDURE — 99999 PR PBB SHADOW E&M-EST. PATIENT-LVL III: ICD-10-PCS | Mod: PBBFAC,,, | Performed by: DERMATOLOGY

## 2020-12-08 PROCEDURE — 17000 PR DESTRUCTION(LASER SURGERY,CRYOSURGERY,CHEMOSURGERY),PREMALIGNANT LESIONS,FIRST LESION: ICD-10-PCS | Mod: S$PBB,,, | Performed by: DERMATOLOGY

## 2020-12-08 PROCEDURE — 99499 UNLISTED E&M SERVICE: CPT | Mod: S$PBB,,, | Performed by: DERMATOLOGY

## 2020-12-08 PROCEDURE — 99499 NO LOS: ICD-10-PCS | Mod: S$PBB,,, | Performed by: DERMATOLOGY

## 2020-12-08 PROCEDURE — 17003 DESTRUCT PREMALG LES 2-14: CPT | Mod: PBBFAC | Performed by: DERMATOLOGY

## 2020-12-08 PROCEDURE — 17003 DESTRUCT PREMALG LES 2-14: CPT | Mod: S$PBB,,, | Performed by: DERMATOLOGY

## 2020-12-08 PROCEDURE — 17000 DESTRUCT PREMALG LESION: CPT | Mod: S$PBB,,, | Performed by: DERMATOLOGY

## 2020-12-08 PROCEDURE — 17003 DESTRUCTION, PREMALIGNANT LESIONS; SECOND THROUGH 14 LESIONS: ICD-10-PCS | Mod: S$PBB,,, | Performed by: DERMATOLOGY

## 2020-12-08 PROCEDURE — 99999 PR PBB SHADOW E&M-EST. PATIENT-LVL III: CPT | Mod: PBBFAC,,, | Performed by: DERMATOLOGY

## 2020-12-08 PROCEDURE — 17000 DESTRUCT PREMALG LESION: CPT | Mod: PBBFAC | Performed by: DERMATOLOGY

## 2020-12-08 PROCEDURE — 99213 OFFICE O/P EST LOW 20 MIN: CPT | Mod: PBBFAC,25 | Performed by: DERMATOLOGY

## 2020-12-08 NOTE — PATIENT INSTRUCTIONS

## 2020-12-08 NOTE — PROGRESS NOTES
Subjective:       Patient ID:  Misha Marte Jr. is a 89 y.o. male who presents for   Chief Complaint   Patient presents with    Skin Check     History of Present Illness: The patient presents for follow up of skin check.    The patient was last seen on: 11/3/2020 for actinic keratosis treated with cryosurgery which has resolved.  H/o NMSC's and AK's    Patient with new complaint of lesion(s)  Location: above right eyebrow  Duration: few months  Symptoms: bled previously    Relieving factors/Previous treatments: none     Using am lactin for arms and hands prn.        Review of Systems   Skin: Positive for activity-related sunscreen use. Negative for daily sunscreen use and recent sunburn.   Hematologic/Lymphatic: Does not bruise/bleed easily.        Objective:    Physical Exam   Skin:   Areas Examined (abnormalities noted in diagram):   Head / Face Inspection Performed              Diagram Legend     Erythematous scaling macule/papule c/w actinic keratosis       Vascular papule c/w angioma      Pigmented verrucoid papule/plaque c/w seborrheic keratosis      Yellow umbilicated papule c/w sebaceous hyperplasia      Irregularly shaped tan macule c/w lentigo     1-2 mm smooth white papules consistent with Milia      Movable subcutaneous cyst with punctum c/w epidermal inclusion cyst      Subcutaneous movable cyst c/w pilar cyst      Firm pink to brown papule c/w dermatofibroma      Pedunculated fleshy papule(s) c/w skin tag(s)      Evenly pigmented macule c/w junctional nevus     Mildly variegated pigmented, slightly irregular-bordered macule c/w mildly atypical nevus      Flesh colored to evenly pigmented papule c/w intradermal nevus       Pink pearly papule/plaque c/w basal cell carcinoma      Erythematous hyperkeratotic cursted plaque c/w SCC      Surgical scar with no sign of skin cancer recurrence      Open and closed comedones      Inflammatory papules and pustules      Verrucoid papule consistent  consistent with wart     Erythematous eczematous patches and plaques     Dystrophic onycholytic nail with subungual debris c/w onychomycosis     Umbilicated papule    Erythematous-base heme-crusted tan verrucoid plaque consistent with inflamed seborrheic keratosis     Erythematous Silvery Scaling Plaque c/w Psoriasis     See annotation      Assessment / Plan:        AK (actinic keratosis)  Cryosurgery Procedure Note    Verbal consent from the patient is obtained including, but not limited to, risk of hypopigmentation/hyperpigmentation, scar, recurrence of lesion. The patient is aware of the precancerous quality and need for treatment of these lesions. Liquid nitrogen cryosurgery is applied to the 2 actinic keratoses, as detailed in the physical exam, to produce a freeze injury. The patient is aware that blisters may form and is instructed on wound care with gentle cleansing and use of vaseline ointment to keep moist until healed. The patient is supplied a handout on cryosurgery and is instructed to call if lesions do not completely resolve.    Cont am lactin to arms/hands qhs    Seborrheic keratosis  These are benign inherited growths without a malignant potential. Reassurance given to patient. No treatment is necessary.   Has cosmetic appt planned 1/2021             Follow up in about 1 year (around 12/8/2021).

## 2020-12-11 ENCOUNTER — PATIENT MESSAGE (OUTPATIENT)
Dept: OTHER | Facility: OTHER | Age: 85
End: 2020-12-11

## 2021-01-02 ENCOUNTER — PATIENT MESSAGE (OUTPATIENT)
Dept: INTERNAL MEDICINE | Facility: CLINIC | Age: 86
End: 2021-01-02

## 2021-01-05 ENCOUNTER — PROCEDURE VISIT (OUTPATIENT)
Dept: DERMATOLOGY | Facility: CLINIC | Age: 86
End: 2021-01-05
Payer: MEDICARE

## 2021-01-05 DIAGNOSIS — Z41.1 ELECTIVE PROCEDURE FOR UNACCEPTABLE COSMETIC APPEARANCE: Primary | ICD-10-CM

## 2021-01-05 PROCEDURE — 17110 PR DESTRUCTION BENIGN LESIONS UP TO 14: ICD-10-PCS | Mod: CSM,S$GLB,, | Performed by: DERMATOLOGY

## 2021-01-05 PROCEDURE — 99499 NO LOS: ICD-10-PCS | Mod: ,,, | Performed by: DERMATOLOGY

## 2021-01-05 PROCEDURE — 99499 UNLISTED E&M SERVICE: CPT | Mod: ,,, | Performed by: DERMATOLOGY

## 2021-01-05 PROCEDURE — 17110 DESTRUCTION B9 LES UP TO 14: CPT | Mod: CSM,S$GLB,, | Performed by: DERMATOLOGY

## 2021-01-10 ENCOUNTER — IMMUNIZATION (OUTPATIENT)
Dept: PRIMARY CARE CLINIC | Facility: CLINIC | Age: 86
End: 2021-01-10
Payer: MEDICARE

## 2021-01-10 DIAGNOSIS — Z23 NEED FOR VACCINATION: ICD-10-CM

## 2021-01-10 PROCEDURE — 91300 COVID-19, MRNA, LNP-S, PF, 30 MCG/0.3 ML DOSE VACCINE: CPT | Mod: PBBFAC,PN

## 2021-01-25 DIAGNOSIS — I10 ESSENTIAL HYPERTENSION: ICD-10-CM

## 2021-01-27 RX ORDER — LOSARTAN POTASSIUM 25 MG/1
TABLET ORAL
Qty: 90 TABLET | Refills: 3 | Status: SHIPPED | OUTPATIENT
Start: 2021-01-27 | End: 2021-11-11

## 2021-01-31 ENCOUNTER — IMMUNIZATION (OUTPATIENT)
Dept: PRIMARY CARE CLINIC | Facility: CLINIC | Age: 86
End: 2021-01-31
Payer: MEDICARE

## 2021-01-31 DIAGNOSIS — Z23 NEED FOR VACCINATION: Primary | ICD-10-CM

## 2021-01-31 PROCEDURE — 91300 COVID-19, MRNA, LNP-S, PF, 30 MCG/0.3 ML DOSE VACCINE: CPT | Mod: PBBFAC | Performed by: EMERGENCY MEDICINE

## 2021-01-31 PROCEDURE — 0002A COVID-19, MRNA, LNP-S, PF, 30 MCG/0.3 ML DOSE VACCINE: CPT | Mod: PBBFAC | Performed by: EMERGENCY MEDICINE

## 2021-02-10 RX ORDER — FUROSEMIDE 20 MG/1
TABLET ORAL
Qty: 90 TABLET | Refills: 3 | Status: SHIPPED | OUTPATIENT
Start: 2021-02-10 | End: 2021-12-04

## 2021-03-09 ENCOUNTER — INFUSION (OUTPATIENT)
Dept: INFECTIOUS DISEASES | Facility: HOSPITAL | Age: 86
End: 2021-03-09
Attending: INTERNAL MEDICINE
Payer: MEDICARE

## 2021-03-09 VITALS
WEIGHT: 189.81 LBS | DIASTOLIC BLOOD PRESSURE: 76 MMHG | SYSTOLIC BLOOD PRESSURE: 144 MMHG | BODY MASS INDEX: 26.57 KG/M2 | HEIGHT: 71 IN

## 2021-03-09 DIAGNOSIS — M81.0 AGE RELATED OSTEOPOROSIS, UNSPECIFIED PATHOLOGICAL FRACTURE PRESENCE: Primary | ICD-10-CM

## 2021-03-09 PROCEDURE — 96372 THER/PROPH/DIAG INJ SC/IM: CPT

## 2021-03-09 PROCEDURE — 63600175 PHARM REV CODE 636 W HCPCS: Mod: JG | Performed by: INTERNAL MEDICINE

## 2021-03-09 RX ADMIN — DENOSUMAB 60 MG: 60 INJECTION SUBCUTANEOUS at 01:03

## 2021-05-05 ENCOUNTER — PATIENT MESSAGE (OUTPATIENT)
Dept: INTERNAL MEDICINE | Facility: CLINIC | Age: 86
End: 2021-05-05

## 2021-05-05 ENCOUNTER — TELEPHONE (OUTPATIENT)
Dept: ENDOCRINOLOGY | Facility: CLINIC | Age: 86
End: 2021-05-05

## 2021-05-05 ENCOUNTER — PATIENT MESSAGE (OUTPATIENT)
Dept: CARDIOLOGY | Facility: CLINIC | Age: 86
End: 2021-05-05

## 2021-07-13 ENCOUNTER — HOSPITAL ENCOUNTER (OUTPATIENT)
Dept: CARDIOLOGY | Facility: HOSPITAL | Age: 86
Discharge: HOME | End: 2021-07-13
Attending: INTERNAL MEDICINE
Payer: MEDICARE

## 2021-07-13 ENCOUNTER — OFFICE VISIT (OUTPATIENT)
Dept: OTOLARYNGOLOGY | Facility: CLINIC | Age: 86
End: 2021-07-13
Payer: MEDICARE

## 2021-07-13 VITALS
HEART RATE: 70 BPM | SYSTOLIC BLOOD PRESSURE: 135 MMHG | BODY MASS INDEX: 25.05 KG/M2 | HEIGHT: 73 IN | DIASTOLIC BLOOD PRESSURE: 80 MMHG | WEIGHT: 189 LBS

## 2021-07-13 DIAGNOSIS — I35.0 NONRHEUMATIC AORTIC VALVE STENOSIS: ICD-10-CM

## 2021-07-13 DIAGNOSIS — H61.23 BILATERAL IMPACTED CERUMEN: Primary | ICD-10-CM

## 2021-07-13 DIAGNOSIS — I10 ESSENTIAL HYPERTENSION: ICD-10-CM

## 2021-07-13 LAB
ASCENDING AORTA: 3.23 CM
AV INDEX (PROSTH): 0.24
AV MEAN GRADIENT: 47 MMHG
AV PEAK GRADIENT: 66 MMHG
AV VALVE AREA: 1.08 CM2
AV VELOCITY RATIO: 0.27
BSA FOR ECHO PROCEDURE: 2.1 M2
CV ECHO LV RWT: 0.38 CM
DOP CALC AO PEAK VEL: 4.07 M/S
DOP CALC AO VTI: 95.07 CM
DOP CALC LVOT AREA: 4.6 CM2
DOP CALC LVOT DIAMETER: 2.41 CM
DOP CALC LVOT PEAK VEL: 1.09 M/S
DOP CALC LVOT STROKE VOLUME: 102.81 CM3
DOP CALCLVOT PEAK VEL VTI: 22.55 CM
E WAVE DECELERATION TIME: 413.27 MSEC
E/A RATIO: 1.1
E/E' RATIO: 49.43 M/S
ECHO LV POSTERIOR WALL: 0.77 CM (ref 0.6–1.1)
EJECTION FRACTION: 63 %
FRACTIONAL SHORTENING: 25 % (ref 28–44)
INTERVENTRICULAR SEPTUM: 1.1 CM (ref 0.6–1.1)
LA MAJOR: 4.8 CM
LA MINOR: 4.19 CM
LA WIDTH: 4.14 CM
LEFT ATRIUM SIZE: 4.35 CM
LEFT ATRIUM VOLUME INDEX MOD: 19.9 ML/M2
LEFT ATRIUM VOLUME INDEX: 32.6 ML/M2
LEFT ATRIUM VOLUME MOD: 41.76 CM3
LEFT ATRIUM VOLUME: 68.49 CM3
LEFT INTERNAL DIMENSION IN SYSTOLE: 3.06 CM (ref 2.1–4)
LEFT VENTRICLE DIASTOLIC VOLUME INDEX: 34.88 ML/M2
LEFT VENTRICLE DIASTOLIC VOLUME: 73.25 ML
LEFT VENTRICLE MASS INDEX: 57 G/M2
LEFT VENTRICLE SYSTOLIC VOLUME INDEX: 17.4 ML/M2
LEFT VENTRICLE SYSTOLIC VOLUME: 36.62 ML
LEFT VENTRICULAR INTERNAL DIMENSION IN DIASTOLE: 4.08 CM (ref 3.5–6)
LEFT VENTRICULAR MASS: 119.35 G
LV LATERAL E/E' RATIO: 43.25 M/S
LV SEPTAL E/E' RATIO: 57.67 M/S
MV A" WAVE DURATION": 9.13 MSEC
MV MEAN GRADIENT: 6 MMHG
MV PEAK A VEL: 1.57 M/S
MV PEAK E VEL: 1.73 M/S
MV PEAK GRADIENT: 13 MMHG
MV STENOSIS PRESSURE HALF TIME: 119.85 MS
MV VALVE AREA P 1/2 METHOD: 1.84 CM2
PISA TR MAX VEL: 3.08 M/S
PULM VEIN S/D RATIO: 1.24
PV PEAK D VEL: 0.25 M/S
PV PEAK S VEL: 0.31 M/S
RA MAJOR: 4.68 CM
RA WIDTH: 3.56 CM
RIGHT VENTRICULAR END-DIASTOLIC DIMENSION: 2.99 CM
RV TISSUE DOPPLER FREE WALL SYSTOLIC VELOCITY 1 (APICAL 4 CHAMBER VIEW): 7.72 CM/S
SINUS: 2.86 CM
STJ: 3.54 CM
TDI LATERAL: 0.04 M/S
TDI SEPTAL: 0.03 M/S
TDI: 0.04 M/S
TR MAX PG: 38 MMHG
TRICUSPID ANNULAR PLANE SYSTOLIC EXCURSION: 1.78 CM

## 2021-07-13 PROCEDURE — 99213 OFFICE O/P EST LOW 20 MIN: CPT | Mod: PBBFAC,25 | Performed by: OTOLARYNGOLOGY

## 2021-07-13 PROCEDURE — 99499 UNLISTED E&M SERVICE: CPT | Mod: S$PBB,,, | Performed by: OTOLARYNGOLOGY

## 2021-07-13 PROCEDURE — 93306 ECHO (CUPID ONLY): ICD-10-PCS | Mod: 26,,, | Performed by: INTERNAL MEDICINE

## 2021-07-13 PROCEDURE — 93306 TTE W/DOPPLER COMPLETE: CPT | Mod: 26,,, | Performed by: INTERNAL MEDICINE

## 2021-07-13 PROCEDURE — 69210 REMOVE IMPACTED EAR WAX UNI: CPT | Mod: PBBFAC | Performed by: OTOLARYNGOLOGY

## 2021-07-13 PROCEDURE — 69210 PR REMOVAL IMPACTED CERUMEN REQUIRING INSTRUMENTATION, UNILATERAL: ICD-10-PCS | Mod: S$PBB,,, | Performed by: OTOLARYNGOLOGY

## 2021-07-13 PROCEDURE — 99999 PR PBB SHADOW E&M-EST. PATIENT-LVL III: CPT | Mod: PBBFAC,,, | Performed by: OTOLARYNGOLOGY

## 2021-07-13 PROCEDURE — 69210 REMOVE IMPACTED EAR WAX UNI: CPT | Mod: S$PBB,,, | Performed by: OTOLARYNGOLOGY

## 2021-07-13 PROCEDURE — 99499 NO LOS: ICD-10-PCS | Mod: S$PBB,,, | Performed by: OTOLARYNGOLOGY

## 2021-07-13 PROCEDURE — 99999 PR PBB SHADOW E&M-EST. PATIENT-LVL III: ICD-10-PCS | Mod: PBBFAC,,, | Performed by: OTOLARYNGOLOGY

## 2021-07-13 PROCEDURE — 93306 TTE W/DOPPLER COMPLETE: CPT

## 2021-07-13 RX ORDER — AMOXICILLIN 500 MG/1
CAPSULE ORAL
Status: ON HOLD | COMMUNITY
Start: 2021-06-01 | End: 2021-12-16

## 2021-07-14 ENCOUNTER — PATIENT MESSAGE (OUTPATIENT)
Dept: CARDIOLOGY | Facility: CLINIC | Age: 86
End: 2021-07-14

## 2021-08-30 ENCOUNTER — PATIENT OUTREACH (OUTPATIENT)
Dept: ADMINISTRATIVE | Facility: OTHER | Age: 86
End: 2021-08-30

## 2021-09-21 ENCOUNTER — INFUSION (OUTPATIENT)
Dept: INFECTIOUS DISEASES | Facility: HOSPITAL | Age: 86
End: 2021-09-21
Attending: INTERNAL MEDICINE
Payer: MEDICARE

## 2021-09-21 VITALS
DIASTOLIC BLOOD PRESSURE: 62 MMHG | SYSTOLIC BLOOD PRESSURE: 124 MMHG | WEIGHT: 188.94 LBS | TEMPERATURE: 99 F | HEIGHT: 73 IN | OXYGEN SATURATION: 98 % | BODY MASS INDEX: 25.04 KG/M2 | RESPIRATION RATE: 16 BRPM | HEART RATE: 67 BPM

## 2021-09-21 DIAGNOSIS — M81.0 AGE RELATED OSTEOPOROSIS, UNSPECIFIED PATHOLOGICAL FRACTURE PRESENCE: Primary | ICD-10-CM

## 2021-09-21 PROCEDURE — 63600175 PHARM REV CODE 636 W HCPCS: Mod: JG | Performed by: INTERNAL MEDICINE

## 2021-09-21 PROCEDURE — 96372 THER/PROPH/DIAG INJ SC/IM: CPT

## 2021-09-21 RX ADMIN — DENOSUMAB 60 MG: 60 INJECTION SUBCUTANEOUS at 03:09

## 2021-09-24 ENCOUNTER — IMMUNIZATION (OUTPATIENT)
Dept: PRIMARY CARE CLINIC | Facility: CLINIC | Age: 86
End: 2021-09-24
Payer: MEDICARE

## 2021-09-24 DIAGNOSIS — Z23 NEED FOR VACCINATION: Primary | ICD-10-CM

## 2021-09-24 PROCEDURE — 91300 COVID-19, MRNA, LNP-S, PF, 30 MCG/0.3 ML DOSE VACCINE: CPT | Mod: PBBFAC,PN

## 2021-11-11 DIAGNOSIS — I10 ESSENTIAL HYPERTENSION: ICD-10-CM

## 2021-11-11 RX ORDER — LOSARTAN POTASSIUM 25 MG/1
TABLET ORAL
Qty: 90 TABLET | Refills: 0 | Status: SHIPPED | OUTPATIENT
Start: 2021-11-11 | End: 2022-01-31

## 2021-12-15 ENCOUNTER — HOSPITAL ENCOUNTER (INPATIENT)
Facility: HOSPITAL | Age: 86
LOS: 2 days | Discharge: HOME HEALTH | DRG: 291 | End: 2021-12-17
Attending: HOSPITALIST | Admitting: HOSPITALIST
Payer: MEDICARE

## 2021-12-15 DIAGNOSIS — I35.0 NONRHEUMATIC AORTIC VALVE STENOSIS: Chronic | ICD-10-CM

## 2021-12-15 DIAGNOSIS — R00.0 TACHYCARDIA: ICD-10-CM

## 2021-12-15 DIAGNOSIS — I10 HYPERTENSION, UNSPECIFIED TYPE: ICD-10-CM

## 2021-12-15 DIAGNOSIS — N18.32 STAGE 3B CHRONIC KIDNEY DISEASE: ICD-10-CM

## 2021-12-15 DIAGNOSIS — D64.9 NORMOCYTIC ANEMIA: Primary | ICD-10-CM

## 2021-12-15 DIAGNOSIS — R06.02 SHORTNESS OF BREATH: ICD-10-CM

## 2021-12-15 DIAGNOSIS — I50.9 CHF (CONGESTIVE HEART FAILURE): ICD-10-CM

## 2021-12-15 DIAGNOSIS — D50.9 IRON DEFICIENCY ANEMIA, UNSPECIFIED IRON DEFICIENCY ANEMIA TYPE: ICD-10-CM

## 2021-12-15 PROBLEM — N17.9 AKI (ACUTE KIDNEY INJURY): Status: ACTIVE | Noted: 2021-12-15

## 2021-12-15 PROBLEM — R26.9 GAIT ABNORMALITY: Chronic | Status: ACTIVE | Noted: 2017-11-14

## 2021-12-15 PROBLEM — E78.49 OTHER HYPERLIPIDEMIA: Chronic | Status: ACTIVE | Noted: 2018-03-06

## 2021-12-15 LAB
ABO + RH BLD: NORMAL
ALBUMIN SERPL BCP-MCNC: 3.6 G/DL (ref 3.5–5.2)
ALP SERPL-CCNC: 49 U/L (ref 55–135)
ALT SERPL W/O P-5'-P-CCNC: 23 U/L (ref 10–44)
ANION GAP SERPL CALC-SCNC: 13 MMOL/L (ref 8–16)
ASCENDING AORTA: 2.41 CM
AST SERPL-CCNC: 31 U/L (ref 10–40)
AV INDEX (PROSTH): 0.28
AV PEAK GRADIENT: 67 MMHG
AV VALVE AREA: 0.85 CM2
AV VELOCITY RATIO: 0.21
BASOPHILS # BLD AUTO: 0.06 K/UL (ref 0–0.2)
BASOPHILS NFR BLD: 1 % (ref 0–1.9)
BILIRUB SERPL-MCNC: 0.8 MG/DL (ref 0.1–1)
BLD GP AB SCN CELLS X3 SERPL QL: NORMAL
BNP SERPL-MCNC: 527 PG/ML (ref 0–99)
BSA FOR ECHO PROCEDURE: 2.05 M2
BUN SERPL-MCNC: 20 MG/DL (ref 10–30)
CALCIUM SERPL-MCNC: 8.5 MG/DL (ref 8.7–10.5)
CHLORIDE SERPL-SCNC: 98 MMOL/L (ref 95–110)
CO2 SERPL-SCNC: 21 MMOL/L (ref 23–29)
CREAT SERPL-MCNC: 1.6 MG/DL (ref 0.5–1.4)
CV ECHO LV RWT: 0.41 CM
DIFFERENTIAL METHOD: ABNORMAL
DOP CALC AO PEAK VEL: 4.1 M/S
DOP CALC AO VTI: 83 CM
DOP CALC LVOT AREA: 3 CM2
DOP CALC LVOT DIAMETER: 1.96 CM
DOP CALC LVOT PEAK VEL: 0.88 M/S
DOP CALC LVOT STROKE VOLUME: 70.84 CM3
DOP CALCLVOT PEAK VEL VTI: 23.49 CM
E WAVE DECELERATION TIME: 378.6 MSEC
E/A RATIO: 0.92
E/E' RATIO: 21.57 M/S
ECHO LV POSTERIOR WALL: 1.01 CM (ref 0.6–1.1)
EJECTION FRACTION: 55 %
EOSINOPHIL # BLD AUTO: 0.1 K/UL (ref 0–0.5)
EOSINOPHIL NFR BLD: 0.9 % (ref 0–8)
ERYTHROCYTE [DISTWIDTH] IN BLOOD BY AUTOMATED COUNT: 13 % (ref 11.5–14.5)
EST. GFR  (AFRICAN AMERICAN): 42.6 ML/MIN/1.73 M^2
EST. GFR  (NON AFRICAN AMERICAN): 36.9 ML/MIN/1.73 M^2
ESTIMATED AVG GLUCOSE: 103 MG/DL (ref 68–131)
FERRITIN SERPL-MCNC: 23 NG/ML (ref 20–300)
FRACTIONAL SHORTENING: 28 % (ref 28–44)
GLUCOSE SERPL-MCNC: 103 MG/DL (ref 70–110)
HAPTOGLOB SERPL-MCNC: 93 MG/DL (ref 30–250)
HBA1C MFR BLD: 5.2 % (ref 4–5.6)
HCT VFR BLD AUTO: 26 % (ref 40–54)
HGB BLD-MCNC: 8.4 G/DL (ref 14–18)
HR MV ECHO: 86 BPM
IMM GRANULOCYTES # BLD AUTO: 0.03 K/UL (ref 0–0.04)
IMM GRANULOCYTES NFR BLD AUTO: 0.5 % (ref 0–0.5)
INTERVENTRICULAR SEPTUM: 1.1 CM (ref 0.6–1.1)
IRON SERPL-MCNC: 20 UG/DL (ref 45–160)
LA MAJOR: 3.63 CM
LA MINOR: 4.79 CM
LA WIDTH: 3.56 CM
LEFT ATRIUM SIZE: 3.05 CM
LEFT ATRIUM VOLUME INDEX MOD: 46.3 ML/M2
LEFT ATRIUM VOLUME INDEX: 18.8 ML/M2
LEFT ATRIUM VOLUME MOD: 94 CM3
LEFT ATRIUM VOLUME: 38.12 CM3
LEFT INTERNAL DIMENSION IN SYSTOLE: 3.53 CM (ref 2.1–4)
LEFT VENTRICLE DIASTOLIC VOLUME INDEX: 56.45 ML/M2
LEFT VENTRICLE DIASTOLIC VOLUME: 114.59 ML
LEFT VENTRICLE MASS INDEX: 94 G/M2
LEFT VENTRICLE SYSTOLIC VOLUME INDEX: 25.5 ML/M2
LEFT VENTRICLE SYSTOLIC VOLUME: 51.77 ML
LEFT VENTRICULAR INTERNAL DIMENSION IN DIASTOLE: 4.93 CM (ref 3.5–6)
LEFT VENTRICULAR MASS: 191.2 G
LV LATERAL E/E' RATIO: 18.88 M/S
LV SEPTAL E/E' RATIO: 25.17 M/S
LYMPHOCYTES # BLD AUTO: 0.5 K/UL (ref 1–4.8)
LYMPHOCYTES NFR BLD: 8.9 % (ref 18–48)
MAGNESIUM SERPL-MCNC: 2.2 MG/DL (ref 1.6–2.6)
MCH RBC QN AUTO: 29.4 PG (ref 27–31)
MCHC RBC AUTO-ENTMCNC: 32.3 G/DL (ref 32–36)
MCV RBC AUTO: 91 FL (ref 82–98)
MONOCYTES # BLD AUTO: 0.5 K/UL (ref 0.3–1)
MONOCYTES NFR BLD: 8.9 % (ref 4–15)
MV MEAN GRADIENT: 7 MMHG
MV PEAK A VEL: 1.64 M/S
MV PEAK E VEL: 1.51 M/S
MV PEAK GRADIENT: 14 MMHG
MV STENOSIS PRESSURE HALF TIME: 109.8 MS
MV VALVE AREA P 1/2 METHOD: 2 CM2
NEUTROPHILS # BLD AUTO: 4.7 K/UL (ref 1.8–7.7)
NEUTROPHILS NFR BLD: 79.8 % (ref 38–73)
NRBC BLD-RTO: 0 /100 WBC
PHOSPHATE SERPL-MCNC: 3.2 MG/DL (ref 2.7–4.5)
PISA TR MAX VEL: 1.35 M/S
PLATELET # BLD AUTO: 311 K/UL (ref 150–450)
PMV BLD AUTO: 10.4 FL (ref 9.2–12.9)
POTASSIUM SERPL-SCNC: 3.7 MMOL/L (ref 3.5–5.1)
PROT SERPL-MCNC: 6.3 G/DL (ref 6–8.4)
RA MAJOR: 3.57 CM
RA PRESSURE: 3 MMHG
RA WIDTH: 3.72 CM
RBC # BLD AUTO: 2.86 M/UL (ref 4.6–6.2)
RETICS/RBC NFR AUTO: 2.6 % (ref 0.4–2)
RIGHT VENTRICULAR END-DIASTOLIC DIMENSION: 3.36 CM
RV TISSUE DOPPLER FREE WALL SYSTOLIC VELOCITY 1 (APICAL 4 CHAMBER VIEW): 14.9 CM/S
SATURATED IRON: 5 % (ref 20–50)
SINUS: 2.12 CM
SODIUM SERPL-SCNC: 132 MMOL/L (ref 136–145)
STJ: 2.16 CM
TDI LATERAL: 0.08 M/S
TDI SEPTAL: 0.06 M/S
TDI: 0.07 M/S
TOTAL IRON BINDING CAPACITY: 425 UG/DL (ref 250–450)
TR MAX PG: 7 MMHG
TRANSFERRIN SERPL-MCNC: 287 MG/DL (ref 200–375)
TRICUSPID ANNULAR PLANE SYSTOLIC EXCURSION: 1.99 CM
TV REST PULMONARY ARTERY PRESSURE: 10 MMHG
WBC # BLD AUTO: 5.85 K/UL (ref 3.9–12.7)

## 2021-12-15 PROCEDURE — 99900035 HC TECH TIME PER 15 MIN (STAT)

## 2021-12-15 PROCEDURE — 85025 COMPLETE CBC W/AUTO DIFF WBC: CPT | Performed by: STUDENT IN AN ORGANIZED HEALTH CARE EDUCATION/TRAINING PROGRAM

## 2021-12-15 PROCEDURE — 99223 1ST HOSP IP/OBS HIGH 75: CPT | Mod: GC,,, | Performed by: INTERNAL MEDICINE

## 2021-12-15 PROCEDURE — 25000242 PHARM REV CODE 250 ALT 637 W/ HCPCS: Performed by: STUDENT IN AN ORGANIZED HEALTH CARE EDUCATION/TRAINING PROGRAM

## 2021-12-15 PROCEDURE — 99223 PR INITIAL HOSPITAL CARE,LEVL III: ICD-10-PCS | Mod: GC,,, | Performed by: HOSPITALIST

## 2021-12-15 PROCEDURE — 20600001 HC STEP DOWN PRIVATE ROOM

## 2021-12-15 PROCEDURE — 84100 ASSAY OF PHOSPHORUS: CPT | Performed by: STUDENT IN AN ORGANIZED HEALTH CARE EDUCATION/TRAINING PROGRAM

## 2021-12-15 PROCEDURE — 97116 GAIT TRAINING THERAPY: CPT

## 2021-12-15 PROCEDURE — 85045 AUTOMATED RETICULOCYTE COUNT: CPT | Performed by: STUDENT IN AN ORGANIZED HEALTH CARE EDUCATION/TRAINING PROGRAM

## 2021-12-15 PROCEDURE — 97165 OT EVAL LOW COMPLEX 30 MIN: CPT

## 2021-12-15 PROCEDURE — 63600175 PHARM REV CODE 636 W HCPCS

## 2021-12-15 PROCEDURE — 25000003 PHARM REV CODE 250

## 2021-12-15 PROCEDURE — 93010 EKG 12-LEAD: ICD-10-PCS | Mod: ,,, | Performed by: INTERNAL MEDICINE

## 2021-12-15 PROCEDURE — 25500020 PHARM REV CODE 255: Performed by: INTERNAL MEDICINE

## 2021-12-15 PROCEDURE — 63600175 PHARM REV CODE 636 W HCPCS: Mod: JG | Performed by: STUDENT IN AN ORGANIZED HEALTH CARE EDUCATION/TRAINING PROGRAM

## 2021-12-15 PROCEDURE — 36415 COLL VENOUS BLD VENIPUNCTURE: CPT | Performed by: STUDENT IN AN ORGANIZED HEALTH CARE EDUCATION/TRAINING PROGRAM

## 2021-12-15 PROCEDURE — 82728 ASSAY OF FERRITIN: CPT | Performed by: STUDENT IN AN ORGANIZED HEALTH CARE EDUCATION/TRAINING PROGRAM

## 2021-12-15 PROCEDURE — 99223 1ST HOSP IP/OBS HIGH 75: CPT | Mod: GC,,, | Performed by: HOSPITALIST

## 2021-12-15 PROCEDURE — 86900 BLOOD TYPING SEROLOGIC ABO: CPT

## 2021-12-15 PROCEDURE — 36415 COLL VENOUS BLD VENIPUNCTURE: CPT | Performed by: INTERNAL MEDICINE

## 2021-12-15 PROCEDURE — 93005 ELECTROCARDIOGRAM TRACING: CPT

## 2021-12-15 PROCEDURE — 25000003 PHARM REV CODE 250: Performed by: STUDENT IN AN ORGANIZED HEALTH CARE EDUCATION/TRAINING PROGRAM

## 2021-12-15 PROCEDURE — 97162 PT EVAL MOD COMPLEX 30 MIN: CPT

## 2021-12-15 PROCEDURE — 94761 N-INVAS EAR/PLS OXIMETRY MLT: CPT

## 2021-12-15 PROCEDURE — 83010 ASSAY OF HAPTOGLOBIN QUANT: CPT

## 2021-12-15 PROCEDURE — 80053 COMPREHEN METABOLIC PANEL: CPT | Performed by: STUDENT IN AN ORGANIZED HEALTH CARE EDUCATION/TRAINING PROGRAM

## 2021-12-15 PROCEDURE — 83735 ASSAY OF MAGNESIUM: CPT | Performed by: STUDENT IN AN ORGANIZED HEALTH CARE EDUCATION/TRAINING PROGRAM

## 2021-12-15 PROCEDURE — 94640 AIRWAY INHALATION TREATMENT: CPT

## 2021-12-15 PROCEDURE — 99223 PR INITIAL HOSPITAL CARE,LEVL III: ICD-10-PCS | Mod: GC,,, | Performed by: INTERNAL MEDICINE

## 2021-12-15 PROCEDURE — 27000221 HC OXYGEN, UP TO 24 HOURS

## 2021-12-15 PROCEDURE — 93010 ELECTROCARDIOGRAM REPORT: CPT | Mod: ,,, | Performed by: INTERNAL MEDICINE

## 2021-12-15 PROCEDURE — 84466 ASSAY OF TRANSFERRIN: CPT | Performed by: STUDENT IN AN ORGANIZED HEALTH CARE EDUCATION/TRAINING PROGRAM

## 2021-12-15 PROCEDURE — 83880 ASSAY OF NATRIURETIC PEPTIDE: CPT

## 2021-12-15 PROCEDURE — 97530 THERAPEUTIC ACTIVITIES: CPT

## 2021-12-15 PROCEDURE — 83036 HEMOGLOBIN GLYCOSYLATED A1C: CPT

## 2021-12-15 RX ORDER — PRAVASTATIN SODIUM 40 MG/1
40 TABLET ORAL DAILY
Status: DISCONTINUED | OUTPATIENT
Start: 2021-12-15 | End: 2021-12-17 | Stop reason: HOSPADM

## 2021-12-15 RX ORDER — ACETAMINOPHEN 325 MG/1
650 TABLET ORAL EVERY 4 HOURS PRN
Status: DISCONTINUED | OUTPATIENT
Start: 2021-12-15 | End: 2021-12-17 | Stop reason: HOSPADM

## 2021-12-15 RX ORDER — IPRATROPIUM BROMIDE AND ALBUTEROL SULFATE 2.5; .5 MG/3ML; MG/3ML
3 SOLUTION RESPIRATORY (INHALATION) EVERY 4 HOURS PRN
Status: DISCONTINUED | OUTPATIENT
Start: 2021-12-15 | End: 2021-12-17 | Stop reason: HOSPADM

## 2021-12-15 RX ORDER — GUAIFENESIN AND PHENYLEPHRINE HCL 400; 10 MG/1; MG/1
1 TABLET ORAL DAILY
Status: ON HOLD | COMMUNITY
End: 2023-07-02 | Stop reason: HOSPADM

## 2021-12-15 RX ORDER — TALC
6 POWDER (GRAM) TOPICAL NIGHTLY PRN
Status: DISCONTINUED | OUTPATIENT
Start: 2021-12-15 | End: 2021-12-17 | Stop reason: HOSPADM

## 2021-12-15 RX ORDER — LANOLIN ALCOHOL/MO/W.PET/CERES
1 CREAM (GRAM) TOPICAL EVERY MORNING
Status: DISCONTINUED | OUTPATIENT
Start: 2021-12-15 | End: 2021-12-17 | Stop reason: HOSPADM

## 2021-12-15 RX ORDER — ASCORBIC ACID 250 MG
250 TABLET ORAL DAILY
Status: DISCONTINUED | OUTPATIENT
Start: 2021-12-15 | End: 2021-12-15

## 2021-12-15 RX ORDER — ONDANSETRON 2 MG/ML
4 INJECTION INTRAMUSCULAR; INTRAVENOUS EVERY 8 HOURS PRN
Status: DISCONTINUED | OUTPATIENT
Start: 2021-12-15 | End: 2021-12-17 | Stop reason: HOSPADM

## 2021-12-15 RX ORDER — LANOLIN ALCOHOL/MO/W.PET/CERES
1 CREAM (GRAM) TOPICAL DAILY
Status: DISCONTINUED | OUTPATIENT
Start: 2021-12-15 | End: 2021-12-15

## 2021-12-15 RX ORDER — LOSARTAN POTASSIUM 25 MG/1
25 TABLET ORAL DAILY
Status: DISCONTINUED | OUTPATIENT
Start: 2021-12-15 | End: 2021-12-17 | Stop reason: HOSPADM

## 2021-12-15 RX ORDER — NAPROXEN SODIUM 220 MG
220 TABLET ORAL DAILY PRN
Status: ON HOLD | COMMUNITY
End: 2021-12-16 | Stop reason: HOSPADM

## 2021-12-15 RX ORDER — FUROSEMIDE 10 MG/ML
40 INJECTION INTRAMUSCULAR; INTRAVENOUS 2 TIMES DAILY
Status: DISCONTINUED | OUTPATIENT
Start: 2021-12-15 | End: 2021-12-15

## 2021-12-15 RX ORDER — HEPARIN SODIUM 5000 [USP'U]/ML
5000 INJECTION, SOLUTION INTRAVENOUS; SUBCUTANEOUS EVERY 8 HOURS
Status: DISCONTINUED | OUTPATIENT
Start: 2021-12-15 | End: 2021-12-17 | Stop reason: HOSPADM

## 2021-12-15 RX ORDER — FUROSEMIDE 20 MG/1
20 TABLET ORAL DAILY
Status: DISCONTINUED | OUTPATIENT
Start: 2021-12-16 | End: 2021-12-17 | Stop reason: HOSPADM

## 2021-12-15 RX ORDER — POLYETHYLENE GLYCOL 3350 17 G/17G
17 POWDER, FOR SOLUTION ORAL DAILY
Status: DISCONTINUED | OUTPATIENT
Start: 2021-12-15 | End: 2021-12-17 | Stop reason: HOSPADM

## 2021-12-15 RX ORDER — SODIUM CHLORIDE 0.9 % (FLUSH) 0.9 %
10 SYRINGE (ML) INJECTION
Status: DISCONTINUED | OUTPATIENT
Start: 2021-12-15 | End: 2021-12-17 | Stop reason: HOSPADM

## 2021-12-15 RX ORDER — ASCORBIC ACID 250 MG
250 TABLET ORAL EVERY MORNING
Status: DISCONTINUED | OUTPATIENT
Start: 2021-12-15 | End: 2021-12-17 | Stop reason: HOSPADM

## 2021-12-15 RX ORDER — METOPROLOL SUCCINATE 25 MG/1
25 TABLET, EXTENDED RELEASE ORAL DAILY
Status: DISCONTINUED | OUTPATIENT
Start: 2021-12-15 | End: 2021-12-17 | Stop reason: HOSPADM

## 2021-12-15 RX ORDER — FUROSEMIDE 40 MG/1
40 TABLET ORAL 2 TIMES DAILY
Status: DISCONTINUED | OUTPATIENT
Start: 2021-12-15 | End: 2021-12-15

## 2021-12-15 RX ORDER — ACETAMINOPHEN 500 MG
500-1000 TABLET ORAL DAILY PRN
COMMUNITY

## 2021-12-15 RX ORDER — FUROSEMIDE 10 MG/ML
40 INJECTION INTRAMUSCULAR; INTRAVENOUS ONCE
Status: COMPLETED | OUTPATIENT
Start: 2021-12-15 | End: 2021-12-15

## 2021-12-15 RX ADMIN — HEPARIN SODIUM 5000 UNITS: 5000 INJECTION INTRAVENOUS; SUBCUTANEOUS at 01:12

## 2021-12-15 RX ADMIN — Medication 250 MG: at 01:12

## 2021-12-15 RX ADMIN — POTASSIUM BICARBONATE 25 MEQ: 978 TABLET, EFFERVESCENT ORAL at 01:12

## 2021-12-15 RX ADMIN — METOPROLOL SUCCINATE 25 MG: 25 TABLET, EXTENDED RELEASE ORAL at 09:12

## 2021-12-15 RX ADMIN — FUROSEMIDE 40 MG: 40 INJECTION, SOLUTION INTRAMUSCULAR; INTRAVENOUS at 09:12

## 2021-12-15 RX ADMIN — HUMAN ALBUMIN MICROSPHERES AND PERFLUTREN 0.66 MG: 10; .22 INJECTION, SOLUTION INTRAVENOUS at 07:12

## 2021-12-15 RX ADMIN — IPRATROPIUM BROMIDE AND ALBUTEROL SULFATE 3 ML: .5; 3 SOLUTION RESPIRATORY (INHALATION) at 10:12

## 2021-12-15 RX ADMIN — SODIUM CHLORIDE 975 MG: 0.9 INJECTION, SOLUTION INTRAVENOUS at 11:12

## 2021-12-15 RX ADMIN — Medication 6 MG: at 10:12

## 2021-12-15 RX ADMIN — FERROUS SULFATE TAB 325 MG (65 MG ELEMENTAL FE) 1 EACH: 325 (65 FE) TAB at 01:12

## 2021-12-15 RX ADMIN — LOSARTAN POTASSIUM 25 MG: 25 TABLET, FILM COATED ORAL at 09:12

## 2021-12-15 RX ADMIN — HEPARIN SODIUM 5000 UNITS: 5000 INJECTION INTRAVENOUS; SUBCUTANEOUS at 09:12

## 2021-12-15 RX ADMIN — PRAVASTATIN SODIUM 40 MG: 40 TABLET ORAL at 09:12

## 2021-12-15 RX ADMIN — IRON DEXTRAN 25 MG: 50 INJECTION INTRAMUSCULAR; INTRAVENOUS at 10:12

## 2021-12-16 DIAGNOSIS — I50.33 ACUTE ON CHRONIC DIASTOLIC CONGESTIVE HEART FAILURE: Primary | ICD-10-CM

## 2021-12-16 PROBLEM — D50.9 IRON DEFICIENCY ANEMIA: Status: ACTIVE | Noted: 2021-12-15

## 2021-12-16 LAB
ALBUMIN SERPL BCP-MCNC: 3.4 G/DL (ref 3.5–5.2)
ALP SERPL-CCNC: 40 U/L (ref 55–135)
ALT SERPL W/O P-5'-P-CCNC: 21 U/L (ref 10–44)
ANION GAP SERPL CALC-SCNC: 12 MMOL/L (ref 8–16)
AST SERPL-CCNC: 44 U/L (ref 10–40)
BASOPHILS # BLD AUTO: 0.06 K/UL (ref 0–0.2)
BASOPHILS NFR BLD: 1 % (ref 0–1.9)
BILIRUB SERPL-MCNC: 0.7 MG/DL (ref 0.1–1)
BUN SERPL-MCNC: 24 MG/DL (ref 10–30)
CALCIUM SERPL-MCNC: 8.3 MG/DL (ref 8.7–10.5)
CHLORIDE SERPL-SCNC: 101 MMOL/L (ref 95–110)
CO2 SERPL-SCNC: 25 MMOL/L (ref 23–29)
CREAT SERPL-MCNC: 1.6 MG/DL (ref 0.5–1.4)
DIFFERENTIAL METHOD: ABNORMAL
EOSINOPHIL # BLD AUTO: 0.1 K/UL (ref 0–0.5)
EOSINOPHIL NFR BLD: 2.4 % (ref 0–8)
ERYTHROCYTE [DISTWIDTH] IN BLOOD BY AUTOMATED COUNT: 13.2 % (ref 11.5–14.5)
EST. GFR  (AFRICAN AMERICAN): 42.6 ML/MIN/1.73 M^2
EST. GFR  (NON AFRICAN AMERICAN): 36.9 ML/MIN/1.73 M^2
GLUCOSE SERPL-MCNC: 105 MG/DL (ref 70–110)
HCT VFR BLD AUTO: 24.7 % (ref 40–54)
HGB BLD-MCNC: 8.2 G/DL (ref 14–18)
IMM GRANULOCYTES # BLD AUTO: 0.04 K/UL (ref 0–0.04)
IMM GRANULOCYTES NFR BLD AUTO: 0.7 % (ref 0–0.5)
LYMPHOCYTES # BLD AUTO: 0.8 K/UL (ref 1–4.8)
LYMPHOCYTES NFR BLD: 12.7 % (ref 18–48)
MAGNESIUM SERPL-MCNC: 2.2 MG/DL (ref 1.6–2.6)
MCH RBC QN AUTO: 30.3 PG (ref 27–31)
MCHC RBC AUTO-ENTMCNC: 33.2 G/DL (ref 32–36)
MCV RBC AUTO: 91 FL (ref 82–98)
MONOCYTES # BLD AUTO: 0.7 K/UL (ref 0.3–1)
MONOCYTES NFR BLD: 11.3 % (ref 4–15)
NEUTROPHILS # BLD AUTO: 4.3 K/UL (ref 1.8–7.7)
NEUTROPHILS NFR BLD: 71.9 % (ref 38–73)
NRBC BLD-RTO: 0 /100 WBC
PHOSPHATE SERPL-MCNC: 3.4 MG/DL (ref 2.7–4.5)
PLATELET # BLD AUTO: 273 K/UL (ref 150–450)
PMV BLD AUTO: 10.2 FL (ref 9.2–12.9)
POTASSIUM SERPL-SCNC: 3.5 MMOL/L (ref 3.5–5.1)
PROT SERPL-MCNC: 6 G/DL (ref 6–8.4)
RBC # BLD AUTO: 2.71 M/UL (ref 4.6–6.2)
SODIUM SERPL-SCNC: 138 MMOL/L (ref 136–145)
WBC # BLD AUTO: 5.92 K/UL (ref 3.9–12.7)

## 2021-12-16 PROCEDURE — 94761 N-INVAS EAR/PLS OXIMETRY MLT: CPT

## 2021-12-16 PROCEDURE — 85025 COMPLETE CBC W/AUTO DIFF WBC: CPT | Performed by: STUDENT IN AN ORGANIZED HEALTH CARE EDUCATION/TRAINING PROGRAM

## 2021-12-16 PROCEDURE — 84100 ASSAY OF PHOSPHORUS: CPT | Performed by: STUDENT IN AN ORGANIZED HEALTH CARE EDUCATION/TRAINING PROGRAM

## 2021-12-16 PROCEDURE — 99233 PR SUBSEQUENT HOSPITAL CARE,LEVL III: ICD-10-PCS | Mod: ,,, | Performed by: INTERNAL MEDICINE

## 2021-12-16 PROCEDURE — 80053 COMPREHEN METABOLIC PANEL: CPT | Performed by: STUDENT IN AN ORGANIZED HEALTH CARE EDUCATION/TRAINING PROGRAM

## 2021-12-16 PROCEDURE — 63600175 PHARM REV CODE 636 W HCPCS

## 2021-12-16 PROCEDURE — 25000003 PHARM REV CODE 250: Performed by: STUDENT IN AN ORGANIZED HEALTH CARE EDUCATION/TRAINING PROGRAM

## 2021-12-16 PROCEDURE — 25000242 PHARM REV CODE 250 ALT 637 W/ HCPCS: Performed by: STUDENT IN AN ORGANIZED HEALTH CARE EDUCATION/TRAINING PROGRAM

## 2021-12-16 PROCEDURE — 20600001 HC STEP DOWN PRIVATE ROOM

## 2021-12-16 PROCEDURE — 25000003 PHARM REV CODE 250

## 2021-12-16 PROCEDURE — 83735 ASSAY OF MAGNESIUM: CPT | Performed by: STUDENT IN AN ORGANIZED HEALTH CARE EDUCATION/TRAINING PROGRAM

## 2021-12-16 PROCEDURE — 36415 COLL VENOUS BLD VENIPUNCTURE: CPT | Performed by: STUDENT IN AN ORGANIZED HEALTH CARE EDUCATION/TRAINING PROGRAM

## 2021-12-16 PROCEDURE — 99233 SBSQ HOSP IP/OBS HIGH 50: CPT | Mod: ,,, | Performed by: INTERNAL MEDICINE

## 2021-12-16 PROCEDURE — 94640 AIRWAY INHALATION TREATMENT: CPT

## 2021-12-16 RX ORDER — METOPROLOL SUCCINATE 25 MG/1
25 TABLET, EXTENDED RELEASE ORAL DAILY
Qty: 90 TABLET | Refills: 3 | Status: SHIPPED | OUTPATIENT
Start: 2021-12-16 | End: 2021-12-17 | Stop reason: SDUPTHER

## 2021-12-16 RX ORDER — FUROSEMIDE 20 MG/1
20 TABLET ORAL DAILY
Qty: 90 TABLET | Refills: 3 | Status: SHIPPED | OUTPATIENT
Start: 2021-12-16 | End: 2021-12-17 | Stop reason: SDUPTHER

## 2021-12-16 RX ORDER — ASCORBIC ACID 250 MG
250 TABLET ORAL EVERY MORNING
Qty: 30 TABLET | Refills: 2 | Status: SHIPPED | OUTPATIENT
Start: 2021-12-17 | End: 2021-12-17

## 2021-12-16 RX ORDER — POLYETHYLENE GLYCOL 3350 17 G/17G
17 POWDER, FOR SOLUTION ORAL 2 TIMES DAILY PRN
Refills: 0
Start: 2021-12-16 | End: 2021-12-17 | Stop reason: SDUPTHER

## 2021-12-16 RX ORDER — ASPIRIN 81 MG/1
81 TABLET ORAL DAILY
Status: DISCONTINUED | OUTPATIENT
Start: 2021-12-17 | End: 2021-12-17 | Stop reason: HOSPADM

## 2021-12-16 RX ORDER — LANOLIN ALCOHOL/MO/W.PET/CERES
1 CREAM (GRAM) TOPICAL EVERY MORNING
Qty: 30 TABLET | Refills: 2 | Status: SHIPPED | OUTPATIENT
Start: 2021-12-17 | End: 2021-12-17

## 2021-12-16 RX ADMIN — LOSARTAN POTASSIUM 25 MG: 25 TABLET, FILM COATED ORAL at 09:12

## 2021-12-16 RX ADMIN — HEPARIN SODIUM 5000 UNITS: 5000 INJECTION INTRAVENOUS; SUBCUTANEOUS at 02:12

## 2021-12-16 RX ADMIN — Medication 250 MG: at 06:12

## 2021-12-16 RX ADMIN — POTASSIUM BICARBONATE 50 MEQ: 978 TABLET, EFFERVESCENT ORAL at 09:12

## 2021-12-16 RX ADMIN — IPRATROPIUM BROMIDE AND ALBUTEROL SULFATE 3 ML: .5; 3 SOLUTION RESPIRATORY (INHALATION) at 05:12

## 2021-12-16 RX ADMIN — FERROUS SULFATE TAB 325 MG (65 MG ELEMENTAL FE) 1 EACH: 325 (65 FE) TAB at 06:12

## 2021-12-16 RX ADMIN — Medication 6 MG: at 11:12

## 2021-12-16 RX ADMIN — HEPARIN SODIUM 5000 UNITS: 5000 INJECTION INTRAVENOUS; SUBCUTANEOUS at 06:12

## 2021-12-16 RX ADMIN — PRAVASTATIN SODIUM 40 MG: 40 TABLET ORAL at 09:12

## 2021-12-16 RX ADMIN — FUROSEMIDE 20 MG: 20 TABLET ORAL at 09:12

## 2021-12-16 RX ADMIN — METOPROLOL SUCCINATE 25 MG: 25 TABLET, EXTENDED RELEASE ORAL at 09:12

## 2021-12-16 RX ADMIN — HEPARIN SODIUM 5000 UNITS: 5000 INJECTION INTRAVENOUS; SUBCUTANEOUS at 09:12

## 2021-12-17 ENCOUNTER — PATIENT MESSAGE (OUTPATIENT)
Dept: INTERNAL MEDICINE | Facility: CLINIC | Age: 86
End: 2021-12-17
Payer: MEDICARE

## 2021-12-17 VITALS
BODY MASS INDEX: 26.82 KG/M2 | HEIGHT: 70 IN | DIASTOLIC BLOOD PRESSURE: 57 MMHG | SYSTOLIC BLOOD PRESSURE: 111 MMHG | RESPIRATION RATE: 18 BRPM | WEIGHT: 187.38 LBS | OXYGEN SATURATION: 96 % | HEART RATE: 67 BPM | TEMPERATURE: 98 F

## 2021-12-17 LAB
ALBUMIN SERPL BCP-MCNC: 3.6 G/DL (ref 3.5–5.2)
ALP SERPL-CCNC: 41 U/L (ref 55–135)
ALT SERPL W/O P-5'-P-CCNC: 26 U/L (ref 10–44)
ANION GAP SERPL CALC-SCNC: 13 MMOL/L (ref 8–16)
AST SERPL-CCNC: 50 U/L (ref 10–40)
BASOPHILS # BLD AUTO: 0.09 K/UL (ref 0–0.2)
BASOPHILS NFR BLD: 1.2 % (ref 0–1.9)
BILIRUB SERPL-MCNC: 0.6 MG/DL (ref 0.1–1)
BUN SERPL-MCNC: 23 MG/DL (ref 10–30)
CALCIUM SERPL-MCNC: 8.8 MG/DL (ref 8.7–10.5)
CHLORIDE SERPL-SCNC: 98 MMOL/L (ref 95–110)
CO2 SERPL-SCNC: 25 MMOL/L (ref 23–29)
CREAT SERPL-MCNC: 1.5 MG/DL (ref 0.5–1.4)
DIFFERENTIAL METHOD: ABNORMAL
EOSINOPHIL # BLD AUTO: 0.2 K/UL (ref 0–0.5)
EOSINOPHIL NFR BLD: 2.6 % (ref 0–8)
ERYTHROCYTE [DISTWIDTH] IN BLOOD BY AUTOMATED COUNT: 13.5 % (ref 11.5–14.5)
EST. GFR  (AFRICAN AMERICAN): 46.1 ML/MIN/1.73 M^2
EST. GFR  (NON AFRICAN AMERICAN): 39.8 ML/MIN/1.73 M^2
GLUCOSE SERPL-MCNC: 118 MG/DL (ref 70–110)
HCT VFR BLD AUTO: 26.3 % (ref 40–54)
HGB BLD-MCNC: 8.5 G/DL (ref 14–18)
IMM GRANULOCYTES # BLD AUTO: 0.06 K/UL (ref 0–0.04)
IMM GRANULOCYTES NFR BLD AUTO: 0.8 % (ref 0–0.5)
LYMPHOCYTES # BLD AUTO: 0.7 K/UL (ref 1–4.8)
LYMPHOCYTES NFR BLD: 8.5 % (ref 18–48)
MAGNESIUM SERPL-MCNC: 2.3 MG/DL (ref 1.6–2.6)
MCH RBC QN AUTO: 30.5 PG (ref 27–31)
MCHC RBC AUTO-ENTMCNC: 32.3 G/DL (ref 32–36)
MCV RBC AUTO: 94 FL (ref 82–98)
MONOCYTES # BLD AUTO: 0.8 K/UL (ref 0.3–1)
MONOCYTES NFR BLD: 9.9 % (ref 4–15)
NEUTROPHILS # BLD AUTO: 6 K/UL (ref 1.8–7.7)
NEUTROPHILS NFR BLD: 77 % (ref 38–73)
NRBC BLD-RTO: 0 /100 WBC
PHOSPHATE SERPL-MCNC: 3.7 MG/DL (ref 2.7–4.5)
PLATELET # BLD AUTO: 299 K/UL (ref 150–450)
PMV BLD AUTO: 10.6 FL (ref 9.2–12.9)
POTASSIUM SERPL-SCNC: 4 MMOL/L (ref 3.5–5.1)
PROT SERPL-MCNC: 6.4 G/DL (ref 6–8.4)
RBC # BLD AUTO: 2.79 M/UL (ref 4.6–6.2)
SODIUM SERPL-SCNC: 136 MMOL/L (ref 136–145)
WBC # BLD AUTO: 7.8 K/UL (ref 3.9–12.7)

## 2021-12-17 PROCEDURE — 25000003 PHARM REV CODE 250: Performed by: STUDENT IN AN ORGANIZED HEALTH CARE EDUCATION/TRAINING PROGRAM

## 2021-12-17 PROCEDURE — 36415 COLL VENOUS BLD VENIPUNCTURE: CPT | Performed by: STUDENT IN AN ORGANIZED HEALTH CARE EDUCATION/TRAINING PROGRAM

## 2021-12-17 PROCEDURE — 80053 COMPREHEN METABOLIC PANEL: CPT | Performed by: STUDENT IN AN ORGANIZED HEALTH CARE EDUCATION/TRAINING PROGRAM

## 2021-12-17 PROCEDURE — 99239 HOSP IP/OBS DSCHRG MGMT >30: CPT | Mod: ,,, | Performed by: INTERNAL MEDICINE

## 2021-12-17 PROCEDURE — 84100 ASSAY OF PHOSPHORUS: CPT | Performed by: STUDENT IN AN ORGANIZED HEALTH CARE EDUCATION/TRAINING PROGRAM

## 2021-12-17 PROCEDURE — 99239 PR HOSPITAL DISCHARGE DAY,>30 MIN: ICD-10-PCS | Mod: ,,, | Performed by: INTERNAL MEDICINE

## 2021-12-17 PROCEDURE — 97535 SELF CARE MNGMENT TRAINING: CPT

## 2021-12-17 PROCEDURE — 25000003 PHARM REV CODE 250

## 2021-12-17 PROCEDURE — 85025 COMPLETE CBC W/AUTO DIFF WBC: CPT | Performed by: STUDENT IN AN ORGANIZED HEALTH CARE EDUCATION/TRAINING PROGRAM

## 2021-12-17 PROCEDURE — 63600175 PHARM REV CODE 636 W HCPCS

## 2021-12-17 PROCEDURE — 83735 ASSAY OF MAGNESIUM: CPT | Performed by: STUDENT IN AN ORGANIZED HEALTH CARE EDUCATION/TRAINING PROGRAM

## 2021-12-17 RX ORDER — POLYETHYLENE GLYCOL 3350 17 G/17G
17 POWDER, FOR SOLUTION ORAL 2 TIMES DAILY PRN
Refills: 0
Start: 2021-12-17

## 2021-12-17 RX ORDER — FAMOTIDINE 20 MG/1
20 TABLET, FILM COATED ORAL DAILY
Status: DISCONTINUED | OUTPATIENT
Start: 2021-12-17 | End: 2021-12-17 | Stop reason: HOSPADM

## 2021-12-17 RX ORDER — LANOLIN ALCOHOL/MO/W.PET/CERES
1 CREAM (GRAM) TOPICAL EVERY MORNING
Qty: 60 TABLET | Refills: 2 | Status: SHIPPED | OUTPATIENT
Start: 2021-12-17 | End: 2022-01-12 | Stop reason: SDUPTHER

## 2021-12-17 RX ORDER — ASCORBIC ACID 250 MG
250 TABLET ORAL EVERY MORNING
Qty: 30 TABLET | Refills: 2 | Status: SHIPPED | OUTPATIENT
Start: 2021-12-17 | End: 2022-03-17

## 2021-12-17 RX ORDER — METOPROLOL SUCCINATE 25 MG/1
25 TABLET, EXTENDED RELEASE ORAL DAILY
Qty: 90 TABLET | Refills: 3 | Status: SHIPPED | OUTPATIENT
Start: 2021-12-17 | End: 2022-07-05 | Stop reason: SDUPTHER

## 2021-12-17 RX ORDER — FUROSEMIDE 20 MG/1
20 TABLET ORAL DAILY
Qty: 90 TABLET | Refills: 3 | Status: SHIPPED | OUTPATIENT
Start: 2021-12-17 | End: 2022-03-21 | Stop reason: SDUPTHER

## 2021-12-17 RX ADMIN — Medication 250 MG: at 06:12

## 2021-12-17 RX ADMIN — PRAVASTATIN SODIUM 40 MG: 40 TABLET ORAL at 09:12

## 2021-12-17 RX ADMIN — METOPROLOL SUCCINATE 25 MG: 25 TABLET, EXTENDED RELEASE ORAL at 09:12

## 2021-12-17 RX ADMIN — LOSARTAN POTASSIUM 25 MG: 25 TABLET, FILM COATED ORAL at 09:12

## 2021-12-17 RX ADMIN — FUROSEMIDE 20 MG: 20 TABLET ORAL at 09:12

## 2021-12-17 RX ADMIN — FERROUS SULFATE TAB 325 MG (65 MG ELEMENTAL FE) 1 EACH: 325 (65 FE) TAB at 06:12

## 2021-12-17 RX ADMIN — HEPARIN SODIUM 5000 UNITS: 5000 INJECTION INTRAVENOUS; SUBCUTANEOUS at 05:12

## 2021-12-17 RX ADMIN — FAMOTIDINE 20 MG: 20 TABLET ORAL at 09:12

## 2021-12-17 RX ADMIN — ASPIRIN 81 MG: 81 TABLET, COATED ORAL at 09:12

## 2021-12-18 ENCOUNTER — PATIENT MESSAGE (OUTPATIENT)
Dept: CARDIOLOGY | Facility: CLINIC | Age: 86
End: 2021-12-18
Payer: MEDICARE

## 2021-12-20 DIAGNOSIS — I35.0 NONRHEUMATIC AORTIC VALVE STENOSIS: Primary | ICD-10-CM

## 2021-12-20 DIAGNOSIS — I10 HYPERTENSION, UNSPECIFIED TYPE: Primary | ICD-10-CM

## 2021-12-20 DIAGNOSIS — I50.33 ACUTE ON CHRONIC DIASTOLIC CONGESTIVE HEART FAILURE: ICD-10-CM

## 2021-12-20 PROCEDURE — G0180 PR HOME HEALTH MD CERTIFICATION: ICD-10-PCS | Mod: ,,, | Performed by: INTERNAL MEDICINE

## 2021-12-20 PROCEDURE — G0180 MD CERTIFICATION HHA PATIENT: HCPCS | Mod: ,,, | Performed by: INTERNAL MEDICINE

## 2021-12-21 ENCOUNTER — DOCUMENTATION ONLY (OUTPATIENT)
Dept: TRANSPLANT | Facility: CLINIC | Age: 86
End: 2021-12-21
Payer: MEDICARE

## 2021-12-27 ENCOUNTER — PATIENT MESSAGE (OUTPATIENT)
Dept: CARDIOLOGY | Facility: CLINIC | Age: 86
End: 2021-12-27
Payer: MEDICARE

## 2021-12-27 ENCOUNTER — TELEPHONE (OUTPATIENT)
Dept: CARDIOLOGY | Facility: CLINIC | Age: 86
End: 2021-12-27
Payer: MEDICARE

## 2021-12-29 ENCOUNTER — CLINICAL SUPPORT (OUTPATIENT)
Dept: CARDIOLOGY | Facility: CLINIC | Age: 86
End: 2021-12-29
Payer: MEDICARE

## 2021-12-29 VITALS
HEART RATE: 71 BPM | WEIGHT: 180.19 LBS | SYSTOLIC BLOOD PRESSURE: 122 MMHG | DIASTOLIC BLOOD PRESSURE: 64 MMHG | BODY MASS INDEX: 25.86 KG/M2 | OXYGEN SATURATION: 98 %

## 2021-12-29 DIAGNOSIS — I10 PRIMARY HYPERTENSION: Chronic | ICD-10-CM

## 2021-12-29 DIAGNOSIS — I50.30 DIASTOLIC CONGESTIVE HEART FAILURE, UNSPECIFIED HF CHRONICITY: Primary | ICD-10-CM

## 2021-12-29 DIAGNOSIS — E78.49 OTHER HYPERLIPIDEMIA: Chronic | ICD-10-CM

## 2021-12-29 DIAGNOSIS — R60.0 EDEMA OF BOTH LOWER LEGS DUE TO PERIPHERAL VENOUS INSUFFICIENCY: ICD-10-CM

## 2021-12-29 DIAGNOSIS — I87.2 EDEMA OF BOTH LOWER LEGS DUE TO PERIPHERAL VENOUS INSUFFICIENCY: ICD-10-CM

## 2021-12-29 DIAGNOSIS — I35.0 NONRHEUMATIC AORTIC VALVE STENOSIS: Chronic | ICD-10-CM

## 2021-12-29 PROCEDURE — 99443 PR PHYSICIAN TELEPHONE EVALUATION 21-30 MIN: CPT | Mod: 95,,,

## 2021-12-29 PROCEDURE — 99443 PR PHYSICIAN TELEPHONE EVALUATION 21-30 MIN: ICD-10-PCS | Mod: 95,,,

## 2022-01-06 ENCOUNTER — OFFICE VISIT (OUTPATIENT)
Dept: INTERNAL MEDICINE | Facility: CLINIC | Age: 87
End: 2022-01-06
Payer: MEDICARE

## 2022-01-06 VITALS
HEIGHT: 70 IN | DIASTOLIC BLOOD PRESSURE: 62 MMHG | SYSTOLIC BLOOD PRESSURE: 136 MMHG | BODY MASS INDEX: 26.15 KG/M2 | HEART RATE: 70 BPM | OXYGEN SATURATION: 99 % | WEIGHT: 182.63 LBS

## 2022-01-06 DIAGNOSIS — I10 ESSENTIAL HYPERTENSION: ICD-10-CM

## 2022-01-06 DIAGNOSIS — R60.0 BILATERAL LOWER EXTREMITY EDEMA: ICD-10-CM

## 2022-01-06 DIAGNOSIS — I35.0 AORTIC STENOSIS, SEVERE: ICD-10-CM

## 2022-01-06 DIAGNOSIS — I50.9 CONGESTIVE HEART FAILURE, UNSPECIFIED HF CHRONICITY, UNSPECIFIED HEART FAILURE TYPE: Primary | ICD-10-CM

## 2022-01-06 PROCEDURE — 99214 OFFICE O/P EST MOD 30 MIN: CPT | Mod: S$PBB,,, | Performed by: INTERNAL MEDICINE

## 2022-01-06 PROCEDURE — 99999 PR PBB SHADOW E&M-EST. PATIENT-LVL IV: CPT | Mod: PBBFAC,,, | Performed by: INTERNAL MEDICINE

## 2022-01-06 PROCEDURE — 99214 PR OFFICE/OUTPT VISIT, EST, LEVL IV, 30-39 MIN: ICD-10-PCS | Mod: S$PBB,,, | Performed by: INTERNAL MEDICINE

## 2022-01-06 PROCEDURE — 99214 OFFICE O/P EST MOD 30 MIN: CPT | Mod: PBBFAC | Performed by: INTERNAL MEDICINE

## 2022-01-06 PROCEDURE — 99999 PR PBB SHADOW E&M-EST. PATIENT-LVL IV: ICD-10-PCS | Mod: PBBFAC,,, | Performed by: INTERNAL MEDICINE

## 2022-01-06 NOTE — PROGRESS NOTES
CC:  Hospital follow-up    HPI:  The patient is a 89-year-old male with aortic stenosis, hypertension, vitamin-D deficiency, colon polyps, spinal stenosis and hyperlipidemia presents today for follow-up from the hospital.  The patient was admitted for CH F. Was felt to be due to severe aortic stenosis.  He was treated with IV Lasix and transitioned to p.o. Lasix.  He also was found to have an iron deficiency and placed on iron supplementation.  The patient does have a follow-up appoint with Interventional Cardiology for possible TAVR procedure.    ROS:  Patient reports weight is stable.  He checks his weight every morning.  No chest pain.  No shortness of breath.  He sleeps on 1 pillow.  No nausea or vomiting.  No abdominal pain.  he does use MiraLax to 3 times a week to keep his bowels moving.    Physical exam:  General appearance:  No acute distress  Pulmonary:  Good inspiratory, expiratory breath sounds are heard.  Lungs clear auscultation.  Cardiovascular:  S1-S2 with a 2/6 systolic ejection murmur heard best at the right sternal border and a 1/6 systolic ejection murmur heard best at the left lower sternal border.  Extremities with.  GI:  Abdomen is nontender with normal bowel sounds.  Comments:  Patient about reviewed for 1227. His hemoglobin was 9.8 hematocrit was 30 a. His BUN was 16 and creatinine 1 4    Assessment:  1. CHF currently stable  2. Iron deficiency anemia  3. Aortic stenosis  4. Hypertension    Plan:  1. No change in medications at this time  2. The patient to follow up with us in 1 month for re-evaluation.

## 2022-01-07 ENCOUNTER — DOCUMENT SCAN (OUTPATIENT)
Dept: HOME HEALTH SERVICES | Facility: HOSPITAL | Age: 87
End: 2022-01-07
Payer: MEDICARE

## 2022-01-11 ENCOUNTER — TELEPHONE (OUTPATIENT)
Dept: INTERNAL MEDICINE | Facility: CLINIC | Age: 87
End: 2022-01-11
Payer: MEDICARE

## 2022-01-11 PROBLEM — I50.33 ACUTE ON CHRONIC DIASTOLIC CONGESTIVE HEART FAILURE: Status: ACTIVE | Noted: 2022-01-11

## 2022-01-11 PROBLEM — I34.2 NONRHEUMATIC MITRAL VALVE STENOSIS: Status: ACTIVE | Noted: 2022-01-11

## 2022-01-11 NOTE — TELEPHONE ENCOUNTER
----- Message from Desiree Hernandez sent at 1/11/2022 10:47 AM CST -----  Regarding: appointment access  Contact: spouse [phone]  Requesting a call back to help set up appointment.  Please call to advise

## 2022-01-12 ENCOUNTER — HOSPITAL ENCOUNTER (OUTPATIENT)
Dept: CARDIOLOGY | Facility: CLINIC | Age: 87
Discharge: HOME | End: 2022-01-12
Payer: MEDICARE

## 2022-01-12 ENCOUNTER — HOSPITAL ENCOUNTER (OUTPATIENT)
Dept: RADIOLOGY | Facility: HOSPITAL | Age: 87
Discharge: HOME | End: 2022-01-12
Attending: INTERNAL MEDICINE
Payer: MEDICARE

## 2022-01-12 ENCOUNTER — OFFICE VISIT (OUTPATIENT)
Dept: CARDIOLOGY | Facility: CLINIC | Age: 87
End: 2022-01-12
Payer: MEDICARE

## 2022-01-12 VITALS
HEIGHT: 70 IN | SYSTOLIC BLOOD PRESSURE: 152 MMHG | DIASTOLIC BLOOD PRESSURE: 66 MMHG | BODY MASS INDEX: 26.41 KG/M2 | WEIGHT: 184.5 LBS | HEART RATE: 56 BPM

## 2022-01-12 VITALS
BODY MASS INDEX: 26.43 KG/M2 | WEIGHT: 184.63 LBS | HEART RATE: 60 BPM | DIASTOLIC BLOOD PRESSURE: 69 MMHG | SYSTOLIC BLOOD PRESSURE: 145 MMHG | HEIGHT: 70 IN

## 2022-01-12 DIAGNOSIS — R60.0 EDEMA OF BOTH LOWER LEGS DUE TO PERIPHERAL VENOUS INSUFFICIENCY: ICD-10-CM

## 2022-01-12 DIAGNOSIS — I50.33 ACUTE ON CHRONIC DIASTOLIC CONGESTIVE HEART FAILURE: ICD-10-CM

## 2022-01-12 DIAGNOSIS — I87.2 EDEMA OF BOTH LOWER LEGS DUE TO PERIPHERAL VENOUS INSUFFICIENCY: ICD-10-CM

## 2022-01-12 DIAGNOSIS — I10 HYPERTENSION, UNSPECIFIED TYPE: ICD-10-CM

## 2022-01-12 DIAGNOSIS — D50.8 OTHER IRON DEFICIENCY ANEMIA: ICD-10-CM

## 2022-01-12 DIAGNOSIS — E78.49 OTHER HYPERLIPIDEMIA: Chronic | ICD-10-CM

## 2022-01-12 DIAGNOSIS — I50.22 CHRONIC SYSTOLIC CONGESTIVE HEART FAILURE: ICD-10-CM

## 2022-01-12 DIAGNOSIS — I34.2 NONRHEUMATIC MITRAL VALVE STENOSIS: ICD-10-CM

## 2022-01-12 DIAGNOSIS — I35.0 NONRHEUMATIC AORTIC VALVE STENOSIS: ICD-10-CM

## 2022-01-12 DIAGNOSIS — I10 PRIMARY HYPERTENSION: Chronic | ICD-10-CM

## 2022-01-12 DIAGNOSIS — I35.0 NONRHEUMATIC AORTIC VALVE STENOSIS: Primary | Chronic | ICD-10-CM

## 2022-01-12 DIAGNOSIS — I50.32 CHRONIC DIASTOLIC HEART FAILURE: Primary | ICD-10-CM

## 2022-01-12 DIAGNOSIS — I35.0 NONRHEUMATIC AORTIC VALVE STENOSIS: Chronic | ICD-10-CM

## 2022-01-12 PROCEDURE — 93005 ELECTROCARDIOGRAM TRACING: CPT | Mod: PBBFAC | Performed by: INTERNAL MEDICINE

## 2022-01-12 PROCEDURE — 99215 PR OFFICE/OUTPT VISIT, EST, LEVL V, 40-54 MIN: ICD-10-PCS | Mod: S$PBB,,, | Performed by: INTERNAL MEDICINE

## 2022-01-12 PROCEDURE — 99215 OFFICE O/P EST HI 40 MIN: CPT | Mod: S$PBB,,, | Performed by: INTERNAL MEDICINE

## 2022-01-12 PROCEDURE — 99999 PR PBB SHADOW E&M-EST. PATIENT-LVL V: CPT | Mod: PBBFAC,,, | Performed by: INTERNAL MEDICINE

## 2022-01-12 PROCEDURE — 71046 XR CHEST PA AND LATERAL: ICD-10-PCS | Mod: 26,,, | Performed by: RADIOLOGY

## 2022-01-12 PROCEDURE — 99214 OFFICE O/P EST MOD 30 MIN: CPT | Mod: S$PBB,,,

## 2022-01-12 PROCEDURE — 93010 ELECTROCARDIOGRAM REPORT: CPT | Mod: S$PBB,,, | Performed by: INTERNAL MEDICINE

## 2022-01-12 PROCEDURE — 93010 EKG 12-LEAD: ICD-10-PCS | Mod: S$PBB,,, | Performed by: INTERNAL MEDICINE

## 2022-01-12 PROCEDURE — 71046 X-RAY EXAM CHEST 2 VIEWS: CPT | Mod: TC

## 2022-01-12 PROCEDURE — 99214 PR OFFICE/OUTPT VISIT, EST, LEVL IV, 30-39 MIN: ICD-10-PCS | Mod: S$PBB,,,

## 2022-01-12 PROCEDURE — 71046 X-RAY EXAM CHEST 2 VIEWS: CPT | Mod: 26,,, | Performed by: RADIOLOGY

## 2022-01-12 PROCEDURE — 99215 OFFICE O/P EST HI 40 MIN: CPT | Mod: PBBFAC,25 | Performed by: INTERNAL MEDICINE

## 2022-01-12 PROCEDURE — 99999 PR PBB SHADOW E&M-EST. PATIENT-LVL V: ICD-10-PCS | Mod: PBBFAC,,, | Performed by: INTERNAL MEDICINE

## 2022-01-12 PROCEDURE — 99999 PR PBB SHADOW E&M-EST. PATIENT-LVL IV: ICD-10-PCS | Mod: PBBFAC,,,

## 2022-01-12 PROCEDURE — 99214 OFFICE O/P EST MOD 30 MIN: CPT | Mod: PBBFAC,25,27

## 2022-01-12 PROCEDURE — 99999 PR PBB SHADOW E&M-EST. PATIENT-LVL IV: CPT | Mod: PBBFAC,,,

## 2022-01-12 RX ORDER — LANOLIN ALCOHOL/MO/W.PET/CERES
1 CREAM (GRAM) TOPICAL EVERY MORNING
Qty: 90 TABLET | Refills: 3 | Status: SHIPPED | OUTPATIENT
Start: 2022-01-12 | End: 2022-07-11

## 2022-01-12 NOTE — PROGRESS NOTES
Subjective:   Patient ID:  Misha Marte Jr. is a 89 y.o. male who presents for follow-up of CHF    HPI:Patient is here for congestive heart failure/Severe AS-was in hospital with CHF and anemia.    The patient has no chest pain,  TIA, palpitations, syncope or pre-syncope.Patient does not exercise.No diuretic yet today.SOB/THOMAS minimal now        Review of Systems   Constitutional: Negative for chills, decreased appetite, diaphoresis, fever, malaise/fatigue, night sweats, weight gain and weight loss.   HENT: Negative for congestion, hoarse voice, nosebleeds, sore throat and tinnitus.    Eyes: Negative for blurred vision, double vision, vision loss in left eye, vision loss in right eye, visual disturbance and visual halos.   Cardiovascular: Positive for dyspnea on exertion. Negative for chest pain, claudication, cyanosis, irregular heartbeat, leg swelling, near-syncope, orthopnea, palpitations, paroxysmal nocturnal dyspnea and syncope.   Respiratory: Positive for shortness of breath. Negative for cough, hemoptysis, sleep disturbances due to breathing, snoring, sputum production and wheezing.    Endocrine: Negative for cold intolerance, heat intolerance, polydipsia, polyphagia and polyuria.   Hematologic/Lymphatic: Negative for adenopathy and bleeding problem. Does not bruise/bleed easily.   Skin: Negative for color change, dry skin, flushing, itching, nail changes, poor wound healing, rash, skin cancer, suspicious lesions and unusual hair distribution.   Musculoskeletal: Negative for arthritis, back pain, falls, gout, joint pain, joint swelling, muscle cramps, muscle weakness, myalgias and stiffness.   Gastrointestinal: Negative for abdominal pain, anorexia, change in bowel habit, constipation, diarrhea, dysphagia, heartburn, hematemesis, hematochezia, melena and vomiting.   Genitourinary: Negative for decreased libido, dysuria, hematuria, hesitancy and urgency.   Neurological: Negative for excessive daytime  "sleepiness, dizziness, focal weakness, headaches, light-headedness, loss of balance, numbness, paresthesias, seizures, sensory change, tremors, vertigo and weakness.   Psychiatric/Behavioral: Negative for altered mental status, depression, hallucinations, memory loss, substance abuse and suicidal ideas. The patient does not have insomnia and is not nervous/anxious.    Allergic/Immunologic: Negative for environmental allergies and hives.       Objective: BP (!) 152/66 (BP Location: Left arm, Patient Position: Sitting, BP Method: Medium (Automatic))   Pulse (!) 56   Ht 5' 10" (1.778 m)   Wt 83.7 kg (184 lb 8.4 oz)   BMI 26.48 kg/m²      Physical Exam  Constitutional:       General: He is not in acute distress.     Appearance: He is well-developed and well-nourished. He is not diaphoretic.   HENT:      Head: Normocephalic.   Eyes:      Extraocular Movements: EOM normal.      Pupils: Pupils are equal, round, and reactive to light.   Neck:      Thyroid: No thyromegaly.   Cardiovascular:      Rate and Rhythm: Normal rate and regular rhythm.      Pulses: Intact distal pulses.           Carotid pulses are 3+ on the right side and 3+ on the left side.       Radial pulses are 3+ on the right side and 3+ on the left side.        Femoral pulses are 3+ on the right side and 3+ on the left side.       Popliteal pulses are 3+ on the right side and 3+ on the left side.        Dorsalis pedis pulses are 3+ on the right side and 3+ on the left side.        Posterior tibial pulses are 3+ on the right side and 3+ on the left side.      Heart sounds: Murmur heard.    Harsh midsystolic murmur is present with a grade of 2/6 at the upper right sternal border radiating to the neck.  No friction rub. No gallop.    Pulmonary:      Effort: Pulmonary effort is normal. No respiratory distress.      Breath sounds: Examination of the right-lower field reveals decreased breath sounds. Examination of the left-lower field reveals decreased breath " sounds. Decreased breath sounds present. No wheezing or rales.   Chest:      Chest wall: No tenderness.   Abdominal:      General: There is no distension.      Palpations: Abdomen is soft. There is no mass.      Tenderness: There is no abdominal tenderness.   Musculoskeletal:         General: Normal range of motion.      Cervical back: Normal range of motion.      Right lower leg: Edema present.      Left lower leg: Edema present.   Lymphadenopathy:      Cervical: No cervical adenopathy.   Skin:     General: Skin is warm.      Nails: There is no clubbing or cyanosis.   Neurological:      Mental Status: He is alert and oriented to person, place, and time.   Psychiatric:         Mood and Affect: Mood and affect normal.         Speech: Speech normal.         Behavior: Behavior normal.         Thought Content: Thought content normal.         Cognition and Memory: Cognition and memory normal.         Judgment: Judgment normal.         Assessment:     1. Nonrheumatic aortic valve stenosis    2. Acute on chronic diastolic congestive heart failure    3. Nonrheumatic mitral valve stenosis    4. Other iron deficiency anemia    5. Chronic systolic congestive heart failure    6. 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   We reviewed meds in detail.  Reassured-Discussed goals, options, plan.  Needs TAVR  Will increase diuretic but want to see labs and CXR  Misha was seen today for follow-up.    Diagnoses and all orders for this visit:    Nonrheumatic aortic valve stenosis  -     BNP; Future; Expected date: 01/13/2022  -     X-Ray Chest PA And Lateral; Future; Expected date: 01/13/2022    Acute on chronic diastolic congestive heart failure  -     BNP; Future; Expected date: 01/13/2022  -     X-Ray Chest PA And Lateral; Future; Expected date: 01/13/2022    Nonrheumatic mitral valve stenosis    Other iron deficiency  anemia  -     ferrous sulfate (FEOSOL) Tab tablet; Take 1 tablet (1 each total) by mouth every morning. Take on an empty stomach with your vitamin C.    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

## 2022-01-12 NOTE — PROGRESS NOTES
[facility] Provider Note (Follow-up) Consult Note      HPI:     Patient denies SOB with rest or exertion, reports able to walk around home assisted with walker and pace slow. Presents to clinic with rolling walker and wheelchair               Patient sleeps on 1 number of pillows at BL, not requiring additional pillows              Patient wakes up SOB, has to get out of bed, associated cough- denies              Palpitations - denies              Dizzy, light-headed, pre-syncope or syncope- denies              Since discharge frequency of performing weights, home weight and weight change- performing daily weights, reports weight stable at 180lbs              Other information felt pertinent to HPI: Mr. Misha Marte is a 89 yo M with PMHx of severe aortic stenosis, HFpEF (EF 63% and grade II diastolic dysfunction on TTE 7/2021) HTN, HLD, Osteoporosis on Prolia, Vit D deficiency who presents for second [facility] visit. History obtained from pt and wife.     12/29/21: He reports prior to presenting to the ED during most recent admission he was tying his shoes and experienced sudden onset of SOB. He states he has not experienced any SOB since being discharged, and he reports today he is feeling back to his baseline, no complaints today. He reports he is able to perform all of his ADL. He is taking lasix 20mg daily and reports good UOP. He endorses chronic LE edema that improves with elevating LE. Denies worsening LE edema. Endorses compliance with low sodium/fluid diet.     1/12/22: Today he has no complaints. Denies SOB/worsening LE edema. Reports home weight has been stable. Today 179.2lbs. He has follow up appointment with Cardiologist, Dr. White, today. He is undergoing PT, and exercises with stationary bike 3x daily.     PHYSICAL:   Vitals:    01/12/22 1432   BP: (!) 145/69   Pulse: 60      Wt Readings from Last 3 Encounters:   01/12/22 83.7 kg (184 lb 9.6 oz)   01/06/22 82.9 kg (182 lb 10.4 oz)   12/29/21 81.7 kg  (180 lb 3.2 oz)       JVD: no   Heart rhythm: regular  Cardiac murmur: Yes - 3/6 systolic murmur heard across pericordium    S3: no  S4: no  Lungs: clear, diminished breath sounds in posterior basal lung fields, no appreciated crackles  Hepatojugular reflux: yes  Edema: yes, 2+ chronic BL LE edema      Lab Results   Component Value Date     (L) 01/12/2022    K 4.8 01/12/2022    MG 2.4 01/12/2022    CL 97 01/12/2022    CO2 26 01/12/2022    BUN 14 01/12/2022    CREATININE 1.1 01/12/2022    GLU 89 01/12/2022    CALCIUM 9.3 01/12/2022    AST 29 01/12/2022    ALT 22 01/12/2022    ALBUMIN 3.9 01/12/2022    ALBUMIN 3.9 01/12/2022    PROT 6.7 01/12/2022    BILITOT 0.6 01/12/2022     Lab Results   Component Value Date     (H) 12/27/2021     (H) 12/15/2021     (H) 12/14/2021       ASSESSMENT: Chronic diastolic HF    PLAN:      Patient Instructions:    Instruct the patient to notify this clinic if HH, a physician or an advanced care provider wants to change medication one of their HF medications    Activity and Diet restrictions:   o Recommend 2-3 gram sodium restriction and 1500cc- 2000cc fluid restriction.  o Encourage physical activity with graded exercise program.  o Requested patient to weigh themselves daily, and to notify us if their weight increases by more than 3 lbs in 1 day or 5 lbs in 3 days.    Assigned dry weight on home scale: 179-180lbs  Medication changes (include current dose and changed dose): NYHA class I-II symptoms. No med changes today. CXR ordered by Dr. White for possible diuretic adjustment.  Upcoming labs and date anticipated: Audio visit in 2 weeks for follow up    Jami Kirby PA-C

## 2022-01-12 NOTE — PATIENT INSTRUCTIONS
Discussed diet , achieving and maintaining ideal body weight, and exercise.   We reviewed meds in detail.  Reassured-Discussed goals, options, plan.  Needs TAVR  Will increase diuretic but want to see labs and CXR

## 2022-01-14 ENCOUNTER — PATIENT MESSAGE (OUTPATIENT)
Dept: CARDIOLOGY | Facility: CLINIC | Age: 87
End: 2022-01-14
Payer: MEDICARE

## 2022-01-18 ENCOUNTER — EXTERNAL HOME HEALTH (OUTPATIENT)
Dept: HOME HEALTH SERVICES | Facility: HOSPITAL | Age: 87
End: 2022-01-18
Payer: MEDICARE

## 2022-01-19 ENCOUNTER — OFFICE VISIT (OUTPATIENT)
Dept: ENDOCRINOLOGY | Facility: CLINIC | Age: 87
End: 2022-01-19
Payer: MEDICARE

## 2022-01-19 ENCOUNTER — PATIENT MESSAGE (OUTPATIENT)
Dept: PODIATRY | Facility: CLINIC | Age: 87
End: 2022-01-19
Payer: MEDICARE

## 2022-01-19 VITALS
HEART RATE: 56 BPM | HEIGHT: 69 IN | WEIGHT: 180.13 LBS | BODY MASS INDEX: 26.68 KG/M2 | OXYGEN SATURATION: 97 % | SYSTOLIC BLOOD PRESSURE: 126 MMHG | DIASTOLIC BLOOD PRESSURE: 67 MMHG

## 2022-01-19 DIAGNOSIS — E55.9 VITAMIN D DEFICIENCY: ICD-10-CM

## 2022-01-19 DIAGNOSIS — M81.0 AGE-RELATED OSTEOPOROSIS WITHOUT CURRENT PATHOLOGICAL FRACTURE: Primary | ICD-10-CM

## 2022-01-19 PROCEDURE — 99999 PR PBB SHADOW E&M-EST. PATIENT-LVL IV: CPT | Mod: PBBFAC,,, | Performed by: INTERNAL MEDICINE

## 2022-01-19 PROCEDURE — 99214 OFFICE O/P EST MOD 30 MIN: CPT | Mod: S$PBB,,, | Performed by: INTERNAL MEDICINE

## 2022-01-19 PROCEDURE — 99999 PR PBB SHADOW E&M-EST. PATIENT-LVL IV: ICD-10-PCS | Mod: PBBFAC,,, | Performed by: INTERNAL MEDICINE

## 2022-01-19 PROCEDURE — 99214 OFFICE O/P EST MOD 30 MIN: CPT | Mod: PBBFAC | Performed by: INTERNAL MEDICINE

## 2022-01-19 PROCEDURE — 99214 PR OFFICE/OUTPT VISIT, EST, LEVL IV, 30-39 MIN: ICD-10-PCS | Mod: S$PBB,,, | Performed by: INTERNAL MEDICINE

## 2022-01-19 NOTE — ASSESSMENT & PLAN NOTE
Risk factors: older age, high FRAX score at the hip, frailty    PLAN:  Continue PT and exercise   Encouraged use of walker (feels less steady with cane)  Continue vitamin D and calcium supplements   Diet is good and weight is stable (maybe a little low)    DXA scan in 2022 after cardiac procedure.     OK to proceed with PRolia.

## 2022-01-19 NOTE — PROGRESS NOTES
Subjective:      Patient ID: Misha Marte Jr. is a 89 y.o. male.    Chief Complaint:  Osteoporosis      History of Present Illness    Mr. Marte is a 89 year old gentleman with multinodular goiter, osteoporosis and vitamin D deficiency.     Since last visit reports shortness of breath about one month ago. Treated for CHF exacerbation.  Physical therapy twice a week. He does the routine twice a day to stay in shape.      MNG diagnosed over ten years ago. FNA was benign over ten years ago with benign cytology. Does not need regular f/u with US.   Denies obstructive symptoms.      For his osteoporosis, osteopenia with high FRAX calculation (TH -1.4 and FN -1.2) denies falls or fractures.   Last prolia injection 09/2021. Tolerated injection well, no muscle aches.    First injection 2/2018   Vitamin D levels 11/2020 --> 46      Supplements:  Vitamin D 800 IUs daily   Calcium and D daily     Diet is pretty good, reports hunger. Chicken, fish, vegetables, milk shakes (twice a week)  [facility]     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     Appearance: He is well-developed and well-nourished.   HENT:      Head: Normocephalic.      Mouth/Throat:      Pharynx: No oropharyngeal exudate.   Eyes:      Extraocular Movements: EOM normal.      Conjunctiva/sclera: Conjunctivae normal.      Pupils: Pupils are equal, round, and reactive to light.   Neck:      Thyroid: No thyromegaly.      Trachea: No tracheal deviation.      Comments: Normal thyroid gland   Cardiovascular:      Rate and Rhythm: Normal rate and regular rhythm.   Pulmonary:      Effort: Pulmonary effort is normal.      Breath sounds: Normal breath sounds.   Musculoskeletal:      Cervical back: Normal range of motion and neck supple.      Comments: No tremor.   Lymphadenopathy:      Cervical: No  "cervical adenopathy.   Skin:     General: Skin is warm and dry.   Neurological:      Deep Tendon Reflexes: Reflexes are normal and symmetric.   Psychiatric:         Mood and Affect: Mood and affect normal.       Vitals:    01/19/22 1334   BP: 126/67   Pulse: (!) 56   SpO2: 97%   Weight: 81.7 kg (180 lb 1.9 oz)   Height: 5' 9" (1.753 m)       BP Readings from Last 3 Encounters:   01/19/22 126/67   01/12/22 (!) 152/66   01/12/22 (!) 145/69     Wt Readings from Last 1 Encounters:   01/19/22 1334 81.7 kg (180 lb 1.9 oz)         Body mass index is 26.6 kg/m².    Lab Review:   Lab Results   Component Value Date    HGBA1C 5.2 12/15/2021     Lab Results   Component Value Date    CHOL 144 11/18/2020    HDL 59 11/18/2020    LDLCALC 69 11/18/2020    TRIG 78 11/18/2020    CHOLHDL 41.0 11/18/2020     Lab Results   Component Value Date     (L) 01/12/2022    K 4.8 01/12/2022    CL 97 01/12/2022    CO2 26 01/12/2022    GLU 89 01/12/2022    BUN 14 01/12/2022    CREATININE 1.1 01/12/2022    CALCIUM 9.3 01/12/2022    PROT 6.7 01/12/2022    ALBUMIN 3.9 01/12/2022    ALBUMIN 3.9 01/12/2022    BILITOT 0.6 01/12/2022    ALKPHOS 38 (L) 01/12/2022    AST 29 01/12/2022    ALT 22 01/12/2022    ANIONGAP 7 (L) 01/12/2022    ESTGFRAFRICA >60.0 01/12/2022    EGFRNONAA 58.0 (A) 01/12/2022    TSH 3.183 12/10/2019     1/2020  FINDINGS:  Lumbar spine (L1-L4):   BMD is 1.282 g/cm2, T-score is 1.7, and Z-score is 3.0. The TBS T-score (L1-L4) is -2.2.     Total hip:                    BMD is 0.871 g/cm2, T-score is -1.1, and Z-score is 0.2.     Femoral neck:             BMD is 0.831 g/cm2, T-score is -0.7, and Z-score is 1.0.     There is a 11.2% risk of a major osteoporotic fracture and a 7% risk of hip fracture in the next 10 years (FRAX adjusted for TBS).     Compared with previous DXA, BMD at the lumbar spine has increased by 11.1%, and the BMD at the total hip has increased by 6.5%.     Impression:     1. Osteopenia of the total hip.  FRAX " calculations suggest treatment.    Assessment and Plan     Age related osteoporosis  Risk factors: older age, high FRAX score at the hip, frailty    PLAN:  Continue PT and exercise   Encouraged use of walker (feels less steady with cane)  Continue vitamin D and calcium supplements   Diet is good and weight is stable (maybe a little low)    DXA scan in 2022 after cardiac procedure.     OK to proceed with PRolia.     Vitamin D deficiency  Continue supplementation

## 2022-01-21 ENCOUNTER — LAB VISIT (OUTPATIENT)
Dept: LAB | Facility: HOSPITAL | Age: 87
End: 2022-01-21
Attending: INTERNAL MEDICINE
Payer: MEDICARE

## 2022-01-21 ENCOUNTER — OFFICE VISIT (OUTPATIENT)
Dept: CARDIOLOGY | Facility: CLINIC | Age: 87
End: 2022-01-21
Payer: MEDICARE

## 2022-01-21 ENCOUNTER — EDUCATION (OUTPATIENT)
Dept: CARDIOLOGY | Facility: CLINIC | Age: 87
End: 2022-01-21
Payer: MEDICARE

## 2022-01-21 VITALS
BODY MASS INDEX: 26.71 KG/M2 | DIASTOLIC BLOOD PRESSURE: 58 MMHG | OXYGEN SATURATION: 100 % | WEIGHT: 180.31 LBS | HEIGHT: 69 IN | SYSTOLIC BLOOD PRESSURE: 136 MMHG | HEART RATE: 68 BPM

## 2022-01-21 DIAGNOSIS — I50.22 CHRONIC SYSTOLIC CONGESTIVE HEART FAILURE: ICD-10-CM

## 2022-01-21 DIAGNOSIS — I35.0 NONRHEUMATIC AORTIC VALVE STENOSIS: Primary | ICD-10-CM

## 2022-01-21 DIAGNOSIS — I50.32 CHRONIC DIASTOLIC HEART FAILURE: ICD-10-CM

## 2022-01-21 DIAGNOSIS — I50.33 ACUTE ON CHRONIC DIASTOLIC CONGESTIVE HEART FAILURE: ICD-10-CM

## 2022-01-21 DIAGNOSIS — I10 ESSENTIAL HYPERTENSION: ICD-10-CM

## 2022-01-21 DIAGNOSIS — N18.32 STAGE 3B CHRONIC KIDNEY DISEASE: ICD-10-CM

## 2022-01-21 DIAGNOSIS — I34.2 NONRHEUMATIC MITRAL VALVE STENOSIS: ICD-10-CM

## 2022-01-21 DIAGNOSIS — Z01.812 PRE-PROCEDURE LAB EXAM: ICD-10-CM

## 2022-01-21 DIAGNOSIS — I35.0 NONRHEUMATIC AORTIC VALVE STENOSIS: ICD-10-CM

## 2022-01-21 LAB
ABO + RH BLD: NORMAL
ALBUMIN SERPL BCP-MCNC: 4.1 G/DL (ref 3.5–5.2)
ANION GAP SERPL CALC-SCNC: 8 MMOL/L (ref 8–16)
APTT BLDCRRT: 25.8 SEC (ref 21–32)
BLD GP AB SCN CELLS X3 SERPL QL: NORMAL
BUN SERPL-MCNC: 12 MG/DL (ref 10–30)
CALCIUM SERPL-MCNC: 10.1 MG/DL (ref 8.7–10.5)
CHLORIDE SERPL-SCNC: 97 MMOL/L (ref 95–110)
CO2 SERPL-SCNC: 25 MMOL/L (ref 23–29)
CREAT SERPL-MCNC: 1.4 MG/DL (ref 0.5–1.4)
ERYTHROCYTE [DISTWIDTH] IN BLOOD BY AUTOMATED COUNT: 14.6 % (ref 11.5–14.5)
EST. GFR  (AFRICAN AMERICAN): 50.1 ML/MIN/1.73 M^2
EST. GFR  (NON AFRICAN AMERICAN): 43.3 ML/MIN/1.73 M^2
GLUCOSE SERPL-MCNC: 88 MG/DL (ref 70–110)
HCT VFR BLD AUTO: 38.4 % (ref 40–54)
HGB BLD-MCNC: 12.3 G/DL (ref 14–18)
INR PPP: 0.9 (ref 0.8–1.2)
MCH RBC QN AUTO: 30.5 PG (ref 27–31)
MCHC RBC AUTO-ENTMCNC: 32 G/DL (ref 32–36)
MCV RBC AUTO: 95 FL (ref 82–98)
PLATELET # BLD AUTO: 246 K/UL (ref 150–450)
PMV BLD AUTO: 10.1 FL (ref 9.2–12.9)
POTASSIUM SERPL-SCNC: 4.2 MMOL/L (ref 3.5–5.1)
PROTHROMBIN TIME: 10.3 SEC (ref 9–12.5)
RBC # BLD AUTO: 4.03 M/UL (ref 4.6–6.2)
SODIUM SERPL-SCNC: 130 MMOL/L (ref 136–145)
WBC # BLD AUTO: 5.74 K/UL (ref 3.9–12.7)

## 2022-01-21 PROCEDURE — 85610 PROTHROMBIN TIME: CPT | Performed by: INTERNAL MEDICINE

## 2022-01-21 PROCEDURE — 99417 PROLNG OP E/M EACH 15 MIN: CPT | Mod: S$PBB,,, | Performed by: INTERNAL MEDICINE

## 2022-01-21 PROCEDURE — 99214 OFFICE O/P EST MOD 30 MIN: CPT | Mod: PBBFAC | Performed by: INTERNAL MEDICINE

## 2022-01-21 PROCEDURE — 85027 COMPLETE CBC AUTOMATED: CPT | Performed by: INTERNAL MEDICINE

## 2022-01-21 PROCEDURE — 80048 BASIC METABOLIC PNL TOTAL CA: CPT | Performed by: INTERNAL MEDICINE

## 2022-01-21 PROCEDURE — 86850 RBC ANTIBODY SCREEN: CPT | Performed by: INTERNAL MEDICINE

## 2022-01-21 PROCEDURE — 99215 OFFICE O/P EST HI 40 MIN: CPT | Mod: S$PBB,,, | Performed by: INTERNAL MEDICINE

## 2022-01-21 PROCEDURE — 85730 THROMBOPLASTIN TIME PARTIAL: CPT | Performed by: INTERNAL MEDICINE

## 2022-01-21 PROCEDURE — 99999 PR PBB SHADOW E&M-EST. PATIENT-LVL IV: CPT | Mod: PBBFAC,,, | Performed by: INTERNAL MEDICINE

## 2022-01-21 PROCEDURE — 99999 PR PBB SHADOW E&M-EST. PATIENT-LVL IV: ICD-10-PCS | Mod: PBBFAC,,, | Performed by: INTERNAL MEDICINE

## 2022-01-21 PROCEDURE — 82040 ASSAY OF SERUM ALBUMIN: CPT | Performed by: INTERNAL MEDICINE

## 2022-01-21 PROCEDURE — 99215 PR OFFICE/OUTPT VISIT, EST, LEVL V, 40-54 MIN: ICD-10-PCS | Mod: S$PBB,,, | Performed by: INTERNAL MEDICINE

## 2022-01-21 PROCEDURE — 99417 PR PROLONGED SVC, OUTPT, W/WO DIRECT PT CONTACT,  EA ADDTL 15 MIN: ICD-10-PCS | Mod: S$PBB,,, | Performed by: INTERNAL MEDICINE

## 2022-01-21 NOTE — PROGRESS NOTES
Interventional Cardiology Clinic Note  Reason for Visit: severe non-rheumatic AS  Referring physician: Dr. White  Last office visit: NP  HPI:     Misha Marte Jr. is a 89 y.o. , who presents for evaluation of severe AS.  He was admitted back in December for decompensated HF but otherwise has done fairly well with regular follow up with Dr. White. This was his first major admission for decompensated HF despite having severe AS for many years. He was diuresed and responded appropriately. Evaluated by IC with plans for follow up here today. Since that admission, he has been fairly well. PT is coming to the house but he is back to near baseline. He is able to dress himself, shower (only needs assistance with getting in and out of the tub), feed himself and use the bathroom on his own. States his sister in law had this procedure done as well and he is interested. Denies chest pain and has never had an MI, denies syncope, dizziness or palpitations. He sleeps laying flat but does have some persistent edema to bilateral lower extremities. He tried to use compression stockings but unable to get them on. Weight has been within 3-4lbs of discharge weight per wife and they are closely monitoring fluid status.     Misha Marte Jr. is a 89 y.o. male referred by Dr. White for evaluation of severe AS (NYHA Class 3 sx).    The patient has undergone the following TAVR work-up:    ECHO (12/15/21): JANESSA= 0.85 cm2, MG= mmHg, Peak Ever= 4.1 m/s, EF= 55 %.    [facility] (Date ): - NEEDS AND SCHEDULED    STS: 4.9 %    Frailty: 2/4    Iliacs are >on R and > on L - NEEDS   LVOT area by CTA is  cm2 ( mm X  mm) and Avg Diameter is per Dr - NEEDS   Incidental findings on CT:    CT Surgery risk assessment: risk, per   due to   - appointment pending   Rhythm issues: I RBBB; p AF episode during last admission   PFTs: FEV1 % predicted, DLCO % predicted. - NEEDS   Comorbidities: new onset pAF during last admission; moderate MS       NYHA: 3   CCS Class: 0      Misha Marte Jr. is a  mm  valve candidate via  access.      Medical: severe aortic stenosis, HFpEF (EF 63% and grade II diastolic dysfunction on TTE 7/2021) hypertension, hyperlipidemia, Osteoporosis on Prolia, Vit D deficiency, severe MS, macular degeneration and cataracts, hearing loss using aid  Surgical: Reviewed, as below.  Family: Reviewed, as below.  Social: Reviewed, as below.    ROS:    Constitution: Negative for fever or chills.  HENT: Negative for  headaches.  Eyes: Negative for blurred vision.   Cardiovascular: See above  Pulmonary: Negative for SOB. Negative for cough.   Gastrointestinal: Negative for nausea/vomiting.   : Negative for dysuria.   Skin: Negative for rashes.  Neurological: Negative for focal weakness.  Psychological: Negative for depression.  PMH:     Past Medical History:   Diagnosis Date    Heart murmur     Hyperlipidemia     Hypertension     Insomnia      Past Surgical History:   Procedure Laterality Date    APPENDECTOMY      CARPAL TUNNEL RELEASE      COLONOSCOPY      TONSILLECTOMY, ADENOIDECTOMY       Allergies:     Review of patient's allergies indicates:   Allergen Reactions    Cortisone Other (See Comments)     hiccups    Dexamethasone Other (See Comments)     Constant hiccups for days    Codeine Other (See Comments)     Other reaction(s): Nausea     Medications:     Current Outpatient Medications on File Prior to Visit   Medication Sig Dispense Refill    acetaminophen (TYLENOL) 500 MG tablet Take 500-1,000 mg by mouth daily as needed for Pain.      ammonium lactate (AMLACTIN TOP) Apply topically. Apply topically daily as needed      ascorbic acid, vitamin C, (VITAMIN C) 250 MG tablet Take 1 tablet (250 mg total) by mouth every morning. 30 tablet 2    aspirin 81 mg Tab Take 81 mg by mouth once daily.       CALCIUM CARBONATE/VITAMIN D3 (CALCIUM 600 + D,3, ORAL) Take 1,200 mg by mouth once daily.       carboxymethylcellulose  sodium (REFRESH OPHT) Apply 1 drop to eye daily as needed.      ceramides 1,3,6-II (CERAVE) Lotn Apply topically daily as needed      cholecalciferol, vitamin D3, (VITAMIN D3 ORAL) Take 1 capsule by mouth once daily.      coenzyme Q10 10 mg capsule Take 100 mg by mouth once daily.       ergocalciferol (ERGOCALCIFEROL) 50,000 unit Cap TAKE 1 CAPSULE BY MOUTH EVERY 2 WEEKS 6 capsule 0    esomeprazole (NEXIUM) 40 MG capsule Take 1 capsule (40 mg total) by mouth once daily. 90 capsule 0    ferrous sulfate (FEOSOL) Tab tablet Take 1 tablet (1 each total) by mouth every morning. Take on an empty stomach with your vitamin C. 90 tablet 3    furosemide (LASIX) 20 MG tablet Take 1 tablet (20 mg total) by mouth once daily. 90 tablet 3    losartan (COZAAR) 25 MG tablet TAKE 1 TABLET EVERY DAY 90 tablet 0    metoprolol succinate (TOPROL-XL) 25 MG 24 hr tablet Take 1 tablet (25 mg total) by mouth once daily. 90 tablet 3    multivitamin-minerals-lutein Tab Take 1 tablet by mouth once daily.       MUSE 1,000 mcg pellet       polyethylene glycol (GLYCOLAX) 17 gram PwPk Take 17 g by mouth 2 (two) times daily as needed (Constipation).  0    pravastatin (PRAVACHOL) 40 MG tablet TAKE 1 TABLET EVERY DAY 90 tablet 3    turmeric root extract 500 mg Cap Take 1 capsule by mouth once daily.      VIT A,C & E/LUTEIN/MINERALS (OCUVITE WITH LUTEIN ORAL) Take 1 capsule by mouth once daily.       No current facility-administered medications on file prior to visit.     Social History:     Social History     Tobacco Use    Smoking status: Never Smoker    Smokeless tobacco: Never Used   Substance Use Topics    Alcohol use: Yes     Alcohol/week: 2.0 standard drinks     Types: 2 Glasses of wine per week     Family History:     Family History   Problem Relation Age of Onset    Heart disease Mother     Heart attack Mother     Stroke Father     Hypertension Neg Hx     Melanoma Neg Hx      Physical Exam:   There were no vitals taken  for this visit.     Constitutional: No apparent distress, conversant  HEENT: Sclera anicteric, extraocular movements intact  Neck: No jugular venous distension, no carotid bruits  CV: Regular rate and rhythm, 3/6 mid systolic ejection murmur at the RUSB with radiation to the neck rubs or gallops, normal S1/S2  Pulm: Clear to auscultation bilaterally  GI: Abdomen soft, no palpable masses  Extremities: 2+ bilateral lower extremity edema just above ankles, warm with palpable pulses  Skin: No ecchymosis, erythema, or ulcers  Psych: Alert and oriented to person place location, appropriate affect  Neuro: No focal deficits    Labs:     Blood Tests:  Lab Results   Component Value Date     (H) 01/12/2022     (L) 01/12/2022    K 4.8 01/12/2022    CL 97 01/12/2022    CO2 26 01/12/2022    BUN 14 01/12/2022    CREATININE 1.1 01/12/2022    GLU 89 01/12/2022    HGBA1C 5.2 12/15/2021    MG 2.4 01/12/2022    AST 29 01/12/2022    ALT 22 01/12/2022    ALBUMIN 3.9 01/12/2022    ALBUMIN 3.9 01/12/2022    PROT 6.7 01/12/2022    BILITOT 0.6 01/12/2022    WBC 5.03 01/12/2022    HGB 11.5 (L) 01/12/2022    HCT 36.2 (L) 01/12/2022    MCV 97 01/12/2022     01/12/2022    INR 0.9 01/12/2022    PSA 4.1 (H) 04/28/2010    TSH 3.183 12/10/2019       Lab Results   Component Value Date    CHOL 144 11/18/2020    HDL 59 11/18/2020    TRIG 78 11/18/2020       Lab Results   Component Value Date    LDLCALC 69 11/18/2020       Urine Tests:  Lab Results   Component Value Date    COLORU Straw 01/11/2017    APPEARANCEUA Clear 01/11/2017    PHUR 6.0 01/11/2017    SPECGRAV 1.005 01/11/2017    PROTEINUA Negative 01/11/2017    GLUCUA Negative 01/11/2017    KETONESU Negative 01/11/2017    BILIRUBINUA Negative 01/11/2017    OCCULTUA Negative 01/11/2017    NITRITE Negative 01/11/2017    UROBILINOGEN Negative 01/11/2017    LEUKOCYTESUR Negative 01/11/2017    PROTEINURINE <7.0 (A) 07/19/2011    PROTEINURINE <5 (A) 10/28/2008    CREATRANDUR 84  07/19/2011    [facility] Unable to calculate 07/19/2011       Imaging:     Echocardiogram  12/15/21  · The estimated ejection fraction is 55%.  · The left ventricle is normal in size with normal systolic function.  · Indeterminate left ventricular diastolic function.  · Normal right ventricular size with normal right ventricular systolic function.  · Moderate left atrial enlargement.  · There is severe aortic valve stenosis.  · Aortic valve area is 0.85 cm2; peak velocity is 4.1 m/s; mean gradient is mmHg.  · There is moderate calcific mitral stenosis with severe calcification and restriction of the anterior MV leaflet.  · The mean diastolic gradient across the mitral valve is 7 mmHg at a heart rate of 86 bpm.  · Normal central venous pressure (3 mmHg).         Stress testing  None    Cath Lab  None    Other  6/27/10 Holter  · Sinus rhythm with PACs and episodes of nonsustained atrial tachycardia    10/24/2018 Carotid U/S   RIGHT   The right Mid Common Carotid Artery is visualized.   The right carotid bulb artery is visualized, associated with heterogeneous plaque.   The right Distal Internal Carotid Artery has 20 - 39% stenosis.   The right external carotid artery is visualized.   The right vertebral artery is visualized, associated with anterograde flow.   The right ICA/CCA ratio is: .92     LEFT   The left Proximal Common Carotid Artery is visualized.   The left carotid bulb artery is visualized, associated with heterogeneous plaque.   The left Mid Internal Carotid Artery has 20 - 39% stenosis.   The left external carotid artery is visualized.   The left vertebral artery is visualized, associated with anterograde flow.   The left ICA/CCA ratio is: .85       CONCLUSIONS   There is 20 - 39% right Internal Carotid stenosis.   There is 20 - 39% left Internal Carotid stenosis.     EKG:   iRBBB     Assessment:     1. Nonrheumatic aortic valve stenosis    2. Chronic diastolic heart failure    3. Essential hypertension    4.  Nonrheumatic mitral valve stenosis    5. Stage 3b chronic kidney disease        Plan:     Nonrheumatic aortic valve stenosis  Patient will started on the work-up pathway for TAVR. First will start with Angiogram   - Anti-platelet Therapy:ASA and plavix - load plavix the day of   - Access: Right Radial; right groin back-up  - Catheters: Brian  - Creatinine/CrCl: 1.1 (1/12)  - Platelets: 222 (1/10)  - Allergies:  None to heparin   - Prior Contrast Allergy: No   - Pre-Hydration: NaCL  - Pre-Op Med: Benadryl     Sedation   Type of sedation: RN IV sedation    Mallampati score: 2   ASA score: 2      - All patient's questions were answered.  -The risks, benefits and alternatives of the procedure were explained to the patient.   -The risks of coronary angiography include but are not limited to: bleeding, infection, heart rhythm abnormalities, allergic reactions, kidney injury and potential need for dialysis, stroke and death.   - Pt is a BRIANDA candidate and understands the importance of taking plavix for at least one year in ACS cases and 6 months in stable CAD. The patient understands that in case of receiving a drug coated stent the failure to comply with dual anti-platelet therapy as prescribed is likely to result in stent clothing, heart attack and death.   -The risks of moderate sedation include hypotension, respiratory depression, arrhythmias, bronchospasm, and death.   - Informed consent was obtained and the  patient is agreeable to proceed with the procedure.      - will get appointment for CTS and also schedule CTA    Chronic diastolic heart failure  - fairly stable; follows with [facility] clinic   - continue to monitor volume status and avoid salt    Essential hypertension    Nonrheumatic mitral valve stenosis  - moderate MS with severe MAC   - mild MR   - monitor     CKD   1.1 - 1.5    Baseline crt 1. -1.5     Signed:  Annamaria Meade MD  Cardiology Fellow  [id number]    1/21/2022 12:59 PM    Follow-up:     Future Appointments    Date Time Provider Department Center   1/21/2022  3:00 PM LAB, SAME DAY [facility] LAB VNP [facility]   1/25/2022 12:30 PM NOM CT1 64- LIMIT 650 LBS [facility] CT SCAN [facility]   1/25/2022  2:00 PM Mindy Hankins MD [facility] DERM [facility]   2/2/2022 12:30 PM Ike Ivan MD NOM IM [facility] PCW   2/5/2022 12:00 PM COVID TESTING, [facility] PRE-ADMIT [facility] PREADM [facility]   3/22/2022  3:15 PM INJECTION [facility] AMB INF [facility]   3/29/2022 11:00 AM Ale Paredes DPM [facility] POD [facility]   4/19/2022  2:00 PM [facility], DEXA1 [facility] BMD Konstantin kelvin Meade MD   CARDIOLOGY FELLOW, PGY VI  [phone]

## 2022-01-21 NOTE — H&P (VIEW-ONLY)
Interventional Cardiology Clinic Note  Reason for Visit: severe non-rheumatic AS  Referring physician: Dr. White  Last office visit: NP  HPI:     Misha Marte Jr. is a 89 y.o. , who presents for evaluation of severe AS.  He was admitted back in December for decompensated HF but otherwise has done fairly well with regular follow up with Dr. White. This was his first major admission for decompensated HF despite having severe AS for many years. He was diuresed and responded appropriately. Evaluated by IC with plans for follow up here today. Since that admission, he has been fairly well. PT is coming to the house but he is back to near baseline. He is able to dress himself, shower (only needs assistance with getting in and out of the tub), feed himself and use the bathroom on his own. States his sister in law had this procedure done as well and he is interested. Denies chest pain and has never had an MI, denies syncope, dizziness or palpitations. He sleeps laying flat but does have some persistent edema to bilateral lower extremities. He tried to use compression stockings but unable to get them on. Weight has been within 3-4lbs of discharge weight per wife and they are closely monitoring fluid status.     Misha Marte Jr. is a 89 y.o. male referred by Dr. White for evaluation of severe AS (NYHA Class 3 sx).    The patient has undergone the following TAVR work-up:    ECHO (12/15/21): JANESSA= 0.85 cm2, MG= mmHg, Peak Ever= 4.1 m/s, EF= 55 %.    [facility] (Date ): - NEEDS AND SCHEDULED    STS: 4.9 %    Frailty: 2/4    Iliacs are >on R and > on L - NEEDS   LVOT area by CTA is  cm2 ( mm X  mm) and Avg Diameter is per Dr - NEEDS   Incidental findings on CT:    CT Surgery risk assessment: risk, per   due to   - appointment pending   Rhythm issues: I RBBB; p AF episode during last admission   PFTs: FEV1 % predicted, DLCO % predicted. - NEEDS   Comorbidities: new onset pAF during last admission; moderate MS       NYHA: 3   CCS Class: 0      Misha Marte Jr. is a  mm  valve candidate via  access.      Medical: severe aortic stenosis, HFpEF (EF 63% and grade II diastolic dysfunction on TTE 7/2021) hypertension, hyperlipidemia, Osteoporosis on Prolia, Vit D deficiency, severe MS, macular degeneration and cataracts, hearing loss using aid  Surgical: Reviewed, as below.  Family: Reviewed, as below.  Social: Reviewed, as below.    ROS:    Constitution: Negative for fever or chills.  HENT: Negative for  headaches.  Eyes: Negative for blurred vision.   Cardiovascular: See above  Pulmonary: Negative for SOB. Negative for cough.   Gastrointestinal: Negative for nausea/vomiting.   : Negative for dysuria.   Skin: Negative for rashes.  Neurological: Negative for focal weakness.  Psychological: Negative for depression.  PMH:     Past Medical History:   Diagnosis Date    Heart murmur     Hyperlipidemia     Hypertension     Insomnia      Past Surgical History:   Procedure Laterality Date    APPENDECTOMY      CARPAL TUNNEL RELEASE      COLONOSCOPY      TONSILLECTOMY, ADENOIDECTOMY       Allergies:     Review of patient's allergies indicates:   Allergen Reactions    Cortisone Other (See Comments)     hiccups    Dexamethasone Other (See Comments)     Constant hiccups for days    Codeine Other (See Comments)     Other reaction(s): Nausea     Medications:     Current Outpatient Medications on File Prior to Visit   Medication Sig Dispense Refill    acetaminophen (TYLENOL) 500 MG tablet Take 500-1,000 mg by mouth daily as needed for Pain.      ammonium lactate (AMLACTIN TOP) Apply topically. Apply topically daily as needed      ascorbic acid, vitamin C, (VITAMIN C) 250 MG tablet Take 1 tablet (250 mg total) by mouth every morning. 30 tablet 2    aspirin 81 mg Tab Take 81 mg by mouth once daily.       CALCIUM CARBONATE/VITAMIN D3 (CALCIUM 600 + D,3, ORAL) Take 1,200 mg by mouth once daily.       carboxymethylcellulose  sodium (REFRESH OPHT) Apply 1 drop to eye daily as needed.      ceramides 1,3,6-II (CERAVE) Lotn Apply topically daily as needed      cholecalciferol, vitamin D3, (VITAMIN D3 ORAL) Take 1 capsule by mouth once daily.      coenzyme Q10 10 mg capsule Take 100 mg by mouth once daily.       ergocalciferol (ERGOCALCIFEROL) 50,000 unit Cap TAKE 1 CAPSULE BY MOUTH EVERY 2 WEEKS 6 capsule 0    esomeprazole (NEXIUM) 40 MG capsule Take 1 capsule (40 mg total) by mouth once daily. 90 capsule 0    ferrous sulfate (FEOSOL) Tab tablet Take 1 tablet (1 each total) by mouth every morning. Take on an empty stomach with your vitamin C. 90 tablet 3    furosemide (LASIX) 20 MG tablet Take 1 tablet (20 mg total) by mouth once daily. 90 tablet 3    losartan (COZAAR) 25 MG tablet TAKE 1 TABLET EVERY DAY 90 tablet 0    metoprolol succinate (TOPROL-XL) 25 MG 24 hr tablet Take 1 tablet (25 mg total) by mouth once daily. 90 tablet 3    multivitamin-minerals-lutein Tab Take 1 tablet by mouth once daily.       MUSE 1,000 mcg pellet       polyethylene glycol (GLYCOLAX) 17 gram PwPk Take 17 g by mouth 2 (two) times daily as needed (Constipation).  0    pravastatin (PRAVACHOL) 40 MG tablet TAKE 1 TABLET EVERY DAY 90 tablet 3    turmeric root extract 500 mg Cap Take 1 capsule by mouth once daily.      VIT A,C & E/LUTEIN/MINERALS (OCUVITE WITH LUTEIN ORAL) Take 1 capsule by mouth once daily.       No current facility-administered medications on file prior to visit.     Social History:     Social History     Tobacco Use    Smoking status: Never Smoker    Smokeless tobacco: Never Used   Substance Use Topics    Alcohol use: Yes     Alcohol/week: 2.0 standard drinks     Types: 2 Glasses of wine per week     Family History:     Family History   Problem Relation Age of Onset    Heart disease Mother     Heart attack Mother     Stroke Father     Hypertension Neg Hx     Melanoma Neg Hx      Physical Exam:   There were no vitals taken  for this visit.     Constitutional: No apparent distress, conversant  HEENT: Sclera anicteric, extraocular movements intact  Neck: No jugular venous distension, no carotid bruits  CV: Regular rate and rhythm, 3/6 mid systolic ejection murmur at the RUSB with radiation to the neck rubs or gallops, normal S1/S2  Pulm: Clear to auscultation bilaterally  GI: Abdomen soft, no palpable masses  Extremities: 2+ bilateral lower extremity edema just above ankles, warm with palpable pulses  Skin: No ecchymosis, erythema, or ulcers  Psych: Alert and oriented to person place location, appropriate affect  Neuro: No focal deficits    Labs:     Blood Tests:  Lab Results   Component Value Date     (H) 01/12/2022     (L) 01/12/2022    K 4.8 01/12/2022    CL 97 01/12/2022    CO2 26 01/12/2022    BUN 14 01/12/2022    CREATININE 1.1 01/12/2022    GLU 89 01/12/2022    HGBA1C 5.2 12/15/2021    MG 2.4 01/12/2022    AST 29 01/12/2022    ALT 22 01/12/2022    ALBUMIN 3.9 01/12/2022    ALBUMIN 3.9 01/12/2022    PROT 6.7 01/12/2022    BILITOT 0.6 01/12/2022    WBC 5.03 01/12/2022    HGB 11.5 (L) 01/12/2022    HCT 36.2 (L) 01/12/2022    MCV 97 01/12/2022     01/12/2022    INR 0.9 01/12/2022    PSA 4.1 (H) 04/28/2010    TSH 3.183 12/10/2019       Lab Results   Component Value Date    CHOL 144 11/18/2020    HDL 59 11/18/2020    TRIG 78 11/18/2020       Lab Results   Component Value Date    LDLCALC 69 11/18/2020       Urine Tests:  Lab Results   Component Value Date    COLORU Straw 01/11/2017    APPEARANCEUA Clear 01/11/2017    PHUR 6.0 01/11/2017    SPECGRAV 1.005 01/11/2017    PROTEINUA Negative 01/11/2017    GLUCUA Negative 01/11/2017    KETONESU Negative 01/11/2017    BILIRUBINUA Negative 01/11/2017    OCCULTUA Negative 01/11/2017    NITRITE Negative 01/11/2017    UROBILINOGEN Negative 01/11/2017    LEUKOCYTESUR Negative 01/11/2017    PROTEINURINE <7.0 (A) 07/19/2011    PROTEINURINE <5 (A) 10/28/2008    CREATRANDUR 84  07/19/2011    [facility] Unable to calculate 07/19/2011       Imaging:     Echocardiogram  12/15/21  · The estimated ejection fraction is 55%.  · The left ventricle is normal in size with normal systolic function.  · Indeterminate left ventricular diastolic function.  · Normal right ventricular size with normal right ventricular systolic function.  · Moderate left atrial enlargement.  · There is severe aortic valve stenosis.  · Aortic valve area is 0.85 cm2; peak velocity is 4.1 m/s; mean gradient is mmHg.  · There is moderate calcific mitral stenosis with severe calcification and restriction of the anterior MV leaflet.  · The mean diastolic gradient across the mitral valve is 7 mmHg at a heart rate of 86 bpm.  · Normal central venous pressure (3 mmHg).         Stress testing  None    Cath Lab  None    Other  6/27/10 Holter  · Sinus rhythm with PACs and episodes of nonsustained atrial tachycardia    10/24/2018 Carotid U/S   RIGHT   The right Mid Common Carotid Artery is visualized.   The right carotid bulb artery is visualized, associated with heterogeneous plaque.   The right Distal Internal Carotid Artery has 20 - 39% stenosis.   The right external carotid artery is visualized.   The right vertebral artery is visualized, associated with anterograde flow.   The right ICA/CCA ratio is: .92     LEFT   The left Proximal Common Carotid Artery is visualized.   The left carotid bulb artery is visualized, associated with heterogeneous plaque.   The left Mid Internal Carotid Artery has 20 - 39% stenosis.   The left external carotid artery is visualized.   The left vertebral artery is visualized, associated with anterograde flow.   The left ICA/CCA ratio is: .85       CONCLUSIONS   There is 20 - 39% right Internal Carotid stenosis.   There is 20 - 39% left Internal Carotid stenosis.     EKG:   iRBBB     Assessment:     1. Nonrheumatic aortic valve stenosis    2. Chronic diastolic heart failure    3. Essential hypertension    4.  Nonrheumatic mitral valve stenosis    5. Stage 3b chronic kidney disease        Plan:     Nonrheumatic aortic valve stenosis  Patient will started on the work-up pathway for TAVR. First will start with Angiogram   - Anti-platelet Therapy:ASA and plavix - load plavix the day of   - Access: Right Radial; right groin back-up  - Catheters: Brian  - Creatinine/CrCl: 1.1 (1/12)  - Platelets: 222 (1/10)  - Allergies:  None to heparin   - Prior Contrast Allergy: No   - Pre-Hydration: NaCL  - Pre-Op Med: Benadryl     Sedation   Type of sedation: RN IV sedation    Mallampati score: 2   ASA score: 2      - All patient's questions were answered.  -The risks, benefits and alternatives of the procedure were explained to the patient.   -The risks of coronary angiography include but are not limited to: bleeding, infection, heart rhythm abnormalities, allergic reactions, kidney injury and potential need for dialysis, stroke and death.   - Pt is a BRIANDA candidate and understands the importance of taking plavix for at least one year in ACS cases and 6 months in stable CAD. The patient understands that in case of receiving a drug coated stent the failure to comply with dual anti-platelet therapy as prescribed is likely to result in stent clothing, heart attack and death.   -The risks of moderate sedation include hypotension, respiratory depression, arrhythmias, bronchospasm, and death.   - Informed consent was obtained and the  patient is agreeable to proceed with the procedure.      - will get appointment for CTS and also schedule CTA    Chronic diastolic heart failure  - fairly stable; follows with TCC clinic   - continue to monitor volume status and avoid salt    Essential hypertension    Nonrheumatic mitral valve stenosis  - moderate MS with severe MAC   - mild MR   - monitor     CKD   1.1 - 1.5    Baseline crt 1. -1.5     Signed:  Annamaria Meade MD  Cardiology Fellow  PGY-VI    1/21/2022 12:59 PM    Follow-up:     Future Appointments    Date Time Provider Department Center   1/21/2022  3:00 PM LAB, SAME DAY [facility] LAB VNP [facility]   1/25/2022 12:30 PM NOM CT1 64- LIMIT 650 LBS [facility] CT SCAN [facility]   1/25/2022  2:00 PM Mindy Hankins MD [facility] DERM [facility]   2/2/2022 12:30 PM Ike Ivan MD NOM IM [facility] PCW   2/5/2022 12:00 PM COVID TESTING, [facility] PRE-ADMIT [facility] PREADM [facility]   3/22/2022  3:15 PM INJECTION [facility] AMB INF [facility]   3/29/2022 11:00 AM Ale Paredes DPM [facility] POD [facility]   4/19/2022  2:00 PM [facility], DEXA1 [facility] BMD Konstantin kelvin Meade MD   CARDIOLOGY FELLOW, PGY VI  [phone]

## 2022-01-21 NOTE — PROGRESS NOTES
"OUTPATIENT CATHETERIZATION INSTRUCTIONS    You have been scheduled for a procedure in the catheterization lab on Tuesday, February 8, 2022.     Please report to the Cardiology Waiting Area on the Third floor of the hospital and check in at 10 AM.   You will then be taken to the SSCU (Short Stay Cardiac Unit) and prepared for your procedure. Please be aware that this is not the time of your procedure but the time you are to arrive. The procedures are scheduled on an hourly basis; however, emergency cases take precedence over all other cases.       You may not have anything to eat or drink after midnight the night before your test. You may take your regular morning medications with water. If there are any medications that you should not take you will be instructed to hold them that morning. If you are diabetic and on Metformin (Glucophage) do not take it the day before, the day of, and for 2 days after your procedure.      The procedure will take 1-2 hours to perform. After the procedure, you will return to SSCU on the third floor of the hospital. You will need to lie still (or keep your arm still) for the next 4 to 6 hours to minimize bleeding from the puncture site. Your family may remain in the room with you during this time.       You may be able to be discharged home that same afternoon if there is someone to drive you home and there were no complications. If you have one of the balloon, stent, or device procedures you may spend the night in the hospital. Your doctor will determine, based on your progress, the date and time of your discharge. The results of your procedure will be discussed with you before you are discharged. Any further testing or procedures will be scheduled for you either before you leave or you will be called with these appointments.       If you should have any questions, concerns, or need to change the date of your procedure, please call [facility] @ [phone]",    Special " Instructions:  Covid test 2    Plavix 75m tablets night before and 1 tablet morning of procedure        THE ABOVE INSTRUCTIONS WERE GIVEN TO THE PATIENT VERBALLY AND THEY VERBALIZED UNDERSTANDING.  THEY DO NOT REQUIRE ANY SPECIAL NEEDS AND DO NOT HAVE ANY LEARNING BARRIERS.          Directions for Reporting to Cardiology Waiting Area in the Hospital  If you park in the Parking Garage:  Take elevators to the1st floor of the parking garage.  Continue past the gift shop, coffee shop, and piano.  Take a right and go to the gold elevators. (Elevator B)  Take the elevator to the 3rd floor.  Follow the arrow on the sign on the wall that says Cath Lab Registration/EP Lab Registration.  Follow the long hallway all the way around until you come to a big open area.  This is the registration area.  Check in at Reception Desk.    OR    If family is dropping you off:  Have them drop you off at the front of the Hospital under the green overhang.  Enter through the doors and take a right.  Take the E elevators to the 3rd floor Cardiology Waiting Area.  Check in at the Reception Desk in the waiting room.

## 2022-01-24 ENCOUNTER — DOCUMENTATION ONLY (OUTPATIENT)
Dept: CARDIOLOGY | Facility: CLINIC | Age: 87
End: 2022-01-24
Payer: MEDICARE

## 2022-01-24 ENCOUNTER — TELEPHONE (OUTPATIENT)
Dept: CARDIOLOGY | Facility: CLINIC | Age: 87
End: 2022-01-24
Payer: MEDICARE

## 2022-01-24 DIAGNOSIS — I10 ESSENTIAL HYPERTENSION: ICD-10-CM

## 2022-01-24 NOTE — TELEPHONE ENCOUNTER
"[facility]) DISCHARGE VISIT - PHONE     Called and spoke to wife     Most Recent Hospital Discharge Date: 12/17/21  Last admission Diagnosis/chief complaint:SOB    Pt discharge completed by phone related to pt preference and inability to follow up with ease.  Pt also is following up with Dr. Neal regularly.       Pt reports the following:  []  Shortness of Breath with activity  []  Shortness of Breath at rest   []  Fatigue  []  Edema   [] Chest pain or tightness  [] Weight Increase since discharge  [x] None of the above    Medications:    Medication reconciliation completed today per RN.  Pt reports having all medications available and understands how to take them appropriately. Reminded pt to call prior to making any changes to medications.     Education:   [x] Confirmed pt still has  "Home Care Guide for Heart Failure Patients".   Reviewed key points as listed below.      Recommend 2 gram sodium restriction and 1500cc fluid restriction.   Encourage physical activity with graded exercise program.   Requested patient to weigh themselves daily, and to notify us if their weight increases by more than 3 lbs in 1 day or 5 lbs in 3 days.     [x] Reviewed completed "Daily Weight and Symptom Tracker".  Reviewed with patient when and how to call HFTCC according to "Yellow Zone" and "Red Zone".       Watch for these Signs and Symptoms: If any of these occur, contact HFTCC immediately:   Increase in shortness of breath with movement   Increase in swelling in your legs and ankles   Weight gain of more than 3 pounds in a night or 5 pounds in 3 days.   Difficulty breathing when you are lying down   Worsening fatigue or tiredness   Stomach bloating, a full feeling or a loss of appetite   Increased coughing--especially when you are lying down    MyChart and Care Companion:   Patient active on myChart? No, pt not interested or does not have supportive technology.    HF TCC Program " Plan:  Pt has successfully completed HFTCC program.  [facility]     Pt educated on how to call their offices and how to call [facility] On call in the event of an after hour issue.    PT reminded to continue to follow recommendations made during the HFTCC program to include monitoring daily weights, taking medications according to list, following up to appointments per provider recommendations, stop smoking/ start exercising and following a heart friendly low salt, low fluid diet.      Pt was able to verbalize back to RN in their own words correct diet/fluid restrictions, necessity for exercise, warning signs and symptoms, when and how to contact their  Long term care team .      Plan:     [x]  Discussed upcoming appointments and/or plan for follow-up care with his/her PCP/Cardiology     Electronic hand off completed :epic note per Jami Kirby PA-C.     Please refer to provider note for additional details and assessment.

## 2022-01-24 NOTE — TELEPHONE ENCOUNTER
No new care gaps identified.  Powered by [employer]. Reference number: [id number].   1/24/2022 4:29:54 AM CST

## 2022-01-24 NOTE — PROGRESS NOTES
Pt has successfully completed HFTCC program with no hospital readmissions for 31 days.    Current GDMT:  Lasix 20mg daily  Losartan 25mg daily  Toprol 25mg daily    Established with Cardiologist, Dr. White.    Jami Kirby PA-C

## 2022-01-25 ENCOUNTER — HOSPITAL ENCOUNTER (OUTPATIENT)
Dept: RADIOLOGY | Facility: HOSPITAL | Age: 87
Discharge: HOME | End: 2022-01-25
Attending: INTERNAL MEDICINE
Payer: MEDICARE

## 2022-01-25 ENCOUNTER — OFFICE VISIT (OUTPATIENT)
Dept: DERMATOLOGY | Facility: CLINIC | Age: 87
End: 2022-01-25
Payer: MEDICARE

## 2022-01-25 DIAGNOSIS — L73.8 SEBACEOUS GLAND HYPERPLASIA: ICD-10-CM

## 2022-01-25 DIAGNOSIS — L57.0 AK (ACTINIC KERATOSIS): Primary | ICD-10-CM

## 2022-01-25 DIAGNOSIS — I50.22 CHRONIC SYSTOLIC CONGESTIVE HEART FAILURE: ICD-10-CM

## 2022-01-25 DIAGNOSIS — Z01.812 PRE-PROCEDURE LAB EXAM: ICD-10-CM

## 2022-01-25 DIAGNOSIS — I35.0 NONRHEUMATIC AORTIC VALVE STENOSIS: ICD-10-CM

## 2022-01-25 PROCEDURE — 74174 CTA ABD&PLVS W/CONTRAST: CPT | Mod: 26,,, | Performed by: STUDENT IN AN ORGANIZED HEALTH CARE EDUCATION/TRAINING PROGRAM

## 2022-01-25 PROCEDURE — 99213 OFFICE O/P EST LOW 20 MIN: CPT | Mod: PBBFAC,25 | Performed by: DERMATOLOGY

## 2022-01-25 PROCEDURE — 99499 NO LOS: ICD-10-PCS | Mod: S$PBB,,, | Performed by: DERMATOLOGY

## 2022-01-25 PROCEDURE — 99999 PR PBB SHADOW E&M-EST. PATIENT-LVL III: CPT | Mod: PBBFAC,,, | Performed by: DERMATOLOGY

## 2022-01-25 PROCEDURE — 99499 UNLISTED E&M SERVICE: CPT | Mod: S$PBB,,, | Performed by: DERMATOLOGY

## 2022-01-25 PROCEDURE — 17000 DESTRUCT PREMALG LESION: CPT | Mod: S$PBB,,, | Performed by: DERMATOLOGY

## 2022-01-25 PROCEDURE — 71275 CT ANGIOGRAPHY CHEST: CPT | Mod: 26,,, | Performed by: STUDENT IN AN ORGANIZED HEALTH CARE EDUCATION/TRAINING PROGRAM

## 2022-01-25 PROCEDURE — 17000 DESTRUCT PREMALG LESION: CPT | Mod: PBBFAC | Performed by: DERMATOLOGY

## 2022-01-25 PROCEDURE — 71275 CTA CARDIAC TAVR_PARTNERS (XPD): ICD-10-PCS | Mod: 26,,, | Performed by: STUDENT IN AN ORGANIZED HEALTH CARE EDUCATION/TRAINING PROGRAM

## 2022-01-25 PROCEDURE — 25500020 PHARM REV CODE 255: Performed by: INTERNAL MEDICINE

## 2022-01-25 PROCEDURE — 17003 DESTRUCTION, PREMALIGNANT LESIONS; SECOND THROUGH 14 LESIONS: ICD-10-PCS | Mod: S$PBB,,, | Performed by: DERMATOLOGY

## 2022-01-25 PROCEDURE — 99999 PR PBB SHADOW E&M-EST. PATIENT-LVL III: ICD-10-PCS | Mod: PBBFAC,,, | Performed by: DERMATOLOGY

## 2022-01-25 PROCEDURE — 71275 CT ANGIOGRAPHY CHEST: CPT | Mod: TC

## 2022-01-25 PROCEDURE — 17003 DESTRUCT PREMALG LES 2-14: CPT | Mod: S$PBB,,, | Performed by: DERMATOLOGY

## 2022-01-25 PROCEDURE — 74174 CTA CARDIAC TAVR_PARTNERS (XPD): ICD-10-PCS | Mod: 26,,, | Performed by: STUDENT IN AN ORGANIZED HEALTH CARE EDUCATION/TRAINING PROGRAM

## 2022-01-25 PROCEDURE — 17003 DESTRUCT PREMALG LES 2-14: CPT | Mod: PBBFAC | Performed by: DERMATOLOGY

## 2022-01-25 PROCEDURE — 17000 PR DESTRUCTION(LASER SURGERY,CRYOSURGERY,CHEMOSURGERY),PREMALIGNANT LESIONS,FIRST LESION: ICD-10-PCS | Mod: S$PBB,,, | Performed by: DERMATOLOGY

## 2022-01-25 RX ADMIN — IOHEXOL 100 ML: 350 INJECTION, SOLUTION INTRAVENOUS at 01:01

## 2022-01-25 NOTE — PATIENT INSTRUCTIONS

## 2022-01-25 NOTE — PROGRESS NOTES
Subjective:       Patient ID:  Misha Marte Jr. is a 89 y.o. male who presents for   Chief Complaint   Patient presents with    Lesion     Ears/scalp, lesion on R cheek     History of Present Illness: The patient presents for follow up of skin check.    The patient was last seen on: 12/8/2020 for actinic keratosis treated with cryosurgery which has resolved.  H/o NMSC's and AK's    Patient with new complaint of lesion(s)  Location: left cheek  Duration: couple months  Symptoms: no  Relieving factors/Previous treatments: no     Using am lactin for arms and hands prn      Review of Systems   Skin: Positive for daily sunscreen use, activity-related sunscreen use and wears hat. Negative for recent sunburn.   Hematologic/Lymphatic: Bruises/bleeds easily.        Baby aspirin daily        Objective:    Physical Exam   Constitutional: He appears well-developed and well-nourished. No distress.   Neurological: He is alert and oriented to person, place, and time. He is not disoriented.   Psychiatric: He has a normal mood and affect.   Skin:   Areas Examined (abnormalities noted in diagram):   Scalp / Hair Palpated and Inspected  Head / Face Inspection Performed  Neck Inspection Performed  RLE Inspected  LLE Inspection Performed                       Diagram Legend     Erythematous scaling macule/papule c/w actinic keratosis       Vascular papule c/w angioma      Pigmented verrucoid papule/plaque c/w seborrheic keratosis      Yellow umbilicated papule c/w sebaceous hyperplasia      Irregularly shaped tan macule c/w lentigo     1-2 mm smooth white papules consistent with Milia      Movable subcutaneous cyst with punctum c/w epidermal inclusion cyst      Subcutaneous movable cyst c/w pilar cyst      Firm pink to brown papule c/w dermatofibroma      Pedunculated fleshy papule(s) c/w skin tag(s)      Evenly pigmented macule c/w junctional nevus     Mildly variegated pigmented, slightly irregular-bordered macule c/w mildly  atypical nevus      Flesh colored to evenly pigmented papule c/w intradermal nevus       Pink pearly papule/plaque c/w basal cell carcinoma      Erythematous hyperkeratotic cursted plaque c/w SCC      Surgical scar with no sign of skin cancer recurrence      Open and closed comedones      Inflammatory papules and pustules      Verrucoid papule consistent consistent with wart     Erythematous eczematous patches and plaques     Dystrophic onycholytic nail with subungual debris c/w onychomycosis     Umbilicated papule    Erythematous-base heme-crusted tan verrucoid plaque consistent with inflamed seborrheic keratosis     Erythematous Silvery Scaling Plaque c/w Psoriasis     See annotation      Assessment / Plan:          Sebaceous gland hyperplasia  This is a common condition representing benign enlargement of the sebaceous lobule. It typically occurs in adulthood. Reassurance given to patient.         AK (actinic keratosis)  Today's Plan:      Cryosurgery Procedure Note    Verbal consent from the patient is obtained including, but not limited to, risk of hypopigmentation/hyperpigmentation, scar, recurrence of lesion. The patient is aware of the precancerous quality and need for treatment of these lesions. Liquid nitrogen cryosurgery is applied to the 4 actinic keratoses, as detailed in the physical exam, to produce a freeze injury. The patient is aware that blisters may form and is instructed on wound care with gentle cleansing and use of vaseline ointment to keep moist until healed. The patient is supplied a handout on cryosurgery and is instructed to call if lesions do not completely resolve.    Cont wear hat always        Follow up in about 1 year (around 1/25/2023).

## 2022-01-25 NOTE — ASSESSMENT & PLAN NOTE
Today's Plan:      Cryosurgery Procedure Note    Verbal consent from the patient is obtained including, but not limited to, risk of hypopigmentation/hyperpigmentation, scar, recurrence of lesion. The patient is aware of the precancerous quality and need for treatment of these lesions. Liquid nitrogen cryosurgery is applied to the 4 actinic keratoses, as detailed in the physical exam, to produce a freeze injury. The patient is aware that blisters may form and is instructed on wound care with gentle cleansing and use of vaseline ointment to keep moist until healed. The patient is supplied a handout on cryosurgery and is instructed to call if lesions do not completely resolve.    Cont wear hat always

## 2022-01-26 ENCOUNTER — OFFICE VISIT (OUTPATIENT)
Dept: PODIATRY | Facility: CLINIC | Age: 87
End: 2022-01-26
Payer: MEDICARE

## 2022-01-26 VITALS
BODY MASS INDEX: 27.12 KG/M2 | DIASTOLIC BLOOD PRESSURE: 76 MMHG | SYSTOLIC BLOOD PRESSURE: 124 MMHG | HEART RATE: 82 BPM | WEIGHT: 181 LBS

## 2022-01-26 DIAGNOSIS — B35.1 ONYCHOMYCOSIS: ICD-10-CM

## 2022-01-26 DIAGNOSIS — Q84.5 ENLARGED AND HYPERTROPHIC NAILS: Primary | ICD-10-CM

## 2022-01-26 DIAGNOSIS — L60.0 INGROWN NAIL: ICD-10-CM

## 2022-01-26 PROCEDURE — 99203 OFFICE O/P NEW LOW 30 MIN: CPT | Mod: S$PBB,,, | Performed by: PODIATRIST

## 2022-01-26 PROCEDURE — 99213 OFFICE O/P EST LOW 20 MIN: CPT | Mod: PBBFAC,PN | Performed by: PODIATRIST

## 2022-01-26 PROCEDURE — 99999 PR PBB SHADOW E&M-EST. PATIENT-LVL III: CPT | Mod: PBBFAC,,, | Performed by: PODIATRIST

## 2022-01-26 PROCEDURE — 99203 PR OFFICE/OUTPT VISIT, NEW, LEVL III, 30-44 MIN: ICD-10-PCS | Mod: S$PBB,,, | Performed by: PODIATRIST

## 2022-01-26 PROCEDURE — 99999 PR PBB SHADOW E&M-EST. PATIENT-LVL III: ICD-10-PCS | Mod: PBBFAC,,, | Performed by: PODIATRIST

## 2022-01-27 DIAGNOSIS — I35.0 AORTIC VALVE STENOSIS, ETIOLOGY OF CARDIAC VALVE DISEASE UNSPECIFIED: ICD-10-CM

## 2022-01-27 DIAGNOSIS — I25.10 CORONARY ARTERY DISEASE, UNSPECIFIED VESSEL OR LESION TYPE, UNSPECIFIED WHETHER ANGINA PRESENT, UNSPECIFIED WHETHER NATIVE OR TRANSPLANTED HEART: Primary | ICD-10-CM

## 2022-01-27 RX ORDER — SODIUM CHLORIDE 9 MG/ML
INJECTION, SOLUTION INTRAVENOUS CONTINUOUS
Status: CANCELLED | OUTPATIENT
Start: 2022-01-27 | End: 2022-01-27

## 2022-01-27 RX ORDER — DIPHENHYDRAMINE HCL 50 MG
50 CAPSULE ORAL ONCE
Status: CANCELLED | OUTPATIENT
Start: 2022-01-27 | End: 2022-01-27

## 2022-01-30 DIAGNOSIS — I50.9 CONGESTIVE HEART FAILURE, UNSPECIFIED HF CHRONICITY, UNSPECIFIED HEART FAILURE TYPE: Primary | ICD-10-CM

## 2022-01-30 DIAGNOSIS — I35.0 AORTIC STENOSIS, SEVERE: ICD-10-CM

## 2022-01-31 RX ORDER — LOSARTAN POTASSIUM 25 MG/1
TABLET ORAL
Qty: 90 TABLET | Refills: 3 | Status: SHIPPED | OUTPATIENT
Start: 2022-01-31 | End: 2022-11-21

## 2022-02-01 RX ORDER — CLOPIDOGREL BISULFATE 75 MG/1
TABLET ORAL
Qty: 5 TABLET | Refills: 0 | Status: ON HOLD | OUTPATIENT
Start: 2022-02-01 | End: 2022-02-08 | Stop reason: HOSPADM

## 2022-02-02 ENCOUNTER — OFFICE VISIT (OUTPATIENT)
Dept: INTERNAL MEDICINE | Facility: CLINIC | Age: 87
End: 2022-02-02
Payer: MEDICARE

## 2022-02-02 VITALS
HEIGHT: 69 IN | OXYGEN SATURATION: 98 % | DIASTOLIC BLOOD PRESSURE: 66 MMHG | HEART RATE: 70 BPM | BODY MASS INDEX: 26.84 KG/M2 | WEIGHT: 181.19 LBS | SYSTOLIC BLOOD PRESSURE: 122 MMHG

## 2022-02-02 DIAGNOSIS — E61.1 IRON DEFICIENCY: ICD-10-CM

## 2022-02-02 DIAGNOSIS — R60.0 BILATERAL LOWER EXTREMITY EDEMA: ICD-10-CM

## 2022-02-02 DIAGNOSIS — I35.0 AORTIC STENOSIS, SEVERE: Primary | ICD-10-CM

## 2022-02-02 DIAGNOSIS — I10 ESSENTIAL HYPERTENSION: ICD-10-CM

## 2022-02-02 PROCEDURE — 99213 PR OFFICE/OUTPT VISIT, EST, LEVL III, 20-29 MIN: ICD-10-PCS | Mod: S$PBB,,, | Performed by: INTERNAL MEDICINE

## 2022-02-02 PROCEDURE — 99213 OFFICE O/P EST LOW 20 MIN: CPT | Mod: S$PBB,,, | Performed by: INTERNAL MEDICINE

## 2022-02-02 PROCEDURE — 99999 PR PBB SHADOW E&M-EST. PATIENT-LVL IV: ICD-10-PCS | Mod: PBBFAC,,, | Performed by: INTERNAL MEDICINE

## 2022-02-02 PROCEDURE — 99999 PR PBB SHADOW E&M-EST. PATIENT-LVL IV: CPT | Mod: PBBFAC,,, | Performed by: INTERNAL MEDICINE

## 2022-02-02 PROCEDURE — 99214 OFFICE O/P EST MOD 30 MIN: CPT | Mod: PBBFAC | Performed by: INTERNAL MEDICINE

## 2022-02-02 NOTE — PROGRESS NOTES
CC:  Follow-up    HPI:  The patient is a 89-year-old male with severe aortic stenosis, chronic diastolic heart failure, hypertension, CKD stage 3, vitamin-D deficiency and iron deficiency anemia comes in today for one-month follow-up for CHF.  The patient was seen by interventional cardiology and to have an angiogram next week.  This is be follow-up for TAVR procedure for severe aortic stenosis.  The patient reports he has acid lost some weight.  No problems with shortness of breath.    ROS:  Patient has been doing daily weights.  No chest pain.  No shortness of breath.  He has been doing rehab exercises twice a day    Physical exam:  General appearance:  No acute distress  HEENT:  Trachea is midline without JVD  Pulmonary:  Good inspiratory, expiratory breath sounds were heard.  Patient did have some mild crackles at the lung bases.  Cardiovascular:  S1-S2 with a 2 to 3/6 systolic ejection murmur best heard right upper sternal border extremities with trace to 1+ ankle edema  GI:  Abdomen was nontender, nondistended  Comments:  The patient's blood test results reviewed.  His hemoglobin is up to 12.3 his hematocrit is up to 38.4.  His BUN is 12 his creatinine is 1.4.    Assessment:  1. Severe aortic stenosis  2. Diastolic heart failure  3. Lower extremity edema  4. Iron deficiency anemia    Plan:  1. No change in treatment at this time  2. The patient will follow-up in 1 month for re-evaluation  3. Did discuss [facility] with the patient his wife  .  They are agreeable to transfer.

## 2022-02-07 NOTE — PROGRESS NOTES
Subjective:      Patient ID: Misha Marte Jr. is a 89 y.o. male.    Chief Complaint: Nail Care (Possible removal)    Misha is a 89 y.o. male who presents to the clinic complaining of painful ingrown toenail on both feet hallux & 3rd toe R. He is accompanied by his wife/caretaker, who also brought in her sister for an new patient appointment (Charo Ortez).    Past Medical History:   Diagnosis Date    Heart murmur     Hyperlipidemia     Hypertension     Insomnia      Patient Active Problem List   Diagnosis    Hypertension    Multinodular goiter    Insomnia    Aortic stenosis    Vitamin D deficiency    Carotid artery disease    Sensation of fullness in both ears    Nonexudative age-related macular degeneration, bilateral, advanced atrophic with subfoveal involvement    NS (nuclear sclerosis)    Overweight (BMI 25.0-29.9)    Carpal tunnel syndrome of left wrist    Gait abnormality    Age related osteoporosis    Other hyperlipidemia    Pain and swelling of lower leg, right    Left trigger finger    AK (actinic keratosis)    Edema of both lower legs due to peripheral venous insufficiency    CHF (congestive heart failure)    Iron deficiency anemia    LOUIE (acute kidney injury)    Acute on chronic diastolic congestive heart failure    Nonrheumatic mitral valve stenosis   Is pending TAVR in a couple of wks.for heart failure.     PCP: Moncho Ramires MD  DOLV: 1/6/22     Objective:      Review of Systems   Constitutional: Negative for malaise/fatigue.   Cardiovascular: Positive for leg swelling. Negative for claudication.   Skin: Positive for nail changes. Negative for color change, rash and suspicious lesions.   Musculoskeletal: Negative for falls, joint pain, muscle weakness and myalgias.   Neurological: Negative for focal weakness, numbness, paresthesias, sensory change and weakness.   Psychiatric/Behavioral: Negative for memory loss. The patient is not nervous/anxious.      Physical  Exam  Vitals reviewed.   Constitutional:       General: He is not in acute distress.     Appearance: He is well-developed and normal weight.   Cardiovascular:      Pulses:           Dorsalis pedis pulses are 1+ on the right side and 1+ on the left side.   Musculoskeletal:         General: No swelling, tenderness or signs of injury.      Right lower le+ Pitting Edema present.      Left lower le+ Pitting Edema present.   Feet:      Right foot:      Protective Sensation: 2 sites tested. 2 sites sensed.      Skin integrity: Skin integrity normal.      Toenail Condition: Right toenails are long and ingrown. Fungal disease present.     Left foot:      Protective Sensation: 2 sites tested. 2 sites sensed.      Skin integrity: Skin integrity normal.      Toenail Condition: Left toenails are long and ingrown. Fungal disease present.     Comments: Nails significantly hypertrophic w/ cryptosis, symptomatic B/L hallux nail borders & 3rd toe nail R. Nails show some thickening & dystrophic appearance. No acute impingement nor infection noted.  Skin:     General: Skin is warm and dry.      Capillary Refill: Capillary refill takes 2 to 3 seconds.      Findings: No bruising, erythema, lesion or rash.   Neurological:      Mental Status: He is alert and oriented to person, place, and time.      Sensory: No sensory deficit.      Gait: Gait abnormal (walker assist).   Psychiatric:         Mood and Affect: Mood and affect normal.         Behavior: Behavior normal. Behavior is cooperative.         Assessment:      Encounter Diagnoses   Name Primary?    Enlarged and hypertrophic nails Yes    Ingrown nail     Onychomycosis        Plan:       Misha was seen today for nail care.    Diagnoses and all orders for this visit:    Enlarged and hypertrophic nails    Ingrown nail    Onychomycosis    I counseled the patient on his conditions, their implications and medical management.    Utilizing sterile toenail nippers, I aggressively  debrided & reduced all the nails x10 to their soft tissue attachment mechanically. I then also cut back the offending nail borders approximately 3 mm from its edge and carried the nail plate incision down at an angle in order to wedge out the  cryptotic portion of the nail plate in toto. The area was cleansed with alcohol. Patient tolerated the procedure well and related significant relief.    Nail & callus care is a non-covered service as patient does not have the required diagnoses to allow for 'routine care' - as such, visit would be a Proc B (out of pocket expense of $42).   Medicare carriers only cover this service in the presence of severe vascular or sensory disease. Note that this is not limited to persons with diabetes.  Correspondingly, although not an exclusive or comprehensive list, the following medical conditions are not, in and of themselves (i.e. in the absence of significant vascular or neurologic sequellae), considered to be of sufficient severity to make the patient at risk for nonprofessional care:   Diabetes without evidence of severe vascular or neurologic disease   End-stage renal disease   Kidney dialysis   History of organ transplantation, on immunosuppression   Hemorrhagic/bleeding conditions, including hemophilia   Use of blood thinners/anticoagulants (warfarin, Coumadin)   History of artificial joints, heart valves, or blood vessels   History of valvular heart disease, even if you have been advised to take antibiotics around the time of dental work   Cancer   Chemotherapy for cancer, or other health condition   HIV/Aids   Legal blindness   Inability to see and/or reach your own feet   Living alone   Mental retardation   History of stroke, spinal cord injury, or brain injury   Parkinson's Disease, or any medical condition associated with tremor of the hands or feet  Routine foot care is only a covered service in those patients with severe circulatory or sensory problems. For all other  stated or unstated conditions, such care is not covered under Medicare. As such, patients are expected to perform the service themselves, or have the care provided by a family member or friend, or pay directly to have such care provided by a professional, such as a podiatrist. Payment for such care is a patient responsibility (i.e. cash). In addition, as a non-covered service, Medicare's fee schedule does not apply.     - patient & wife are aware of this & will call when needed, after a couple of months or so.

## 2022-02-08 ENCOUNTER — HOSPITAL ENCOUNTER (OUTPATIENT)
Facility: HOSPITAL | Age: 87
Discharge: HOME | End: 2022-02-08
Attending: INTERNAL MEDICINE | Admitting: INTERNAL MEDICINE
Payer: MEDICARE

## 2022-02-08 VITALS
TEMPERATURE: 97 F | DIASTOLIC BLOOD PRESSURE: 66 MMHG | SYSTOLIC BLOOD PRESSURE: 130 MMHG | WEIGHT: 176 LBS | HEART RATE: 68 BPM | HEIGHT: 69 IN | BODY MASS INDEX: 26.07 KG/M2 | RESPIRATION RATE: 18 BRPM | OXYGEN SATURATION: 95 %

## 2022-02-08 DIAGNOSIS — I50.22 CHRONIC SYSTOLIC CONGESTIVE HEART FAILURE: ICD-10-CM

## 2022-02-08 DIAGNOSIS — I35.0 AORTIC VALVE STENOSIS, ETIOLOGY OF CARDIAC VALVE DISEASE UNSPECIFIED: ICD-10-CM

## 2022-02-08 DIAGNOSIS — I35.0 NONRHEUMATIC AORTIC VALVE STENOSIS: Primary | ICD-10-CM

## 2022-02-08 DIAGNOSIS — Z01.812 PRE-PROCEDURE LAB EXAM: ICD-10-CM

## 2022-02-08 DIAGNOSIS — I25.10 CORONARY ARTERY DISEASE, UNSPECIFIED VESSEL OR LESION TYPE, UNSPECIFIED WHETHER ANGINA PRESENT, UNSPECIFIED WHETHER NATIVE OR TRANSPLANTED HEART: ICD-10-CM

## 2022-02-08 DIAGNOSIS — I35.0 SEVERE AORTIC STENOSIS: Primary | ICD-10-CM

## 2022-02-08 DIAGNOSIS — I35.0 AORTIC STENOSIS: ICD-10-CM

## 2022-02-08 LAB
ABO + RH BLD: NORMAL
ALBUMIN SERPL BCP-MCNC: 3.9 G/DL (ref 3.5–5.2)
ALP SERPL-CCNC: 41 U/L (ref 55–135)
ALT SERPL W/O P-5'-P-CCNC: 21 U/L (ref 10–44)
ANION GAP SERPL CALC-SCNC: 6 MMOL/L (ref 8–16)
APTT BLDCRRT: 23.6 SEC (ref 21–32)
AST SERPL-CCNC: 28 U/L (ref 10–40)
BASOPHILS # BLD AUTO: 0.05 K/UL (ref 0–0.2)
BASOPHILS NFR BLD: 0.9 % (ref 0–1.9)
BILIRUB SERPL-MCNC: 0.7 MG/DL (ref 0.1–1)
BLD GP AB SCN CELLS X3 SERPL QL: NORMAL
BUN SERPL-MCNC: 14 MG/DL (ref 10–30)
CALCIUM SERPL-MCNC: 8.8 MG/DL (ref 8.7–10.5)
CHLORIDE SERPL-SCNC: 103 MMOL/L (ref 95–110)
CO2 SERPL-SCNC: 25 MMOL/L (ref 23–29)
CREAT SERPL-MCNC: 1.2 MG/DL (ref 0.5–1.4)
DIFFERENTIAL METHOD: ABNORMAL
EOSINOPHIL # BLD AUTO: 0.1 K/UL (ref 0–0.5)
EOSINOPHIL NFR BLD: 0.9 % (ref 0–8)
ERYTHROCYTE [DISTWIDTH] IN BLOOD BY AUTOMATED COUNT: 14.2 % (ref 11.5–14.5)
EST. GFR  (AFRICAN AMERICAN): 59.9 ML/MIN/1.73 M^2
EST. GFR  (NON AFRICAN AMERICAN): 51.8 ML/MIN/1.73 M^2
GLUCOSE SERPL-MCNC: 101 MG/DL (ref 70–110)
HCT VFR BLD AUTO: 37.2 % (ref 40–54)
HGB BLD-MCNC: 12.1 G/DL (ref 14–18)
IMM GRANULOCYTES # BLD AUTO: 0.04 K/UL (ref 0–0.04)
IMM GRANULOCYTES NFR BLD AUTO: 0.7 % (ref 0–0.5)
INR PPP: 1 (ref 0.8–1.2)
LYMPHOCYTES # BLD AUTO: 0.5 K/UL (ref 1–4.8)
LYMPHOCYTES NFR BLD: 8.3 % (ref 18–48)
MCH RBC QN AUTO: 30 PG (ref 27–31)
MCHC RBC AUTO-ENTMCNC: 32.5 G/DL (ref 32–36)
MCV RBC AUTO: 92 FL (ref 82–98)
MONOCYTES # BLD AUTO: 0.4 K/UL (ref 0.3–1)
MONOCYTES NFR BLD: 6.5 % (ref 4–15)
NEUTROPHILS # BLD AUTO: 4.6 K/UL (ref 1.8–7.7)
NEUTROPHILS NFR BLD: 82.7 % (ref 38–73)
NRBC BLD-RTO: 0 /100 WBC
PLATELET # BLD AUTO: 241 K/UL (ref 150–450)
PMV BLD AUTO: 10.2 FL (ref 9.2–12.9)
POTASSIUM SERPL-SCNC: 4 MMOL/L (ref 3.5–5.1)
PROT SERPL-MCNC: 6.8 G/DL (ref 6–8.4)
PROTHROMBIN TIME: 10.5 SEC (ref 9–12.5)
RBC # BLD AUTO: 4.03 M/UL (ref 4.6–6.2)
SODIUM SERPL-SCNC: 134 MMOL/L (ref 136–145)
WBC # BLD AUTO: 5.57 K/UL (ref 3.9–12.7)

## 2022-02-08 PROCEDURE — 25500020 PHARM REV CODE 255: Performed by: INTERNAL MEDICINE

## 2022-02-08 PROCEDURE — C1887 CATHETER, GUIDING: HCPCS | Performed by: INTERNAL MEDICINE

## 2022-02-08 PROCEDURE — 25000003 PHARM REV CODE 250: Performed by: INTERNAL MEDICINE

## 2022-02-08 PROCEDURE — 93454 CORONARY ARTERY ANGIO S&I: CPT | Mod: GC | Performed by: INTERNAL MEDICINE

## 2022-02-08 PROCEDURE — 93005 ELECTROCARDIOGRAM TRACING: CPT

## 2022-02-08 PROCEDURE — 85025 COMPLETE CBC W/AUTO DIFF WBC: CPT | Performed by: INTERNAL MEDICINE

## 2022-02-08 PROCEDURE — 93454 CORONARY ARTERY ANGIO S&I: CPT | Mod: 26,GC,, | Performed by: INTERNAL MEDICINE

## 2022-02-08 PROCEDURE — 99153 MOD SED SAME PHYS/QHP EA: CPT | Performed by: INTERNAL MEDICINE

## 2022-02-08 PROCEDURE — C1894 INTRO/SHEATH, NON-LASER: HCPCS | Performed by: INTERNAL MEDICINE

## 2022-02-08 PROCEDURE — C1760 CLOSURE DEV, VASC: HCPCS | Performed by: INTERNAL MEDICINE

## 2022-02-08 PROCEDURE — 93010 EKG 12-LEAD: ICD-10-PCS | Mod: ,,, | Performed by: STUDENT IN AN ORGANIZED HEALTH CARE EDUCATION/TRAINING PROGRAM

## 2022-02-08 PROCEDURE — 63600175 PHARM REV CODE 636 W HCPCS: Performed by: INTERNAL MEDICINE

## 2022-02-08 PROCEDURE — 93010 ELECTROCARDIOGRAM REPORT: CPT | Mod: ,,, | Performed by: STUDENT IN AN ORGANIZED HEALTH CARE EDUCATION/TRAINING PROGRAM

## 2022-02-08 PROCEDURE — 85730 THROMBOPLASTIN TIME PARTIAL: CPT | Performed by: INTERNAL MEDICINE

## 2022-02-08 PROCEDURE — 99152 MOD SED SAME PHYS/QHP 5/>YRS: CPT | Mod: GC,,, | Performed by: INTERNAL MEDICINE

## 2022-02-08 PROCEDURE — 99152 MOD SED SAME PHYS/QHP 5/>YRS: CPT | Performed by: INTERNAL MEDICINE

## 2022-02-08 PROCEDURE — C1769 GUIDE WIRE: HCPCS | Performed by: INTERNAL MEDICINE

## 2022-02-08 PROCEDURE — 80053 COMPREHEN METABOLIC PANEL: CPT | Performed by: INTERNAL MEDICINE

## 2022-02-08 PROCEDURE — 93454 PR CATH PLACE/CORONARY ANGIO, IMG SUPER/INTERP: ICD-10-PCS | Mod: 26,GC,, | Performed by: INTERNAL MEDICINE

## 2022-02-08 PROCEDURE — 99152 PR MOD CONSCIOUS SEDATION, SAME PHYS, 5+ YRS, FIRST 15 MIN: ICD-10-PCS | Mod: GC,,, | Performed by: INTERNAL MEDICINE

## 2022-02-08 PROCEDURE — 86850 RBC ANTIBODY SCREEN: CPT | Performed by: INTERNAL MEDICINE

## 2022-02-08 PROCEDURE — 85610 PROTHROMBIN TIME: CPT | Performed by: INTERNAL MEDICINE

## 2022-02-08 RX ORDER — DIPHENHYDRAMINE HCL 50 MG
50 CAPSULE ORAL ONCE
Status: CANCELLED | OUTPATIENT
Start: 2022-02-08 | End: 2022-02-08

## 2022-02-08 RX ORDER — ACETAMINOPHEN 325 MG/1
650 TABLET ORAL EVERY 4 HOURS PRN
Status: DISCONTINUED | OUTPATIENT
Start: 2022-02-08 | End: 2022-02-08 | Stop reason: HOSPADM

## 2022-02-08 RX ORDER — DIPHENHYDRAMINE HCL 50 MG
50 CAPSULE ORAL ONCE
Status: COMPLETED | OUTPATIENT
Start: 2022-02-08 | End: 2022-02-08

## 2022-02-08 RX ORDER — LIDOCAINE HYDROCHLORIDE 20 MG/ML
INJECTION, SOLUTION EPIDURAL; INFILTRATION; INTRACAUDAL; PERINEURAL
Status: DISCONTINUED | OUTPATIENT
Start: 2022-02-08 | End: 2022-02-08 | Stop reason: HOSPADM

## 2022-02-08 RX ORDER — SODIUM CHLORIDE 9 MG/ML
INJECTION, SOLUTION INTRAVENOUS CONTINUOUS
Status: ACTIVE | OUTPATIENT
Start: 2022-02-08 | End: 2022-02-08

## 2022-02-08 RX ORDER — CEFAZOLIN SODIUM 1 G/3ML
INJECTION, POWDER, FOR SOLUTION INTRAMUSCULAR; INTRAVENOUS
Status: DISCONTINUED | OUTPATIENT
Start: 2022-02-08 | End: 2022-02-08 | Stop reason: HOSPADM

## 2022-02-08 RX ORDER — HEPARIN SODIUM 1000 [USP'U]/ML
INJECTION, SOLUTION INTRAVENOUS; SUBCUTANEOUS
Status: DISCONTINUED | OUTPATIENT
Start: 2022-02-08 | End: 2022-02-08 | Stop reason: HOSPADM

## 2022-02-08 RX ORDER — SODIUM CHLORIDE 9 MG/ML
INJECTION, SOLUTION INTRAVENOUS CONTINUOUS
Status: CANCELLED | OUTPATIENT
Start: 2022-02-08 | End: 2022-02-08

## 2022-02-08 RX ORDER — FENTANYL CITRATE 50 UG/ML
INJECTION, SOLUTION INTRAMUSCULAR; INTRAVENOUS
Status: DISCONTINUED | OUTPATIENT
Start: 2022-02-08 | End: 2022-02-08 | Stop reason: HOSPADM

## 2022-02-08 RX ORDER — HEPARIN SOD,PORCINE/0.9 % NACL 1000/500ML
INTRAVENOUS SOLUTION INTRAVENOUS
Status: DISCONTINUED | OUTPATIENT
Start: 2022-02-08 | End: 2022-02-08 | Stop reason: HOSPADM

## 2022-02-08 RX ORDER — MIDAZOLAM HYDROCHLORIDE 2 MG/2ML
INJECTION, SOLUTION INTRAMUSCULAR; INTRAVENOUS
Status: DISCONTINUED | OUTPATIENT
Start: 2022-02-08 | End: 2022-02-08 | Stop reason: HOSPADM

## 2022-02-08 RX ORDER — ONDANSETRON 8 MG/1
8 TABLET, ORALLY DISINTEGRATING ORAL EVERY 8 HOURS PRN
Status: DISCONTINUED | OUTPATIENT
Start: 2022-02-08 | End: 2022-02-08 | Stop reason: HOSPADM

## 2022-02-08 RX ADMIN — DIPHENHYDRAMINE HYDROCHLORIDE 50 MG: 50 CAPSULE ORAL at 10:02

## 2022-02-08 RX ADMIN — SODIUM CHLORIDE: 0.9 INJECTION, SOLUTION INTRAVENOUS at 10:02

## 2022-02-08 RX ADMIN — ACETAMINOPHEN 650 MG: 325 TABLET ORAL at 02:02

## 2022-02-08 NOTE — PROGRESS NOTES
Received report from DE Del Cid. Assumed care of pt. Pt laying in bed with no S/S of distress or SOB. Denies pain. Needs met. Right radial vascband assessed; CDI, no bleeding or hematoma noted. Will remove air per protocol. Right groin assessed; CDI, no bleeding or hematoma noted. Bed in lowest position. Call light within reach. Family at bedside. Will continue to monitor.

## 2022-02-08 NOTE — Clinical Note
The catheter was inserted into the ostium   right coronary artery. An angiography was performed of the right coronary arteries. The angiography was performed via hand injection with .   Catheter removed.

## 2022-02-08 NOTE — Clinical Note
The catheter was inserted into the ostium   left main. An angiography was performed of the left coronary arteries. The angiography was performed via hand injection with .   Catheter removed.

## 2022-02-08 NOTE — DISCHARGE SUMMARY
Interventional Cardiology Discharge Summary    Admission date: 2/8/2022  Discharge date: 2/8/2022  Admitting provider: Tushar Swann MD  Discharge provider: Tushar Swann MD    HPI:   Misha Marte Jr. is a 89 y.o. , who presents for evaluation of severe AS.  He was admitted back in December for decompensated HF but otherwise has done fairly well with regular follow up with Dr. White. This was his first major admission for decompensated HF despite having severe AS for many years. He was diuresed and responded appropriately. He is being considered for TAVR, presents today for diagnostic left heart cath.       Hospital Course:   Patient presented for outpatient coronary angiogram which went without complication. Coronary angiogram revealed no significant coronary artery disease. 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 See full cath report in Epic for details. Hemostasis of patient's R CFA access site was achieved with Perclosure. Hemostasis of patient's R radial access site was achieved with VascBand. Patient was monitored per post-cath protocol, and his R groin and radial access site was c/d/i with no hematoma. Patient was able to ambulate without difficulty. He was feeling well and anticipating discharge home today. He was evaluated by CTS during stay and case is currently under review. If he is not felt to be a surgical candidate will proceed with TAVR next week.     Outpatient Plan:  - There were no medication changes  - Continue aspirin  - Complete TAVR workup and follow up with Dr. Neal outpatient       Jamil Vo MD  [facility]  Cardiovascular Disease, PGY-IV

## 2022-02-08 NOTE — HPI
Misha Marte Jr. is a 89 y.o. , who presents for evaluation of severe AS.  He was admitted back in December for decompensated HF but otherwise has done fairly well with regular follow up with Dr. White. This was his first major admission for decompensated HF despite having severe AS for many years. He was diuresed and responded appropriately. Evaluated by IC with plans for follow up here today. Since that admission, he has been fairly well. PT is coming to the house but he is back to near baseline. He is able to dress himself, shower (only needs assistance with getting in and out of the tub), feed himself and use the bathroom on his own. States his sister in law had this procedure done as well and he is interested. Denies chest pain and has never had an MI, denies syncope, dizziness or palpitations. He sleeps laying flat but does have some persistent edema to bilateral lower extremities. He tried to use compression stockings but unable to get them on. Weight has been within 3-4lbs of discharge weight per wife and they are closely monitoring fluid status. CTS is being consulted for SAVR vs TAVR.     Misha Marte Jr. is a 89 y.o. male referred by Dr. White for evaluation of severe AS (NYHA Class 3 sx).     The patient has undergone the following TAVR work-up:   ECHO (12/15/21): JANESSA= 0.85 cm2, MG= mmHg, Peak Ever= 4.1 m/s, EF= 55 %.   [facility] (Date ): - NEEDS AND SCHEDULED   STS: 4.9 %   Frailty: 2/4   Iliacs are >on R and > on L - NEEDS  LVOT area by CTA is  cm2 ( mm X  mm) and Avg Diameter is per Dr - NEEDS  Incidental findings on CT:   CT Surgery risk assessment: risk, per Dr  due to   - appointment pending  Rhythm issues: I RBBB; p AF episode during last admission  PFTs: FEV1 % predicted, DLCO % predicted. - NEEDS  Comorbidities: new onset pAF during last admission; moderate MS       NYHA: 3   CCS Class: 0

## 2022-02-08 NOTE — Clinical Note
75 ml of contrast were injected throughout the case. 125 mL of contrast was the total wasted during the case. 200 mL was the total amount used during the case.

## 2022-02-08 NOTE — PROGRESS NOTES
Report received from DE Castrejon. Patient s/p C.R radial vasc band intact, R groin dressing cdi. No bleeding or hematoma noted from sites. Doppler pulses. IVF infusing. Vss. Post procedure protocol discussed with patient. Family at bedside.

## 2022-02-08 NOTE — SUBJECTIVE & OBJECTIVE
No current facility-administered medications on file prior to encounter.     Current Outpatient Medications on File Prior to Encounter   Medication Sig    acetaminophen (TYLENOL) 500 MG tablet Take 500-1,000 mg by mouth daily as needed for Pain.    ammonium lactate (AMLACTIN TOP) Apply topically. Apply topically daily as needed    ascorbic acid, vitamin C, (VITAMIN C) 250 MG tablet Take 1 tablet (250 mg total) by mouth every morning.    aspirin 81 mg Tab Take 81 mg by mouth once daily.     CALCIUM CARBONATE/VITAMIN D3 (CALCIUM 600 + D,3, ORAL) Take 1,200 mg by mouth once daily.     carboxymethylcellulose sodium (REFRESH OPHT) Apply 1 drop to eye daily as needed.    ceramides 1,3,6-II (CERAVE) Lotn Apply topically daily as needed    cholecalciferol, vitamin D3, (VITAMIN D3 ORAL) Take 1 capsule by mouth once daily.    coenzyme Q10 10 mg capsule Take 100 mg by mouth once daily.     esomeprazole (NEXIUM) 40 MG capsule Take 1 capsule (40 mg total) by mouth once daily.    ferrous sulfate (FEOSOL) Tab tablet Take 1 tablet (1 each total) by mouth every morning. Take on an empty stomach with your vitamin C.    furosemide (LASIX) 20 MG tablet Take 1 tablet (20 mg total) by mouth once daily.    losartan (COZAAR) 25 MG tablet TAKE 1 TABLET EVERY DAY    metoprolol succinate (TOPROL-XL) 25 MG 24 hr tablet Take 1 tablet (25 mg total) by mouth once daily.    multivitamin-minerals-lutein Tab Take 1 tablet by mouth once daily.     polyethylene glycol (GLYCOLAX) 17 gram PwPk Take 17 g by mouth 2 (two) times daily as needed (Constipation).    pravastatin (PRAVACHOL) 40 MG tablet TAKE 1 TABLET EVERY DAY    turmeric root extract 500 mg Cap Take 1 capsule by mouth once daily.    VIT A,C & E/LUTEIN/MINERALS (OCUVITE WITH LUTEIN ORAL) Take 1 capsule by mouth once daily.       Review of patient's allergies indicates:   Allergen Reactions    Cortisone Other (See Comments)     hiccups    Dexamethasone Other (See Comments)      Constant hiccups for days    Codeine Other (See Comments)     Other reaction(s): Nausea       Past Medical History:   Diagnosis Date    Arthritis     CHF (congestive heart failure)     Digestive disorder     Heart murmur     Hyperlipidemia     Hypertension     Insomnia     Skin cancer      Past Surgical History:   Procedure Laterality Date    APPENDECTOMY      CARPAL TUNNEL RELEASE      COLONOSCOPY      TONSILLECTOMY, ADENOIDECTOMY       Family History     Problem Relation (Age of Onset)    Heart attack Mother    Heart disease Mother    Stroke Father        Tobacco Use    Smoking status: Never Smoker    Smokeless tobacco: Never Used   Substance and Sexual Activity    Alcohol use: Yes     Alcohol/week: 2.0 standard drinks     Types: 2 Glasses of wine per week     Comment: socially    Drug use: No    Sexual activity: Never     Review of Systems   Constitutional: Negative for activity change, appetite change, fatigue and fever.   HENT: Negative for nosebleeds.    Respiratory: Positive for shortness of breath. Negative for cough.    Cardiovascular: Negative for chest pain, palpitations and leg swelling.   Gastrointestinal: Negative for abdominal distention, abdominal pain and nausea.   Genitourinary: Negative for frequency.   Musculoskeletal: Negative for arthralgias and myalgias.   Skin: Negative for rash.   Neurological: Negative for dizziness and numbness.   Hematological: Does not bruise/bleed easily.     Objective:     Vital Signs (Most Recent):  Temp: 97.3 °F (36.3 °C) (02/08/22 1046)  Pulse: 71 (02/08/22 1046)  Resp: 16 (02/08/22 1046)  BP: (!) 162/78 (02/08/22 1048)  SpO2: 99 % (02/08/22 1046) Vital Signs (24h Range):  Temp:  [97.3 °F (36.3 °C)] 97.3 °F (36.3 °C)  Pulse:  [71] 71  Resp:  [16] 16  SpO2:  [99 %] 99 %  BP: (148-162)/(78-85) 162/78     Weight: 79.8 kg (176 lb)  Body mass index is 25.99 kg/m².    SpO2: 99 %  O2 Device (Oxygen Therapy): nasal cannula     Intake/Output - Last 3  Shifts     None           Lines/Drains/Airways     Peripheral Intravenous Line                 Peripheral IV - Single Lumen 02/08/22 1039 20 G Left Antecubital <1 day         Peripheral IV - Single Lumen 02/08/22 1055 20 G Right Antecubital <1 day                 STS Risk Score: 4.9% with available information    Physical Exam  HENT:      Head: Normocephalic and atraumatic.   Eyes:      Extraocular Movements: Extraocular movements intact.   Cardiovascular:      Rate and Rhythm: Normal rate and regular rhythm.   Pulmonary:      Effort: Pulmonary effort is normal.   Abdominal:      General: Abdomen is flat.      Palpations: Abdomen is soft.   Musculoskeletal:         General: Normal range of motion.      Cervical back: Normal range of motion.   Skin:     General: Skin is warm and dry.      Capillary Refill: Capillary refill takes less than 2 seconds.   Neurological:      General: No focal deficit present.         Significant Labs:  BMP:   Recent Labs   Lab 02/08/22  1151      *   K 4.0      CO2 25   BUN 14   CREATININE 1.2   CALCIUM 8.8     CBC:   Recent Labs   Lab 02/08/22  1151   WBC 5.57   RBC 4.03*   HGB 12.1*   HCT 37.2*      MCV 92   MCH 30.0   MCHC 32.5     CMP:   Recent Labs   Lab 02/08/22  1151      CALCIUM 8.8   ALBUMIN 3.9   PROT 6.8   *   K 4.0   CO2 25      BUN 14   CREATININE 1.2   ALKPHOS 41*   ALT 21   AST 28   BILITOT 0.7     Coagulation:   Recent Labs   Lab 02/08/22  1151   INR 1.0   APTT 23.6       Significant Diagnostics: Reviewed  CT Chest      Impression:  1. Examination performed for preoperative planning with TAVR measurements as above.  2. Moderate to severe coronary artery and aortoiliac calcific atherosclerosis.  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  3. Large hiatal hernia with the stomach lying completely within the chest.  4. Prostatomegaly, diffuse bladder thickening, and multiple bladder diverticula.  5. Small right and  trace left pleural effusions.  0.4 cm right lower lobe pulmonary nodule.  Clinical considerations will determine whether continued follow-up is warranted.  6. Multiple additional findings.     ECHO:  · The estimated ejection fraction is 55%.  · The left ventricle is normal in size with normal systolic function.  · Indeterminate left ventricular diastolic function.  · Normal right ventricular size with normal right ventricular systolic function.  · Moderate left atrial enlargement.  · There is severe aortic valve stenosis.  · Aortic valve area is 0.85 cm2; peak velocity is 4.1 m/s; mean gradient is mmHg.  · There is moderate calcific mitral stenosis with severe calcification and restriction of the anterior MV leaflet.  · The mean diastolic gradient across the mitral valve is 7 mmHg at a heart rate of 86 bpm.  · Normal central venous pressure (3 mmHg).      I have reviewed and interpreted all pertinent imaging results/findings within the past 24 hours.

## 2022-02-08 NOTE — BRIEF OP NOTE
Brief Operative Note:    : Tushar Swann MD     Referring Physician: Ike Ivan     All Operators: Surgeon(s):  MD Jamil Padron MD Khaldia Khaled, MD     Preoperative Diagnosis: Coronary artery disease, unspecified vessel or lesion type, unspecified whether angina present, unspecified whether native or transplanted heart [I25.10]  Aortic valve stenosis, etiology of cardiac valve disease unspecified [I35.0]     Postop Diagnosis: Coronary artery disease, unspecified vessel or lesion type, unspecified whether angina present, unspecified whether native or transplanted heart [I25.10]  Aortic valve stenosis, etiology of cardiac valve disease unspecified [I35.0]    Treatments/Procedures: Procedure(s) (LRB):  ANGIOGRAM, CORONARY ARTERY (N/A)    Access: Right CFA, Right radial artery    Findings:No significant coronary artery disease is present. Separate ostia for LCx and LAD were present coming off the aortic root     See catheterization report for full details.    Intervention: None     See catheterization report for full details.    Closure device: Vasc Band for right radial, Perclose for right CFA       Plan:  - Post cath protocol   - IVF @ 150 cc/kg/hr x 4 hours  - Bed rest x 2 hours   - Continue aspirin 81 mg daily indefinitely  - Continue high intensity statin therapy (LDL goal < 70)  - Risk factor reduction (BP <130/80 mmHg, glycemic control, etc)  - Follow up with outpatient cardiologist    Estimated Blood loss: 20 cc    Specimens removed: No      Jamil Vo MD  [facility]  Cardiovascular Disease, PGY-IV

## 2022-02-08 NOTE — DISCHARGE INSTRUCTIONS
1. Do not strain or lift anything greater than 5 lb for 1 week.  2. Do not drive or operate any dangerous machinery for 24 hours.   3. Keep the dressing on, clean, and dry for 24 hours.   4. After 24 hours, the dressing may be removed and a shower is allowed.   5. Clean the area with mild soap and water and then leave open to air. Keep area free of any creams, lotions or ointments. .   6. Once the skin has healed, bathing in a tub or swimming is allowed.   7. Inspect the groin site daily and report to the physician any swelling at the site that   cannot be controlled with manual pressure for 10 minutes, unusual pain at the   access site or affected extremity, unusual swelling at the access site, or signs or   symptoms of infection such as redness, pain, or fever.     Call 911 if you have:   Bleeding from the puncture site that you cannot stop by doing the following:   Relax and lie down right away. Keep your leg flat and apply firm pressure to the site using your fingers and a gauze pad. Keep the pressure on for 20 minutes. Continue this until the bleeding stops. This may take awhile. When bleeding stops, cover the site with a clean bandage and keep your leg still as much as possible.    Discharge Instructions and Care of Your Wrist After a Cardiac Catheterization Procedure Performed via the Radial Artery    For 3-5 days following the procedure:  Do not subject your affected hand/arm to any forceful movements (i.e. supporting weight when rising from a chair or bed)  Avoid excessive (extension/flexion) wrist movement (i.e. supporting weight when rising from a chair or bed, push-ups, lifting garage doors, etc.)  Do not drive a car for 24 hours  The dressing on the puncture site may be removed after 24 hours and left open to air. If there is minor oozing, you may apply a Band-aid and remove after 12 hours  You may shower on the day following your procedure. Do not take a tub bath or submerge the puncture site in water  for 3 days following the procedure  Do not lift anything heavier than 3 to 5 pounds with the affected hand/arm  Do not operate a lawnmower, motorcycle, chainsaw, or all-terrain vehicle   Do not engage in vigorous exercise (i.e. Tennis, Golf, Bowling) using the affected arm    If bleeding should occur following discharge:  Sit down and apply firm pressure to the puncture site with your fingers for 10 minutes   If the bleeding stops, continue to sit quietly, keeping your wrist straight for 2 hours. Notify your physician as soon as possible   If bleeding does not stop after 10 minutes or if there is a large amount of bleeding or spurting, call 911 immediately. Do not drive yourself to the hospital.     You should expect mild tingling in your hand and tenderness at the puncture site for up to 3 days.     Notify your physician if these symptoms persist or if you experience:  Change in color or temperature of the hand or arm  Redness, heat, or pus at the puncture site  Chills or fever greater than 101.0 F

## 2022-02-08 NOTE — Clinical Note
----- Message from Damaso Cano MD sent at 1/3/2019  5:22 PM CST -----  Please inform patient that her kidney functions are slightly declined this is likely due to diarrhea and mild dehydration.   Recommend to continue pushing fluids along with Gatorade as The closure device was deployed in the right femoral artery.

## 2022-02-08 NOTE — ASSESSMENT & PLAN NOTE
Misha SY Marte Jr. is a 89 y.o. , who presents for evaluation of severe AS.  ECHO reveals The valve is trileaflet.  Aortic valve sclerosis is severe. Mobility is moderately reduced.There is severe annular calcification. There is severe stenosis. JANESSA is 0.85 cm2; mean gradient is mmHg; peak gradient is 67 mmHg; AV peak velocity is 4.1 m/s. The LVOT diameter is 1.96 cm. STS score elevated at 4.9%.   Will review with Dr. Dunaway.

## 2022-02-08 NOTE — CONSULTS
[facility] - Short Stay Cardiac Unit  Cardiothoracic Surgery  Consult Note    Patient Name: Misha Marte Jr.  MRN: [id number]  Admission Date: 2/8/2022  Attending Physician: Tushar Swann MD  Referring Provider: Ike Ivan MD    Patient information was obtained from patient, past medical records and ER records.     Consults  Subjective:     Principal Problem: <principal problem not specified>    History of Present Illness: Misha Marte Jr. is a 89 y.o. , who presents for evaluation of severe AS.  He was admitted back in December for decompensated HF but otherwise has done fairly well with regular follow up with Dr. White. This was his first major admission for decompensated HF despite having severe AS for many years. He was diuresed and responded appropriately. Evaluated by IC with plans for follow up here today. Since that admission, he has been fairly well. PT is coming to the house but he is back to near baseline. He is able to dress himself, shower (only needs assistance with getting in and out of the tub), feed himself and use the bathroom on his own. States his sister in law had this procedure done as well and he is interested. Denies chest pain and has never had an MI, denies syncope, dizziness or palpitations. He sleeps laying flat but does have some persistent edema to bilateral lower extremities. He tried to use compression stockings but unable to get them on. Weight has been within 3-4lbs of discharge weight per wife and they are closely monitoring fluid status. CTS is being consulted for SAVR vs TAVR.     Misha Marte Jr. is a 89 y.o. male referred by Dr. White for evaluation of severe AS (NYHA Class 3 sx).     The patient has undergone the following TAVR work-up:   ECHO (12/15/21): JANESSA= 0.85 cm2, MG= mmHg, Peak Ever= 4.1 m/s, EF= 55 %.   [facility] (Date ): - NEEDS AND SCHEDULED   STS: 4.9 %   Frailty: 2/4   Iliacs are >on R and > on L - NEEDS  LVOT area by CTA is  cm2 ( mm X  mm) and  Avg Diameter is per Dr - NEEDS  Incidental findings on CT:   CT Surgery risk assessment: risk, per Dr  due to   - appointment pending  Rhythm issues: I RBBB; p AF episode during last admission  PFTs: FEV1 % predicted, DLCO % predicted. - NEEDS  Comorbidities: new onset pAF during last admission; moderate MS       NYHA: 3   CCS Class: 0      No current facility-administered medications on file prior to encounter.     Current Outpatient Medications on File Prior to Encounter   Medication Sig    acetaminophen (TYLENOL) 500 MG tablet Take 500-1,000 mg by mouth daily as needed for Pain.    ammonium lactate (AMLACTIN TOP) Apply topically. Apply topically daily as needed    ascorbic acid, vitamin C, (VITAMIN C) 250 MG tablet Take 1 tablet (250 mg total) by mouth every morning.    aspirin 81 mg Tab Take 81 mg by mouth once daily.     CALCIUM CARBONATE/VITAMIN D3 (CALCIUM 600 + D,3, ORAL) Take 1,200 mg by mouth once daily.     carboxymethylcellulose sodium (REFRESH OPHT) Apply 1 drop to eye daily as needed.    ceramides 1,3,6-II (CERAVE) Lotn Apply topically daily as needed    cholecalciferol, vitamin D3, (VITAMIN D3 ORAL) Take 1 capsule by mouth once daily.    coenzyme Q10 10 mg capsule Take 100 mg by mouth once daily.     esomeprazole (NEXIUM) 40 MG capsule Take 1 capsule (40 mg total) by mouth once daily.    ferrous sulfate (FEOSOL) Tab tablet Take 1 tablet (1 each total) by mouth every morning. Take on an empty stomach with your vitamin C.    furosemide (LASIX) 20 MG tablet Take 1 tablet (20 mg total) by mouth once daily.    losartan (COZAAR) 25 MG tablet TAKE 1 TABLET EVERY DAY    metoprolol succinate (TOPROL-XL) 25 MG 24 hr tablet Take 1 tablet (25 mg total) by mouth once daily.    multivitamin-minerals-lutein Tab Take 1 tablet by mouth once daily.     polyethylene glycol (GLYCOLAX) 17 gram PwPk Take 17 g by mouth 2 (two) times daily as needed (Constipation).    pravastatin (PRAVACHOL) 40 MG tablet TAKE 1 TABLET EVERY  DAY    turmeric root extract 500 mg Cap Take 1 capsule by mouth once daily.    VIT A,C & E/LUTEIN/MINERALS (OCUVITE WITH LUTEIN ORAL) Take 1 capsule by mouth once daily.       Review of patient's allergies indicates:   Allergen Reactions    Cortisone Other (See Comments)     hiccups    Dexamethasone Other (See Comments)     Constant hiccups for days    Codeine Other (See Comments)     Other reaction(s): Nausea       Past Medical History:   Diagnosis Date    Arthritis     CHF (congestive heart failure)     Digestive disorder     Heart murmur     Hyperlipidemia     Hypertension     Insomnia     Skin cancer      Past Surgical History:   Procedure Laterality Date    APPENDECTOMY      CARPAL TUNNEL RELEASE      COLONOSCOPY      TONSILLECTOMY, ADENOIDECTOMY       Family History       Problem Relation (Age of Onset)    Heart attack Mother    Heart disease Mother    Stroke Father          Tobacco Use    Smoking status: Never Smoker    Smokeless tobacco: Never Used   Substance and Sexual Activity    Alcohol use: Yes     Alcohol/week: 2.0 standard drinks     Types: 2 Glasses of wine per week     Comment: socially    Drug use: No    Sexual activity: Never     Review of Systems   Constitutional: Negative for activity change, appetite change, fatigue and fever.   HENT: Negative for nosebleeds.    Respiratory: Positive for shortness of breath. Negative for cough.    Cardiovascular: Negative for chest pain, palpitations and leg swelling.   Gastrointestinal: Negative for abdominal distention, abdominal pain and nausea.   Genitourinary: Negative for frequency.   Musculoskeletal: Negative for arthralgias and myalgias.   Skin: Negative for rash.   Neurological: Negative for dizziness and numbness.   Hematological: Does not bruise/bleed easily.     Objective:     Vital Signs (Most Recent):  Temp: 97.3 °F (36.3 °C) (02/08/22 1046)  Pulse: 71 (02/08/22 1046)  Resp: 16 (02/08/22 1046)  BP: (!) 162/78 (02/08/22 1048)  SpO2: 99 % (02/08/22  1046) Vital Signs (24h Range):  Temp:  [97.3 °F (36.3 °C)] 97.3 °F (36.3 °C)  Pulse:  [71] 71  Resp:  [16] 16  SpO2:  [99 %] 99 %  BP: (148-162)/(78-85) 162/78     Weight: 79.8 kg (176 lb)  Body mass index is 25.99 kg/m².    SpO2: 99 %  O2 Device (Oxygen Therapy): nasal cannula     Intake/Output - Last 3 Shifts       None             Lines/Drains/Airways       Peripheral Intravenous Line                   Peripheral IV - Single Lumen 02/08/22 1039 20 G Left Antecubital <1 day         Peripheral IV - Single Lumen 02/08/22 1055 20 G Right Antecubital <1 day                     STS Risk Score: 4.9% with available information    Physical Exam  HENT:      Head: Normocephalic and atraumatic.   Eyes:      Extraocular Movements: Extraocular movements intact.   Cardiovascular:      Rate and Rhythm: Normal rate and regular rhythm.   Pulmonary:      Effort: Pulmonary effort is normal.   Abdominal:      General: Abdomen is flat.      Palpations: Abdomen is soft.   Musculoskeletal:         General: Normal range of motion.      Cervical back: Normal range of motion.   Skin:     General: Skin is warm and dry.      Capillary Refill: Capillary refill takes less than 2 seconds.   Neurological:      General: No focal deficit present.         Significant Labs:  BMP:   Recent Labs   Lab 02/08/22  1151      *   K 4.0      CO2 25   BUN 14   CREATININE 1.2   CALCIUM 8.8     CBC:   Recent Labs   Lab 02/08/22  1151   WBC 5.57   RBC 4.03*   HGB 12.1*   HCT 37.2*      MCV 92   MCH 30.0   MCHC 32.5     CMP:   Recent Labs   Lab 02/08/22  1151      CALCIUM 8.8   ALBUMIN 3.9   PROT 6.8   *   K 4.0   CO2 25      BUN 14   CREATININE 1.2   ALKPHOS 41*   ALT 21   AST 28   BILITOT 0.7     Coagulation:   Recent Labs   Lab 02/08/22  1151   INR 1.0   APTT 23.6       Significant Diagnostics: Reviewed  CT Chest      Impression:  1. Examination performed for preoperative planning with TAVR measurements as  above.  2. Moderate to severe coronary artery and aortoiliac calcific atherosclerosis.  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  3. Large hiatal hernia with the stomach lying completely within the chest.  4. Prostatomegaly, diffuse bladder thickening, and multiple bladder diverticula.  5. Small right and trace left pleural effusions.  0.4 cm right lower lobe pulmonary nodule.  Clinical considerations will determine whether continued follow-up is warranted.  6. Multiple additional findings.     ECHO:  The estimated ejection fraction is 55%.  The left ventricle is normal in size with normal systolic function.  Indeterminate left ventricular diastolic function.  Normal right ventricular size with normal right ventricular systolic function.  Moderate left atrial enlargement.  There is severe aortic valve stenosis.  Aortic valve area is 0.85 cm2; peak velocity is 4.1 m/s; mean gradient is mmHg.  There is moderate calcific mitral stenosis with severe calcification and restriction of the anterior MV leaflet.  The mean diastolic gradient across the mitral valve is 7 mmHg at a heart rate of 86 bpm.  Normal central venous pressure (3 mmHg).      I have reviewed and interpreted all pertinent imaging results/findings within the past 24 hours.    Assessment/Plan:     NYHA Score: NYHA III: marked limitation of physical activity, comfortable at rest    Aortic stenosis  Misha Marte Jr. is a 89 y.o. , who presents for evaluation of severe AS.  ECHO reveals The valve is trileaflet.  Aortic valve sclerosis is severe. Mobility is moderately reduced.There is severe annular calcification. There is severe stenosis. JANESSA is 0.85 cm2; mean gradient is mmHg; peak gradient is 67 mmHg; AV peak velocity is 4.1 m/s. The LVOT diameter is 1.96 cm. STS score elevated at 4.9%.   Will review with Dr. Dunaway.         Thank you for your consult. I will sign off. Please contact us if you have any additional questions.    I have  seen the patient and reviewed the nurse practitioner's note above. I have personally interviewed and examined the patient at bedside and agree with the findings.     89 y.o. male with multiple comorbidities presented with symptomatic severe aortic stenosis. Deemed inoperable for surgical aortic valve replacement (SAVR) due to age and comorbidities.  Appropriate candidate for TAVR evaluation.  We explained the patient's condition, pathology and prognosis of severe aortic stenosis, treatment options including medical therapy, SAVR and TAVR, details of SAVR and the TAVR procedure, risks and benefits of SAVR and TAVR including myocardial infarction, stroke, pacemaker, bleeding, infection, acute renal injury, conversion to open surgery, need for emergency mechanical circulatory support, and potential complications and recovery course with patient and family, and also durability of surgical vs transcatheter valve and options for reintervention in the future.  Patient and family understood and agreed to proceed. All questions answered.    We spent >70 minutes reviewing, examining and evaluating this patient, including reviewing relevant diagnostic studies, and 50% of which were spent on patient counseling including the patient's condition, diagnosis, prognosis, expected recovery after SAVR vs TAVR, follow-up plan and next steps.      Talat Dunaway MD  Cardiothoracic Surgery  [facility]

## 2022-02-11 ENCOUNTER — EDUCATION (OUTPATIENT)
Dept: CARDIOLOGY | Facility: CLINIC | Age: 87
End: 2022-02-11
Payer: MEDICARE

## 2022-02-11 ENCOUNTER — OFFICE VISIT (OUTPATIENT)
Dept: CARDIOLOGY | Facility: CLINIC | Age: 87
End: 2022-02-11
Payer: MEDICARE

## 2022-02-11 ENCOUNTER — DOCUMENTATION ONLY (OUTPATIENT)
Dept: CARDIOLOGY | Facility: CLINIC | Age: 87
End: 2022-02-11
Payer: MEDICARE

## 2022-02-11 VITALS
DIASTOLIC BLOOD PRESSURE: 73 MMHG | BODY MASS INDEX: 27.1 KG/M2 | WEIGHT: 178.81 LBS | OXYGEN SATURATION: 99 % | HEIGHT: 68 IN | SYSTOLIC BLOOD PRESSURE: 150 MMHG | HEART RATE: 70 BPM

## 2022-02-11 DIAGNOSIS — I35.0 NONRHEUMATIC AORTIC VALVE STENOSIS: Primary | Chronic | ICD-10-CM

## 2022-02-11 DIAGNOSIS — I50.22 CHRONIC SYSTOLIC CONGESTIVE HEART FAILURE: ICD-10-CM

## 2022-02-11 DIAGNOSIS — I10 PRIMARY HYPERTENSION: Chronic | ICD-10-CM

## 2022-02-11 DIAGNOSIS — I50.33 ACUTE ON CHRONIC DIASTOLIC CONGESTIVE HEART FAILURE: ICD-10-CM

## 2022-02-11 PROCEDURE — 99999 PR PBB SHADOW E&M-EST. PATIENT-LVL IV: ICD-10-PCS | Mod: PBBFAC,,, | Performed by: INTERNAL MEDICINE

## 2022-02-11 PROCEDURE — 99214 OFFICE O/P EST MOD 30 MIN: CPT | Mod: PBBFAC | Performed by: INTERNAL MEDICINE

## 2022-02-11 PROCEDURE — 99999 PR PBB SHADOW E&M-EST. PATIENT-LVL IV: CPT | Mod: PBBFAC,,, | Performed by: INTERNAL MEDICINE

## 2022-02-11 PROCEDURE — 99214 OFFICE O/P EST MOD 30 MIN: CPT | Mod: S$PBB,,, | Performed by: INTERNAL MEDICINE

## 2022-02-11 PROCEDURE — 99214 PR OFFICE/OUTPT VISIT, EST, LEVL IV, 30-39 MIN: ICD-10-PCS | Mod: S$PBB,,, | Performed by: INTERNAL MEDICINE

## 2022-02-11 NOTE — H&P (VIEW-ONLY)
PCP - Ike Ivan MD  Subjective:     Misha Marte Jr. is a 89 y.o. male referred for evaluation of severe AS (NYHA Class 3 sx). Recently had a left heart cath without significant coronary disease. Today he reports doing well since the cath. No chest pain or dyspnea at home. Gets around with a walker without problems. No pain or swelling at radial or femoral puncture sites. He does note some bruising to his right groin but this is improving.      The patient has undergone the following TAVR work-up:   · ECHO (12/15/21): JANESSA= 0.85 cm2, MG= mmHg, Peak Ever= 4.1 m/s, EF= 55 %.   · LHC (Date 2/8/22; Val): - no significant coronary artery disease. 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   · STS: 4.9 %   · Frailty: 2/4   · Iliacs are > 9.31 on R and > 9.81 on L  · LVOT area by CTA is 3.70 cm2 ( 24.4 mm X 21.0 mm) and Avg Diameter is 21.7 mm per Dr Neal  · Incidental findings on CT:   · CT Surgery risk assessment: inoperable, per Dr Dunaway due to age and comorbidities   · Rhythm issues: incomplete RBBB, Afib  · PFTs: not done  · Comorbidities: new onset pAF during last admission; moderate MS         History:     Social History     Tobacco Use    Smoking status: Never Smoker    Smokeless tobacco: Never Used   Substance Use Topics    Alcohol use: Yes     Alcohol/week: 2.0 standard drinks     Types: 2 Glasses of wine per week     Comment: socially     Family History   Problem Relation Age of Onset    Heart disease Mother     Heart attack Mother     Stroke Father     Hypertension Neg Hx     Melanoma Neg Hx        Meds:     Review of patient's allergies indicates:   Allergen Reactions    Cortisone Other (See Comments)     hiccups    Dexamethasone Other (See Comments)     Constant hiccups for days    Codeine Other (See Comments)     Other reaction(s): Nausea       Current Outpatient Medications:     acetaminophen (TYLENOL) 500 MG tablet, Take 500-1,000 mg by mouth daily  as needed for Pain., Disp: , Rfl:     ammonium lactate (AMLACTIN TOP), Apply topically. Apply topically daily as needed, Disp: , Rfl:     ascorbic acid, vitamin C, (VITAMIN C) 250 MG tablet, Take 1 tablet (250 mg total) by mouth every morning., Disp: 30 tablet, Rfl: 2    aspirin 81 mg Tab, Take 81 mg by mouth once daily. , Disp: , Rfl:     CALCIUM CARBONATE/VITAMIN D3 (CALCIUM 600 + D,3, ORAL), Take 1,200 mg by mouth once daily. , Disp: , Rfl:     carboxymethylcellulose sodium (REFRESH OPHT), Apply 1 drop to eye daily as needed., Disp: , Rfl:     ceramides 1,3,6-II (CERAVE) Lotn, Apply topically daily as needed, Disp: , Rfl:     cholecalciferol, vitamin D3, (VITAMIN D3 ORAL), Take 1 capsule by mouth once daily., Disp: , Rfl:     coenzyme Q10 10 mg capsule, Take 100 mg by mouth once daily. , Disp: , Rfl:     esomeprazole (NEXIUM) 40 MG capsule, Take 1 capsule (40 mg total) by mouth once daily., Disp: 90 capsule, Rfl: 0    ferrous sulfate (FEOSOL) Tab tablet, Take 1 tablet (1 each total) by mouth every morning. Take on an empty stomach with your vitamin C., Disp: 90 tablet, Rfl: 3    furosemide (LASIX) 20 MG tablet, Take 1 tablet (20 mg total) by mouth once daily., Disp: 90 tablet, Rfl: 3    losartan (COZAAR) 25 MG tablet, TAKE 1 TABLET EVERY DAY, Disp: 90 tablet, Rfl: 3    metoprolol succinate (TOPROL-XL) 25 MG 24 hr tablet, Take 1 tablet (25 mg total) by mouth once daily., Disp: 90 tablet, Rfl: 3    multivitamin-minerals-lutein Tab, Take 1 tablet by mouth once daily. , Disp: , Rfl:     polyethylene glycol (GLYCOLAX) 17 gram PwPk, Take 17 g by mouth 2 (two) times daily as needed (Constipation)., Disp: , Rfl: 0    pravastatin (PRAVACHOL) 40 MG tablet, TAKE 1 TABLET EVERY DAY, Disp: 90 tablet, Rfl: 3    turmeric root extract 500 mg Cap, Take 1 capsule by mouth once daily., Disp: , Rfl:     VIT A,C & E/LUTEIN/MINERALS (OCUVITE WITH LUTEIN ORAL), Take 1 capsule by mouth once daily., Disp: , Rfl:     10  "point ROS performed and negative except as stated in HPI     Objective:   BP (!) 150/73 (BP Location: Left arm, Patient Position: Sitting, BP Method: Large (Automatic))   Pulse 70   Ht 5' 8" (1.727 m)   Wt 81.1 kg (178 lb 12.7 oz)   SpO2 99%   BMI 27.19 kg/m²     Physical Exam:   Constitutional: no acute distress  HEENT: NCAT, EOMI, no scleral icterus  Cardiovascular: Regular rate and rhythm, 3/6 crescendo-decrescendo murmur at RUSB. 2+ carotid, radial, femoral pulses bilaterally. Bruising to right groin which tracks into left scrotum but no swelling or tenderness, strong femoral pulse     Pulmonary: Clear to auscultation bilaterally   Abdomen: nontender, non-distended   Neuro: alert and oriented, no focal deficits  Extremities: warm, no edema   MSK: no deformities  Integument: intact, no rashes     Labs:     Lab Results   Component Value Date     (L) 02/08/2022    K 4.0 02/08/2022     02/08/2022    CO2 25 02/08/2022    BUN 14 02/08/2022    CREATININE 1.2 02/08/2022    ANIONGAP 6 (L) 02/08/2022     Lab Results   Component Value Date    HGBA1C 5.2 12/15/2021     Lab Results   Component Value Date     (H) 01/12/2022     (H) 12/27/2021     (H) 12/15/2021       Lab Results   Component Value Date    WBC 5.57 02/08/2022    HGB 12.1 (L) 02/08/2022    HCT 37.2 (L) 02/08/2022     02/08/2022    GRAN 4.6 02/08/2022    GRAN 82.7 (H) 02/08/2022     Lab Results   Component Value Date    CHOL 144 11/18/2020    HDL 59 11/18/2020    LDLCALC 69 11/18/2020    TRIG 78 11/18/2020       Lab Results   Component Value Date     (L) 02/08/2022    K 4.0 02/08/2022     02/08/2022    CO2 25 02/08/2022    BUN 14 02/08/2022    CREATININE 1.2 02/08/2022    ANIONGAP 6 (L) 02/08/2022     Lab Results   Component Value Date    HGBA1C 5.2 12/15/2021     Lab Results   Component Value Date     (H) 01/12/2022     (H) 12/27/2021     (H) 12/15/2021    Lab Results   Component Value " Date    WBC 5.57 02/08/2022    HGB 12.1 (L) 02/08/2022    HCT 37.2 (L) 02/08/2022     02/08/2022    GRAN 4.6 02/08/2022    GRAN 82.7 (H) 02/08/2022     Lab Results   Component Value Date    CHOL 144 11/18/2020    HDL 59 11/18/2020    LDLCALC 69 11/18/2020    TRIG 78 11/18/2020            Assessment     1. Nonrheumatic aortic valve stenosis    2. Chronic systolic congestive heart failure    3. Acute on chronic diastolic congestive heart failure    4. Primary hypertension        Plan:   Misha Marte Jr. is a 29 mm (maybe 26)  Evolut valve candidate via RTF access. No obstructive disease on recent left heart cath. Plan for TAVR next Tuesday 2/15. Consents signed and scanned into chart.      Transcatheter Aortic valve replacement   1. Valve: 29 or 26 mm Evolut  2. TAVR access: RTF  3. Valvuloplasty balloon: 20 mm  4. Viabahn size if needed: 10x5  5. Antithrombotic therapy: ASA alone  6. Pacemaker risk factors: incomplete RBBB       Jamil Vo MD  [facility]  Cardiovascular Disease, PGY-IV

## 2022-02-11 NOTE — PROGRESS NOTES
Transcatheter Valve Replacement (TAVR)    You have been scheduled for your valve replacement on Tuesday, February 15, 2022.     Please report to the Cardiology Waiting Area on the Third floor of the hospital and check in at 8 AM.   You will then be taken to the SSCU (Short Stay Cardiac Unit) and prepared for your procedure. Please be aware that this is not the time of your procedure but the time you are to arrive.     Preperations for your procedure:  1. Shower with Dial soap the night before and the morning of your procedure.  2. No solid foods 8 hours prior to your procedure.  You may have clear liquids (12 ounces or 1 1/2 cups) up until 2 hours of your procedure.  Patients with gastric         emptying issues should be fasting for 6- 8 hours prior to the procedure.  Clear liquids include water, black coffee, clear juices, and performance drinks - no pulp         or milk.    3. Heart failure or dialysis patients will be limited to 8 ounces (1 cup) of clear liquids up until 2 hours of the procedure.  You may take your regular morning medications         with as much water as necessary.   4. Bring your cane and/or walker with you to the hospital.  5. Bring CPAP/BiPAP, or oxygen if you are on oxygen at home.  6. Call the office for any signs of infection ( fever, cough, pneumonia, urinary tract, etc.) We cannot implant a device in an infected patient.    Medications:  You may take your regular scheduled medications the morning of your procedure with as much water as necessary.  If you are diabetic and on Metformin (Glucophage), do not take it the day before, the day of, and 2 days after your procedure.  If you are on Pradaxa, Xarelto, Eliquis, or Coumadin hold 3 days prior to your procedure.     How long will the procedure take?  The entire procedure takes three to four hours.  After the procedure, you will go to an ICU where you will be monitored closely for the next several hours.  You will remain in the ICU  overnight.      Covid-19 restrictions:  Our visitation policy has been altered due to Covid-19.  You may bring one family member with you for the day until 6 pm.  No one is allowed to spend the night with you at this time.  Please keep in mind that our policies are constantly changing and we must adhere to the current policy.    If you walk with a cane or walker, please bring it with you to the hospital so that we can get you out of bed several hours after your valve replacement.  Our goal is to get you up in a chair and moving as quickly as possible as long as it is safe for you to do so.    When can I go home?  Your length of stay in the hospital, will be determined by your physician.  Be prepared to stay 1-3 days.  You will be discharged when your physician thinks it is safe for you to go home.    Follow Up After Valve Replacement:  It is required that you return to [facility] for the follow up in one month and one year with an echo, lab work, and a clinic visit.  If you are in a research trial, your follow up will be slightly different and according to the trial requirements.  You can follow up with your regular cardiologist regarding any other heart issues.    Contacts one of our nurses at [phone] for any questions.  Angélica Christopher, RN  Liliana Goff RN        THE ABOVE INSTRUCTIONS WERE GIVEN TO THE PATIENT VERBALLY AND THEY VERBALIZED UNDERSTANDING.  THEY DO NOT REQUIRE ANY SPECIAL NEEDS AND DO NOT HAVE ANY LEARNING BARRIERS.          Directions for Reporting to Cardiology Waiting Area in the Hospital  If you park in the Parking Garage:  Take elevators to the1st floor of the parking garage.  Continue past the gift shop, coffee shop, and piano.  Take a right and go to the gold elevators. (Elevator B)  Take the elevator to the 3rd floor.  Follow the arrow on the sign on the wall that says Cath Lab Registration/EP Lab Registration.  Follow the long hallway all the way around until you come to a big open  area.  This is the registration area.  Check in at Reception Desk.    OR    If family is dropping you off:  Have them drop you off at the front of the Hospital under the green overhang.  Enter through the doors and take a right.  Take the E elevators to the 3rd floor Cardiology Waiting Area.  Check in at the Reception Desk in the waiting room.

## 2022-02-11 NOTE — PROGRESS NOTES
Misha Marte Jr. is a 89 y.o. male referred by Dr. White for evaluation of severe AS (NYHA Class 3 sx).     The patient has undergone the following TAVR work-up:   · ECHO (12/15/21): JANESSA= 0.85 cm2, MG= mmHg, Peak Ever= 4.1 m/s, EF= 55 %.   · LHC (Date 2/8/22; Val): - no significant coronary artery disease. 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   · STS: 4.9 %   · Frailty: 2/4   · Iliacs are > 9.31 on R and > 9.81 on L  · LVOT area by CTA is 3.70 cm2 ( 24.4 mm X 21.0 mm) and Avg Diameter is 21.7 mm per Dr Neal  · Incidental findings on CT:   · CT Surgery risk assessment: inoperable, per Dr Dunaway due to age and comorbidities   · Rhythm issues: incomplete RBBB, Afib  · PFTs: not done  · Comorbidities: new onset pAF during last admission; moderate MS        Misha Marte Jr. is a 29 mm (maybe 26)  Evolut valve candidate via RTF access.    Transcatheter Aortic valve replacement   1. Valve: 29 or 26 mm Evolut  2. TAVR access: RTF  3. Valvuloplasty balloon: 20 mm  4. Viabahn size if needed: 10x5  5. Antithrombotic therapy: ASA alone  6. Pacemaker risk factors: incomplete RBBB

## 2022-02-14 ENCOUNTER — ANESTHESIA EVENT (OUTPATIENT)
Dept: MEDSURG UNIT | Facility: HOSPITAL | Age: 87
DRG: 266 | End: 2022-02-14
Payer: MEDICARE

## 2022-02-14 NOTE — ANESTHESIA PREPROCEDURE EVALUATION
[facility]  Anesthesia Pre-Operative Evaluation         Patient Name: Misha Marte Jr.  YOB: 1929  MRN: [id number]    SUBJECTIVE:     Pre-operative evaluation for Procedure(s) (LRB):  REPLACEMENT, AORTIC VALVE, TRANSCATHETER (TAVR) (N/A)     02/14/2022    Misha Marte Jr. is a 89 y.o. male w/ a significant PMHx of HTN, HLD, new onset pAF during admission in December for decompensated HF, CHF who presents for the above procedure.    · ECHO (12/15/21): JANESSA= 0.85 cm2, MG= mmHg, Peak Ever= 4.1 m/s, EF= 55 %.   · LHC (Date 2/8/22; Val): - no significant coronary artery disease. 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   · STS: 4.9 %   · Frailty: 2/4   · Iliacs are > 9.31 on R and > 9.81 on L  · LVOT area by CTA is 3.70 cm2 ( 24.4 mm X 21.0 mm) and Avg Diameter is 21.7 mm per Dr Neal  · Incidental findings on CT:   · CT Surgery risk assessment: inoperable, per Dr Dunaway due to age and comorbidities   · Rhythm issues: incomplete RBBB, Afib  · PFTs: not done  · Comorbidities: new onset pAF during last admission; moderate MS       LDA: None documented.    Prev airway: None documented.     Drips: None documented.    Patient Active Problem List   Diagnosis    Hypertension    Multinodular goiter    Insomnia    Aortic stenosis    Vitamin D deficiency    Carotid artery disease    Sensation of fullness in both ears    Nonexudative age-related macular degeneration, bilateral, advanced atrophic with subfoveal involvement    NS (nuclear sclerosis)    Overweight (BMI 25.0-29.9)    Carpal tunnel syndrome of left wrist    Gait abnormality    Age related osteoporosis    Other hyperlipidemia    Pain and swelling of lower leg, right    Left trigger finger    AK (actinic keratosis)    Edema of both lower legs due to peripheral venous insufficiency    CHF (congestive heart failure)    Iron deficiency anemia    LOUIE (acute kidney  injury)    Acute on chronic diastolic congestive heart failure    Nonrheumatic mitral valve stenosis       Review of patient's allergies indicates:   Allergen Reactions    Cortisone Other (See Comments)     hiccups    Dexamethasone Other (See Comments)     Constant hiccups for days    Codeine Other (See Comments)     Other reaction(s): Nausea       Current Inpatient Medications:      No current facility-administered medications on file prior to encounter.     Current Outpatient Medications on File Prior to Encounter   Medication Sig Dispense Refill    acetaminophen (TYLENOL) 500 MG tablet Take 500-1,000 mg by mouth daily as needed for Pain.      ammonium lactate (AMLACTIN TOP) Apply topically. Apply topically daily as needed      ascorbic acid, vitamin C, (VITAMIN C) 250 MG tablet Take 1 tablet (250 mg total) by mouth every morning. 30 tablet 2    aspirin 81 mg Tab Take 81 mg by mouth once daily.       CALCIUM CARBONATE/VITAMIN D3 (CALCIUM 600 + D,3, ORAL) Take 1,200 mg by mouth once daily.       carboxymethylcellulose sodium (REFRESH OPHT) Apply 1 drop to eye daily as needed.      ceramides 1,3,6-II (CERAVE) Lotn Apply topically daily as needed      cholecalciferol, vitamin D3, (VITAMIN D3 ORAL) Take 1 capsule by mouth once daily.      coenzyme Q10 10 mg capsule Take 100 mg by mouth once daily.       esomeprazole (NEXIUM) 40 MG capsule Take 1 capsule (40 mg total) by mouth once daily. 90 capsule 0    ferrous sulfate (FEOSOL) Tab tablet Take 1 tablet (1 each total) by mouth every morning. Take on an empty stomach with your vitamin C. 90 tablet 3    furosemide (LASIX) 20 MG tablet Take 1 tablet (20 mg total) by mouth once daily. 90 tablet 3    losartan (COZAAR) 25 MG tablet TAKE 1 TABLET EVERY DAY 90 tablet 3    metoprolol succinate (TOPROL-XL) 25 MG 24 hr tablet Take 1 tablet (25 mg total) by mouth once daily. 90 tablet 3    multivitamin-minerals-lutein Tab Take 1 tablet by mouth once daily.        polyethylene glycol (GLYCOLAX) 17 gram PwPk Take 17 g by mouth 2 (two) times daily as needed (Constipation).  0    pravastatin (PRAVACHOL) 40 MG tablet TAKE 1 TABLET EVERY DAY 90 tablet 3    turmeric root extract 500 mg Cap Take 1 capsule by mouth once daily.      VIT A,C & E/LUTEIN/MINERALS (OCUVITE WITH LUTEIN ORAL) Take 1 capsule by mouth once daily.         Past Surgical History:   Procedure Laterality Date    APPENDECTOMY      CARPAL TUNNEL RELEASE      COLONOSCOPY      CORONARY ANGIOGRAPHY N/A 2/8/2022    Procedure: ANGIOGRAM, CORONARY ARTERY;  Surgeon: Tushar Swann MD;  Location[facility] CATH LAB;  Service: Cardiology;  Laterality: N/A;    TONSILLECTOMY, ADENOIDECTOMY         Social History     Socioeconomic History    Marital status:    Tobacco Use    Smoking status: Never Smoker    Smokeless tobacco: Never Used   Substance and Sexual Activity    Alcohol use: Yes     Alcohol/week: 2.0 standard drinks     Types: 2 Glasses of wine per week     Comment: socially    Drug use: No    Sexual activity: Never       OBJECTIVE:     Vital Signs Range (Last 24H):         CBC:   No results for input(s): WBC, RBC, HGB, HCT, PLT, MCV, MCH, MCHC in the last 72 hours.    CMP: No results for input(s): NA, K, CL, CO2, BUN, CREATININE, GLU, MG, PHOS, CALCIUM, ALBUMIN, PROT, ALKPHOS, ALT, AST, BILITOT in the last 72 hours.    INR:  No results for input(s): PT, INR, PROTIME, APTT in the last 72 hours.    Diagnostic Studies: No relevant studies.    EKG: No recent studies available.    2D ECHO:  Results for orders placed or performed during the hospital encounter of 10/24/18   2D echo with color flow doppler   Result Value Ref Range    EF + QEF 65 55 - 65    Mitral Valve Regurgitation MILD     Aortic Valve Regurgitation MILD     Aortic Valve Stenosis MODERATE TO SEVERE (A)     Est. PA Systolic Pressure 27.04     Tricuspid Valve Regurgitation MILD          ASSESSMENT/PLAN:         Pre-op Assessment    I  have reviewed the Patient Summary Reports.     I have reviewed the Nursing Notes.    I have reviewed the Medications.     Review of Systems  Anesthesia Hx:  No problems with previous Anesthesia Denies Hx of Anesthetic complications  History of prior surgery of interest to airway management or planning:  Denies Personal Hx of Anesthesia complications.   Social:  Non-Smoker    Hematology/Oncology:  Hematology Normal   Oncology Normal     EENT/Dental:EENT/Dental Normal   Cardiovascular:   Hypertension Valvular problems/Murmurs, AS  Denies Angina. CHF PVD ECG has been reviewed.    Pulmonary:   Denies Shortness of breath.    Hepatic/GI:  Hepatic/GI Normal    Musculoskeletal:  Musculoskeletal Normal    Neurological:  Neurology Normal  Denies CVA. Denies Seizures.    Endocrine:  Endocrine Normal    Dermatological:  Skin Normal    Psych:  Psychiatric Normal           Physical Exam  General:  Well nourished    Airway/Jaw/Neck:  Airway Findings: Mouth Opening: Normal Tongue: Normal  General Airway Assessment: Adult  Mallampati: II  TM Distance: Normal, at least 6 cm  Jaw/Neck Findings:  Neck ROM: Extension Decreased, Mod.      Dental:  Dental Findings: In tact   Chest/Lungs:  Chest/Lungs Findings: Clear to auscultation, Normal Respiratory Rate     Heart/Vascular:  Heart Findings: Rate: Normal  Rhythm: Regular Rhythm  Heart Murmur  Systolic        Mental Status:  Mental Status Findings:  Cooperative, Alert and Oriented         Anesthesia Plan  Type of Anesthesia, risks & benefits discussed:  Anesthesia Type:  general, MAC    Patient's Preference:   Plan Factors:          Intra-op Monitoring Plan: standard ASA monitors, arterial line and central line  Intra-op Monitoring Plan Comments:   Post Op Pain Control Plan: per primary service following discharge from PACU, IV/PO Opioids PRN and multimodal analgesia  Post Op Pain Control Plan Comments:     Induction:   IV  Beta Blocker:  Patient is on a Beta-Blocker and has received one  dose within the past 24 hours (No further documentation required).       Informed Consent: Patient understands risks and agrees with Anesthesia plan.  Questions answered. Anesthesia consent signed with patient.  ASA Score: 4     Day of Surgery Review of History & Physical:    H&P update referred to the surgeon.         Ready For Surgery From Anesthesia Perspective.

## 2022-02-15 ENCOUNTER — HOSPITAL ENCOUNTER (INPATIENT)
Facility: HOSPITAL | Age: 87
LOS: 1 days | Discharge: HOME | DRG: 266 | End: 2022-02-16
Attending: INTERNAL MEDICINE | Admitting: INTERNAL MEDICINE
Payer: MEDICARE

## 2022-02-15 ENCOUNTER — ANESTHESIA (OUTPATIENT)
Dept: MEDSURG UNIT | Facility: HOSPITAL | Age: 87
DRG: 266 | End: 2022-02-15
Payer: MEDICARE

## 2022-02-15 DIAGNOSIS — Z95.2 S/P TAVR (TRANSCATHETER AORTIC VALVE REPLACEMENT): ICD-10-CM

## 2022-02-15 DIAGNOSIS — I35.0 SEVERE AORTIC STENOSIS: Primary | ICD-10-CM

## 2022-02-15 DIAGNOSIS — I50.22 CHRONIC SYSTOLIC CONGESTIVE HEART FAILURE: ICD-10-CM

## 2022-02-15 DIAGNOSIS — I45.10 RIGHT BUNDLE BRANCH BLOCK: ICD-10-CM

## 2022-02-15 DIAGNOSIS — I34.2 NONRHEUMATIC MITRAL VALVE STENOSIS: ICD-10-CM

## 2022-02-15 LAB
ABO + RH BLD: NORMAL
ANION GAP SERPL CALC-SCNC: 9 MMOL/L (ref 8–16)
APTT BLDCRRT: 24.8 SEC (ref 21–32)
BLD GP AB SCN CELLS X3 SERPL QL: NORMAL
BNP SERPL-MCNC: 372 PG/ML (ref 0–99)
BUN SERPL-MCNC: 20 MG/DL (ref 10–30)
CALCIUM SERPL-MCNC: 9.7 MG/DL (ref 8.7–10.5)
CHLORIDE SERPL-SCNC: 102 MMOL/L (ref 95–110)
CO2 SERPL-SCNC: 26 MMOL/L (ref 23–29)
CREAT SERPL-MCNC: 1.3 MG/DL (ref 0.5–1.4)
ERYTHROCYTE [DISTWIDTH] IN BLOOD BY AUTOMATED COUNT: 14.4 % (ref 11.5–14.5)
EST. GFR  (AFRICAN AMERICAN): 54.4 ML/MIN/1.73 M^2
EST. GFR  (NON AFRICAN AMERICAN): 47 ML/MIN/1.73 M^2
GLUCOSE SERPL-MCNC: 97 MG/DL (ref 70–110)
HCT VFR BLD AUTO: 36.1 % (ref 40–54)
HGB BLD-MCNC: 11.8 G/DL (ref 14–18)
INR PPP: 1 (ref 0.8–1.2)
MCH RBC QN AUTO: 30.8 PG (ref 27–31)
MCHC RBC AUTO-ENTMCNC: 32.7 G/DL (ref 32–36)
MCV RBC AUTO: 94 FL (ref 82–98)
PLATELET # BLD AUTO: 222 K/UL (ref 150–450)
PMV BLD AUTO: 10.3 FL (ref 9.2–12.9)
POCT GLUCOSE: 109 MG/DL (ref 70–110)
POTASSIUM SERPL-SCNC: 4 MMOL/L (ref 3.5–5.1)
PROTHROMBIN TIME: 10.3 SEC (ref 9–12.5)
RBC # BLD AUTO: 3.83 M/UL (ref 4.6–6.2)
SODIUM SERPL-SCNC: 137 MMOL/L (ref 136–145)
WBC # BLD AUTO: 5.46 K/UL (ref 3.9–12.7)

## 2022-02-15 PROCEDURE — 25000003 PHARM REV CODE 250: Performed by: INTERNAL MEDICINE

## 2022-02-15 PROCEDURE — C1751 CATH, INF, PER/CENT/MIDLINE: HCPCS

## 2022-02-15 PROCEDURE — 27800903 OPTIME MED/SURG SUP & DEVICES OTHER IMPLANTS: Performed by: INTERNAL MEDICINE

## 2022-02-15 PROCEDURE — 33210: ICD-10-PCS | Mod: 59,Q0,, | Performed by: ANESTHESIOLOGY

## 2022-02-15 PROCEDURE — 33361 REPLACE AORTIC VALVE PERQ: CPT | Performed by: INTERNAL MEDICINE

## 2022-02-15 PROCEDURE — 94761 N-INVAS EAR/PLS OXIMETRY MLT: CPT

## 2022-02-15 PROCEDURE — 25000003 PHARM REV CODE 250: Performed by: STUDENT IN AN ORGANIZED HEALTH CARE EDUCATION/TRAINING PROGRAM

## 2022-02-15 PROCEDURE — 93010 ELECTROCARDIOGRAM REPORT: CPT | Mod: ,,, | Performed by: INTERNAL MEDICINE

## 2022-02-15 PROCEDURE — 27201423 OPTIME MED/SURG SUP & DEVICES STERILE SUPPLY: Performed by: INTERNAL MEDICINE

## 2022-02-15 PROCEDURE — D9220A PRA ANESTHESIA: ICD-10-PCS | Mod: Q0,,, | Performed by: ANESTHESIOLOGY

## 2022-02-15 PROCEDURE — 93005 ELECTROCARDIOGRAM TRACING: CPT

## 2022-02-15 PROCEDURE — 36415 COLL VENOUS BLD VENIPUNCTURE: CPT | Performed by: INTERNAL MEDICINE

## 2022-02-15 PROCEDURE — 93010 EKG 12-LEAD: ICD-10-PCS | Mod: ,,, | Performed by: INTERNAL MEDICINE

## 2022-02-15 PROCEDURE — 33361 PR TAVR, PERCUTANEOUS FEMORAL: ICD-10-PCS | Mod: Q0,62,, | Performed by: INTERNAL MEDICINE

## 2022-02-15 PROCEDURE — C1760 CLOSURE DEV, VASC: HCPCS | Performed by: INTERNAL MEDICINE

## 2022-02-15 PROCEDURE — 86900 BLOOD TYPING SEROLOGIC ABO: CPT | Performed by: INTERNAL MEDICINE

## 2022-02-15 PROCEDURE — 80048 BASIC METABOLIC PNL TOTAL CA: CPT | Performed by: INTERNAL MEDICINE

## 2022-02-15 PROCEDURE — C1725 CATH, TRANSLUMIN NON-LASER: HCPCS | Performed by: INTERNAL MEDICINE

## 2022-02-15 PROCEDURE — 93010 ELECTROCARDIOGRAM REPORT: CPT | Mod: 76,,, | Performed by: INTERNAL MEDICINE

## 2022-02-15 PROCEDURE — C1769 GUIDE WIRE: HCPCS | Performed by: INTERNAL MEDICINE

## 2022-02-15 PROCEDURE — 20000000 HC ICU ROOM

## 2022-02-15 PROCEDURE — 36620 INSERTION CATHETER ARTERY: CPT | Mod: 59,Q0,, | Performed by: ANESTHESIOLOGY

## 2022-02-15 PROCEDURE — 27000239 HC STAND-BY BYPASS PUMP

## 2022-02-15 PROCEDURE — 99900035 HC TECH TIME PER 15 MIN (STAT)

## 2022-02-15 PROCEDURE — 83880 ASSAY OF NATRIURETIC PEPTIDE: CPT | Performed by: INTERNAL MEDICINE

## 2022-02-15 PROCEDURE — 86850 RBC ANTIBODY SCREEN: CPT | Performed by: INTERNAL MEDICINE

## 2022-02-15 PROCEDURE — 33361 REPLACE AORTIC VALVE PERQ: CPT | Mod: 62,Q0,, | Performed by: INTERNAL MEDICINE

## 2022-02-15 PROCEDURE — A4216 STERILE WATER/SALINE, 10 ML: HCPCS | Performed by: STUDENT IN AN ORGANIZED HEALTH CARE EDUCATION/TRAINING PROGRAM

## 2022-02-15 PROCEDURE — 85730 THROMBOPLASTIN TIME PARTIAL: CPT | Performed by: INTERNAL MEDICINE

## 2022-02-15 PROCEDURE — 63600175 PHARM REV CODE 636 W HCPCS: Performed by: STUDENT IN AN ORGANIZED HEALTH CARE EDUCATION/TRAINING PROGRAM

## 2022-02-15 PROCEDURE — 36620 ARTERIAL: ICD-10-PCS | Mod: 59,Q0,, | Performed by: ANESTHESIOLOGY

## 2022-02-15 PROCEDURE — 33361 PR TAVR, PERCUTANEOUS FEMORAL: ICD-10-PCS | Mod: 62,Q0,, | Performed by: INTERNAL MEDICINE

## 2022-02-15 PROCEDURE — 33210 INSERT ELECTRD/PM CATH SNGL: CPT | Mod: 59,Q0,, | Performed by: ANESTHESIOLOGY

## 2022-02-15 PROCEDURE — 37000009 HC ANESTHESIA EA ADD 15 MINS: Performed by: INTERNAL MEDICINE

## 2022-02-15 PROCEDURE — 85027 COMPLETE CBC AUTOMATED: CPT | Performed by: INTERNAL MEDICINE

## 2022-02-15 PROCEDURE — 85610 PROTHROMBIN TIME: CPT | Performed by: INTERNAL MEDICINE

## 2022-02-15 PROCEDURE — 37000008 HC ANESTHESIA 1ST 15 MINUTES: Performed by: INTERNAL MEDICINE

## 2022-02-15 PROCEDURE — 33361 REPLACE AORTIC VALVE PERQ: CPT | Mod: Q0,62,, | Performed by: INTERNAL MEDICINE

## 2022-02-15 PROCEDURE — 27000191 HC C-V MONITORING

## 2022-02-15 PROCEDURE — C1894 INTRO/SHEATH, NON-LASER: HCPCS | Performed by: INTERNAL MEDICINE

## 2022-02-15 PROCEDURE — D9220A PRA ANESTHESIA: Mod: Q0,,, | Performed by: ANESTHESIOLOGY

## 2022-02-15 DEVICE — SYS EVOLUT PRO+ DELIVERY 23-29: Type: IMPLANTABLE DEVICE | Site: OTHER (ADD COMMENT) | Status: FUNCTIONAL

## 2022-02-15 DEVICE — SYS EVOLUT PRO+ LOADING 23-29: Type: IMPLANTABLE DEVICE | Site: OTHER (ADD COMMENT) | Status: FUNCTIONAL

## 2022-02-15 DEVICE — VALVE EVOLUT PRO+ TAVR 26MM: Type: IMPLANTABLE DEVICE | Site: HEART | Status: FUNCTIONAL

## 2022-02-15 RX ORDER — LANOLIN ALCOHOL/MO/W.PET/CERES
800 CREAM (GRAM) TOPICAL
Status: DISCONTINUED | OUTPATIENT
Start: 2022-02-15 | End: 2022-02-16 | Stop reason: HOSPADM

## 2022-02-15 RX ORDER — HEPARIN SODIUM 1000 [USP'U]/ML
INJECTION, SOLUTION INTRAVENOUS; SUBCUTANEOUS
Status: DISCONTINUED | OUTPATIENT
Start: 2022-02-15 | End: 2022-02-15

## 2022-02-15 RX ORDER — ACETAMINOPHEN 325 MG/1
650 TABLET ORAL EVERY 4 HOURS PRN
Status: DISCONTINUED | OUTPATIENT
Start: 2022-02-15 | End: 2022-02-16 | Stop reason: HOSPADM

## 2022-02-15 RX ORDER — LIDOCAINE HYDROCHLORIDE 20 MG/ML
INJECTION, SOLUTION EPIDURAL; INFILTRATION; INTRACAUDAL; PERINEURAL
Status: DISCONTINUED | OUTPATIENT
Start: 2022-02-15 | End: 2022-02-15 | Stop reason: HOSPADM

## 2022-02-15 RX ORDER — FENTANYL CITRATE 50 UG/ML
INJECTION, SOLUTION INTRAMUSCULAR; INTRAVENOUS
Status: DISCONTINUED | OUTPATIENT
Start: 2022-02-15 | End: 2022-02-15

## 2022-02-15 RX ORDER — HEPARIN SOD,PORCINE/0.9 % NACL 1000/500ML
INTRAVENOUS SOLUTION INTRAVENOUS
Status: DISCONTINUED | OUTPATIENT
Start: 2022-02-15 | End: 2022-02-15 | Stop reason: HOSPADM

## 2022-02-15 RX ORDER — SODIUM CHLORIDE 9 MG/ML
INJECTION, SOLUTION INTRAVENOUS CONTINUOUS
Status: ACTIVE | OUTPATIENT
Start: 2022-02-15 | End: 2022-02-15

## 2022-02-15 RX ORDER — SODIUM CHLORIDE 9 MG/ML
INJECTION, SOLUTION INTRAVENOUS CONTINUOUS
Status: DISCONTINUED | OUTPATIENT
Start: 2022-02-15 | End: 2022-02-16 | Stop reason: HOSPADM

## 2022-02-15 RX ORDER — PROTAMINE SULFATE 10 MG/ML
INJECTION, SOLUTION INTRAVENOUS
Status: DISCONTINUED | OUTPATIENT
Start: 2022-02-15 | End: 2022-02-15

## 2022-02-15 RX ORDER — SODIUM,POTASSIUM PHOSPHATES 280-250MG
2 POWDER IN PACKET (EA) ORAL
Status: DISCONTINUED | OUTPATIENT
Start: 2022-02-15 | End: 2022-02-16 | Stop reason: HOSPADM

## 2022-02-15 RX ORDER — DIPHENHYDRAMINE HCL 50 MG
50 CAPSULE ORAL ONCE
Status: COMPLETED | OUTPATIENT
Start: 2022-02-15 | End: 2022-02-15

## 2022-02-15 RX ORDER — ASPIRIN 81 MG/1
81 TABLET ORAL DAILY
Status: DISCONTINUED | OUTPATIENT
Start: 2022-02-16 | End: 2022-02-16 | Stop reason: HOSPADM

## 2022-02-15 RX ORDER — CEFAZOLIN SODIUM 1 G/3ML
INJECTION, POWDER, FOR SOLUTION INTRAMUSCULAR; INTRAVENOUS
Status: DISCONTINUED | OUTPATIENT
Start: 2022-02-15 | End: 2022-02-15

## 2022-02-15 RX ORDER — PROPOFOL 10 MG/ML
VIAL (ML) INTRAVENOUS
Status: DISCONTINUED | OUTPATIENT
Start: 2022-02-15 | End: 2022-02-15

## 2022-02-15 RX ORDER — DEXMEDETOMIDINE HYDROCHLORIDE 100 UG/ML
INJECTION, SOLUTION INTRAVENOUS
Status: DISCONTINUED | OUTPATIENT
Start: 2022-02-15 | End: 2022-02-15

## 2022-02-15 RX ORDER — LIDOCAINE HYDROCHLORIDE 20 MG/ML
INJECTION, SOLUTION EPIDURAL; INFILTRATION; INTRACAUDAL; PERINEURAL
Status: DISCONTINUED | OUTPATIENT
Start: 2022-02-15 | End: 2022-02-15

## 2022-02-15 RX ORDER — MUPIROCIN 20 MG/G
OINTMENT TOPICAL 2 TIMES DAILY
Status: DISCONTINUED | OUTPATIENT
Start: 2022-02-15 | End: 2022-02-16 | Stop reason: HOSPADM

## 2022-02-15 RX ORDER — ONDANSETRON 8 MG/1
8 TABLET, ORALLY DISINTEGRATING ORAL EVERY 8 HOURS PRN
Status: DISCONTINUED | OUTPATIENT
Start: 2022-02-15 | End: 2022-02-16 | Stop reason: HOSPADM

## 2022-02-15 RX ADMIN — DEXMEDETOMIDINE HYDROCHLORIDE 0.8 MCG/KG/HR: 100 INJECTION, SOLUTION, CONCENTRATE INTRAVENOUS at 11:02

## 2022-02-15 RX ADMIN — FENTANYL CITRATE 50 MCG: 50 INJECTION INTRAMUSCULAR; INTRAVENOUS at 11:02

## 2022-02-15 RX ADMIN — SODIUM CHLORIDE: 0.9 INJECTION, SOLUTION INTRAVENOUS at 07:02

## 2022-02-15 RX ADMIN — CEFAZOLIN SODIUM 2 G: 1 INJECTION, POWDER, FOR SOLUTION INTRAMUSCULAR; INTRAVENOUS at 11:02

## 2022-02-15 RX ADMIN — PROPOFOL 20 MG: 10 INJECTION, EMULSION INTRAVENOUS at 12:02

## 2022-02-15 RX ADMIN — PROPOFOL 20 MG: 10 INJECTION, EMULSION INTRAVENOUS at 11:02

## 2022-02-15 RX ADMIN — DIPHENHYDRAMINE HYDROCHLORIDE 50 MG: 50 CAPSULE ORAL at 07:02

## 2022-02-15 RX ADMIN — MUPIROCIN: 20 OINTMENT TOPICAL at 08:02

## 2022-02-15 RX ADMIN — NOREPINEPHRINE BITARTRATE 0.02 MCG/KG/MIN: 1 INJECTION, SOLUTION, CONCENTRATE INTRAVENOUS at 12:02

## 2022-02-15 RX ADMIN — DEXMEDETOMIDINE HYDROCHLORIDE 8 MCG: 100 INJECTION, SOLUTION, CONCENTRATE INTRAVENOUS at 11:02

## 2022-02-15 RX ADMIN — GLYCOPYRROLATE 0.2 MG: 0.2 INJECTION INTRAMUSCULAR; INTRAVENOUS at 11:02

## 2022-02-15 RX ADMIN — DEXMEDETOMIDINE HYDROCHLORIDE 8 MCG: 100 INJECTION, SOLUTION, CONCENTRATE INTRAVENOUS at 12:02

## 2022-02-15 RX ADMIN — SODIUM CHLORIDE: 0.9 INJECTION, SOLUTION INTRAVENOUS at 10:02

## 2022-02-15 RX ADMIN — HEPARIN SODIUM 1000 UNITS: 1000 INJECTION, SOLUTION INTRAVENOUS; SUBCUTANEOUS at 12:02

## 2022-02-15 RX ADMIN — HEPARIN SODIUM 10000 UNITS: 1000 INJECTION, SOLUTION INTRAVENOUS; SUBCUTANEOUS at 12:02

## 2022-02-15 RX ADMIN — ACETAMINOPHEN 650 MG: 325 TABLET ORAL at 07:02

## 2022-02-15 RX ADMIN — PROTAMINE SULFATE 110 MG: 10 INJECTION, SOLUTION INTRAVENOUS at 12:02

## 2022-02-15 RX ADMIN — LIDOCAINE HYDROCHLORIDE 80 MG: 20 INJECTION, SOLUTION EPIDURAL; INFILTRATION; INTRACAUDAL at 12:02

## 2022-02-15 NOTE — ANESTHESIA POSTPROCEDURE EVALUATION
Anesthesia Post Evaluation    Patient: Misha Marte Jr.    Procedure(s) Performed: Procedure(s) (LRB):  REPLACEMENT, AORTIC VALVE, TRANSCATHETER (TAVR) (N/A)  Cardiac Cath Cosurgeon (N/A)  Left heart cath (Left)    Final Anesthesia Type: general (Natural airway)      Patient location during evaluation: ICU  Patient participation: Yes- Able to Participate  Level of consciousness: awake and alert  Post-procedure vital signs: reviewed and stable  Pain management: adequate  Airway patency: patent    PONV status at discharge: No PONV  Anesthetic complications: no      Cardiovascular status: hemodynamically stable  Respiratory status: unassisted  Hydration status: euvolemic  Follow-up not needed.          Vitals Value Taken Time   /54 02/15/22 1311   Temp 36.4 °C (97.6 °F) 02/15/22 1415   Pulse 50 02/15/22 1437   Resp 14 02/15/22 1437   SpO2 95 % 02/15/22 1437   Vitals shown include unvalidated device data.      No case tracking events are documented in the log.      Pain/Raymond Score: Raymond Score: 10 (2/15/2022 11:06 AM)

## 2022-02-15 NOTE — BRIEF OP NOTE
Brief Operative Report:    : Tushar Swann MD     All Operators: Surgeon(s):  MD Tushar Méndez MD Marloe Prince, MD Stephen R. Ramee, MD     Preoperative Diagnosis: Severe aortic stenosis [I35.0]  Chronic systolic congestive heart failure [I50.22]     Postop Diagnosis: Severe aortic stenosis [I35.0]  Chronic systolic congestive heart failure [I50.22]    Treatments/Procedures: Procedure(s) (LRB):  REPLACEMENT, AORTIC VALVE, TRANSCATHETER (TAVR) (N/A)  Cardiac Cath Cosurgeon (N/A)  Left heart cath (Left)    Findings:Severe structural disease is present. See catheterization report for full details. 26 mm evolut    Estimated Blood loss: 20 cc    Specimens removed: Iona Ibanez

## 2022-02-15 NOTE — PROGRESS NOTES
"      SICU PLAN OF CARE NOTE    Dx: TAVR    Shift Events: Admitted to SICU. OOBTC. TVP pulled. NS infusing. Neuro and vascular checks within normal limits.     Goals of Care: discharge    Neuro: AAO x4    Vital Signs: BP (!) 144/81 (BP Location: Right arm, Patient Position: Lying)   Pulse 69   Temp 97.8 °F (36.6 °C) (Oral)   Resp (!) 25   Ht 5' 8" (1.727 m)   Wt 81.2 kg (179 lb)   SpO2 (!) 91%   BMI 27.22 kg/m²     Respiratory: Room Air    Diet: Cardiac Diet           "

## 2022-02-15 NOTE — ANESTHESIA PROCEDURE NOTES
Trnasvenous Pacing Catheter Insertion    Diagnosis: Aortic Stenosis  Patient location during procedure: done in OR    Staffing  Authorizing Provider: Dallas Razo MD  Performing Provider: Nikita Manrique MD    Anesthesiologist was present at the time of the procedure.  Preanesthetic Checklist  Completed: patient identified, IV checked, site marked, risks and benefits discussed, surgical consent, monitors and equipment checked, pre-op evaluation, timeout performed and anesthesia consent given  [place] Alexx Line  Skin Prep: chlorhexidine gluconate  Local Infiltration: lidocaine  Location: right,  internal jugular vein  Vessel Caliber: patent, compressibility normal  Coaxial introducer size: 6 Fr. manometry used.  Device: transvenous pacing catheter  Catheter Size: 5 Fr  Catheter placement by yes. Heme positive aspiration all ports. PAC floated with balloon up until wedgedSterile sheath usedInsertion Attempts: 1  Indication: transvenous pacing  Ultrasound Guidance  Needle advanced into vessel with real time Ultrasound guidance.  Sterile sheath used.  Assessment  Central Line Bundle Protocol followed. Hand hygiene before procedure, surgical cap worn, surgical mask worn, sterile surgical gloves worn, large sterile drape used.  Verification: blood return  Dressing: secured with tape and tegaderm  Patient: Tolerated Well

## 2022-02-15 NOTE — PROGRESS NOTES
Perfusion Standby Note:      02/15/2022  Perfusionist:  Jalen Turcios Jr  CCP, LP  Surgeon(s) and Role:  Panel 1:     * Tushar Swann MD - Primary     * Talat Esquivel MD - Assisting     * Asuncion Ibanez MD - Fellow  Panel 2:     * Talat Dunaway MD - Primary  Anesthesiologist:  Dallas Razo MD    Past Medical History:   Diagnosis Date    Arthritis     CHF (congestive heart failure)     Digestive disorder     Heart murmur     Hyperlipidemia     Hypertension     Insomnia     Skin cancer          Perfusion department standby was requested for this case, utilizing either a full cardiopulmonary machine or ECMO pump.      All pre-op checklists were completed, all equipment was available, as were cannulae and other disposables.  A TAVR instrument tray was also verified as available, per the checklist, for any case requiring ECMO standby.    I fully participated in the briefing and time-out for this procedure.  I was present in the room for all critical portions of this case during which mechanical circulatory support could be required.  Please see cardiopulmonary bypass record for details.  There were no complications.    12:36 PM

## 2022-02-15 NOTE — ANESTHESIA PROCEDURE NOTES
Arterial    Diagnosis: Aortic stenosis    Patient location during procedure: done in OR    Staffing  Authorizing Provider: Dallas Razo MD  Performing Provider: Nikita Manrique MD    Anesthesiologist was present at the time of the procedure.    Preanesthetic Checklist  Completed: patient identified, IV checked, site marked, risks and benefits discussed, surgical consent, monitors and equipment checked, pre-op evaluation, timeout performed and anesthesia consent givenArterial  Skin Prep: chlorhexidine gluconate  Local Infiltration: lidocaine  Orientation: right  Location: radial    Catheter Size: 20 G  Catheter placement by Anatomical landmarks. Heme positive aspiration all ports.Insertion Attempts: 1  Assessment  Dressing: secured with tape and tegaderm  Patient: Tolerated well

## 2022-02-15 NOTE — INTERVAL H&P NOTE
The patient has been examined and the H&P has been reviewed:    I concur with the findings and no changes have occurred since H&P was written.    Anesthesia/Surgery risks, benefits and alternative options discussed and understood by patient/family.    Misha Marte Jr. is a 89 y.o. male referred for evaluation of severe AS (NYHA Class 3 sx). Recently had a left heart cath without significant coronary disease. Today he reports doing well since the cath. No chest pain or dyspnea at home. Gets around with a walker without problems. No pain or swelling at radial or femoral puncture sites. He does note some bruising to his right groin but this is improving.      The patient has undergone the following TAVR work-up:   ECHO (12/15/21): JANESSA= 0.85 cm2, MG= mmHg, Peak Ever= 4.1 m/s, EF= 55 %.   [facility] (Date 2/8/22; Val): - no significant coronary artery disease. 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   STS: 4.9 %   Frailty: 2/4   Iliacs are > 9.31 on R and > 9.81 on L  LVOT area by CTA is 3.70 cm2 ( 24.4 mm X 21.0 mm) and Avg Diameter is 21.7 mm per Dr Neal  Incidental findings on CT:   CT Surgery risk assessment: inoperable, per Dr Dunaway due to age and comorbidities   Rhythm issues: incomplete RBBB, Afib  PFTs: not done  Comorbidities: new onset pAF during last admission; moderate MS        Transcatheter Aortic valve replacement   Valve: 29 or 26 mm Evolut  TAVR access: RTF  Valvuloplasty balloon: 20 mm  Viabahn size if needed: 10x5  Antithrombotic therapy: ASA alone  Pacemaker risk factors: incomplete RBBB     There are no hospital problems to display for this patient.

## 2022-02-15 NOTE — Clinical Note
The groin was prepped. The site was prepped with ChloraPrep. The site was clipped. The patient was draped.

## 2022-02-15 NOTE — Clinical Note
dry, intact, no bleeding and no hematoma. Bilateral femoral sites with Perclose, gauze and Tegaderm.

## 2022-02-15 NOTE — Clinical Note
The right neck was prepped. The site was prepped with ChloraPrep. The site was clipped. The patient was draped.

## 2022-02-15 NOTE — Clinical Note
9 ml of contrast were injected throughout the case. 91 mL of contrast was the total wasted during the case. 100 mL was the total amount used during the case.

## 2022-02-15 NOTE — Clinical Note
The catheter was inserted in the right ventricle. The transvenous pacing lead was secured in place in the RV and was placed and capture was confirmed.

## 2022-02-15 NOTE — OP NOTE
[facility]  Cardiothoracic Surgery Operative Report    Patient Name:  Misha Marte Jr.; [id number]    DATE OF PROCEDURE: 02/15/2022   ATTENDING SURGEONS: Talat Esquivel M.D., Tushar Neal M.D., and Talat Dunaway M.D.  PREOPERATIVE DIAGNOSIS:  Severe aortic stenosis.  POSTOPERATIVE DIAGNOSIS:  Severe aortic stenosis  ?  OPERATION PERFORMED: Transcatheter aortic valve insertion via transfemoral approach using a 26mm Medtronic Evolut bioprosthesis  ANESTHESIA: General.  ESTIMATED BLOOD LOSS: 10 mL.  BRIEF HISTORY: This patient is a 89 year old male with symptomatic severe aortic stenosis.  The patient has had progressive dyspnea on exertion. This prompted a thoughtful and thorough evaluation, which demonstrated severe aortic stenosis. Given the comorbid conditions, a transcatheter valve insertion was recommended. The patient now presents for transcatheter aortic valve insertion.  PROCEDURE: After obtaining informed and written consent, the patient was brought to the cath lab and placed on the cath table in supine position. After induction of adequate anesthesia, a transvenous pacing wire was placed.  Bilateral femoral arterial and femoral venous access was obtained. A wire was advanced across the aortic valve. It was exchanged for stiff wire, and an aortic balloon was placed. Under rapid ventricular pacing, balloon valvuloplasty was performed. The balloon was then withdrawn, and a valve was advanced to the level of the aortic annulus. Once the team was satisfied that the valve was in proper position, it was deployed. Excellent positioning was obtained. Post-procedure echo demonstrated no aortic insufficiency. The femoral access sites were controlled using percutaneous techniques. The patient tolerated the procedure well, there were no complications. At the conclusion of the case, sponge and instrument counts were correct.

## 2022-02-16 ENCOUNTER — TELEPHONE (OUTPATIENT)
Dept: INTERNAL MEDICINE | Facility: CLINIC | Age: 87
End: 2022-02-16
Payer: MEDICARE

## 2022-02-16 ENCOUNTER — CLINICAL SUPPORT (OUTPATIENT)
Dept: CARDIOLOGY | Facility: HOSPITAL | Age: 87
DRG: 266 | End: 2022-02-16
Attending: INTERNAL MEDICINE
Payer: MEDICARE

## 2022-02-16 VITALS
HEART RATE: 92 BPM | DIASTOLIC BLOOD PRESSURE: 61 MMHG | RESPIRATION RATE: 20 BRPM | OXYGEN SATURATION: 97 % | SYSTOLIC BLOOD PRESSURE: 127 MMHG | HEIGHT: 68 IN | WEIGHT: 187.38 LBS | BODY MASS INDEX: 28.4 KG/M2 | TEMPERATURE: 98 F

## 2022-02-16 PROBLEM — Z95.2 S/P TAVR (TRANSCATHETER AORTIC VALVE REPLACEMENT): Status: ACTIVE | Noted: 2022-02-16

## 2022-02-16 PROBLEM — I45.10 INCOMPLETE RBBB: Status: ACTIVE | Noted: 2022-02-16

## 2022-02-16 PROBLEM — I70.0 AORTIC ATHEROSCLEROSIS: Status: ACTIVE | Noted: 2022-02-16

## 2022-02-16 LAB
ANION GAP SERPL CALC-SCNC: 9 MMOL/L (ref 8–16)
BASOPHILS # BLD AUTO: 0.06 K/UL (ref 0–0.2)
BASOPHILS NFR BLD: 1 % (ref 0–1.9)
BUN SERPL-MCNC: 20 MG/DL (ref 10–30)
CALCIUM SERPL-MCNC: 8.2 MG/DL (ref 8.7–10.5)
CHLORIDE SERPL-SCNC: 104 MMOL/L (ref 95–110)
CO2 SERPL-SCNC: 21 MMOL/L (ref 23–29)
CREAT SERPL-MCNC: 0.9 MG/DL (ref 0.5–1.4)
DIFFERENTIAL METHOD: ABNORMAL
EOSINOPHIL # BLD AUTO: 0.1 K/UL (ref 0–0.5)
EOSINOPHIL NFR BLD: 0.8 % (ref 0–8)
ERYTHROCYTE [DISTWIDTH] IN BLOOD BY AUTOMATED COUNT: 14.4 % (ref 11.5–14.5)
EST. GFR  (AFRICAN AMERICAN): >60 ML/MIN/1.73 M^2
EST. GFR  (NON AFRICAN AMERICAN): >60 ML/MIN/1.73 M^2
GLUCOSE SERPL-MCNC: 98 MG/DL (ref 70–110)
HCT VFR BLD AUTO: 34.2 % (ref 40–54)
HGB BLD-MCNC: 11 G/DL (ref 14–18)
IMM GRANULOCYTES # BLD AUTO: 0.03 K/UL (ref 0–0.04)
IMM GRANULOCYTES NFR BLD AUTO: 0.5 % (ref 0–0.5)
LYMPHOCYTES # BLD AUTO: 0.4 K/UL (ref 1–4.8)
LYMPHOCYTES NFR BLD: 6 % (ref 18–48)
MAGNESIUM SERPL-MCNC: 1.8 MG/DL (ref 1.6–2.6)
MCH RBC QN AUTO: 31 PG (ref 27–31)
MCHC RBC AUTO-ENTMCNC: 32.2 G/DL (ref 32–36)
MCV RBC AUTO: 96 FL (ref 82–98)
MONOCYTES # BLD AUTO: 0.5 K/UL (ref 0.3–1)
MONOCYTES NFR BLD: 7.6 % (ref 4–15)
NEUTROPHILS # BLD AUTO: 5.2 K/UL (ref 1.8–7.7)
NEUTROPHILS NFR BLD: 84.1 % (ref 38–73)
NRBC BLD-RTO: 0 /100 WBC
PHOSPHATE SERPL-MCNC: 2.7 MG/DL (ref 2.7–4.5)
PLATELET # BLD AUTO: 193 K/UL (ref 150–450)
PMV BLD AUTO: 10.3 FL (ref 9.2–12.9)
POC ACTIVATED CLOTTING TIME K: 124 SEC (ref 74–137)
POC ACTIVATED CLOTTING TIME K: 237 SEC (ref 74–137)
POCT GLUCOSE: 83 MG/DL (ref 70–110)
POTASSIUM SERPL-SCNC: 4 MMOL/L (ref 3.5–5.1)
RBC # BLD AUTO: 3.55 M/UL (ref 4.6–6.2)
SAMPLE: ABNORMAL
SAMPLE: NORMAL
SODIUM SERPL-SCNC: 134 MMOL/L (ref 136–145)
WBC # BLD AUTO: 6.15 K/UL (ref 3.9–12.7)

## 2022-02-16 PROCEDURE — 25000003 PHARM REV CODE 250: Performed by: PHYSICIAN ASSISTANT

## 2022-02-16 PROCEDURE — 85025 COMPLETE CBC W/AUTO DIFF WBC: CPT | Performed by: PHYSICIAN ASSISTANT

## 2022-02-16 PROCEDURE — 93005 ELECTROCARDIOGRAM TRACING: CPT

## 2022-02-16 PROCEDURE — 93272 CARDIAC EVENT MONITOR (CUPID ONLY): ICD-10-PCS | Mod: ,,, | Performed by: INTERNAL MEDICINE

## 2022-02-16 PROCEDURE — 80048 BASIC METABOLIC PNL TOTAL CA: CPT | Performed by: PHYSICIAN ASSISTANT

## 2022-02-16 PROCEDURE — 84100 ASSAY OF PHOSPHORUS: CPT | Performed by: INTERNAL MEDICINE

## 2022-02-16 PROCEDURE — 93271 ECG/MONITORING AND ANALYSIS: CPT

## 2022-02-16 PROCEDURE — 93272 ECG/REVIEW INTERPRET ONLY: CPT | Mod: ,,, | Performed by: INTERNAL MEDICINE

## 2022-02-16 PROCEDURE — 99239 HOSP IP/OBS DSCHRG MGMT >30: CPT | Mod: ,,, | Performed by: PHYSICIAN ASSISTANT

## 2022-02-16 PROCEDURE — 93010 ELECTROCARDIOGRAM REPORT: CPT | Mod: 59,,, | Performed by: INTERNAL MEDICINE

## 2022-02-16 PROCEDURE — 99239 PR HOSPITAL DISCHARGE DAY,>30 MIN: ICD-10-PCS | Mod: ,,, | Performed by: PHYSICIAN ASSISTANT

## 2022-02-16 PROCEDURE — 94761 N-INVAS EAR/PLS OXIMETRY MLT: CPT

## 2022-02-16 PROCEDURE — 25000003 PHARM REV CODE 250: Performed by: INTERNAL MEDICINE

## 2022-02-16 PROCEDURE — 83735 ASSAY OF MAGNESIUM: CPT | Performed by: INTERNAL MEDICINE

## 2022-02-16 PROCEDURE — 93010 EKG 12-LEAD: ICD-10-PCS | Mod: 59,,, | Performed by: INTERNAL MEDICINE

## 2022-02-16 RX ADMIN — Medication 800 MG: at 05:02

## 2022-02-16 RX ADMIN — SODIUM CHLORIDE: 0.9 INJECTION, SOLUTION INTRAVENOUS at 04:02

## 2022-02-16 RX ADMIN — Medication 800 MG: at 09:02

## 2022-02-16 RX ADMIN — MUPIROCIN: 20 OINTMENT TOPICAL at 09:02

## 2022-02-16 RX ADMIN — ASPIRIN 81 MG: 81 TABLET, COATED ORAL at 09:02

## 2022-02-16 NOTE — DISCHARGE SUMMARY
[facility]  Interventional Cardiology  Discharge Summary      Patient Name: Misha Marte Jr.  MRN: [id number]  Admission Date: 2/15/2022  Hospital Length of Stay: 1 days  Discharge Date and Time:  02/16/2022 12:43 PM  Attending Physician: Tushar Swann MD  Discharging Provider: Angelita Blanco PA-C  Primary Care Physician: Ike Ivan MD    HPI:  Misha Marte Jr. is a 89 y.o. male referred for evaluation of severe AS (NYHA Class 3 sx). Recently had a left heart cath without significant coronary disease. Today he reports doing well since the cath. No chest pain or dyspnea at home. Gets around with a walker without problems. No pain or swelling at radial or femoral puncture sites. He does note some bruising to his right groin but this is improving.      The patient has undergone the following TAVR work-up:   · ECHO (12/15/21): JANESSA= 0.85 cm2, MG= mmHg, Peak Ever= 4.1 m/s, EF= 55 %.   · LHC (Date 2/8/22; Val): - no significant coronary artery disease. 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   · STS: 4.9 %   · Frailty: 2/4   · Iliacs are > 9.31 on R and > 9.81 on L  · LVOT area by CTA is 3.70 cm2 ( 24.4 mm X 21.0 mm) and Avg Diameter is 21.7 mm per Dr Neal  · Incidental findings on CT:   · CT Surgery risk assessment: inoperable, per Dr Dunaway due to age and comorbidities   · Rhythm issues: incomplete RBBB, Afib  · PFTs: not done  · Comorbidities: new onset pAF during last admission; moderate MS       Procedure(s) (LRB):  REPLACEMENT, AORTIC VALVE, TRANSCATHETER (TAVR) (N/A)  Cardiac Cath Cosurgeon (N/A)  Left heart cath (Left)     Indwelling Lines/Drains at time of discharge:  Lines/Drains/Airways     Central Venous Catheter Line            Percutaneous Central Line Insertion/Assessment - single lumen  02/15/22 right internal jugular 1 day    Introducer 02/15/22 1413 right internal jugular <1 day                Hospital Course:  Misha DAN  Rosanna Oneal was admitted and underwent successful placement of a 26 mm EvolutR TAVR via TF access under MAC sedation on 2/15/22. Please see full cath report for details. A transthoracic echo was performed immediately post procedure which showed no paravalvular leak. An aortic valve mean gradient of 5 mmHg and a maximum velocity through the aortic valve of 1.2 m/s. He was transported to the CCU in stable condition with a TVP and arterial line in place. He remained hemodynamically stable overnight. He had an incomplete RBBB which remained stable post TAVR. EP was not consulted. This morning, he ambulated without difficulty and was eager to go home. It was felt he was stable for discharge and will go home on ASA alone for antithrombotic therapy post TAVR.       Goals of Care Treatment Preferences:  Code Status: Full Code          Significant Diagnostic Studies: Labs:   BMP:   Recent Labs   Lab 02/15/22  0719 02/16/22  0405   GLU 97 98    134*   K 4.0 4.0    104   CO2 26 21*   BUN 20 20   CREATININE 1.3 0.9   CALCIUM 9.7 8.2*   MG  --  1.8   , CMP   Recent Labs   Lab 02/15/22  0719 02/16/22  0405    134*   K 4.0 4.0    104   CO2 26 21*   GLU 97 98   BUN 20 20   CREATININE 1.3 0.9   CALCIUM 9.7 8.2*   ANIONGAP 9 9   ESTGFRAFRICA 54.4* >60.0   EGFRNONAA 47.0* >60.0   , CBC   Recent Labs   Lab 02/15/22  0719 02/15/22  0719 02/16/22  0405   WBC 5.46  --  6.15   HGB 11.8*  --  11.0*   HCT 36.1*   < > 34.2*     --  193    < > = values in this interval not displayed.    and All labs within the past 24 hours have been reviewed    Pending Diagnostic Studies:     None        * S/P TAVR (transcatheter aortic valve replacement)  Successful transfemoral aortic valve replacement with a 26 mm EvolutR valve. A transthoracic echo was performed immediately post procedure which showed no paravalvular leak. An aortic valve mean gradient of 5 mmHg and a maximum velocity through the aortic valve of 1.2 m/s.        Discharge Plans:  1. Follow up with [facility] in 1 month and 1 year with labs and Echo.  2. ASA indefinitely.   3. Event monitor at discharge for stable incomplete RBBB.   4. No non sterile procedures which could cause endocarditis for 6 months post TAVR including dental work, endoscopy, colonoscopy, and  procedures.   5. SBE prophylaxis for life.    Aortic stenosis  See above.     Acute on chronic diastolic congestive heart failure  Acute on Chronic diastolic heart failure with LVEDP 29 mmHg     Incomplete RBBB  Stable post TAVR. Event monitor at discharge.     Aortic atherosclerosis  See CTA    Carotid artery disease  No severe stenosis on 2018 US. Followed by Dr White.     Hypertension  Chronic. Controlled. Follow up with Cardiology/PCP.        Discharged Condition: good    Follow Up:   Follow-up Information     DANY Albarado On 2/23/2022.    Specialty: Internal Medicine  Why: Hospital Follow-Up @ 2:00 pm  * Correct Address [address]*   Contact information:  [address]  [facility] [id number] [phone]                       Patient Instructions:      BNP   Standing Status: Future Standing Exp. Date: 04/09/23     CBC Without Differential   Standing Status: Future Standing Exp. Date: 04/17/23     Basic Metabolic Panel   Standing Status: Future Standing Exp. Date: 04/17/23     Notify your health care provider if you experience any of the following:  temperature >100.4     Notify your health care provider if you experience any of the following:  persistent nausea and vomiting or diarrhea     Notify your health care provider if you experience any of the following:  severe uncontrolled pain     Notify your health care provider if you experience any of the following:  redness, tenderness, or signs of infection (pain, swelling, redness, odor or green/yellow discharge around incision site)     Notify your health care provider if you experience any of the following:   difficulty breathing or increased cough     Notify your health care provider if you experience any of the following:  severe persistent headache     Notify your health care provider if you experience any of the following:  worsening rash     Notify your health care provider if you experience any of the following:  persistent dizziness, light-headedness, or visual disturbances     Notify your health care provider if you experience any of the following:  increased confusion or weakness     Echo   Standing Status: Future Standing Exp. Date: 02/16/23     Order Specific Question Answer Comments   Color Doppler? Yes      Medications:  Reconciled Home Medications:      Medication List      CONTINUE taking these medications    acetaminophen 500 MG tablet  Commonly known as: TYLENOL  Take 500-1,000 mg by mouth daily as needed for Pain.     AMLACTIN TOP  Apply topically. Apply topically daily as needed     ascorbic acid (vitamin C) 250 MG tablet  Commonly known as: VITAMIN C  Take 1 tablet (250 mg total) by mouth every morning.     aspirin 81 mg Tab  Take 81 mg by mouth once daily.     CALCIUM 600 + D(3) ORAL  Take 1,200 mg by mouth once daily.     CERAVE Lotn  Generic drug: ceramides 1,3,6-II  Apply topically daily as needed     coenzyme Q10 10 mg capsule  Take 100 mg by mouth once daily.     esomeprazole 40 MG capsule  Commonly known as: NEXIUM  Take 1 capsule (40 mg total) by mouth once daily.     ferrous sulfate Tab tablet  Commonly known as: FEOSOL  Take 1 tablet (1 each total) by mouth every morning. Take on an empty stomach with your vitamin C.     furosemide 20 MG tablet  Commonly known as: LASIX  Take 1 tablet (20 mg total) by mouth once daily.     losartan 25 MG tablet  Commonly known as: COZAAR  TAKE 1 TABLET EVERY DAY     metoprolol succinate 25 MG 24 hr tablet  Commonly known as: TOPROL-XL  Take 1 tablet (25 mg total) by mouth once daily.     multivitamin-minerals-lutein Tab  Take 1 tablet by mouth once daily.      OCUVITE WITH LUTEIN ORAL  Take 1 capsule by mouth once daily.     polyethylene glycol 17 gram Pwpk  Commonly known as: GLYCOLAX  Take 17 g by mouth 2 (two) times daily as needed (Constipation).     pravastatin 40 MG tablet  Commonly known as: PRAVACHOL  TAKE 1 TABLET EVERY DAY     REFRESH OPHT  Apply 1 drop to eye daily as needed.     turmeric root extract 500 mg Cap  Take 1 capsule by mouth once daily.     VITAMIN D3 ORAL  Take 1 capsule by mouth once daily.            Time spent on the discharge of patient: 40 minutes    Angelita Blanco PA-C  Interventional Cardiology  [facility]

## 2022-02-16 NOTE — HOSPITAL COURSE
Misha Marte JrSanket was admitted and underwent successful placement of a 26 mm EvolutR TAVR via TF access under MAC sedation on 2/15/22. Please see full cath report for details. A transthoracic echo was performed immediately post procedure which showed no paravalvular leak. An aortic valve mean gradient of 5 mmHg and a maximum velocity through the aortic valve of 1.2 m/s. He was transported to the CCU in stable condition with a TVP and arterial line in place. He remained hemodynamically stable overnight. He had an incomplete RBBB which remained stable post TAVR. EP was not consulted. This morning, he ambulated without difficulty and was eager to go home. It was felt he was stable for discharge and will go home on ASA alone for antithrombotic therapy post TAVR.

## 2022-02-16 NOTE — ASSESSMENT & PLAN NOTE
Successful transfemoral aortic valve replacement with a 26 mm EvolutR valve. A transthoracic echo was performed immediately post procedure which showed no paravalvular leak. An aortic valve mean gradient of 5 mmHg and a maximum velocity through the aortic valve of 1.2 m/s.       Discharge Plans:  1. Follow up with [facility] in 1 month and 1 year with labs and Echo.  2. ASA indefinitely.   3. Event monitor at discharge for stable incomplete RBBB.   4. No non sterile procedures which could cause endocarditis for 6 months post TAVR including dental work, endoscopy, colonoscopy, and  procedures.   5. SBE prophylaxis for life.

## 2022-02-16 NOTE — TELEPHONE ENCOUNTER
----- Message from Atiya Solis sent at 2/16/2022 12:15 PM CST -----  Contact: wife/stephanie/[phone]  Pt wife called in regards to see if the dr would authorize for the pt home health to be continued. Pt is being discharge right know. Pt has a heart angie replacement.  Pt wife would like a call back.    Please advise

## 2022-02-16 NOTE — TELEPHONE ENCOUNTER
----- Message from Garima Haji sent at 2/16/2022 11:50 AM CST -----  Contact: [phone] or [phone]  Pt's wife called to see if the pt could be seen for a hospital f/u next week with the provider. Pt has an appointment scheduled with the NP, but would rather see his provider. Please Advise

## 2022-02-16 NOTE — PLAN OF CARE
[facility]  Initial Discharge Assessment       Primary Care Provider: Ike Ivan MD    Admission Diagnosis: Severe aortic stenosis [I35.0]  Chronic systolic congestive heart failure [I50.22]    Admission Date: 2/15/2022  Expected Discharge Date: 2/16/2022    Discharge Barriers Identified: None    Payor: MEDICARE / Plan: MEDICARE PART A & B / Product Type: Government /     Extended Emergency Contact Information  Primary Emergency Contact: HarinderSandra godoy  Address: [address] [place] [address] [facility]  Home Phone: [phone]  Mobile Phone: [phone]  Relation: Spouse  Secondary Emergency Contact: Trevon Hyatt MD  Work Phone: [phone]  Relation: Relative  Preferred language: English    Discharge Plan A: Home with family  Discharge Plan B: Home      [id number] - [place], [place] - [address] [facility] [address]  [address] [place] [address]  Phone: [phone] Fax: [phone]    [facility] - [facility] [address] [facility]  [address] [facility] [address]  Phone: [phone] Fax: [phone]      Initial Assessment (most recent)     Adult Discharge Assessment - 02/16/22 1050        Discharge Assessment    Assessment Type Discharge Planning Assessment     Confirmed/corrected address, phone number and insurance Yes     Confirmed Demographics Correct on Facesheet     Source of Information patient     Communicated SHELDON with patient/caregiver Date not available/Unable to determine     Reason For Admission Aortic stenosis     Lives With spouse     Do you expect to return to your current living situation? Yes     Do you have help at home or someone to help you manage your care at home? Yes     Who are your caregiver(s) and their phone number(s)? Pat Rosanna [phone]     Prior to hospitilization cognitive status: Alert/Oriented     Current cognitive status:  Alert/Oriented     Home Layout Able to live on 1st floor     Equipment Currently Used at Home walker, rolling;cane, quad     Readmission within 30 days? No     Patient currently being followed by outpatient case management? No     Do you currently have service(s) that help you manage your care at home? No     Do you take prescription medications? Yes     Is the patient taking medications as prescribed? yes     Who is going to help you get home at discharge? Pat Marte     How do you get to doctors appointments? family or friend will provide     Are you on dialysis? No     Do you take coumadin? No     Discharge Plan A Home with family     Discharge Plan B Home     DME Needed Upon Discharge  other (see comments)   TBD    Discharge Plan discussed with: Patient     Discharge Barriers Identified None        Relationship/Environment    Name(s) of Who Lives With Patient Pat Marte               This SW met with patient and his wife bedside to complete DPA. Questions answered / contact numbers provided.  Use PREFERRED PHARMACY / BEDSIDE DELIVERY for any necessary medications at time of discharge.  The patient is independent with all ADLs - does use a walker and cane, is not on HD, BTs or home oxygen. The patient's wife will be assisting with help upon discharge. The patient's wife  will be providing transportation home. Hospital follow up will be scheduled with PCP. Will continue to follow for course of hospitalization.    Silvia Gates LMSW  Case Management [facility]  Ext: [phone]

## 2022-02-16 NOTE — PLAN OF CARE
22 1216   Post-Acute Status   Post-Acute Authorization Other   Other Status No Post-Acute Service Needs   Hospital Resources/Appts/Education Provided Appointments scheduled and added to AVS       The patient is being d/c today with no social service needs identified at this time. The SW placed the patient's pcp follow-up appointment on the AVS.       1:30 PM  The patient's bed nurse informed the SW that the patient's wife wanted him to obtain home health services.  The SW placed a secure chat to the patient's MD. The SW met with the patient and his wife to discuss home health. The patient's wife informed the SW that her 's home health services had just . The SW inquired about the agency. The patient's wife informed the SW that the patient had [facility]. The SW proceeded to contact [facility] to inquire about the patient's home health services. The SW spoke with Niels from [facility] whom stated that the patient was re-certified for six additional weeks. The SW notified the patient, his wife,and bedside nurse about the re-certification.  The SW provided the patient's wife with the contact information for [facility].  The patient's MD secured chat the SW to inform her that the patient's pcp would have to place the  orders. The SW notified the patient's MD about the updates from [facility].         Silvia Gates LMSW  Case Management [facility]  Ext: [phone]

## 2022-02-16 NOTE — TELEPHONE ENCOUNTER
The wife just wanted to let you know that her  has had the heart valve sx and it was very successful   Home health has been extended as well

## 2022-02-17 NOTE — PLAN OF CARE
[facility]  Discharge Final Note    Primary Care Provider: Ike Ivan MD    Expected Discharge Date: 2/16/2022    Final Discharge Note (most recent)     Final Note - 02/17/22 0954        Final Note    Assessment Type Final Discharge Note     Anticipated Discharge Disposition Home or Self Care     Hospital Resources/Appts/Education Provided Appointments scheduled and added to AVS        Post-Acute Status    Post-Acute Authorization Other     Other Status No Post-Acute Service Needs                 Important Message from Medicare             Contact Info     DANY Albarado   Specialty: Internal Medicine    [address]  [facility] [address]   Phone: [phone]       Next Steps: Follow up on 2/23/2022    Instructions: Hospital Follow-Up @ 2:00 pm  * Correct Address [address]*           Silvia Gates LMSW  Case Management [facility]  Ext: [phone]

## 2022-02-18 ENCOUNTER — PATIENT MESSAGE (OUTPATIENT)
Dept: ADMINISTRATIVE | Facility: CLINIC | Age: 87
End: 2022-02-18
Payer: MEDICARE

## 2022-02-18 ENCOUNTER — PATIENT OUTREACH (OUTPATIENT)
Dept: ADMINISTRATIVE | Facility: CLINIC | Age: 87
End: 2022-02-18
Payer: MEDICARE

## 2022-02-18 NOTE — PHYSICIAN QUERY
PT Name: Clary Marte Jr.  MR #: [id number]     DOCUMENTATION CLARIFICATION     CDS/: April Higgins RN CDIS           Contact information: [email or web address]    This form is a permanent document in the medical record.     Query Date: February 18, 2022    By submitting this query, we are merely seeking further clarification of documentation.  Please utilize your independent clinical judgment when addressing the question(s) below.    The Medical Record contains the following   Indicators Supporting Clinical Findings Location in Medical Record   x Heart Failure documented   Acute on chronic diastolic congestive heart failure  Acute on Chronic diastolic heart failure with LVEDP 29 mmHg       Postop Diagnosis: Severe aortic stenosis [I35.0]  Chronic systolic congestive heart failure [I50.22]   Discharge summary             Brief Op note    x BNP Results for CLARY MARTE JR. (MRN [id number]) as of 2/18/2022 16:31   Ref. Range 2/15/2022 07:19   BNP Latest Ref Range: 0 - 99 pg/mL 372 (H)    Labs   x EF/Echo ECHO (12/15/21): JANESSA= 0.85 cm2, MG= mmHg, Peak Ever= 4.1 m/s, EF= 55 %.    Discharge summary     Radiology findings     x Subjective/Objective Respiratory Conditions BRIEF HISTORY: This patient is a 89 year old male with symptomatic severe aortic stenosis.  The patient has had progressive dyspnea on exertion OP note     Recent/Current MI      Heart Transplant, LVAD      Edema, JVD      Ascites      Diuretics/Meds      Other Treatment      Other       Heart failure is a clinical diagnosis which includes symptomatic fluid retention, elevated intracardiac pressures, and/or the inability of the heart to deliver adequate blood flow.    Heart Failure with reduced Ejection Fraction (HFrEF) or Systolic Heart Failure (loses ability to contract normally, EF is <40%)    Heart Failure with preserved Ejection Fraction (HFpEF) or Diastolic Heart Failure (stiff ventricles, does not relax properly, EF is >50%)     Heart  Failure with Combined Systolic and Diastolic Failure (stiff ventricles, does not relax properly and EF is <50%)    Mid-range or mildly reduced ejection fraction (HFmrEF) is classified as systolic heart failure.   Common clues to acute exacerbation:  Rapidly progressive symptoms (w/in 2 weeks of presentation), using IV diuretics, using supplemental O2, pulmonary edema on Xray, new or worsening pleural effusion, +JVD or other signs of volume overload, MI w/in 4 weeks, and/or BNP >500  The clinical guidelines noted are only system guidelines, and do not replace the providers clinical judgment.    Due to conflicting documentation Please clarify the TYPE and ACUITY of the heart failure     [   ]  Acute on Chronic Systolic Heart Failure (HFrEF or HFmrEF) - worsening of CHF signs/symptoms in preexisting CHF   [   ]  Chronic Systolic Heart Failure (HFrEF or HFmrEF) - preexisting and stable   [  X ]  Acute on Chronic Diastolic Heart Failure (HFpEF) - worsening of CHF signs/symptoms in preexisting CHF   [   ]  Chronic Diastolic Heart Failure (HFpEF) - preexisting and stable   [   ]  Acute on Chronic Combined Systolic and Diastolic Heart Failure - worsening of CHF signs/symptoms in preexisting CHF   [   ]  Chronic Combined Systolic and Diastolic Heart Failure - pre-existing and stable   [   ]  Other (please specify): ___________________________________   [  ]  Clinically Undetermined         Please document in your progress notes daily for the duration of treatment until resolved and include in your discharge summary.    References:  American Heart Association editorial staff. (2017, May). Ejection Fraction Heart Failure Measurement. American Heart Association. https://www.heart.org/en/health-topics/heart-failure/diagnosing-heart-failure/ejection-fraction-heart-failure-measurement#:~:text=Ejection%20fraction%20(EF)%20is%20a,pushed%20out%20with%20each%20davianrtNORM [place]o (2020, December 15). Heart failure with  preserved ejection fraction: Clinical manifestations and diagnosis. [employer]. https://www.[employer].com/contents/heart-failure-with-preserved-ejection-fraction-clinical-manifestations-and-diagnosis.  ICD-10-CM/PCS Coding Clinic Third Quarter ICD-10, Effective with discharges: September 8, 2020 [facility] § Heart failure with mid-range or mildly reduced ejection fraction (2020).  Form No. [id number]

## 2022-02-18 NOTE — PROGRESS NOTES
C3 nurse attempted to contact Misha Marte Jr. for a TCC post hospital discharge follow up call. No answer. Left voicemail with callback information. The patient has a scheduled HOSFU appointment with DANY Yoon on 2/23/2022 @ 1400. Message sent via [facility] portal for Post Discharge Attempt.

## 2022-02-19 NOTE — PROGRESS NOTES
-- DO NOT REPLY / DO NOT REPLY ALL --  -- Message is from the [facility]--    COVID-19 Universal Screening: N/A - Not about scheduling    General Patient Message      Reason for Call: patient mother is calling she was told that  she coould  a copy of the [employer] records yesterday but she could not make it. She needs this information for there appt today at 2 with the new doctor. She is requesting that the information be sent to the [facility] location so she can pick it up from there the other medical record number is [id number] please follow up     Caller Information       Type Contact Phone    07/30/2021 12:29 PM CDT Phone (Incoming) DANIEL THAKKAR (Mother) [phone]          Alternative phone number: none    Turnaround time given to caller:   \"This message will be sent to [state Provider's name]. The clinical team will fulfill your request as soon as they review your message.\"     C3 nurse spoke with Misha Marte Jr. for a TCC post hospital discharge follow up call. The patient has a scheduled HOSFU appointment with Car on 2/23/2022 @ 2:00 pm.

## 2022-02-21 ENCOUNTER — TELEPHONE (OUTPATIENT)
Dept: INTERNAL MEDICINE | Facility: CLINIC | Age: 87
End: 2022-02-21
Payer: MEDICARE

## 2022-02-21 NOTE — TELEPHONE ENCOUNTER
----- Message from Liliana Goff RN sent at 2/21/2022  9:45 AM CST -----  Regarding: Home Health  Good morning,  Patient's family called to inquire about resuming Home Health orders.  Please contact.  Thanks  DE Ford

## 2022-02-22 DIAGNOSIS — I50.9 CONGESTIVE HEART FAILURE, UNSPECIFIED HF CHRONICITY, UNSPECIFIED HEART FAILURE TYPE: ICD-10-CM

## 2022-02-22 DIAGNOSIS — Z95.2 S/P TAVR (TRANSCATHETER AORTIC VALVE REPLACEMENT): Primary | ICD-10-CM

## 2022-02-22 DIAGNOSIS — I10 ESSENTIAL HYPERTENSION: ICD-10-CM

## 2022-02-24 ENCOUNTER — PATIENT MESSAGE (OUTPATIENT)
Dept: CARDIOLOGY | Facility: CLINIC | Age: 87
End: 2022-02-24
Payer: MEDICARE

## 2022-02-24 PROCEDURE — G0180 PR HOME HEALTH MD CERTIFICATION: ICD-10-PCS | Mod: ,,, | Performed by: INTERNAL MEDICINE

## 2022-02-24 PROCEDURE — G0180 MD CERTIFICATION HHA PATIENT: HCPCS | Mod: ,,, | Performed by: INTERNAL MEDICINE

## 2022-03-02 ENCOUNTER — OFFICE VISIT (OUTPATIENT)
Dept: INTERNAL MEDICINE | Facility: CLINIC | Age: 87
End: 2022-03-02
Payer: MEDICARE

## 2022-03-02 ENCOUNTER — PATIENT MESSAGE (OUTPATIENT)
Dept: INTERNAL MEDICINE | Facility: CLINIC | Age: 87
End: 2022-03-02

## 2022-03-02 ENCOUNTER — LAB VISIT (OUTPATIENT)
Dept: LAB | Facility: HOSPITAL | Age: 87
End: 2022-03-02
Attending: INTERNAL MEDICINE
Payer: MEDICARE

## 2022-03-02 VITALS
DIASTOLIC BLOOD PRESSURE: 64 MMHG | SYSTOLIC BLOOD PRESSURE: 124 MMHG | HEIGHT: 68 IN | WEIGHT: 176 LBS | OXYGEN SATURATION: 99 % | BODY MASS INDEX: 26.67 KG/M2 | HEART RATE: 65 BPM

## 2022-03-02 DIAGNOSIS — R60.0 BILATERAL LOWER EXTREMITY EDEMA: ICD-10-CM

## 2022-03-02 DIAGNOSIS — I10 ESSENTIAL HYPERTENSION: ICD-10-CM

## 2022-03-02 DIAGNOSIS — E61.1 IRON DEFICIENCY: ICD-10-CM

## 2022-03-02 DIAGNOSIS — Z95.2 S/P TAVR (TRANSCATHETER AORTIC VALVE REPLACEMENT): Primary | ICD-10-CM

## 2022-03-02 LAB
ANION GAP SERPL CALC-SCNC: 11 MMOL/L (ref 8–16)
BASOPHILS # BLD AUTO: 0.08 K/UL (ref 0–0.2)
BASOPHILS NFR BLD: 1.5 % (ref 0–1.9)
BUN SERPL-MCNC: 17 MG/DL (ref 10–30)
CALCIUM SERPL-MCNC: 9 MG/DL (ref 8.7–10.5)
CHLORIDE SERPL-SCNC: 104 MMOL/L (ref 95–110)
CO2 SERPL-SCNC: 24 MMOL/L (ref 23–29)
CREAT SERPL-MCNC: 1.3 MG/DL (ref 0.5–1.4)
DIFFERENTIAL METHOD: ABNORMAL
EOSINOPHIL # BLD AUTO: 0.1 K/UL (ref 0–0.5)
EOSINOPHIL NFR BLD: 2.1 % (ref 0–8)
ERYTHROCYTE [DISTWIDTH] IN BLOOD BY AUTOMATED COUNT: 14.2 % (ref 11.5–14.5)
EST. GFR  (AFRICAN AMERICAN): 54.4 ML/MIN/1.73 M^2
EST. GFR  (NON AFRICAN AMERICAN): 47 ML/MIN/1.73 M^2
GLUCOSE SERPL-MCNC: 96 MG/DL (ref 70–110)
HCT VFR BLD AUTO: 37.7 % (ref 40–54)
HGB BLD-MCNC: 12 G/DL (ref 14–18)
IMM GRANULOCYTES # BLD AUTO: 0.05 K/UL (ref 0–0.04)
IMM GRANULOCYTES NFR BLD AUTO: 0.9 % (ref 0–0.5)
LYMPHOCYTES # BLD AUTO: 0.6 K/UL (ref 1–4.8)
LYMPHOCYTES NFR BLD: 11.1 % (ref 18–48)
MCH RBC QN AUTO: 29.6 PG (ref 27–31)
MCHC RBC AUTO-ENTMCNC: 31.8 G/DL (ref 32–36)
MCV RBC AUTO: 93 FL (ref 82–98)
MONOCYTES # BLD AUTO: 0.5 K/UL (ref 0.3–1)
MONOCYTES NFR BLD: 9.4 % (ref 4–15)
NEUTROPHILS # BLD AUTO: 4 K/UL (ref 1.8–7.7)
NEUTROPHILS NFR BLD: 75 % (ref 38–73)
NRBC BLD-RTO: 0 /100 WBC
PLATELET # BLD AUTO: 246 K/UL (ref 150–450)
PMV BLD AUTO: 10.4 FL (ref 9.2–12.9)
POTASSIUM SERPL-SCNC: 4.5 MMOL/L (ref 3.5–5.1)
RBC # BLD AUTO: 4.06 M/UL (ref 4.6–6.2)
SODIUM SERPL-SCNC: 139 MMOL/L (ref 136–145)
WBC # BLD AUTO: 5.32 K/UL (ref 3.9–12.7)

## 2022-03-02 PROCEDURE — 99214 OFFICE O/P EST MOD 30 MIN: CPT | Mod: PBBFAC | Performed by: INTERNAL MEDICINE

## 2022-03-02 PROCEDURE — 99214 OFFICE O/P EST MOD 30 MIN: CPT | Mod: S$PBB,,, | Performed by: INTERNAL MEDICINE

## 2022-03-02 PROCEDURE — 85025 COMPLETE CBC W/AUTO DIFF WBC: CPT | Performed by: INTERNAL MEDICINE

## 2022-03-02 PROCEDURE — 99214 PR OFFICE/OUTPT VISIT, EST, LEVL IV, 30-39 MIN: ICD-10-PCS | Mod: S$PBB,,, | Performed by: INTERNAL MEDICINE

## 2022-03-02 PROCEDURE — 99999 PR PBB SHADOW E&M-EST. PATIENT-LVL IV: ICD-10-PCS | Mod: PBBFAC,,, | Performed by: INTERNAL MEDICINE

## 2022-03-02 PROCEDURE — 99999 PR PBB SHADOW E&M-EST. PATIENT-LVL IV: CPT | Mod: PBBFAC,,, | Performed by: INTERNAL MEDICINE

## 2022-03-02 PROCEDURE — 36415 COLL VENOUS BLD VENIPUNCTURE: CPT | Performed by: INTERNAL MEDICINE

## 2022-03-02 PROCEDURE — 80048 BASIC METABOLIC PNL TOTAL CA: CPT | Performed by: INTERNAL MEDICINE

## 2022-03-02 NOTE — PROGRESS NOTES
CC:  One-month follow-up    HPI:  The patient is a 89-year-old male with aortic stenosis status post TAVR, chronic diastolic heart failure, hypertension, CKD stage 3, vitamin-D deficiency and iron deficiency anemia presents today for follow-up of TAVR procedure.  The patient reports feeling much better.  He has more energy.  He is quite pleased with have any procedure done.  His wife has been weighing him daily.  His weight is staying stable.  He has a follow-up on the 22nd of this month.    ROS:  Patient reports no chest pain or shortness of breath.  Does have some swelling in his legs; but, he feels better than what it had been in the past.  It is also better in the morning when he 1st gets up in tends to increase the day goes by.  He is getting home health physical therapy 3 times a week.  No nausea vomiting.  No abdominal pain.    Physical exam:  General appearance:  No acute distress  Pulmonary:  Good inspiratory, expiratory breath sounds are heard.  Lungs are clear to auscultation.  Cardiovascular:  S1-S2, rhythm appear to be normal.  Extremities with 1+ ankle edema.  GI:  Abdomen is nontender, nondistended without hepatosplenomegaly    Assessment:  1. Status post TAVR procedure  2. Lower extremity edema  3. History of iron deficiency anemia  4. CKD stage 3    Plan:  1. Will check a CBC and BMP today  2. The patient will follow-up pending test results.

## 2022-03-04 ENCOUNTER — EXTERNAL HOME HEALTH (OUTPATIENT)
Dept: HOME HEALTH SERVICES | Facility: HOSPITAL | Age: 87
End: 2022-03-04
Payer: MEDICARE

## 2022-03-21 ENCOUNTER — PATIENT MESSAGE (OUTPATIENT)
Dept: INTERNAL MEDICINE | Facility: CLINIC | Age: 87
End: 2022-03-21
Payer: MEDICARE

## 2022-03-21 NOTE — TELEPHONE ENCOUNTER
No new care gaps identified.  Powered by [employer]. Reference number: [id number].   3/21/2022 6:57:39 PM CDT

## 2022-03-22 ENCOUNTER — INFUSION (OUTPATIENT)
Dept: INFECTIOUS DISEASES | Facility: HOSPITAL | Age: 87
End: 2022-03-22
Payer: MEDICARE

## 2022-03-22 ENCOUNTER — HOSPITAL ENCOUNTER (OUTPATIENT)
Dept: CARDIOLOGY | Facility: HOSPITAL | Age: 87
Discharge: HOME | End: 2022-03-22
Payer: MEDICARE

## 2022-03-22 ENCOUNTER — OFFICE VISIT (OUTPATIENT)
Dept: CARDIOLOGY | Facility: CLINIC | Age: 87
End: 2022-03-22
Payer: MEDICARE

## 2022-03-22 VITALS
SYSTOLIC BLOOD PRESSURE: 168 MMHG | DIASTOLIC BLOOD PRESSURE: 71 MMHG | BODY MASS INDEX: 27.79 KG/M2 | OXYGEN SATURATION: 99 % | HEART RATE: 59 BPM | BODY MASS INDEX: 27.7 KG/M2 | HEART RATE: 67 BPM | WEIGHT: 182.75 LBS | SYSTOLIC BLOOD PRESSURE: 159 MMHG | DIASTOLIC BLOOD PRESSURE: 73 MMHG | TEMPERATURE: 97 F | HEIGHT: 68 IN | RESPIRATION RATE: 16 BRPM | OXYGEN SATURATION: 97 % | WEIGHT: 182.75 LBS

## 2022-03-22 VITALS
HEIGHT: 68 IN | HEART RATE: 70 BPM | DIASTOLIC BLOOD PRESSURE: 64 MMHG | BODY MASS INDEX: 26.67 KG/M2 | WEIGHT: 176 LBS | SYSTOLIC BLOOD PRESSURE: 124 MMHG

## 2022-03-22 DIAGNOSIS — Z95.2 S/P TAVR (TRANSCATHETER AORTIC VALVE REPLACEMENT): ICD-10-CM

## 2022-03-22 DIAGNOSIS — I35.0 NONRHEUMATIC AORTIC VALVE STENOSIS: Chronic | ICD-10-CM

## 2022-03-22 DIAGNOSIS — I50.32 CHRONIC DIASTOLIC CONGESTIVE HEART FAILURE: ICD-10-CM

## 2022-03-22 DIAGNOSIS — I45.10 INCOMPLETE RBBB: ICD-10-CM

## 2022-03-22 DIAGNOSIS — M81.0 AGE RELATED OSTEOPOROSIS, UNSPECIFIED PATHOLOGICAL FRACTURE PRESENCE: Primary | ICD-10-CM

## 2022-03-22 DIAGNOSIS — I70.0 AORTIC ATHEROSCLEROSIS: ICD-10-CM

## 2022-03-22 DIAGNOSIS — I10 PRIMARY HYPERTENSION: Chronic | ICD-10-CM

## 2022-03-22 DIAGNOSIS — I35.0 SEVERE AORTIC STENOSIS: ICD-10-CM

## 2022-03-22 LAB
ASCENDING AORTA: 2.78 CM
AV INDEX (PROSTH): 0.81
AV MEAN GRADIENT: 3 MMHG
AV PEAK GRADIENT: 5 MMHG
AV VALVE AREA: 2.79 CM2
AV VELOCITY RATIO: 0.73
BSA FOR ECHO PROCEDURE: 1.96 M2
CV ECHO LV RWT: 0.46 CM
DOP CALC AO PEAK VEL: 1.1 M/S
DOP CALC AO VTI: 25.8 CM
DOP CALC LVOT AREA: 3.5 CM2
DOP CALC LVOT DIAMETER: 2.1 CM
DOP CALC LVOT PEAK VEL: 0.8 M/S
DOP CALC LVOT STROKE VOLUME: 72.11 CM3
DOP CALC MV VTI: 58.57 CM
DOP CALCLVOT PEAK VEL VTI: 20.83 CM
E/E' RATIO: 46.57 M/S
ECHO LV POSTERIOR WALL: 0.81 CM (ref 0.6–1.1)
EJECTION FRACTION: 60 %
FRACTIONAL SHORTENING: 32 % (ref 28–44)
HR MV ECHO: 63 BPM
INTERVENTRICULAR SEPTUM: 1.07 CM (ref 0.6–1.1)
IVRT: 83.73 MSEC
LA MAJOR: 5.3 CM
LA MINOR: 5.25 CM
LA WIDTH: 5.24 CM
LEFT ATRIUM SIZE: 4.52 CM
LEFT ATRIUM VOLUME INDEX MOD: 57.7 ML/M2
LEFT ATRIUM VOLUME INDEX: 54.7 ML/M2
LEFT ATRIUM VOLUME MOD: 111.93 CM3
LEFT ATRIUM VOLUME: 106.19 CM3
LEFT INTERNAL DIMENSION IN SYSTOLE: 2.37 CM (ref 2.1–4)
LEFT VENTRICLE DIASTOLIC VOLUME INDEX: 26.31 ML/M2
LEFT VENTRICLE DIASTOLIC VOLUME: 51.05 ML
LEFT VENTRICLE MASS INDEX: 49 G/M2
LEFT VENTRICLE SYSTOLIC VOLUME INDEX: 10.1 ML/M2
LEFT VENTRICLE SYSTOLIC VOLUME: 19.54 ML
LEFT VENTRICULAR INTERNAL DIMENSION IN DIASTOLE: 3.51 CM (ref 3.5–6)
LEFT VENTRICULAR MASS: 94.9 G
LV LATERAL E/E' RATIO: 40.75 M/S
LV SEPTAL E/E' RATIO: 54.33 M/S
MV A" WAVE DURATION": 19.03 MSEC
MV MEAN GRADIENT: 5 MMHG
MV PEAK E VEL: 1.63 M/S
MV PEAK GRADIENT: 11 MMHG
MV VALVE AREA BY CONTINUITY EQUATION: 1.23 CM2
PISA TR MAX VEL: 3.03 M/S
PULM VEIN S/D RATIO: 0.68
PV PEAK D VEL: 0.37 M/S
PV PEAK S VEL: 0.25 M/S
RA MAJOR: 4.57 CM
RA PRESSURE: 3 MMHG
RA WIDTH: 2.81 CM
RIGHT ATRIAL AREA: 15 CM2
RIGHT VENTRICULAR END-DIASTOLIC DIMENSION: 2.8 CM
RV TISSUE DOPPLER FREE WALL SYSTOLIC VELOCITY 1 (APICAL 4 CHAMBER VIEW): 9.25 CM/S
SINUS: 2.95 CM
STJ: 2.5 CM
TDI LATERAL: 0.04 M/S
TDI SEPTAL: 0.03 M/S
TDI: 0.04 M/S
TR MAX PG: 37 MMHG
TRICUSPID ANNULAR PLANE SYSTOLIC EXCURSION: 1.87 CM
TV REST PULMONARY ARTERY PRESSURE: 40 MMHG

## 2022-03-22 PROCEDURE — 93306 TTE W/DOPPLER COMPLETE: CPT

## 2022-03-22 PROCEDURE — 63600175 PHARM REV CODE 636 W HCPCS: Mod: JG | Performed by: INTERNAL MEDICINE

## 2022-03-22 PROCEDURE — 99999 PR PBB SHADOW E&M-EST. PATIENT-LVL IV: CPT | Mod: PBBFAC,,, | Performed by: PHYSICIAN ASSISTANT

## 2022-03-22 PROCEDURE — 96372 THER/PROPH/DIAG INJ SC/IM: CPT

## 2022-03-22 PROCEDURE — 99214 OFFICE O/P EST MOD 30 MIN: CPT | Mod: S$PBB,,, | Performed by: PHYSICIAN ASSISTANT

## 2022-03-22 PROCEDURE — 99999 PR PBB SHADOW E&M-EST. PATIENT-LVL IV: ICD-10-PCS | Mod: PBBFAC,,, | Performed by: PHYSICIAN ASSISTANT

## 2022-03-22 PROCEDURE — 99214 PR OFFICE/OUTPT VISIT, EST, LEVL IV, 30-39 MIN: ICD-10-PCS | Mod: S$PBB,,, | Performed by: PHYSICIAN ASSISTANT

## 2022-03-22 PROCEDURE — 99214 OFFICE O/P EST MOD 30 MIN: CPT | Mod: PBBFAC,25 | Performed by: PHYSICIAN ASSISTANT

## 2022-03-22 PROCEDURE — 93306 TTE W/DOPPLER COMPLETE: CPT | Mod: 26,,, | Performed by: INTERNAL MEDICINE

## 2022-03-22 PROCEDURE — 93306 ECHO (CUPID ONLY): ICD-10-PCS | Mod: 26,,, | Performed by: INTERNAL MEDICINE

## 2022-03-22 RX ORDER — FUROSEMIDE 20 MG/1
20 TABLET ORAL DAILY
Qty: 90 TABLET | Refills: 3 | Status: SHIPPED | OUTPATIENT
Start: 2022-03-22 | End: 2022-06-05 | Stop reason: SDUPTHER

## 2022-03-22 RX ADMIN — DENOSUMAB 60 MG: 60 INJECTION SUBCUTANEOUS at 03:03

## 2022-03-22 NOTE — PROGRESS NOTES
Subjective:    Patient ID:  Misha Marte Jr. is a 89 y.o. male who presents for follow-up of aortic stenosis.     Referring Physician: Dr Cindy NUÑEZ  Misha Marte Jr. is a 89 y.o. male who presents 1 month s/p TAVR. He has a history of severe AS (NYHA Class 3 sx). Prior to TAVR he had a LHC without significant coronary disease.     TAVR Hospital Course:  Misha Marte Jr. was admitted and underwent successful placement of a 26 mm EvolutR TAVR via TF access under MAC sedation on 2/15/22. Please see full cath report for details. A transthoracic echo was performed immediately post procedure which showed no paravalvular leak. An aortic valve mean gradient of 5 mmHg and a maximum velocity through the aortic valve of 1.2 m/s. He was transported to the CCU in stable condition with a TVP and arterial line in place. He remained hemodynamically stable overnight. He had an incomplete RBBB which remained stable post TAVR. EP was not consulted. This morning, he ambulated without difficulty and was eager to go home. It was felt he was stable for discharge and will go home on ASA alone for antithrombotic therapy post TAVR.     Interval History:  Mr Marte has done very well since TAVR. He has noticed a resolution in his SOB and fatigue. He denies SOB, THOMAS, LE swelling, weight gain, and orthopnea. He follows closely with Dr White. Remains compliant with ASA.       NYHA: I CCS: 0    Review of Systems   Constitutional: Negative for chills and fever.   HENT: Negative for sore throat.    Eyes: Negative for blurred vision.   Cardiovascular: Negative for chest pain, claudication, cyanosis, dyspnea on exertion, irregular heartbeat, leg swelling, near-syncope, orthopnea, palpitations, paroxysmal nocturnal dyspnea and syncope.   Respiratory: Negative for cough and sputum production.    Hematologic/Lymphatic: Does not bruise/bleed easily.   Skin: Negative for itching, rash and suspicious lesions.   Musculoskeletal:  Negative for falls.   Gastrointestinal: Negative for abdominal pain and change in bowel habit.   Genitourinary: Negative for dysuria.   Neurological: Negative for disturbances in coordination, dizziness and loss of balance.   Psychiatric/Behavioral: Negative for altered mental status.          Past Medical History:   Diagnosis Date    Arthritis     CHF (congestive heart failure)     Digestive disorder     Heart murmur     Hyperlipidemia     Hypertension     Insomnia     Skin cancer        Current Outpatient Medications:     ammonium lactate (AMLACTIN TOP), Apply topically. Apply topically daily as needed, Disp: , Rfl:     aspirin 81 mg Tab, Take 81 mg by mouth once daily. , Disp: , Rfl:     CALCIUM CARBONATE/VITAMIN D3 (CALCIUM 600 + D,3, ORAL), Take 1,200 mg by mouth once daily. , Disp: , Rfl:     carboxymethylcellulose sodium (REFRESH OPHT), Apply 1 drop to eye daily as needed., Disp: , Rfl:     ceramides 1,3,6-II (CERAVE) Lotn, Apply topically daily as needed, Disp: , Rfl:     cholecalciferol, vitamin D3, (VITAMIN D3 ORAL), Take 1 capsule by mouth once daily., Disp: , Rfl:     coenzyme Q10 10 mg capsule, Take 100 mg by mouth once daily. , Disp: , Rfl:     esomeprazole (NEXIUM) 40 MG capsule, Take 1 capsule (40 mg total) by mouth once daily., Disp: 90 capsule, Rfl: 0    ferrous sulfate (FEOSOL) Tab tablet, Take 1 tablet (1 each total) by mouth every morning. Take on an empty stomach with your vitamin C. (Patient taking differently: Take 1 tablet by mouth every morning. Take on an empty stomach with your vitamin C. 65 mg), Disp: 90 tablet, Rfl: 3    losartan (COZAAR) 25 MG tablet, TAKE 1 TABLET EVERY DAY, Disp: 90 tablet, Rfl: 3    metoprolol succinate (TOPROL-XL) 25 MG 24 hr tablet, Take 1 tablet (25 mg total) by mouth once daily., Disp: 90 tablet, Rfl: 3    multivitamin-minerals-lutein Tab, Take 1 tablet by mouth once daily. , Disp: , Rfl:     pravastatin (PRAVACHOL) 40 MG tablet, TAKE 1  "TABLET EVERY DAY, Disp: 90 tablet, Rfl: 3    turmeric root extract 500 mg Cap, Take 1 capsule by mouth once daily., Disp: , Rfl:     VIT A,C & E/LUTEIN/MINERALS (OCUVITE WITH LUTEIN ORAL), Take 1 capsule by mouth once daily., Disp: , Rfl:     acetaminophen (TYLENOL) 500 MG tablet, Take 500-1,000 mg by mouth daily as needed for Pain., Disp: , Rfl:     furosemide (LASIX) 20 MG tablet, Take 1 tablet (20 mg total) by mouth once daily., Disp: 90 tablet, Rfl: 3    polyethylene glycol (GLYCOLAX) 17 gram PwPk, Take 17 g by mouth 2 (two) times daily as needed (Constipation). (Patient not taking: Reported on 3/22/2022), Disp: , Rfl: 0  No current facility-administered medications for this visit.    Facility-Administered Medications Ordered in Other Visits:     denosumab (PROLIA) injection 60 mg, 60 mg, Subcutaneous, 1 time in Clinic/HOD, Evelyn Beltran MD    Objective:    Physical Exam  Vitals reviewed.   Constitutional:       General: He is not in acute distress.     Appearance: He is well-developed. He is not diaphoretic.   HENT:      Head: Normocephalic and atraumatic.   Neck:      Vascular: No JVD.   Cardiovascular:      Rate and Rhythm: Normal rate and regular rhythm.      Pulses: Intact distal pulses.      Heart sounds: No murmur heard.  Pulmonary:      Effort: Pulmonary effort is normal. No respiratory distress.      Breath sounds: Normal breath sounds.   Musculoskeletal:      Cervical back: Normal range of motion.      Right lower leg: No edema.      Left lower leg: No edema.   Skin:     General: Skin is warm and dry.   Neurological:      Mental Status: He is alert and oriented to person, place, and time.             Vitals:    03/22/22 1419 03/22/22 1421   BP: (!) 155/77 (!) 159/71   BP Location: Left arm Right arm   Patient Position: Sitting Sitting   BP Method: Large (Automatic) Large (Automatic)   Pulse: (!) 59 (!) 59   SpO2: 99%    Weight: 82.9 kg (182 lb 12.2 oz)    Height: 5' 8" (1.727 m)      Body mass " index is 27.79 kg/m².    Test(s) Reviewed  I have reviewed the following in detail:  [] Stress test   [] Angiography   [] Echocardiogram   [] Labs:   [] Other:     TTE Today  · The left ventricle is normal in size with concentric remodeling and normal systolic function.  · The estimated ejection fraction is 60%.  · Left ventricular diastolic dysfunction.  · There is a 26 mm EvolutR transcutaneously-placed aortic bioprosthesis present. The aortic valve mean gradient is 3 mmHg with a dimensionless index of 0.81. There is mild paravalvular aortic insufficiency present.  · Moderate calcific mitral stenosis with mean diastolic gradient 5mmHg across the mitral valve at heart rate 63bpm.  · Normal right ventricular size with normal right ventricular systolic function.  · Mild tricuspid regurgitation.  · The estimated PA systolic pressure is 40 mmHg.  · Normal central venous pressure (3 mmHg).  · Severe left atrial enlargement.        Assessment:   S/P TAVR (transcatheter aortic valve replacement)  Successful transfemoral aortic valve replacement with a 26 mm EvolutR valve. TTE today shows mild PVL with MG decreased to 3 mmHg.    Mitral Stenosis  MAC. Recommend aggressive HR control if he becomes symptomatic.     Aortic stenosis  See above    Chronic diastolic congestive heart failure  Chronic. Controlled. Follow up with Cardiology/PCP.    Aortic atherosclerosis  See CTA    Incomplete RBBB  Stable post TAVR. Event monitor at discharge still pending.    Hypertension  Chronic. Controlled. Follow up with Cardiology/PCP.    Plan:     1. Follow up with [facility] in 1 year with labs and Echo.  2. ASA indefinitely.   3. Event monitor at discharge for stable incomplete RBBB in process.   4. No non sterile procedures which could cause endocarditis for 6 months post TAVR including dental work, endoscopy, colonoscopy, and  procedures.   5. SBE prophylaxis for life.    Orders Placed This Encounter   Procedures    CBC Without  Differential    Basic Metabolic Panel    Echo          Angelita Blanco PA-C  Valve and Structural Heart Disease  [facility]

## 2022-03-24 ENCOUNTER — PATIENT MESSAGE (OUTPATIENT)
Dept: CARDIOLOGY | Facility: CLINIC | Age: 87
End: 2022-03-24
Payer: MEDICARE

## 2022-03-31 NOTE — PROGRESS NOTES
Patient received Prolia 60 mg injection sub Q in the left arm.  Tolerated well and left in NAD.    Patient is taking Vit D and Calcium      
DISPLAY PLAN FREE TEXT

## 2022-04-05 ENCOUNTER — OFFICE VISIT (OUTPATIENT)
Dept: INTERNAL MEDICINE | Facility: CLINIC | Age: 87
End: 2022-04-05
Payer: MEDICARE

## 2022-04-05 ENCOUNTER — IMMUNIZATION (OUTPATIENT)
Dept: INTERNAL MEDICINE | Facility: CLINIC | Age: 87
End: 2022-04-05
Payer: MEDICARE

## 2022-04-05 VITALS
OXYGEN SATURATION: 98 % | HEART RATE: 59 BPM | BODY MASS INDEX: 26.26 KG/M2 | SYSTOLIC BLOOD PRESSURE: 118 MMHG | HEIGHT: 70 IN | WEIGHT: 183.44 LBS | DIASTOLIC BLOOD PRESSURE: 60 MMHG

## 2022-04-05 DIAGNOSIS — Z95.2 S/P TAVR (TRANSCATHETER AORTIC VALVE REPLACEMENT): Primary | ICD-10-CM

## 2022-04-05 DIAGNOSIS — I10 ESSENTIAL HYPERTENSION: ICD-10-CM

## 2022-04-05 DIAGNOSIS — E61.1 IRON DEFICIENCY: ICD-10-CM

## 2022-04-05 DIAGNOSIS — R60.0 BILATERAL LOWER EXTREMITY EDEMA: ICD-10-CM

## 2022-04-05 DIAGNOSIS — Z23 NEED FOR VACCINATION: Primary | ICD-10-CM

## 2022-04-05 PROCEDURE — 99999 PR PBB SHADOW E&M-EST. PATIENT-LVL IV: ICD-10-PCS | Mod: PBBFAC,,, | Performed by: INTERNAL MEDICINE

## 2022-04-05 PROCEDURE — 99213 OFFICE O/P EST LOW 20 MIN: CPT | Mod: S$PBB,,, | Performed by: INTERNAL MEDICINE

## 2022-04-05 PROCEDURE — 99999 PR PBB SHADOW E&M-EST. PATIENT-LVL IV: CPT | Mod: PBBFAC,,, | Performed by: INTERNAL MEDICINE

## 2022-04-05 PROCEDURE — 91305 COVID-19, MRNA, LNP-S, PF, 30 MCG/0.3 ML DOSE VACCINE (PFIZER): CPT | Mod: PBBFAC

## 2022-04-05 PROCEDURE — 99213 PR OFFICE/OUTPT VISIT, EST, LEVL III, 20-29 MIN: ICD-10-PCS | Mod: S$PBB,,, | Performed by: INTERNAL MEDICINE

## 2022-04-05 PROCEDURE — 99214 OFFICE O/P EST MOD 30 MIN: CPT | Mod: PBBFAC | Performed by: INTERNAL MEDICINE

## 2022-04-06 NOTE — PROGRESS NOTES
CC:  Follow-up    HPI:  The patient is a 89-year-old male with aortic stenosis status post TAVR, chronic diastolic heart failure, hypertension, CKD stage 3, vitamin-D deficiency and iron deficiency anemia presents today for follow-up.  The patient is status post TAVR procedure is feeling much better.  He still has lower extremity edema which is not any better or worse.  His wife reports that might be little bit better.    ROS:  Patient reports no chest pain or shortness of breath.  No nausea or vomiting.  No abdominal pain.  He does use MiraLax to 3 times a week.    Physical exam:  General appearance:  No acute distress  HEENT:  Trachea is midline without JVD  Pulmonary:  Good inspiratory, expiratory breath sounds are heard.  Lungs clear auscultation.  Cardiovascular:  S1-S2, rhythm is normal.  Extremities with trace to 1+ ankle edema up to lower 3rd of his calf.  GI:  Abdomen was nontender, nondistended without hepatosplenomegaly    Assessment:  1. Status post TAVR  2. Hypertension  3. CKD    Plan:  1. Patient will follow-up in 3-4 months  2. Will put the patient in for COVID vaccine.

## 2022-04-07 ENCOUNTER — PATIENT MESSAGE (OUTPATIENT)
Dept: INTERNAL MEDICINE | Facility: CLINIC | Age: 87
End: 2022-04-07
Payer: MEDICARE

## 2022-04-14 ENCOUNTER — HOSPITAL ENCOUNTER (OUTPATIENT)
Dept: RADIOLOGY | Facility: CLINIC | Age: 87
Discharge: HOME | End: 2022-04-14
Attending: INTERNAL MEDICINE
Payer: MEDICARE

## 2022-04-14 DIAGNOSIS — M81.0 AGE-RELATED OSTEOPOROSIS WITHOUT CURRENT PATHOLOGICAL FRACTURE: ICD-10-CM

## 2022-04-14 PROCEDURE — 77080 DXA BONE DENSITY AXIAL: CPT | Mod: 26,,, | Performed by: INTERNAL MEDICINE

## 2022-04-14 PROCEDURE — 77080 DXA BONE DENSITY AXIAL: CPT | Mod: TC

## 2022-04-14 PROCEDURE — 77080 DEXA BONE DENSITY SPINE HIP: ICD-10-PCS | Mod: 26,,, | Performed by: INTERNAL MEDICINE

## 2022-04-19 NOTE — TELEPHONE ENCOUNTER
Left message for Zachary Solo to call back- also pt had labs done Spoke to pt's wife. Scheduled patient appointment per message received in the Dermatology department. Patient acknowledges appointment made and confirms.

## 2022-04-25 ENCOUNTER — PATIENT MESSAGE (OUTPATIENT)
Dept: INTERNAL MEDICINE | Facility: CLINIC | Age: 87
End: 2022-04-25
Payer: MEDICARE

## 2022-04-27 ENCOUNTER — PATIENT MESSAGE (OUTPATIENT)
Dept: ENDOCRINOLOGY | Facility: CLINIC | Age: 87
End: 2022-04-27
Payer: MEDICARE

## 2022-04-27 DIAGNOSIS — H35.30 MACULAR DEGENERATION, UNSPECIFIED LATERALITY, UNSPECIFIED TYPE: Primary | ICD-10-CM

## 2022-05-03 ENCOUNTER — PATIENT OUTREACH (OUTPATIENT)
Dept: ADMINISTRATIVE | Facility: OTHER | Age: 87
End: 2022-05-03
Payer: MEDICARE

## 2022-05-03 NOTE — PROGRESS NOTES
Requested updates within Care Everywhere.  Patient's chart was reviewed for overdue BONNIE topics.  Health maintenance:updated  Immunizations:reconciled   Legacy:   Media:  Orders placed:  Tasked appts:  Labs Linked:  Upcoming appt:

## 2022-05-03 NOTE — PROGRESS NOTES
Subjective:   Patient ID:  Misha Marte Jr. is a 89 y.o. male who presents for follow-up of CVD    HPI: Patient is here for valvular heart disease and TAVR.   The patient has no chest pain, SOB, TIA, palpitations, syncope or pre-syncope.Patient does not exercise but back to activity.        Review of Systems   Constitutional: Negative for chills, decreased appetite, diaphoresis, fever, malaise/fatigue, night sweats, weight gain and weight loss.   HENT: Negative for congestion, hoarse voice, nosebleeds, sore throat and tinnitus.    Eyes: Negative for blurred vision, double vision, vision loss in left eye, vision loss in right eye, visual disturbance and visual halos.   Cardiovascular: Negative for chest pain, claudication, cyanosis, dyspnea on exertion, irregular heartbeat, leg swelling, near-syncope, orthopnea, palpitations, paroxysmal nocturnal dyspnea and syncope.   Respiratory: Negative for cough, hemoptysis, shortness of breath, sleep disturbances due to breathing, snoring, sputum production and wheezing.    Endocrine: Negative for cold intolerance, heat intolerance, polydipsia, polyphagia and polyuria.   Hematologic/Lymphatic: Negative for adenopathy and bleeding problem. Does not bruise/bleed easily.   Skin: Negative for color change, dry skin, flushing, itching, nail changes, poor wound healing, rash, skin cancer, suspicious lesions and unusual hair distribution.   Musculoskeletal: Negative for arthritis, back pain, falls, gout, joint pain, joint swelling, muscle cramps, muscle weakness, myalgias and stiffness.   Gastrointestinal: Negative for abdominal pain, anorexia, change in bowel habit, constipation, diarrhea, dysphagia, heartburn, hematemesis, hematochezia, melena and vomiting.   Genitourinary: Negative for decreased libido, dysuria, hematuria, hesitancy and urgency.   Neurological: Negative for excessive daytime sleepiness, dizziness, focal weakness, headaches, light-headedness, loss of balance,  "numbness, paresthesias, seizures, sensory change, tremors, vertigo and weakness.   Psychiatric/Behavioral: Negative for altered mental status, depression, hallucinations, memory loss, substance abuse and suicidal ideas. The patient does not have insomnia and is not nervous/anxious.    Allergic/Immunologic: Negative for environmental allergies and hives.       Objective: BP (!) 152/67 (BP Location: Left arm, Patient Position: Sitting, BP Method: Medium (Automatic))   Pulse 60   Ht 5' 10" (1.778 m)   Wt 83 kg (182 lb 15.7 oz)   BMI 26.26 kg/m²      Physical Exam  Constitutional:       General: He is not in acute distress.     Appearance: He is well-developed. He is not diaphoretic.   HENT:      Head: Normocephalic.   Eyes:      Pupils: Pupils are equal, round, and reactive to light.   Neck:      Thyroid: No thyromegaly.   Cardiovascular:      Rate and Rhythm: Normal rate and regular rhythm.      Pulses: Intact distal pulses.           Carotid pulses are 3+ on the right side and 3+ on the left side.       Radial pulses are 3+ on the right side and 3+ on the left side.        Femoral pulses are 3+ on the right side and 3+ on the left side.       Popliteal pulses are 3+ on the right side and 3+ on the left side.        Dorsalis pedis pulses are 3+ on the right side and 3+ on the left side.        Posterior tibial pulses are 3+ on the right side and 3+ on the left side.      Heart sounds: Murmur heard.    Harsh midsystolic murmur is present with a grade of 1/6 at the upper right sternal border radiating to the neck.    No friction rub. No gallop.   Pulmonary:      Effort: Pulmonary effort is normal. No respiratory distress.      Breath sounds: Normal breath sounds. No wheezing or rales.   Chest:      Chest wall: No tenderness.   Abdominal:      General: There is no distension.      Palpations: Abdomen is soft. There is no mass.      Tenderness: There is no abdominal tenderness.   Musculoskeletal:         General: " Normal range of motion.      Cervical back: Normal range of motion.   Lymphadenopathy:      Cervical: No cervical adenopathy.   Skin:     General: Skin is warm.      Nails: There is no clubbing.   Neurological:      Mental Status: He is alert and oriented to person, place, and time.   Psychiatric:         Speech: Speech normal.         Behavior: Behavior normal.         Thought Content: Thought content normal.         Judgment: Judgment normal.         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 Nonrheumatic mitral valve stenosis    3. Chronic diastolic congestive heart failure    4. Diastolic congestive heart failure, unspecified HF chronicity    5. Incomplete RBBB    6. Aortic atherosclerosis    7. Overweight (BMI 25.0-29.9)    8. Vitamin D deficiency    9. Hypertension, unspecified type    10. Bilateral carotid artery stenosis        Plan:   Discussed diet , achieving and maintaining ideal body weight, and exercise.   We reviewed meds in detail.  Reassured-Discussed goals, options, plan.  Goal BP< 135-140/85-90 at advanced age.  3 times  per week take a second Furosimide  Divide the Metoprolol to half am and half pm  We have discussed the need for endocarditis prophylaxis with Amoxicillin 500 mg 4 pilld 1 hour before dental.          Misha was seen today for follow-up.    Diagnoses and all orders for this visit:    S/P TAVR (transcatheter aortic valve replacement)  -     amoxicillin (AMOXIL) 500 MG capsule; Take 1 capsule (500 mg total) by mouth as needed. 4 pills 1 hour before dental work  -     Lipid Panel; Future; Expected date: 09/05/2022  -     Comprehensive Metabolic Panel; Future; Expected date: 09/05/2022  -     CBC Auto Differential; Future; Expected date: 09/05/2022  -     EKG 12-lead; Future; Expected date: 09/05/2022  -     BNP; Future; Expected date: 09/05/2022    Nonrheumatic mitral valve stenosis  -     EKG 12-lead; Future; Expected date: 09/05/2022    Chronic  diastolic congestive heart failure  -     Comprehensive Metabolic Panel; Future; Expected date: 09/05/2022  -     TSH; Future; Expected date: 09/05/2022  -     CBC Auto Differential; Future; Expected date: 09/05/2022  -     EKG 12-lead; Future; Expected date: 09/05/2022  -     BNP; Future; Expected date: 09/05/2022    Diastolic congestive heart failure, unspecified HF chronicity  -     Comprehensive Metabolic Panel; Future; Expected date: 09/05/2022    Incomplete RBBB  -     EKG 12-lead; Future; Expected date: 09/05/2022    Aortic atherosclerosis  -     Comprehensive Metabolic Panel; Future; Expected date: 09/05/2022  -     TSH; Future; Expected date: 09/05/2022    Overweight (BMI 25.0-29.9)  -     TSH; Future; Expected date: 09/05/2022    Vitamin D deficiency    Hypertension, unspecified type  -     Comprehensive Metabolic Panel; Future; Expected date: 09/05/2022  -     TSH; Future; Expected date: 09/05/2022  -     EKG 12-lead; Future; Expected date: 09/05/2022    Bilateral carotid artery stenosis  -     Lipid Panel; Future; Expected date: 09/05/2022            Follow up in about 4 months (around 9/5/2022) for with ECG and labs.

## 2022-05-05 ENCOUNTER — OFFICE VISIT (OUTPATIENT)
Dept: CARDIOLOGY | Facility: CLINIC | Age: 87
End: 2022-05-05
Payer: MEDICARE

## 2022-05-05 VITALS
HEART RATE: 60 BPM | HEIGHT: 70 IN | SYSTOLIC BLOOD PRESSURE: 152 MMHG | WEIGHT: 183 LBS | BODY MASS INDEX: 26.2 KG/M2 | DIASTOLIC BLOOD PRESSURE: 67 MMHG

## 2022-05-05 DIAGNOSIS — E66.3 OVERWEIGHT (BMI 25.0-29.9): ICD-10-CM

## 2022-05-05 DIAGNOSIS — Z95.2 S/P TAVR (TRANSCATHETER AORTIC VALVE REPLACEMENT): Primary | ICD-10-CM

## 2022-05-05 DIAGNOSIS — I50.30 DIASTOLIC CONGESTIVE HEART FAILURE, UNSPECIFIED HF CHRONICITY: ICD-10-CM

## 2022-05-05 DIAGNOSIS — I34.2 NONRHEUMATIC MITRAL VALVE STENOSIS: ICD-10-CM

## 2022-05-05 DIAGNOSIS — E55.9 VITAMIN D DEFICIENCY: ICD-10-CM

## 2022-05-05 DIAGNOSIS — I70.0 AORTIC ATHEROSCLEROSIS: ICD-10-CM

## 2022-05-05 DIAGNOSIS — I50.32 CHRONIC DIASTOLIC CONGESTIVE HEART FAILURE: ICD-10-CM

## 2022-05-05 DIAGNOSIS — I10 HYPERTENSION, UNSPECIFIED TYPE: Chronic | ICD-10-CM

## 2022-05-05 DIAGNOSIS — I65.23 BILATERAL CAROTID ARTERY STENOSIS: Chronic | ICD-10-CM

## 2022-05-05 DIAGNOSIS — I45.10 INCOMPLETE RBBB: ICD-10-CM

## 2022-05-05 PROCEDURE — 99999 PR PBB SHADOW E&M-EST. PATIENT-LVL V: CPT | Mod: PBBFAC,,, | Performed by: INTERNAL MEDICINE

## 2022-05-05 PROCEDURE — 99999 PR PBB SHADOW E&M-EST. PATIENT-LVL V: ICD-10-PCS | Mod: PBBFAC,,, | Performed by: INTERNAL MEDICINE

## 2022-05-05 PROCEDURE — 99215 OFFICE O/P EST HI 40 MIN: CPT | Mod: S$PBB,,, | Performed by: INTERNAL MEDICINE

## 2022-05-05 PROCEDURE — 99215 OFFICE O/P EST HI 40 MIN: CPT | Mod: PBBFAC | Performed by: INTERNAL MEDICINE

## 2022-05-05 PROCEDURE — 99215 PR OFFICE/OUTPT VISIT, EST, LEVL V, 40-54 MIN: ICD-10-PCS | Mod: S$PBB,,, | Performed by: INTERNAL MEDICINE

## 2022-05-05 RX ORDER — AMOXICILLIN 500 MG/1
500 CAPSULE ORAL
Qty: 12 CAPSULE | Refills: 3 | Status: SHIPPED | OUTPATIENT
Start: 2022-05-05 | End: 2022-08-07

## 2022-05-05 NOTE — PATIENT INSTRUCTIONS
Discussed diet , achieving and maintaining ideal body weight, and exercise.   We reviewed meds in detail.  Reassured-Discussed goals, options, plan.  Goal BP< 135-140/85-90 at advanced age.  3 times  per week take a second Furosimide  Divide the Metoprolol to half am and half pm  We have discussed the need for endocarditis prophylaxis with Amoxicillin 500 mg 4 pilld 1 hour before dental.

## 2022-05-10 ENCOUNTER — OFFICE VISIT (OUTPATIENT)
Dept: PODIATRY | Facility: CLINIC | Age: 87
End: 2022-05-10
Payer: MEDICARE

## 2022-05-10 VITALS — DIASTOLIC BLOOD PRESSURE: 64 MMHG | BODY MASS INDEX: 26.11 KG/M2 | WEIGHT: 182 LBS | SYSTOLIC BLOOD PRESSURE: 130 MMHG

## 2022-05-10 DIAGNOSIS — I87.2 EDEMA OF BOTH LOWER LEGS DUE TO PERIPHERAL VENOUS INSUFFICIENCY: ICD-10-CM

## 2022-05-10 DIAGNOSIS — R60.0 EDEMA OF BOTH LOWER LEGS DUE TO PERIPHERAL VENOUS INSUFFICIENCY: ICD-10-CM

## 2022-05-10 DIAGNOSIS — L60.0 ONYCHOCRYPTOSIS: ICD-10-CM

## 2022-05-10 DIAGNOSIS — B35.1 ONYCHOMYCOSIS: Primary | ICD-10-CM

## 2022-05-10 PROCEDURE — 99999 PR PBB SHADOW E&M-EST. PATIENT-LVL IV: ICD-10-PCS | Mod: PBBFAC,,, | Performed by: PODIATRIST

## 2022-05-10 PROCEDURE — 17999 UNLISTD PX SKN MUC MEMB SUBQ: CPT | Mod: CSM,S$GLB,, | Performed by: PODIATRIST

## 2022-05-10 PROCEDURE — 99499 NO LOS: ICD-10-PCS | Mod: ,,, | Performed by: PODIATRIST

## 2022-05-10 PROCEDURE — 17999 PR NON-COVERED FOOT CARE: ICD-10-PCS | Mod: CSM,S$GLB,, | Performed by: PODIATRIST

## 2022-05-10 PROCEDURE — 99214 OFFICE O/P EST MOD 30 MIN: CPT | Mod: PBBFAC,PN | Performed by: PODIATRIST

## 2022-05-10 PROCEDURE — 99499 UNLISTED E&M SERVICE: CPT | Mod: ,,, | Performed by: PODIATRIST

## 2022-05-10 PROCEDURE — 99999 PR PBB SHADOW E&M-EST. PATIENT-LVL IV: CPT | Mod: PBBFAC,,, | Performed by: PODIATRIST

## 2022-05-10 NOTE — PROGRESS NOTES
Subjective:      Patient ID: Misha Marte Jr. is a 89 y.o. male.    Chief Complaint: No chief complaint on file.    Misha is a 89 y.o. male who presents to the clinic, accompanied by wife, for nail care. Also, swelling in legs/feet so in one pair of tennis shoes only. Comes in with sister in law, Charo Ortez, who has an appointment here as well. Wife wants him to come in regularly for nail care despite the fact that he does not qualify for covered foot care. Is aware that it is a non-covered service Proc  B .    Patient Active Problem List   Diagnosis    Hypertension    Multinodular goiter    Insomnia    Aortic stenosis    Vitamin D deficiency    Carotid artery disease    Sensation of fullness in both ears    Nonexudative age-related macular degeneration, bilateral, advanced atrophic with subfoveal involvement    NS (nuclear sclerosis)    Overweight (BMI 25.0-29.9)    Carpal tunnel syndrome of left wrist    Gait abnormality    Age related osteoporosis    Other hyperlipidemia    Pain and swelling of lower leg, right    Left trigger finger    AK (actinic keratosis)    Edema of both lower legs due to peripheral venous insufficiency    CHF (congestive heart failure)    Iron deficiency anemia    LOUIE (acute kidney injury)    Chronic diastolic congestive heart failure    Nonrheumatic mitral valve stenosis    S/P TAVR (transcatheter aortic valve replacement)    Incomplete RBBB    Aortic atherosclerosis   TAVR successful.    PCP: Moncho Ramires MD  DOLV:  2022     Objective:      Physical Exam  Vitals reviewed.   Constitutional:       Appearance: He is well-developed and normal weight.   Cardiovascular:      Pulses:           Dorsalis pedis pulses are 1+ on the right side and 1+ on the left side.      Comments: Venous insufficiency edema right greater than left  Musculoskeletal:         General: No swelling, tenderness or signs of injury.      Right lower le+ Pitting Edema present.       Left lower le+ Pitting Edema present.   Feet:      Right foot:      Skin integrity: Skin integrity normal.      Toenail Condition: Right toenails are long and ingrown. Fungal disease present.     Left foot:      Skin integrity: Skin integrity normal.      Toenail Condition: Left toenails are long and ingrown. Fungal disease present.     Comments: All nails significantly hypertrophic w/ cryptosis, symptomatic distal B/L hallux nail borders L>R as well as thickening & dystrophic appearance. No acute impingement nor infection noted.  Skin:     General: Skin is warm and dry.      Capillary Refill: Capillary refill takes 2 to 3 seconds.      Findings: No bruising, erythema, lesion or rash.   Neurological:      Mental Status: He is alert and oriented to person, place, and time.      Sensory: No sensory deficit.      Gait: Gait abnormal (Has a cane today as well as a wheelchair (was using walker)).   Psychiatric:         Mood and Affect: Mood and affect normal.         Behavior: Behavior normal. Behavior is cooperative.         Assessment:      Encounter Diagnoses   Name Primary?    Onychomycosis Yes    Onychocryptosis     Edema of both lower legs due to peripheral venous insufficiency        Problem List Items Addressed This Visit        Cardiac/Vascular    Edema of both lower legs due to peripheral venous insufficiency    Overview     Right> left           Relevant Orders    SURGITUBE FOR HOME USE      Other Visit Diagnoses     Onychomycosis    -  Primary    Onychocryptosis            Plan:       Diagnoses and all orders for this visit:    Onychomycosis    Onychocryptosis    Edema of both lower legs due to peripheral venous insufficiency  -     SURGITUBE FOR HOME USE    I counseled the patient on his conditions, their implications and medical management.    Utilizing sterile toenail nippers, I aggressively debrided & reduced all the nails x10 to their soft tissue attachment mechanically. I then also cut back  the offending nail borders approximately 3 mm from its edge and carried the nail plate incision down at an angle in order to wedge out the  cryptotic portion of the nail plate in toto. The area was cleansed with alcohol. Patient tolerated the procedure well and related significant relief.    Nail & callus care is a non-covered service as patient does not have the required diagnoses to allow for 'routine care' - as such, visit would be a Proc B (out of pocket expense of $42).   Medicare carriers only cover this service in the presence of severe vascular or sensory disease. Note that this is not limited to persons with diabetes.  Correspondingly, although not an exclusive or comprehensive list, the following medical conditions are not, in and of themselves (i.e. in the absence of significant vascular or neurologic sequellae), considered to be of sufficient severity to make the patient at risk for nonprofessional care:   Diabetes without evidence of severe vascular or neurologic disease   End-stage renal disease   Kidney dialysis   History of organ transplantation, on immunosuppression   Hemorrhagic/bleeding conditions, including hemophilia   Use of blood thinners/anticoagulants (warfarin, Coumadin)   History of artificial joints, heart valves, or blood vessels   History of valvular heart disease, even if you have been advised to take antibiotics around the time of dental work   Cancer   Chemotherapy for cancer, or other health condition   HIV/Aids   Legal blindness   Inability to see and/or reach your own feet   Living alone   Mental retardation   History of stroke, spinal cord injury, or brain injury   Parkinson's Disease, or any medical condition associated with tremor of the hands or feet  Routine foot care is only a covered service in those patients with severe circulatory or sensory problems. For all other stated or unstated conditions, such care is not covered under Medicare. As such, patients are expected to  perform the service themselves, or have the care provided by a family member or friend, or pay directly to have such care provided by a professional, such as a podiatrist. Payment for such care is a patient responsibility (i.e. cash). In addition, as a non-covered service, Medicare's fee schedule does not apply.     Compression utilizing Tubigrip stockinettes applied bilaterally and also dispensed to patient including size E for the left lower extremity and size F for the R LE    Patient's wife would like to schedule back in about 3 or 4 months as above.

## 2022-05-12 ENCOUNTER — DOCUMENT SCAN (OUTPATIENT)
Dept: HOME HEALTH SERVICES | Facility: HOSPITAL | Age: 87
End: 2022-05-12
Payer: MEDICARE

## 2022-05-14 ENCOUNTER — PATIENT MESSAGE (OUTPATIENT)
Dept: CARDIOLOGY | Facility: CLINIC | Age: 87
End: 2022-05-14
Payer: MEDICARE

## 2022-05-17 ENCOUNTER — PATIENT MESSAGE (OUTPATIENT)
Dept: CARDIOLOGY | Facility: CLINIC | Age: 87
End: 2022-05-17
Payer: MEDICARE

## 2022-05-27 ENCOUNTER — PATIENT MESSAGE (OUTPATIENT)
Dept: DERMATOLOGY | Facility: CLINIC | Age: 87
End: 2022-05-27
Payer: MEDICARE

## 2022-06-01 ENCOUNTER — PATIENT MESSAGE (OUTPATIENT)
Dept: INTERNAL MEDICINE | Facility: CLINIC | Age: 87
End: 2022-06-01
Payer: MEDICARE

## 2022-06-04 ENCOUNTER — PATIENT MESSAGE (OUTPATIENT)
Dept: OTOLARYNGOLOGY | Facility: CLINIC | Age: 87
End: 2022-06-04
Payer: MEDICARE

## 2022-06-05 RX ORDER — FUROSEMIDE 20 MG/1
TABLET ORAL
Qty: 120 TABLET | Refills: 3 | Status: SHIPPED | OUTPATIENT
Start: 2022-06-05 | End: 2022-10-25 | Stop reason: ALTCHOICE

## 2022-06-07 ENCOUNTER — OFFICE VISIT (OUTPATIENT)
Dept: OPHTHALMOLOGY | Facility: CLINIC | Age: 87
End: 2022-06-07
Payer: MEDICARE

## 2022-06-07 DIAGNOSIS — H35.3134 ADVANCED ATROPHIC NONEXUDATIVE AGE-RELATED MACULAR DEGENERATION OF BOTH EYES WITH SUBFOVEAL INVOLVEMENT: Primary | ICD-10-CM

## 2022-06-07 DIAGNOSIS — H25.13 AGE-RELATED NUCLEAR CATARACT OF BOTH EYES: ICD-10-CM

## 2022-06-07 DIAGNOSIS — H43.813 VITREOUS DEGENERATION OF BOTH EYES: ICD-10-CM

## 2022-06-07 PROBLEM — Z96.1 PSEUDOPHAKIA OF BOTH EYES: Status: ACTIVE | Noted: 2022-06-07

## 2022-06-07 PROCEDURE — 99213 OFFICE O/P EST LOW 20 MIN: CPT | Mod: PBBFAC | Performed by: OPHTHALMOLOGY

## 2022-06-07 PROCEDURE — 92134 OCT, RETINA - OU - BOTH EYES: ICD-10-PCS | Mod: 26,S$PBB,, | Performed by: OPHTHALMOLOGY

## 2022-06-07 PROCEDURE — 99999 PR PBB SHADOW E&M-EST. PATIENT-LVL III: ICD-10-PCS | Mod: PBBFAC,,, | Performed by: OPHTHALMOLOGY

## 2022-06-07 PROCEDURE — 99999 PR PBB SHADOW E&M-EST. PATIENT-LVL III: CPT | Mod: PBBFAC,,, | Performed by: OPHTHALMOLOGY

## 2022-06-07 PROCEDURE — 92004 COMPRE OPH EXAM NEW PT 1/>: CPT | Mod: S$PBB,,, | Performed by: OPHTHALMOLOGY

## 2022-06-07 PROCEDURE — 92134 CPTRZ OPH DX IMG PST SGM RTA: CPT | Mod: PBBFAC | Performed by: OPHTHALMOLOGY

## 2022-06-07 PROCEDURE — 92004 PR EYE EXAM, NEW PATIENT,COMPREHESV: ICD-10-PCS | Mod: S$PBB,,, | Performed by: OPHTHALMOLOGY

## 2022-06-07 RX ORDER — FUROSEMIDE 20 MG/1
TABLET ORAL
COMMUNITY
Start: 2021-12-17 | End: 2022-10-25 | Stop reason: ALTCHOICE

## 2022-06-07 RX ORDER — FERROUS SULFATE 325(65) MG
TABLET ORAL
COMMUNITY
Start: 2021-12-17 | End: 2022-12-07

## 2022-06-07 RX ORDER — ASCORBIC ACID 250 MG
TABLET,CHEWABLE ORAL
COMMUNITY
Start: 2021-12-17

## 2022-06-07 NOTE — PROGRESS NOTES
HPI     NP OCT/DFE    Pt here establishing care. Pt has history of AMD with Dr. Bower. Pt has   not has treatment for AMD. Pt has no seen a specialist since before   pandemic. Pt concerned about possible cataract progression. Pt states he   has cloudy va OU. Pt has regular use of Amsler grid with no wavy lines.   Dry OU    No flashes  No floaters  No pain  Gtts: Refresh PRN OU          Eye Vitamins    Last edited by Shelbi Parra on 6/7/2022  2:21 PM. (History)         A/P    ICD-10-CM ICD-9-CM   1. 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 Vitreous degeneration of both eyes  H43.813 379.21   3. Age-related nuclear cataract of both eyes  H25.13 366.16       1. Advanced atrophic nonexudative age-related macular degeneration of both eyes with subfoveal involvement  Eval for AMD changes OU  Dry changes, severe atrophy OU focal area spared OD  Plan: Observation  Amsler precautions  Recommend Antioxidants, lutein, omega 3, brown sunglasses (blue blockers).     2. Vitreous degeneration of both eyes  No RT/RD  Plan: Observation     3. Age-related nuclear cataract of both eyes  Mod NS OU but given degree of macular atrophy, may be limited for visual improvement but could help periphery  Has pseudoexfoliation also  Plan: patient wishes to wait at this time, observe, update Mrx prn    RTC 6 mo DFE/OCTm OU      I saw and examined the patient and reviewed in detail the findings documented. The final examination findings, image interpretations, and plan as documented in the record represent my personal judgment and conclusions.    Fred Cueva MD  Vitreoretinal Surgery   [facility]

## 2022-06-29 NOTE — PROGRESS NOTES
PCP - Ike Ivan MD  Subjective:     Misha Marte Jr. is a 89 y.o. male referred for evaluation of severe AS (NYHA Class 3 sx). Recently had a left heart cath without significant coronary disease. Today he reports doing well since the cath. No chest pain or dyspnea at home. Gets around with a walker without problems. No pain or swelling at radial or femoral puncture sites. He does note some bruising to his right groin but this is improving.      The patient has undergone the following TAVR work-up:   · ECHO (12/15/21): JANESSA= 0.85 cm2, MG= mmHg, Peak Ever= 4.1 m/s, EF= 55 %.   · LHC (Date 2/8/22; Val): - no significant coronary artery disease. 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   · STS: 4.9 %   · Frailty: 2/4   · Iliacs are > 9.31 on R and > 9.81 on L  · LVOT area by CTA is 3.70 cm2 ( 24.4 mm X 21.0 mm) and Avg Diameter is 21.7 mm per Dr Neal  · Incidental findings on CT:   · CT Surgery risk assessment: inoperable, per Dr Dunaway due to age and comorbidities   · Rhythm issues: incomplete RBBB, Afib  · PFTs: not done  · Comorbidities: new onset pAF during last admission; moderate MS         History:     Social History     Tobacco Use    Smoking status: Never Smoker    Smokeless tobacco: Never Used   Substance Use Topics    Alcohol use: Yes     Alcohol/week: 2.0 standard drinks     Types: 2 Glasses of wine per week     Comment: socially     Family History   Problem Relation Age of Onset    Heart disease Mother     Heart attack Mother     Stroke Father     Hypertension Neg Hx     Melanoma Neg Hx        Meds:     Review of patient's allergies indicates:   Allergen Reactions    Cortisone Other (See Comments)     hiccups    Dexamethasone Other (See Comments)     Constant hiccups for days    Codeine Other (See Comments)     Other reaction(s): Nausea       Current Outpatient Medications:     acetaminophen (TYLENOL) 500 MG tablet, Take 500-1,000 mg by mouth daily  [facility] Emergency Department Provider Note                   PCP:                Luisana Kendall MD               Age: 52 y.o. Sex: female       ICD-10-CM    1. Pain of right upper extremity  M79.601        DISPOSITION Decision To Discharge 06/29/2022 07:00:26 AM       New Prescriptions    No medications on file       Orders Placed This Encounter   Procedures    Troponin    CBC    Comprehensive Metabolic Panel    Lipase    Troponin    D-Dimer, Quantitative    Cardiac Monitor    Pulse Oximetry    EKG 12 Lead    Saline lock IV        Frank Churchill, DO 7:00 AM      MDM  Number of Diagnoses or Management Options  Pain of right upper extremity  Diagnosis management comments: 70-year-old female presents to the emergency department with atraumatic right arm pain. Will obtain a broad-based work-up. Patient is concerned for DVT since her son has DVTs. She denies swelling in her arm. Denies history of cancer or clotting disorder. Denies any recent long sedentary time periods. Labs show normal white count, stable H&H, normal electrolytes and kidney function, normal Liver enzymes, normal lipase, normal troponin and normal D-dimer. Patient vies take Tylenol as needed for discomfort. Advised to follow-up with family physician. She voiced understanding and agreement. EKG interpretation shows sinus rhythm, rate 83, , QRS 78, QTc 413, normal axis, there is S1Q3T3 pattern, which is noted on prior EKGs as well, no significant ST elevation.        Amount and/or Complexity of Data Reviewed  Clinical lab tests: ordered and reviewed  Tests in the radiology section of CPT®: reviewed and ordered  Decide to obtain previous medical records or to obtain history from someone other than the patient: yes  Review and summarize past medical records: yes  Independent visualization of images, tracings, or specimens: yes    Risk of Complications, Morbidity, and/or Mortality  Presenting problems: as needed for Pain., Disp: , Rfl:     ammonium lactate (AMLACTIN TOP), Apply topically. Apply topically daily as needed, Disp: , Rfl:     ascorbic acid, vitamin C, (VITAMIN C) 250 MG tablet, Take 1 tablet (250 mg total) by mouth every morning., Disp: 30 tablet, Rfl: 2    aspirin 81 mg Tab, Take 81 mg by mouth once daily. , Disp: , Rfl:     CALCIUM CARBONATE/VITAMIN D3 (CALCIUM 600 + D,3, ORAL), Take 1,200 mg by mouth once daily. , Disp: , Rfl:     carboxymethylcellulose sodium (REFRESH OPHT), Apply 1 drop to eye daily as needed., Disp: , Rfl:     ceramides 1,3,6-II (CERAVE) Lotn, Apply topically daily as needed, Disp: , Rfl:     cholecalciferol, vitamin D3, (VITAMIN D3 ORAL), Take 1 capsule by mouth once daily., Disp: , Rfl:     coenzyme Q10 10 mg capsule, Take 100 mg by mouth once daily. , Disp: , Rfl:     esomeprazole (NEXIUM) 40 MG capsule, Take 1 capsule (40 mg total) by mouth once daily., Disp: 90 capsule, Rfl: 0    ferrous sulfate (FEOSOL) Tab tablet, Take 1 tablet (1 each total) by mouth every morning. Take on an empty stomach with your vitamin C., Disp: 90 tablet, Rfl: 3    furosemide (LASIX) 20 MG tablet, Take 1 tablet (20 mg total) by mouth once daily., Disp: 90 tablet, Rfl: 3    losartan (COZAAR) 25 MG tablet, TAKE 1 TABLET EVERY DAY, Disp: 90 tablet, Rfl: 3    metoprolol succinate (TOPROL-XL) 25 MG 24 hr tablet, Take 1 tablet (25 mg total) by mouth once daily., Disp: 90 tablet, Rfl: 3    multivitamin-minerals-lutein Tab, Take 1 tablet by mouth once daily. , Disp: , Rfl:     polyethylene glycol (GLYCOLAX) 17 gram PwPk, Take 17 g by mouth 2 (two) times daily as needed (Constipation)., Disp: , Rfl: 0    pravastatin (PRAVACHOL) 40 MG tablet, TAKE 1 TABLET EVERY DAY, Disp: 90 tablet, Rfl: 3    turmeric root extract 500 mg Cap, Take 1 capsule by mouth once daily., Disp: , Rfl:     VIT A,C & E/LUTEIN/MINERALS (OCUVITE WITH LUTEIN ORAL), Take 1 capsule by mouth once daily., Disp: , Rfl:     10  moderate  Diagnostic procedures: moderate  Management options: diya         Jihan Ken is a 52 y.o. female who presents to the Emergency Department with chief complaint of  No chief complaint on file. 51-year-old female presents to the emergency department complaining of right arm pain that started approximately 24 hours prior to arrival.  Patient states pain began on the medial aspect of her right elbow, denies any overuse. Denies chest pain, back pain or shortness of breath. Denies being on any medications daily. Denies taking medication for this. No trauma. No other pain or swelling. Reports normal strength and sensation throughout the arm. Patient states she is concerned because her son has blood clots and that is what she is concerned about. Review of Systems   Constitutional: Negative for chills and fever. HENT: Negative for sore throat. Eyes: Negative for photophobia. Respiratory: Negative for cough and shortness of breath. Cardiovascular: Negative for chest pain. Gastrointestinal: Negative for diarrhea, nausea and vomiting. Genitourinary: Negative for dysuria. Musculoskeletal: Positive for arthralgias. Negative for neck pain and neck stiffness. Skin: Negative for rash. Neurological: Negative for syncope and headaches. Psychiatric/Behavioral: Negative for confusion. All other systems reviewed and are negative.       Past Medical History:   Diagnosis Date    GERD (gastroesophageal reflux disease) 4/28/2020    Ill-defined condition     hx migraines    Migraine without aura 1/22/2021    OA (osteoarthritis) 4/28/2020    JAIMEE (obstructive sleep apnea) 4/28/2020    TIA (transient ischemic attack) 1/22/2021        Past Surgical History:   Procedure Laterality Date    TUBAL LIGATION          Family History   Problem Relation Age of Onset    Breast Cancer Cousin 40           Social Connections:     Frequency of Communication with Friends and Family: Not on "point ROS performed and negative except as stated in HPI     Objective:   BP (!) 150/73 (BP Location: Left arm, Patient Position: Sitting, BP Method: Large (Automatic))   Pulse 70   Ht 5' 8" (1.727 m)   Wt 81.1 kg (178 lb 12.7 oz)   SpO2 99%   BMI 27.19 kg/m²     Physical Exam:   Constitutional: no acute distress  HEENT: NCAT, EOMI, no scleral icterus  Cardiovascular: Regular rate and rhythm, 3/6 crescendo-decrescendo murmur at RUSB. 2+ carotid, radial, femoral pulses bilaterally. Bruising to right groin which tracks into left scrotum but no swelling or tenderness, strong femoral pulse     Pulmonary: Clear to auscultation bilaterally   Abdomen: nontender, non-distended   Neuro: alert and oriented, no focal deficits  Extremities: warm, no edema   MSK: no deformities  Integument: intact, no rashes     Labs:     Lab Results   Component Value Date     (L) 02/08/2022    K 4.0 02/08/2022     02/08/2022    CO2 25 02/08/2022    BUN 14 02/08/2022    CREATININE 1.2 02/08/2022    ANIONGAP 6 (L) 02/08/2022     Lab Results   Component Value Date    HGBA1C 5.2 12/15/2021     Lab Results   Component Value Date     (H) 01/12/2022     (H) 12/27/2021     (H) 12/15/2021       Lab Results   Component Value Date    WBC 5.57 02/08/2022    HGB 12.1 (L) 02/08/2022    HCT 37.2 (L) 02/08/2022     02/08/2022    GRAN 4.6 02/08/2022    GRAN 82.7 (H) 02/08/2022     Lab Results   Component Value Date    CHOL 144 11/18/2020    HDL 59 11/18/2020    LDLCALC 69 11/18/2020    TRIG 78 11/18/2020       Lab Results   Component Value Date     (L) 02/08/2022    K 4.0 02/08/2022     02/08/2022    CO2 25 02/08/2022    BUN 14 02/08/2022    CREATININE 1.2 02/08/2022    ANIONGAP 6 (L) 02/08/2022     Lab Results   Component Value Date    HGBA1C 5.2 12/15/2021     Lab Results   Component Value Date     (H) 01/12/2022     (H) 12/27/2021     (H) 12/15/2021    Lab Results   Component Value " file    Frequency of Social Gatherings with Friends and Family: Not on file    Attends Buddhism Services: Not on file    Active Member of Clubs or Organizations: Not on file    Attends Club or Organization Meetings: Not on file    Marital Status: Not on file        No Known Allergies     Vitals signs and nursing note reviewed. Patient Vitals for the past 4 hrs:   Temp Pulse Resp BP SpO2   06/29/22 0628 -- 72 18 121/84 95 %   06/29/22 0613 -- 68 17 128/85 94 %   06/29/22 0558 -- 61 15 (!) 121/91 95 %   06/29/22 0543 -- 67 15 122/84 95 %   06/29/22 0528 -- 68 17 129/87 94 %   06/29/22 0458 -- 74 17 128/82 96 %   06/29/22 0443 -- 74 18 131/85 96 %   06/29/22 0345 -- 80 17 (!) 151/87 97 %   06/29/22 0318 98.5 °F (36.9 °C) 89 16 (!) 159/96 99 %          Physical Exam  Vitals and nursing note reviewed. Constitutional:       General: She is not in acute distress. Appearance: Normal appearance. HENT:      Head: Normocephalic. Nose: Nose normal.      Mouth/Throat:      Mouth: Mucous membranes are moist.   Eyes:      Extraocular Movements: Extraocular movements intact. Cardiovascular:      Rate and Rhythm: Normal rate and regular rhythm. Pulses: Normal pulses. Heart sounds: Normal heart sounds. Pulmonary:      Effort: Pulmonary effort is normal. No respiratory distress. Breath sounds: Normal breath sounds. Abdominal:      General: Abdomen is flat. Palpations: Abdomen is soft. Tenderness: There is no abdominal tenderness. Musculoskeletal:         General: No swelling or tenderness. Normal range of motion. Cervical back: Normal range of motion. No rigidity. Comments: Right upper extremity: Tenderness palpation over the medial aspect of the right AC, does not radiate, no induration or fluctuance, no swelling to the right upper extremity, neurovascularly intact distally, full range of motion of the wrist, elbow and shoulder without difficulty.    Skin:     General: Date    WBC 5.57 02/08/2022    HGB 12.1 (L) 02/08/2022    HCT 37.2 (L) 02/08/2022     02/08/2022    GRAN 4.6 02/08/2022    GRAN 82.7 (H) 02/08/2022     Lab Results   Component Value Date    CHOL 144 11/18/2020    HDL 59 11/18/2020    LDLCALC 69 11/18/2020    TRIG 78 11/18/2020            Assessment     1. Nonrheumatic aortic valve stenosis    2. Chronic systolic congestive heart failure    3. Acute on chronic diastolic congestive heart failure    4. Primary hypertension        Plan:   Misha Marte Jr. is a 29 mm (maybe 26)  Evolut valve candidate via RTF access. No obstructive disease on recent left heart cath. Plan for TAVR next Tuesday 2/15. Consents signed and scanned into chart.      Transcatheter Aortic valve replacement   1. Valve: 29 or 26 mm Evolut  2. TAVR access: RTF  3. Valvuloplasty balloon: 20 mm  4. Viabahn size if needed: 10x5  5. Antithrombotic therapy: ASA alone  6. Pacemaker risk factors: incomplete RBBB       Jamil Vo MD  [facility]  Cardiovascular Disease, PGY-IV     Skin is warm. Findings: No rash. Neurological:      General: No focal deficit present. Mental Status: She is alert and oriented to person, place, and time. Psychiatric:         Mood and Affect: Mood normal.          Procedures      Labs Reviewed   CBC - Abnormal; Notable for the following components:       Result Value    RBC 5.22 (*)     Hemoglobin 11.6 (*)     MCV 73.9 (*)     MCH 22.2 (*)     MCHC 30.1 (*)     RDW 16.4 (*)     All other components within normal limits   COMPREHENSIVE METABOLIC PANEL - Abnormal; Notable for the following components:    Globulin 4.6 (*)     Albumin/Globulin Ratio 0.8 (*)     All other components within normal limits   LIPASE   TROPONIN   D-DIMER, QUANTITATIVE   TROPONIN        No orders to display                          Voice dictation software was used during the making of this note. This software is not perfect and grammatical and other typographical errors may be present. This note has not been completely proofread for errors.         Rosa Elena Humphrey DO  06/29/22 3330

## 2022-07-04 ENCOUNTER — PATIENT MESSAGE (OUTPATIENT)
Dept: CARDIOLOGY | Facility: CLINIC | Age: 87
End: 2022-07-04
Payer: MEDICARE

## 2022-07-05 DIAGNOSIS — I35.0 NONRHEUMATIC AORTIC VALVE STENOSIS: Chronic | ICD-10-CM

## 2022-07-05 DIAGNOSIS — R00.0 TACHYCARDIA: ICD-10-CM

## 2022-07-05 DIAGNOSIS — I10 HYPERTENSION, UNSPECIFIED TYPE: ICD-10-CM

## 2022-07-05 RX ORDER — METOPROLOL SUCCINATE 25 MG/1
25 TABLET, EXTENDED RELEASE ORAL DAILY
Qty: 90 TABLET | Refills: 3 | Status: SHIPPED | OUTPATIENT
Start: 2022-07-05 | End: 2023-01-26 | Stop reason: SDUPTHER

## 2022-08-05 ENCOUNTER — PATIENT MESSAGE (OUTPATIENT)
Dept: INTERNAL MEDICINE | Facility: CLINIC | Age: 87
End: 2022-08-05

## 2022-08-05 ENCOUNTER — OFFICE VISIT (OUTPATIENT)
Dept: INTERNAL MEDICINE | Facility: CLINIC | Age: 87
End: 2022-08-05
Payer: MEDICARE

## 2022-08-05 VITALS
BODY MASS INDEX: 26.51 KG/M2 | WEIGHT: 185.19 LBS | OXYGEN SATURATION: 98 % | HEART RATE: 60 BPM | DIASTOLIC BLOOD PRESSURE: 68 MMHG | SYSTOLIC BLOOD PRESSURE: 128 MMHG | HEIGHT: 70 IN

## 2022-08-05 DIAGNOSIS — E78.5 HYPERLIPIDEMIA, UNSPECIFIED HYPERLIPIDEMIA TYPE: ICD-10-CM

## 2022-08-05 DIAGNOSIS — Z95.2 S/P TAVR (TRANSCATHETER AORTIC VALVE REPLACEMENT): Primary | ICD-10-CM

## 2022-08-05 DIAGNOSIS — I10 ESSENTIAL HYPERTENSION: ICD-10-CM

## 2022-08-05 DIAGNOSIS — I50.9 CONGESTIVE HEART FAILURE, UNSPECIFIED HF CHRONICITY, UNSPECIFIED HEART FAILURE TYPE: ICD-10-CM

## 2022-08-05 PROCEDURE — 99213 OFFICE O/P EST LOW 20 MIN: CPT | Mod: S$PBB,,, | Performed by: INTERNAL MEDICINE

## 2022-08-05 PROCEDURE — 99215 OFFICE O/P EST HI 40 MIN: CPT | Mod: PBBFAC | Performed by: INTERNAL MEDICINE

## 2022-08-05 PROCEDURE — 99999 PR PBB SHADOW E&M-EST. PATIENT-LVL V: ICD-10-PCS | Mod: PBBFAC,,, | Performed by: INTERNAL MEDICINE

## 2022-08-05 PROCEDURE — 99999 PR PBB SHADOW E&M-EST. PATIENT-LVL V: CPT | Mod: PBBFAC,,, | Performed by: INTERNAL MEDICINE

## 2022-08-05 PROCEDURE — 99213 PR OFFICE/OUTPT VISIT, EST, LEVL III, 20-29 MIN: ICD-10-PCS | Mod: S$PBB,,, | Performed by: INTERNAL MEDICINE

## 2022-08-06 NOTE — PROGRESS NOTES
CC:  Follow-up    HPI:  The patient is a 89-year-old male with aortic stenosis status post TAVR, chronic diastolic heart failure, hypertension, CKD stage 3, vitamin-D deficiency, iron deficiency who presents today for follow-up.  The patient reports doing well status post TAVR.  The patient has seen Cardiology recently.    ROS:  Patient reports weight is staying stable.  He is feeling better overall.  He did start exercising using dumbbells for his arms.  He is also exercising his legs.  No chest pain.  No shortness of breath.  He does use a walker to walk.    Physical exam:  General appearance:  No acute distress  HEENT:  Trachea is midline without JVD  Pulmonary:  Good inspiratory, expiratory breath sounds are heard.  Lungs are clear to auscultation.  Cardiovascular:  S1-S2, extremities with trace to 1+ ankle edema.  GI:  Abdomen was nontender, nondistended without hepatosplenomegaly    Assessment:  1. Status post TAVR  2. Chronic diastolic heart failure  3. Hypertension    Plan:  1. No change in therapy at this time  2. The patient to follow-up in 4 months for re-evaluation.

## 2022-08-10 ENCOUNTER — PATIENT MESSAGE (OUTPATIENT)
Dept: PODIATRY | Facility: CLINIC | Age: 87
End: 2022-08-10
Payer: MEDICARE

## 2022-08-16 ENCOUNTER — OFFICE VISIT (OUTPATIENT)
Dept: OTOLARYNGOLOGY | Facility: CLINIC | Age: 87
End: 2022-08-16
Payer: MEDICARE

## 2022-08-16 VITALS — BODY MASS INDEX: 26.54 KG/M2 | WEIGHT: 185 LBS

## 2022-08-16 DIAGNOSIS — H61.23 IMPACTED CERUMEN, BILATERAL: Primary | ICD-10-CM

## 2022-08-16 PROCEDURE — 99999 PR PBB SHADOW E&M-EST. PATIENT-LVL III: CPT | Mod: PBBFAC,,, | Performed by: OTOLARYNGOLOGY

## 2022-08-16 PROCEDURE — 69210 REMOVE IMPACTED EAR WAX UNI: CPT | Mod: PBBFAC | Performed by: OTOLARYNGOLOGY

## 2022-08-16 PROCEDURE — 99499 UNLISTED E&M SERVICE: CPT | Mod: S$PBB,,, | Performed by: OTOLARYNGOLOGY

## 2022-08-16 PROCEDURE — 99999 PR PBB SHADOW E&M-EST. PATIENT-LVL III: ICD-10-PCS | Mod: PBBFAC,,, | Performed by: OTOLARYNGOLOGY

## 2022-08-16 PROCEDURE — 99499 NO LOS: ICD-10-PCS | Mod: S$PBB,,, | Performed by: OTOLARYNGOLOGY

## 2022-08-16 PROCEDURE — 99213 OFFICE O/P EST LOW 20 MIN: CPT | Mod: PBBFAC | Performed by: OTOLARYNGOLOGY

## 2022-08-16 PROCEDURE — 69210 EAR CERUMEN REMOVAL: ICD-10-PCS | Mod: S$PBB,,, | Performed by: OTOLARYNGOLOGY

## 2022-08-16 NOTE — PROCEDURES
Ear Cerumen Removal    Date/Time: 8/16/2022 2:45 PM  Performed by: Harley Singleton MD  Authorized by: Harley Singleton MD     Consent Done?:  Yes (Verbal)  Location details:  Both ears  Procedure type: curette    Cerumen  Removal Results:  Cerumen completely removed  Patient tolerance:  Patient tolerated the procedure well with no immediate complications     Binocular microscopy used to examine ear canal and tympanic membrane.  There was a cerumen impaction on the right side and an impaction of cerumen on the left.  This was removed using a curette and other microinstrumentation.

## 2022-08-23 ENCOUNTER — PATIENT MESSAGE (OUTPATIENT)
Dept: CARDIOLOGY | Facility: CLINIC | Age: 87
End: 2022-08-23
Payer: MEDICARE

## 2022-08-30 ENCOUNTER — OFFICE VISIT (OUTPATIENT)
Dept: PODIATRY | Facility: CLINIC | Age: 87
End: 2022-08-30
Payer: MEDICARE

## 2022-08-30 ENCOUNTER — PATIENT MESSAGE (OUTPATIENT)
Dept: CARDIOLOGY | Facility: CLINIC | Age: 87
End: 2022-08-30
Payer: MEDICARE

## 2022-08-30 VITALS
DIASTOLIC BLOOD PRESSURE: 59 MMHG | SYSTOLIC BLOOD PRESSURE: 135 MMHG | WEIGHT: 185 LBS | BODY MASS INDEX: 26.54 KG/M2 | HEART RATE: 68 BPM

## 2022-08-30 DIAGNOSIS — Q84.5 ENLARGED AND HYPERTROPHIC NAILS: ICD-10-CM

## 2022-08-30 DIAGNOSIS — L60.0 ONYCHOCRYPTOSIS: ICD-10-CM

## 2022-08-30 DIAGNOSIS — I50.32 CHRONIC DIASTOLIC CONGESTIVE HEART FAILURE: Primary | ICD-10-CM

## 2022-08-30 DIAGNOSIS — B35.1 ONYCHOMYCOSIS: Primary | ICD-10-CM

## 2022-08-30 DIAGNOSIS — R60.0 EDEMA OF BOTH LOWER LEGS DUE TO PERIPHERAL VENOUS INSUFFICIENCY: ICD-10-CM

## 2022-08-30 DIAGNOSIS — I87.2 EDEMA OF BOTH LOWER LEGS DUE TO PERIPHERAL VENOUS INSUFFICIENCY: ICD-10-CM

## 2022-08-30 DIAGNOSIS — Z95.2 S/P TAVR (TRANSCATHETER AORTIC VALVE REPLACEMENT): ICD-10-CM

## 2022-08-30 PROCEDURE — 99499 NO LOS: ICD-10-PCS | Mod: ,,, | Performed by: PODIATRIST

## 2022-08-30 PROCEDURE — 99999 PR PBB SHADOW E&M-EST. PATIENT-LVL III: ICD-10-PCS | Mod: PBBFAC,,, | Performed by: PODIATRIST

## 2022-08-30 PROCEDURE — 17999 UNLISTD PX SKN MUC MEMB SUBQ: CPT | Mod: CSM,S$GLB,, | Performed by: PODIATRIST

## 2022-08-30 PROCEDURE — 17999 PR NON-COVERED FOOT CARE: ICD-10-PCS | Mod: CSM,S$GLB,, | Performed by: PODIATRIST

## 2022-08-30 PROCEDURE — 99213 OFFICE O/P EST LOW 20 MIN: CPT | Mod: PBBFAC,PN | Performed by: PODIATRIST

## 2022-08-30 PROCEDURE — 99499 UNLISTED E&M SERVICE: CPT | Mod: ,,, | Performed by: PODIATRIST

## 2022-08-30 PROCEDURE — 99999 PR PBB SHADOW E&M-EST. PATIENT-LVL III: CPT | Mod: PBBFAC,,, | Performed by: PODIATRIST

## 2022-08-30 RX ORDER — AMOXICILLIN 500 MG/1
2000 CAPSULE ORAL
Qty: 12 CAPSULE | Refills: 3 | Status: ON HOLD | OUTPATIENT
Start: 2022-08-30 | End: 2023-03-14 | Stop reason: HOSPADM

## 2022-08-30 NOTE — PROGRESS NOTES
Subjective:      Patient ID: Misha Marte Jr. is a 89 y.o. male.    Chief Complaint: Nail Care (Dr Ivan 22)    Misha is a 89 y.o. male who presents to the clinic, accompanied by wife & niece Heather, for nail care. Also, comes in with sister in law, Charo Ortez, who has an appointment here as well. Wife wants him to come in regularly for nail care but does not qualify for routine foot care. Is aware that it is a non-covered service Proc B $42.    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    CHF (congestive heart failure)    Iron deficiency anemia    LOUIE (acute kidney injury)    Chronic diastolic congestive heart failure    Nonrheumatic mitral valve stenosis    S/P TAVR (transcatheter aortic valve replacement)    Incomplete RBBB    Aortic atherosclerosis    Pseudophakia of both eyes    Vitreous degeneration of both eyes    Age-related nuclear cataract of both eyes   TAVR successful.    PCP: Moncho Ramires MD  DOLV:       Objective:      Physical Exam  Vitals reviewed.   Constitutional:       Appearance: He is well-developed and overweight.   Cardiovascular:      Pulses:           Dorsalis pedis pulses are 1+ on the right side and 1+ on the left side.      Comments: Venous insufficiency edema R>L  Musculoskeletal:         General: No swelling or tenderness.      Right lower le+ Pitting Edema present.      Left lower  le+ Pitting Edema present.   Feet:      Right foot:      Skin integrity: Skin integrity normal.      Toenail Condition: Right toenails are long and ingrown. Fungal disease present.     Left foot:      Skin integrity: Skin integrity normal.      Toenail Condition: Left toenails are long and ingrown. Fungal disease present.     Comments: All nails hypertrophic w/ cryptosis, symptomatic B/L hallux nail borders L>R as well as thickening & dystrophic appearance.  Skin:     General: Skin is warm and dry.      Capillary Refill: Capillary refill takes 2 to 3 seconds.      Findings: No lesion or rash.   Neurological:      Mental Status: He is alert and oriented to person, place, and time.      Sensory: No sensory deficit.      Gait: Gait abnormal (Has a cane today as well as a wheelchair).   Psychiatric:         Mood and Affect: Mood and affect normal.         Behavior: Behavior normal. Behavior is cooperative.       Assessment:      Encounter Diagnoses   Name Primary?    Onychomycosis Yes    Onychocryptosis     Enlarged and hypertrophic nails     Edema of both lower legs due to peripheral venous insufficiency          Problem List Items Addressed This Visit          Cardiac/Vascular    Edema of both lower legs due to peripheral venous insufficiency    Overview     Right> left          Other Visit Diagnoses       Onychomycosis    -  Primary    Onychocryptosis        Enlarged and hypertrophic nails              Plan:       Misha was seen today for nail care.    Diagnoses and all orders for this visit:    Onychomycosis    Onychocryptosis    Enlarged and hypertrophic nails    Edema of both lower legs due to peripheral venous insufficiency    Utilizing sterile toenail nippers, I aggressively debrided & reduced all the nails x10 to their soft tissue attachment mechanically. I then also cut back the offending nail borders approximately 3 mm from its edge and carried the nail plate incision down at an angle in order to wedge  out the cryptotic portion of the nail plate in toto. The area was cleansed with alcohol. Patient tolerated the procedure well and related significant relief    Tubigrip stockinette dispensed for B/L edema - size E for LLE & size F for RLE.    Patient's wife would like to schedule back in about 3 or 4 months, but after the 1st of the year.      Nail & callus care is a non-covered service as patient does not have the required diagnoses to allow for 'routine care' - as such, visit would be a Proc B (out of pocket expense of $42).   Medicare carriers only cover this service in the presence of severe vascular or sensory disease. Note that this is not limited to persons with diabetes.  Correspondingly, although not an exclusive or comprehensive list, the following medical conditions are not, in and of themselves (i.e. in the absence of significant vascular or neurologic sequellae), considered to be of sufficient severity to make the patient at risk for nonprofessional care:   Diabetes without evidence of severe vascular or neurologic disease   End-stage renal disease   Kidney dialysis   History of organ transplantation, on immunosuppression   Hemorrhagic/bleeding conditions, including hemophilia   Use of blood thinners/anticoagulants (warfarin, Coumadin)   History of artificial joints, heart valves, or blood vessels   History of valvular heart disease, even if you have been advised to take antibiotics around the time of dental work   Cancer   Chemotherapy for cancer, or other health condition   HIV/Aids   Legal blindness   Inability to see and/or reach your own feet   Living alone   Mental retardation   History of stroke, spinal cord injury, or brain injury   Parkinson's Disease, or any medical condition associated with tremor of the hands or feet  Routine foot care is only a covered service in those patients with severe circulatory or sensory problems. For all other stated or unstated conditions, such care is not covered  under Medicare. As such, patients are expected to perform the service themselves, or have the care provided by a family member or friend, or pay directly to have such care provided by a professional, such as a podiatrist. Payment for such care is a patient responsibility (i.e. cash). In addition, as a non-covered service, Medicare's fee schedule does not apply.

## 2022-09-20 ENCOUNTER — OFFICE VISIT (OUTPATIENT)
Dept: DERMATOLOGY | Facility: CLINIC | Age: 87
End: 2022-09-20
Payer: MEDICARE

## 2022-09-20 DIAGNOSIS — L57.0 AK (ACTINIC KERATOSIS): Primary | ICD-10-CM

## 2022-09-20 DIAGNOSIS — L73.8 SEBACEOUS GLAND HYPERPLASIA: ICD-10-CM

## 2022-09-20 DIAGNOSIS — L70.0 OPEN COMEDONE: ICD-10-CM

## 2022-09-20 PROCEDURE — 17003 DESTRUCT PREMALG LES 2-14: CPT | Mod: PBBFAC | Performed by: DERMATOLOGY

## 2022-09-20 PROCEDURE — 99999 PR PBB SHADOW E&M-EST. PATIENT-LVL II: ICD-10-PCS | Mod: PBBFAC,,, | Performed by: DERMATOLOGY

## 2022-09-20 PROCEDURE — 99212 OFFICE O/P EST SF 10 MIN: CPT | Mod: PBBFAC | Performed by: DERMATOLOGY

## 2022-09-20 PROCEDURE — 17003 DESTRUCTION, PREMALIGNANT LESIONS; SECOND THROUGH 14 LESIONS: ICD-10-PCS | Mod: S$PBB,,, | Performed by: DERMATOLOGY

## 2022-09-20 PROCEDURE — 99212 OFFICE O/P EST SF 10 MIN: CPT | Mod: 25,S$PBB,, | Performed by: DERMATOLOGY

## 2022-09-20 PROCEDURE — 17000 PR DESTRUCTION(LASER SURGERY,CRYOSURGERY,CHEMOSURGERY),PREMALIGNANT LESIONS,FIRST LESION: ICD-10-PCS | Mod: S$PBB,,, | Performed by: DERMATOLOGY

## 2022-09-20 PROCEDURE — 99212 PR OFFICE/OUTPT VISIT, EST, LEVL II, 10-19 MIN: ICD-10-PCS | Mod: 25,S$PBB,, | Performed by: DERMATOLOGY

## 2022-09-20 PROCEDURE — 99999 PR PBB SHADOW E&M-EST. PATIENT-LVL II: CPT | Mod: PBBFAC,,, | Performed by: DERMATOLOGY

## 2022-09-20 PROCEDURE — 17000 DESTRUCT PREMALG LESION: CPT | Mod: S$PBB,,, | Performed by: DERMATOLOGY

## 2022-09-20 PROCEDURE — 17000 DESTRUCT PREMALG LESION: CPT | Mod: PBBFAC | Performed by: DERMATOLOGY

## 2022-09-20 PROCEDURE — 17003 DESTRUCT PREMALG LES 2-14: CPT | Mod: S$PBB,,, | Performed by: DERMATOLOGY

## 2022-09-20 NOTE — PATIENT INSTRUCTIONS

## 2022-09-20 NOTE — PROGRESS NOTES
Subjective:       Patient ID:  Misha Marte Jr. is a 89 y.o. male who presents for   Chief Complaint   Patient presents with    Skin Check     History of Present Illness: The patient presents for follow up of skin check.    The patient was last seen on: 1/25/2022 for cryosurgery to actinic keratoses which have resolved.     No hx skin cancer.     Wants face, scalp & ears checked          Review of Systems   Skin:  Positive for daily sunscreen use, activity-related sunscreen use and wears hat (all the time). Negative for recent sunburn.   Hematologic/Lymphatic: Bruises/bleeds easily (aspirin).        Baby aspirin daily      Objective:    Physical Exam   Constitutional: He appears well-developed and well-nourished. No distress.   Neurological: He is alert and oriented to person, place, and time. He is not disoriented.   Psychiatric: He has a normal mood and affect.   Skin:   Areas Examined (abnormalities noted in diagram):   Scalp / Hair Palpated and Inspected  Head / Face Inspection Performed  Neck Inspection Performed                 Diagram Legend     Erythematous scaling macule/papule c/w actinic keratosis       Vascular papule c/w angioma      Pigmented verrucoid papule/plaque c/w seborrheic keratosis      Yellow umbilicated papule c/w sebaceous hyperplasia      Irregularly shaped tan macule c/w lentigo     1-2 mm smooth white papules consistent with Milia      Movable subcutaneous cyst with punctum c/w epidermal inclusion cyst      Subcutaneous movable cyst c/w pilar cyst      Firm pink to brown papule c/w dermatofibroma      Pedunculated fleshy papule(s) c/w skin tag(s)      Evenly pigmented macule c/w junctional nevus     Mildly variegated pigmented, slightly irregular-bordered macule c/w mildly atypical nevus      Flesh colored to evenly pigmented papule c/w intradermal nevus       Pink pearly papule/plaque c/w basal cell carcinoma      Erythematous hyperkeratotic cursted plaque c/w SCC      Surgical  scar with no sign of skin cancer recurrence      Open and closed comedones      Inflammatory papules and pustules      Verrucoid papule consistent consistent with wart     Erythematous eczematous patches and plaques     Dystrophic onycholytic nail with subungual debris c/w onychomycosis     Umbilicated papule    Erythematous-base heme-crusted tan verrucoid plaque consistent with inflamed seborrheic keratosis     Erythematous Silvery Scaling Plaque c/w Psoriasis     See annotation      Assessment / Plan:        AK (actinic keratosis)  Cryosurgery Procedure Note    Verbal consent from the patient is obtained including, but not limited to, risk of hypopigmentation/hyperpigmentation, scar, recurrence of lesion. The patient is aware of the precancerous quality and need for treatment of these lesions. Liquid nitrogen cryosurgery is applied to the 7 actinic keratoses, as detailed in the physical exam, to produce a freeze injury. The patient is aware that blisters may form and is instructed on wound care with gentle cleansing and use of vaseline ointment to keep moist until healed. The patient is supplied a handout on cryosurgery and is instructed to call if lesions do not completely resolve.     Open comedone  Reassurance given to patient. No treatment is necessary.   Treatment of benign, asymptomatic lesions may be considered cosmetic.    Sebaceous gland hyperplasia  This is a common condition representing benign enlargement of the sebaceous lobule. It typically occurs in adulthood. Reassurance given to patient.           Follow up in about 9 months (around 6/20/2023).

## 2022-09-27 ENCOUNTER — INFUSION (OUTPATIENT)
Dept: INFECTIOUS DISEASES | Facility: HOSPITAL | Age: 87
End: 2022-09-27
Attending: INTERNAL MEDICINE
Payer: MEDICARE

## 2022-09-27 VITALS
TEMPERATURE: 99 F | DIASTOLIC BLOOD PRESSURE: 76 MMHG | WEIGHT: 180.44 LBS | BODY MASS INDEX: 25.89 KG/M2 | SYSTOLIC BLOOD PRESSURE: 171 MMHG | RESPIRATION RATE: 18 BRPM | HEART RATE: 60 BPM | OXYGEN SATURATION: 98 %

## 2022-09-27 DIAGNOSIS — M81.0 AGE RELATED OSTEOPOROSIS, UNSPECIFIED PATHOLOGICAL FRACTURE PRESENCE: Primary | ICD-10-CM

## 2022-09-27 PROCEDURE — 96372 THER/PROPH/DIAG INJ SC/IM: CPT

## 2022-09-27 PROCEDURE — 63600175 PHARM REV CODE 636 W HCPCS: Mod: JG | Performed by: INTERNAL MEDICINE

## 2022-09-27 RX ADMIN — DENOSUMAB 60 MG: 60 INJECTION SUBCUTANEOUS at 01:09

## 2022-09-27 NOTE — PROGRESS NOTES
Patient received Prolia 60 mg injection sub Q in the left arm.  Tolerated well and left in NAD, with family at side via w/c.    Patient is taking Vit D and Calcium

## 2022-10-03 DIAGNOSIS — Z71.89 COMPLEX CARE COORDINATION: ICD-10-CM

## 2022-10-07 ENCOUNTER — IMMUNIZATION (OUTPATIENT)
Dept: PRIMARY CARE CLINIC | Facility: CLINIC | Age: 87
End: 2022-10-07
Payer: MEDICARE

## 2022-10-07 DIAGNOSIS — Z23 NEED FOR VACCINATION: Primary | ICD-10-CM

## 2022-10-07 PROCEDURE — 0124A COVID-19, MRNA, LNP-S, BIVALENT BOOSTER, PF, 30 MCG/0.3 ML DOSE: CPT | Mod: PBBFAC,PN

## 2022-10-07 PROCEDURE — 91312 COVID-19, MRNA, LNP-S, BIVALENT BOOSTER, PF, 30 MCG/0.3 ML DOSE: CPT | Mod: PBBFAC,PN

## 2022-10-25 ENCOUNTER — HOSPITAL ENCOUNTER (OUTPATIENT)
Dept: CARDIOLOGY | Facility: CLINIC | Age: 87
Discharge: HOME | End: 2022-10-25
Payer: MEDICARE

## 2022-10-25 ENCOUNTER — OFFICE VISIT (OUTPATIENT)
Dept: CARDIOLOGY | Facility: CLINIC | Age: 87
End: 2022-10-25
Payer: MEDICARE

## 2022-10-25 VITALS
OXYGEN SATURATION: 98 % | DIASTOLIC BLOOD PRESSURE: 92 MMHG | BODY MASS INDEX: 26.67 KG/M2 | SYSTOLIC BLOOD PRESSURE: 140 MMHG | WEIGHT: 186.31 LBS | HEART RATE: 66 BPM | HEIGHT: 70 IN

## 2022-10-25 DIAGNOSIS — I34.2 NONRHEUMATIC MITRAL VALVE STENOSIS: ICD-10-CM

## 2022-10-25 DIAGNOSIS — Z95.2 S/P TAVR (TRANSCATHETER AORTIC VALVE REPLACEMENT): Primary | ICD-10-CM

## 2022-10-25 DIAGNOSIS — E55.9 VITAMIN D DEFICIENCY: ICD-10-CM

## 2022-10-25 DIAGNOSIS — I50.32 CHRONIC DIASTOLIC CONGESTIVE HEART FAILURE: ICD-10-CM

## 2022-10-25 DIAGNOSIS — I10 HYPERTENSION, UNSPECIFIED TYPE: Chronic | ICD-10-CM

## 2022-10-25 DIAGNOSIS — E87.1 HYPONATREMIA: ICD-10-CM

## 2022-10-25 DIAGNOSIS — E66.3 OVERWEIGHT (BMI 25.0-29.9): ICD-10-CM

## 2022-10-25 DIAGNOSIS — I45.10 INCOMPLETE RBBB: ICD-10-CM

## 2022-10-25 DIAGNOSIS — I65.29 STENOSIS OF CAROTID ARTERY, UNSPECIFIED LATERALITY: Chronic | ICD-10-CM

## 2022-10-25 DIAGNOSIS — Z95.2 S/P TAVR (TRANSCATHETER AORTIC VALVE REPLACEMENT): ICD-10-CM

## 2022-10-25 DIAGNOSIS — E78.49 OTHER HYPERLIPIDEMIA: Chronic | ICD-10-CM

## 2022-10-25 DIAGNOSIS — I70.0 AORTIC ATHEROSCLEROSIS: ICD-10-CM

## 2022-10-25 PROCEDURE — 99213 OFFICE O/P EST LOW 20 MIN: CPT | Mod: PBBFAC | Performed by: INTERNAL MEDICINE

## 2022-10-25 PROCEDURE — 93010 ELECTROCARDIOGRAM REPORT: CPT | Mod: S$PBB,,, | Performed by: INTERNAL MEDICINE

## 2022-10-25 PROCEDURE — 99999 PR PBB SHADOW E&M-EST. PATIENT-LVL III: ICD-10-PCS | Mod: PBBFAC,,, | Performed by: INTERNAL MEDICINE

## 2022-10-25 PROCEDURE — 99999 PR PBB SHADOW E&M-EST. PATIENT-LVL III: CPT | Mod: PBBFAC,,, | Performed by: INTERNAL MEDICINE

## 2022-10-25 PROCEDURE — 99215 PR OFFICE/OUTPT VISIT, EST, LEVL V, 40-54 MIN: ICD-10-PCS | Mod: S$PBB,,, | Performed by: INTERNAL MEDICINE

## 2022-10-25 PROCEDURE — 93010 EKG 12-LEAD: ICD-10-PCS | Mod: S$PBB,,, | Performed by: INTERNAL MEDICINE

## 2022-10-25 PROCEDURE — 93005 ELECTROCARDIOGRAM TRACING: CPT | Mod: PBBFAC | Performed by: INTERNAL MEDICINE

## 2022-10-25 PROCEDURE — 99215 OFFICE O/P EST HI 40 MIN: CPT | Mod: S$PBB,,, | Performed by: INTERNAL MEDICINE

## 2022-10-25 RX ORDER — FUROSEMIDE 20 MG/1
20 TABLET ORAL 2 TIMES DAILY PRN
Qty: 180 TABLET | Refills: 3 | Status: SHIPPED | OUTPATIENT
Start: 2022-10-25 | End: 2023-02-01 | Stop reason: SDUPTHER

## 2022-10-25 NOTE — PATIENT INSTRUCTIONS
Discussed diet , achieving and maintaining ideal body weight, and exercise.   We reviewed meds in detail.  Reassured-Discussed goals, options, plan.  Omega-3 > 800 mg/d combined EPA/DHA.  Goal BP< 130/80.  Goal LDL < 100.  We have discussed the need for endocarditis prophylaxis.  Increase sodium intake slightly ; every other day take a second Furosimide

## 2022-10-25 NOTE — PROGRESS NOTES
Subjective:   Patient ID:  Misha Marte Jr. is a 89 y.o. male who presents for follow-up of VHD    HPI:  Patient is here for valvular heart disease.  The patient has no chest pain, SOB, TIA, palpitations, syncope or pre-syncope.Patient does not exercise but active        Review of Systems   Constitutional: Negative for chills, decreased appetite, diaphoresis, fever, malaise/fatigue, night sweats, weight gain and weight loss.   HENT:  Negative for congestion, hoarse voice, nosebleeds, sore throat and tinnitus.    Eyes:  Negative for blurred vision, double vision, vision loss in left eye, vision loss in right eye, visual disturbance and visual halos.   Cardiovascular:  Negative for chest pain, claudication, cyanosis, dyspnea on exertion, irregular heartbeat, leg swelling, near-syncope, orthopnea, palpitations, paroxysmal nocturnal dyspnea and syncope.   Respiratory:  Negative for cough, hemoptysis, shortness of breath, sleep disturbances due to breathing, snoring, sputum production and wheezing.    Endocrine: Negative for cold intolerance, heat intolerance, polydipsia, polyphagia and polyuria.   Hematologic/Lymphatic: Negative for adenopathy and bleeding problem. Does not bruise/bleed easily.   Skin:  Negative for color change, dry skin, flushing, itching, nail changes, poor wound healing, rash, skin cancer, suspicious lesions and unusual hair distribution.   Musculoskeletal:  Negative for arthritis, back pain, falls, gout, joint pain, joint swelling, muscle cramps, muscle weakness, myalgias and stiffness.   Gastrointestinal:  Negative for abdominal pain, anorexia, change in bowel habit, constipation, diarrhea, dysphagia, heartburn, hematemesis, hematochezia, melena and vomiting.   Genitourinary:  Negative for decreased libido, dysuria, hematuria, hesitancy and urgency.   Neurological:  Negative for excessive daytime sleepiness, dizziness, focal weakness, headaches, light-headedness, loss of balance, numbness,  "paresthesias, seizures, sensory change, tremors, vertigo and weakness.   Psychiatric/Behavioral:  Negative for altered mental status, depression, hallucinations, memory loss, substance abuse and suicidal ideas. The patient does not have insomnia and is not nervous/anxious.    Allergic/Immunologic: Negative for environmental allergies and hives.     Objective: BP (!) 140/92 (BP Location: Right arm, Patient Position: Sitting)   Pulse 66   Ht 5' 10" (1.778 m)   Wt 84.5 kg (186 lb 4.6 oz)   SpO2 98%   BMI 26.73 kg/m²      Physical Exam  Constitutional:       General: He is not in acute distress.     Appearance: He is well-developed. He is not diaphoretic.   HENT:      Head: Normocephalic.   Eyes:      Pupils: Pupils are equal, round, and reactive to light.   Neck:      Thyroid: No thyromegaly.   Cardiovascular:      Rate and Rhythm: Normal rate and regular rhythm.      Pulses: Intact distal pulses.           Carotid pulses are 3+ on the right side and 3+ on the left side.       Radial pulses are 3+ on the right side and 3+ on the left side.        Femoral pulses are 3+ on the right side and 3+ on the left side.       Popliteal pulses are 3+ on the right side and 3+ on the left side.        Dorsalis pedis pulses are 3+ on the right side and 3+ on the left side.        Posterior tibial pulses are 3+ on the right side and 3+ on the left side.      Heart sounds: Murmur heard.   Harsh midsystolic murmur is present with a grade of 1/6 at the upper right sternal border radiating to the neck.     No friction rub. No gallop.   Pulmonary:      Effort: Pulmonary effort is normal. No respiratory distress.      Breath sounds: Normal breath sounds. No wheezing or rales.   Chest:      Chest wall: No tenderness.   Abdominal:      General: There is no distension.      Palpations: Abdomen is soft. There is no mass.      Tenderness: There is no abdominal tenderness.   Musculoskeletal:         General: Normal range of motion.      " Cervical back: Normal range of motion.   Lymphadenopathy:      Cervical: No cervical adenopathy.   Skin:     General: Skin is warm.      Nails: There is no clubbing.   Neurological:      Mental Status: He is alert and oriented to person, place, and time.   Psychiatric:         Speech: Speech normal.         Behavior: Behavior normal.         Thought Content: Thought content normal.         Judgment: Judgment normal.       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 Aortic atherosclerosis    3. Incomplete RBBB    4. Nonrheumatic mitral valve stenosis    5. Chronic diastolic congestive heart failure    6. Overweight (BMI 25.0-29.9)    7. Vitamin D deficiency    8. Other hyperlipidemia    9. Stenosis of carotid artery, unspecified laterality    10. Hypertension, unspecified type    11. Hyponatremia        Plan:   Discussed diet , achieving and maintaining ideal body weight, and exercise.   We reviewed meds in detail.  Reassured-Discussed goals, options, plan.  Omega-3 > 800 mg/d combined EPA/DHA.  Goal BP< 130/80.  Goal LDL < 100.  We have discussed the need for endocarditis prophylaxis.               Diagnoses and all orders for this visit:    S/P TAVR (transcatheter aortic valve replacement)    Aortic atherosclerosis    Incomplete RBBB    Nonrheumatic mitral valve stenosis    Chronic diastolic congestive heart failure    Overweight (BMI 25.0-29.9)    Vitamin D deficiency    Other hyperlipidemia    Stenosis of carotid artery, unspecified laterality    Hypertension, unspecified type    Hyponatremia          Follow up in about 6 months (around 4/25/2023) for with labs; labs also in [facility] at 3 months.

## 2022-10-25 NOTE — PATIENT INSTRUCTIONS
CRYOSURGERY      Your doctor has used a method called cryosurgery to treat your skin condition. Cryosurgery refers to the use of very cold substances to treat a variety of skin conditions such as warts, pre-skin cancers, molluscum contagiosum, sun spots, and several benign growths. The substance we use in cryosurgery is liquid nitrogen and is so cold (-195 degrees Celsius) that is burns when administered.     Following treatment in the office, the skin may immediately burn and become red. You may find the area around the lesion is affected as well. It is sometimes necessary to treat not only the lesion, but a small area of the surrounding normal skin to achieve a good response.     A blister, and even a blood filled blister, may form after treatment.   This is a normal response. If the blister is painful, it is acceptable to sterilize a needle and with rubbing alcohol and gently pop the blister. It is important that you gently wash the area with soap and warm water as the blister fluid may contain wart virus if a wart was treated. Do no remove the roof of the blister.     The area treated can take anywhere from 1-3 weeks to heal. Healing time depends on the kind skin lesion treated, the location, and how aggressively the lesion was treated. It is recommended that the areas treated are covered with Vaseline or bacitracin ointment and a band-aid. If a band-aid is not practical, just ointment applied several times per day will do. Keeping these areas moist will speed the healing time.    Treatment with liquid nitrogen can leave a scar. In dark skin, it may be a light or dark scar, in light skin it may be a white or pink scar. These will generally fade with time, but may never go away completely.     If you have any concerns after your treatment, please feel free to call the office.       [address] [place] [address]/ [phone] FAX/ [email or web address]    SEBORRHEIC KERATOSES        What  causes seborrheic keratoses?    Seborrheic keratoses are harmless, common skin growths that first appear during adult life.  As time goes by, more growths appear.  Some persons have a very large number of them.  Seborrheic keratoses appear on both covered and uncovered parts of the body; they are not caused by sunlight.  The tendency to develop seborrheic keratoses is inherited.    Seborrheic keratoses are harmless and never become malignant.  They begin as slightly raised, light brown spots.  Gradually they thicken and take on a rough wartlike surface.  They slowly darken and may turn black.  These color changes are harmless.  Seborrheic keratoses are superficial and look as if they were stuck on the skin.  Persons who have had several seborrheic keratoses can usually recognize this type of benign growth.  However, if you are concerned or unsure about any growth, consult me.    Treatment    Seborrheic keratoses can easily be removed in the office.  The only reason for removing a seborrheic keratosis is your wish to get rid of it.         Dermal Autograft Text: The defect edges were debeveled with a #15 scalpel blade.  Given the location of the defect, shape of the defect and the proximity to free margins a dermal autograft was deemed most appropriate.  Using a sterile surgical marker, the primary defect shape was transferred to the donor site. The area thus outlined was incised deep to adipose tissue with a #15 scalpel blade.  The harvested graft was then trimmed of adipose and epidermal tissue until only dermis was left.  The skin graft was then placed in the primary defect and oriented appropriately.

## 2022-11-03 ENCOUNTER — PATIENT MESSAGE (OUTPATIENT)
Dept: INTERNAL MEDICINE | Facility: CLINIC | Age: 87
End: 2022-11-03
Payer: MEDICARE

## 2022-11-04 DIAGNOSIS — M25.519 SHOULDER PAIN, UNSPECIFIED CHRONICITY, UNSPECIFIED LATERALITY: Primary | ICD-10-CM

## 2022-11-09 ENCOUNTER — OFFICE VISIT (OUTPATIENT)
Dept: ORTHOPEDICS | Facility: CLINIC | Age: 87
End: 2022-11-09
Payer: MEDICARE

## 2022-11-09 ENCOUNTER — HOSPITAL ENCOUNTER (OUTPATIENT)
Dept: RADIOLOGY | Facility: HOSPITAL | Age: 87
Discharge: HOME | End: 2022-11-09
Attending: PHYSICIAN ASSISTANT
Payer: MEDICARE

## 2022-11-09 DIAGNOSIS — M25.519 SHOULDER PAIN, UNSPECIFIED CHRONICITY, UNSPECIFIED LATERALITY: ICD-10-CM

## 2022-11-09 DIAGNOSIS — M19.011 OSTEOARTHRITIS, LOCALIZED, SHOULDER, RIGHT: ICD-10-CM

## 2022-11-09 PROCEDURE — 73030 XR SHOULDER COMPLETE 2 OR MORE VIEWS RIGHT: ICD-10-PCS | Mod: 26,RT,, | Performed by: RADIOLOGY

## 2022-11-09 PROCEDURE — 99213 OFFICE O/P EST LOW 20 MIN: CPT | Mod: PBBFAC | Performed by: PHYSICIAN ASSISTANT

## 2022-11-09 PROCEDURE — 73030 X-RAY EXAM OF SHOULDER: CPT | Mod: TC,RT

## 2022-11-09 PROCEDURE — 99999 PR PBB SHADOW E&M-EST. PATIENT-LVL III: CPT | Mod: PBBFAC,,, | Performed by: PHYSICIAN ASSISTANT

## 2022-11-09 PROCEDURE — 99213 PR OFFICE/OUTPT VISIT, EST, LEVL III, 20-29 MIN: ICD-10-PCS | Mod: S$PBB,,, | Performed by: PHYSICIAN ASSISTANT

## 2022-11-09 PROCEDURE — 99213 OFFICE O/P EST LOW 20 MIN: CPT | Mod: S$PBB,,, | Performed by: PHYSICIAN ASSISTANT

## 2022-11-09 PROCEDURE — 73030 X-RAY EXAM OF SHOULDER: CPT | Mod: 26,RT,, | Performed by: RADIOLOGY

## 2022-11-09 PROCEDURE — 99999 PR PBB SHADOW E&M-EST. PATIENT-LVL III: ICD-10-PCS | Mod: PBBFAC,,, | Performed by: PHYSICIAN ASSISTANT

## 2022-11-10 NOTE — PROGRESS NOTES
SUBJECTIVE:     Chief Complaint & History of Present Illness:  Misha Marte Jr. is a 89 y.o. year old male who presents today with constant right shoulder pain that started several weeks ago.  He is Right hand dominant.  There is not a history of injury.  The pain is located in the  lateral and  upper arm aspect of the shoulder.  The pain is described as achy.  It is aggravated by raising his arm, shoulder abduction.  No swelling  Previous treatments include acetaminophen, rest, and topical creams which have provided adequate relief.  There is not a history of previous injury or surgery to the shoulder.      Review of patient's allergies indicates:   Allergen Reactions    Cortisone Other (See Comments)     hiccups    Dexamethasone Other (See Comments)     Constant hiccups for days    Codeine Other (See Comments)     Other reaction(s): Nausea         Current Outpatient Medications   Medication Sig Dispense Refill    acetaminophen (TYLENOL) 500 MG tablet Take 500-1,000 mg by mouth daily as needed for Pain.      ammonium lactate (AMLACTIN TOP) Apply topically. Apply topically daily as needed      amoxicillin (AMOXIL) 500 MG capsule Take 4 capsules (2,000 mg total) by mouth as needed. 1 hour before dental work 12 capsule 3    ascorbic acid, vitamin C, 250 mg Chew 250 mg DAILY (route: oral)      aspirin 81 mg Tab Take 81 mg by mouth once daily.       CALCIUM CARBONATE/VITAMIN D3 (CALCIUM 600 + D,3, ORAL) Take 1,200 mg by mouth once daily.       carboxymethylcellulose sodium (REFRESH OPHT) Apply 1 drop to eye daily as needed.      ceramides 1,3,6-II (CERAVE) Lotn Apply topically daily as needed      cholecalciferol, vitamin D3, (VITAMIN D3 ORAL) Take 1 capsule by mouth once daily.      coenzyme Q10 10 mg capsule Take 100 mg by mouth once daily.       esomeprazole (NEXIUM) 40 MG capsule Take 1 capsule (40 mg total) by mouth once daily. 90 capsule 0    ferrous sulfate (FEOSOL) 325 mg (65 mg iron) Tab tablet 325 mg  DAILY (route: oral)      furosemide (LASIX) 20 MG tablet Take 1 tablet (20 mg total) by mouth 2 (two) times daily as needed. One-2 daily or as directed 180 tablet 3    losartan (COZAAR) 25 MG tablet TAKE 1 TABLET EVERY DAY 90 tablet 3    metoprolol succinate (TOPROL-XL) 25 MG 24 hr tablet Take 1 tablet (25 mg total) by mouth once daily. 90 tablet 3    multivitamin-minerals-lutein Tab Take 1 tablet by mouth once daily.       polyethylene glycol (GLYCOLAX) 17 gram PwPk Take 17 g by mouth 2 (two) times daily as needed (Constipation).  0    pravastatin (PRAVACHOL) 40 MG tablet TAKE 1 TABLET EVERY DAY 90 tablet 3    turmeric root extract 500 mg Cap Take 1 capsule by mouth once daily.      VIT A,C & E/LUTEIN/MINERALS (OCUVITE WITH LUTEIN ORAL) Take 1 capsule by mouth once daily.       No current facility-administered medications for this visit.       Past Medical History:   Diagnosis Date    Arthritis     Cataract     CHF (congestive heart failure)     Digestive disorder     Heart murmur     Hyperlipidemia     Hypertension     Insomnia     Macular degeneration     Skin cancer        Past Surgical History:   Procedure Laterality Date    APPENDECTOMY      CARDIAC CATH COSURGEON N/A 2/15/2022    Procedure: Cardiac Cath Cosurgeon;  Surgeon: Talat Dunaway MD;  Location: [facility] CATH LAB;  Service: Cardiovascular;  Laterality: N/A;    CARPAL TUNNEL RELEASE      COLONOSCOPY      CORONARY ANGIOGRAPHY N/A 2/8/2022    Procedure: ANGIOGRAM, CORONARY ARTERY;  Surgeon: Tushar Swann MD;  Location: [facility] CATH LAB;  Service: Cardiology;  Laterality: N/A;    LEFT HEART CATHETERIZATION Left 2/15/2022    Procedure: Left heart cath;  Surgeon: Tushar Swann MD;  Location: [facility] CATH LAB;  Service: Cardiology;  Laterality: Left;    TONSILLECTOMY, ADENOIDECTOMY      TRANSCATHETER AORTIC VALVE REPLACEMENT (TAVR) N/A 2/15/2022    Procedure: REPLACEMENT, AORTIC VALVE, TRANSCATHETER (TAVR);  Surgeon: Tushar Swann MD;  Location:  [facility] CATH LAB;  Service: Cardiology;  Laterality: N/A;       Review of Systems:  ROS:  Constitutional: no fever or chills  Eyes: no visual changes  ENT: no nasal congestion or sore throat  Respiratory: no cough or shortness of breath  Musculoskeletal: no arthralgias or myalgias      OBJECTIVE:     PHYSICAL EXAM:    General:   There is no height or weight on file to calculate BMI.  Patient is alert, awake and oriented to time, place and person. Mood and affect are appropriate.  Patient does not appear to be in any distress, denies any constitutional symptoms and appears stated age.   HEENT: Pupils are equal and round, sclera are not injected. External examination of ears and nose reveals no abnormalities. Cranial nerves II-X are grossly intact  Neck: examination demonstrates painless active range of motion. Spurling's sign is negative  Skin: no rashes, abrasions or open wounds on the affected extremity   Resp: No respiratory distress or audible wheezing   Psych:  normal mood and behavior  CV: 2+ pulses, all extremities warm and well perfused   Right Shoulder   Skin intact  No effusion  Tenderness: none  Range of motion is painful   ROM: forward flexion 160, external rotation 50/50, internal rotation L1  Shoulder Strength: biceps 5/5, triceps 5/5, abduction 5/5, adduction 5/5  positive for impingement sign, sensory exam normal, and motor exam normal  Stability tests: normal  Special Tests:    Crossbody test: negative    Neer's positive  Hawkin's positive    Sadaf's positive  Drop arm negative  Belly press negative    IMAGING:  X-rays of the right shoulder, personally reviewed by me, demonstrate mild degenerative changes.  No fracture or dislocation.     ASSESSMENT/PLAN:   89 y.o. year old male with right shoulder osteoarthritis    Plan: Discussed with the patient at length all the different treatment options available for his rightshoulder including anti-inflammatories, acetaminophen, rest, ice, Physical therapy,  occasional cortisone injections for temporary relief, and shoulder arthroscopy    - We discussed options including CSI- has cortisone listed as allergy- report hiccups after cortisone shot in wrist many years ago.  He would like to hold of for now.  His pain is improving.  Discussed rest, activity modification, tylenol as needed  - Follow up if symptoms worsen or fail to improve

## 2022-11-26 ENCOUNTER — PATIENT MESSAGE (OUTPATIENT)
Dept: DERMATOLOGY | Facility: CLINIC | Age: 87
End: 2022-11-26
Payer: MEDICARE

## 2022-11-30 ENCOUNTER — PATIENT MESSAGE (OUTPATIENT)
Dept: INTERNAL MEDICINE | Facility: CLINIC | Age: 87
End: 2022-11-30
Payer: MEDICARE

## 2022-12-02 ENCOUNTER — IMMUNIZATION (OUTPATIENT)
Dept: PRIMARY CARE CLINIC | Facility: CLINIC | Age: 87
End: 2022-12-02
Payer: MEDICARE

## 2022-12-02 DIAGNOSIS — Z23 NEED FOR VACCINATION: Primary | ICD-10-CM

## 2022-12-02 PROCEDURE — 91312 COVID-19, MRNA, LNP-S, BIVALENT BOOSTER, PF, 30 MCG/0.3 ML DOSE: CPT | Mod: PBBFAC,PN

## 2022-12-02 PROCEDURE — 0124A COVID-19, MRNA, LNP-S, BIVALENT BOOSTER, PF, 30 MCG/0.3 ML DOSE: CPT | Mod: PBBFAC,PN

## 2022-12-05 ENCOUNTER — OFFICE VISIT (OUTPATIENT)
Dept: DERMATOLOGY | Facility: CLINIC | Age: 87
End: 2022-12-05
Payer: MEDICARE

## 2022-12-05 DIAGNOSIS — L57.0 AK (ACTINIC KERATOSIS): Primary | ICD-10-CM

## 2022-12-05 PROCEDURE — 99999 PR PBB SHADOW E&M-EST. PATIENT-LVL III: ICD-10-PCS | Mod: PBBFAC,,, | Performed by: DERMATOLOGY

## 2022-12-05 PROCEDURE — 17000 DESTRUCT PREMALG LESION: CPT | Mod: S$PBB,,, | Performed by: DERMATOLOGY

## 2022-12-05 PROCEDURE — 99999 PR PBB SHADOW E&M-EST. PATIENT-LVL III: CPT | Mod: PBBFAC,,, | Performed by: DERMATOLOGY

## 2022-12-05 PROCEDURE — 17000 PR DESTRUCTION(LASER SURGERY,CRYOSURGERY,CHEMOSURGERY),PREMALIGNANT LESIONS,FIRST LESION: ICD-10-PCS | Mod: S$PBB,,, | Performed by: DERMATOLOGY

## 2022-12-05 PROCEDURE — 99213 OFFICE O/P EST LOW 20 MIN: CPT | Mod: PBBFAC | Performed by: DERMATOLOGY

## 2022-12-05 PROCEDURE — 17003 DESTRUCTION, PREMALIGNANT LESIONS; SECOND THROUGH 14 LESIONS: ICD-10-PCS | Mod: S$PBB,,, | Performed by: DERMATOLOGY

## 2022-12-05 PROCEDURE — 17003 DESTRUCT PREMALG LES 2-14: CPT | Mod: PBBFAC | Performed by: DERMATOLOGY

## 2022-12-05 PROCEDURE — 17003 DESTRUCT PREMALG LES 2-14: CPT | Mod: S$PBB,,, | Performed by: DERMATOLOGY

## 2022-12-05 PROCEDURE — 99499 NO LOS: ICD-10-PCS | Mod: S$PBB,,, | Performed by: DERMATOLOGY

## 2022-12-05 PROCEDURE — 99499 UNLISTED E&M SERVICE: CPT | Mod: S$PBB,,, | Performed by: DERMATOLOGY

## 2022-12-05 PROCEDURE — 17000 DESTRUCT PREMALG LESION: CPT | Mod: PBBFAC | Performed by: DERMATOLOGY

## 2022-12-05 NOTE — PROGRESS NOTES
Subjective:       Patient ID:  Misha Marte Jr. is a 89 y.o. male who presents for   Chief Complaint   Patient presents with    Lesion     scalp     History of Present Illness: The patient presents for follow up for new lesion on scalp.    The patient was last seen on: 09/20/2022 for cryosurgery to actinic keratoses - on nose and left cheek by MARGO which have resolved.  This is a high risk patient here to check for the development of new lesions.    Other skin complaints:     Patient with new complaint of lesion(s)  Location: scalp  Duration: 3-4 weeks  Symptoms: none  Relieving factors/Previous treatments: none       Review of Systems   Skin:  Positive for daily sunscreen use, activity-related sunscreen use and wears hat (all the time). Negative for recent sunburn.   Hematologic/Lymphatic: Bruises/bleeds easily (aspirin).        Baby aspirin daily      Objective:    Physical Exam   Constitutional: He appears well-developed and well-nourished. No distress.   Neurological: He is alert and oriented to person, place, and time. He is not disoriented.   Psychiatric: He has a normal mood and affect.   Skin:   Areas Examined (abnormalities noted in diagram):   Scalp / Hair Palpated and Inspected            Diagram Legend     Erythematous scaling macule/papule c/w actinic keratosis       Vascular papule c/w angioma      Pigmented verrucoid papule/plaque c/w seborrheic keratosis      Yellow umbilicated papule c/w sebaceous hyperplasia      Irregularly shaped tan macule c/w lentigo     1-2 mm smooth white papules consistent with Milia      Movable subcutaneous cyst with punctum c/w epidermal inclusion cyst      Subcutaneous movable cyst c/w pilar cyst      Firm pink to brown papule c/w dermatofibroma      Pedunculated fleshy papule(s) c/w skin tag(s)      Evenly pigmented macule c/w junctional nevus     Mildly variegated pigmented, slightly irregular-bordered macule c/w mildly atypical nevus      Flesh colored to  evenly pigmented papule c/w intradermal nevus       Pink pearly papule/plaque c/w basal cell carcinoma      Erythematous hyperkeratotic cursted plaque c/w SCC      Surgical scar with no sign of skin cancer recurrence      Open and closed comedones      Inflammatory papules and pustules      Verrucoid papule consistent consistent with wart     Erythematous eczematous patches and plaques     Dystrophic onycholytic nail with subungual debris c/w onychomycosis     Umbilicated papule    Erythematous-base heme-crusted tan verrucoid plaque consistent with inflamed seborrheic keratosis     Erythematous Silvery Scaling Plaque c/w Psoriasis     See annotation      Assessment / Plan:        AK (actinic keratosis) - scalp  Cryosurgery Procedure Note    Verbal consent from the patient is obtained including, but not limited to, risk of hypopigmentation/hyperpigmentation, scar, recurrence of lesion. The patient is aware of the precancerous quality and need for treatment of these lesions. Liquid nitrogen cryosurgery is applied to the 2 actinic keratoses, as detailed in the physical exam, to produce a freeze injury. The patient is aware that blisters may form and is instructed on wound care with gentle cleansing and use of vaseline ointment to keep moist until healed. The patient is supplied a handout on cryosurgery and is instructed to call if lesions do not completely resolve.             Follow up in about 6 months (around 6/5/2023).

## 2022-12-05 NOTE — PATIENT INSTRUCTIONS

## 2022-12-12 ENCOUNTER — PATIENT MESSAGE (OUTPATIENT)
Dept: INTERNAL MEDICINE | Facility: CLINIC | Age: 87
End: 2022-12-12
Payer: MEDICARE

## 2022-12-14 ENCOUNTER — PATIENT MESSAGE (OUTPATIENT)
Dept: INTERNAL MEDICINE | Facility: CLINIC | Age: 87
End: 2022-12-14
Payer: MEDICARE

## 2022-12-15 ENCOUNTER — OFFICE VISIT (OUTPATIENT)
Dept: INTERNAL MEDICINE | Facility: CLINIC | Age: 87
End: 2022-12-15
Payer: MEDICARE

## 2022-12-15 VITALS
DIASTOLIC BLOOD PRESSURE: 62 MMHG | HEIGHT: 70 IN | SYSTOLIC BLOOD PRESSURE: 130 MMHG | HEART RATE: 80 BPM | BODY MASS INDEX: 25.88 KG/M2 | WEIGHT: 180.75 LBS | OXYGEN SATURATION: 95 %

## 2022-12-15 DIAGNOSIS — E78.5 HYPERLIPIDEMIA, UNSPECIFIED HYPERLIPIDEMIA TYPE: ICD-10-CM

## 2022-12-15 DIAGNOSIS — J06.9 UPPER RESPIRATORY TRACT INFECTION, UNSPECIFIED TYPE: ICD-10-CM

## 2022-12-15 DIAGNOSIS — Z95.2 S/P TAVR (TRANSCATHETER AORTIC VALVE REPLACEMENT): Primary | ICD-10-CM

## 2022-12-15 DIAGNOSIS — I10 ESSENTIAL HYPERTENSION: ICD-10-CM

## 2022-12-15 PROCEDURE — 99999 PR PBB SHADOW E&M-EST. PATIENT-LVL V: CPT | Mod: PBBFAC,,, | Performed by: INTERNAL MEDICINE

## 2022-12-15 PROCEDURE — 99999 PR PBB SHADOW E&M-EST. PATIENT-LVL V: ICD-10-PCS | Mod: PBBFAC,,, | Performed by: INTERNAL MEDICINE

## 2022-12-15 PROCEDURE — 99215 OFFICE O/P EST HI 40 MIN: CPT | Mod: PBBFAC | Performed by: INTERNAL MEDICINE

## 2022-12-15 PROCEDURE — 99214 PR OFFICE/OUTPT VISIT, EST, LEVL IV, 30-39 MIN: ICD-10-PCS | Mod: S$PBB,,, | Performed by: INTERNAL MEDICINE

## 2022-12-15 PROCEDURE — 99214 OFFICE O/P EST MOD 30 MIN: CPT | Mod: S$PBB,,, | Performed by: INTERNAL MEDICINE

## 2022-12-15 RX ORDER — AZITHROMYCIN 250 MG/1
TABLET, FILM COATED ORAL
Qty: 6 TABLET | Refills: 0 | Status: SHIPPED | OUTPATIENT
Start: 2022-12-15 | End: 2022-12-20

## 2022-12-15 NOTE — PROGRESS NOTES
CC:  Four-month follow-up     HPI:  The patient is a 89-year-old male status post TAVR procedure for aortic she, chronic diastolic heart failure, hypertension, CKD stage 3, vitamin-D deficiency and iron deficiency anemia presents today for four-month follow-up.  The patient reports doing well.  He was recently seen by his cardiologist who has alternating the dose is fluid pill to 2 pills q.o.d. alternating with 1 pill q.o.d..  Patient does complain of a cough which has been going on for the past 2-3 weeks.    ROS:  Patient reports weight is stable.  His weight is usually in the high 170s to low 180s.  No chest pain.  No shortness of breath.  Does report some leg edema.  No nausea vomiting.  No abdominal pain.  Does have some stiffness in the right shoulder.  He was seen by Orthopedics.  He has been using a topical cream which has helped.  Patient does report having a lesion on his right leg which has been using bacitracin.    Physical exam:   General appearance:  No acute distress   HEENT:  Conjunctiva is clear.  Pupils equal.  He has bilateral cataracts.  Does have hearing aids in place.  Nasal septum is midline without discharge.  Oropharynx without erythema.  Trachea is midline without JVD.    Pulmonary:  Good inspiratory, expiratory breath sounds are heard.  Lungs clear auscultation.    Cardiovascular:  S1-S2, rhythm is normal.  Extremities 1 +edema.  GI: Abdomen was nontender, nondistended without hepatosplenomegaly  Derm:        Assessment:  1. Stenosis status post TAVR   2. Chronic diastolic heart failure  3. One hundred patient's he anemia   4. Upper respiratory infection   5. Excoriated skin on the right ankle     Plan:  1. No change in medication or treatment at this time  2.  The patient to follow-up in 6 months.

## 2023-01-02 ENCOUNTER — PATIENT MESSAGE (OUTPATIENT)
Dept: INTERNAL MEDICINE | Facility: CLINIC | Age: 88
End: 2023-01-02
Payer: MEDICARE

## 2023-01-04 ENCOUNTER — OFFICE VISIT (OUTPATIENT)
Dept: PODIATRY | Facility: CLINIC | Age: 88
End: 2023-01-04
Payer: MEDICARE

## 2023-01-04 VITALS
WEIGHT: 180 LBS | DIASTOLIC BLOOD PRESSURE: 65 MMHG | HEIGHT: 70 IN | BODY MASS INDEX: 25.77 KG/M2 | HEART RATE: 57 BPM | SYSTOLIC BLOOD PRESSURE: 117 MMHG

## 2023-01-04 DIAGNOSIS — B35.1 ONYCHOMYCOSIS: ICD-10-CM

## 2023-01-04 DIAGNOSIS — L60.0 INGROWN NAIL: Primary | ICD-10-CM

## 2023-01-04 DIAGNOSIS — L60.1 ONYCHOLYSIS OF TOENAIL: ICD-10-CM

## 2023-01-04 DIAGNOSIS — Q84.5 ENLARGED AND HYPERTROPHIC NAILS: ICD-10-CM

## 2023-01-04 PROCEDURE — 17999 PR NON-COVERED FOOT CARE: ICD-10-PCS | Mod: CSM,S$GLB,, | Performed by: PODIATRIST

## 2023-01-04 PROCEDURE — 99214 OFFICE O/P EST MOD 30 MIN: CPT | Mod: PBBFAC,PN | Performed by: PODIATRIST

## 2023-01-04 PROCEDURE — 17999 UNLISTD PX SKN MUC MEMB SUBQ: CPT | Mod: CSM,S$GLB,, | Performed by: PODIATRIST

## 2023-01-04 PROCEDURE — 99499 UNLISTED E&M SERVICE: CPT | Mod: ,,, | Performed by: PODIATRIST

## 2023-01-04 PROCEDURE — 99999 PR PBB SHADOW E&M-EST. PATIENT-LVL IV: ICD-10-PCS | Mod: PBBFAC,,, | Performed by: PODIATRIST

## 2023-01-04 PROCEDURE — 99999 PR PBB SHADOW E&M-EST. PATIENT-LVL IV: CPT | Mod: PBBFAC,,, | Performed by: PODIATRIST

## 2023-01-04 PROCEDURE — 99499 NO LOS: ICD-10-PCS | Mod: ,,, | Performed by: PODIATRIST

## 2023-01-04 NOTE — PROGRESS NOTES
Subjective:      Patient ID: Misha Marte Jr. is a 89 y.o. male.    Chief Complaint: No chief complaint on file.    Misha is a 89 y.o. male who presents to the clinic, accompanied by wife & niece Heather, for nail care. Comes in with sister in law, Charo Ortez, who also has an appointment here. Wife wants him to come in regularly for nail care but does not qualify for routine foot care.  She has been applying Listerine topically to his nails for the last 2 or 3 months but has not been noticing much of change. Wants other treatment options.  Is aware that it is a non-covered service Proc B $42 and willing to pay for it; would also like to reduce interval between visits from 4 months to 3 months..    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    CHF (congestive heart failure)    Iron deficiency anemia    LOUIE (acute kidney injury)    Chronic diastolic congestive heart failure    Nonrheumatic mitral valve stenosis    S/P TAVR (transcatheter aortic valve replacement)    Incomplete RBBB    Aortic atherosclerosis    Pseudophakia of both eyes    Vitreous degeneration of both eyes    Age-related nuclear cataract of both eyes    Hyponatremia   TAVR successful.    PCP: Moncho Ramires MD  DOLV:  12/15/2022     Objective:      Physical Exam  Vitals reviewed.   Constitutional:       Appearance: He is well-developed and overweight.   Cardiovascular:       Pulses:           Dorsalis pedis pulses are 1+ on the right side and 1+ on the left side.      Comments: Venous insufficiency edema R>L  Musculoskeletal:         General: No swelling or tenderness.      Right lower le+ Pitting Edema present.      Left lower le+ Pitting Edema present.   Feet:      Right foot:      Skin integrity: Skin integrity normal.      Toenail Condition: Right toenails are long and ingrown. Fungal disease present.     Left foot:      Skin integrity: Skin integrity normal.      Toenail Condition: Left toenails are long and ingrown. Fungal disease present.     Comments: All nails hypertrophic w/ cryptosis, symptomatic B/L hallux nail borders L>R as well as thickening & dystrophic appearance.  Skin:     General: Skin is warm and dry.      Capillary Refill: Capillary refill takes 2 to 3 seconds.      Findings: No lesion or rash.   Neurological:      Mental Status: He is alert and oriented to person, place, and time.      Sensory: No sensory deficit.      Gait: Gait abnormal (Has a cane today as well as a wheelchair).   Psychiatric:         Mood and Affect: Mood and affect normal.         Behavior: Behavior normal. Behavior is cooperative.       Assessment:      Encounter Diagnoses   Name Primary?    Ingrown nail Yes    Onychomycosis     Enlarged and hypertrophic nails     Onycholysis of toenail        Plan:       Diagnoses and all orders for this visit:    Ingrown nail    Onychomycosis    Enlarged and hypertrophic nails    Onycholysis of toenail    Utilizing sterile toenail nippers, I aggressively debrided & reduced all the nails x10 to their soft tissue attachment mechanically. I then also cut back the offending nail borders approximately 3 mm from its edge and carried the nail plate incision down at an angle in order to wedge out the cryptotic portion of the nail plate in toto. The area was cleansed with alcohol. Patient tolerated the procedure well and related significant  relief.    Tubigrip stockinette dispensed for B/L edema - size E for BLE.    Nail & callus care is a non-covered service as patient does not have the required diagnoses to allow for 'routine care' - as such, visit would be a Proc B (out of pocket expense of $42).   Medicare carriers only cover this service in the presence of severe vascular or sensory disease. Note that this is not limited to persons with diabetes.  Correspondingly, although not an exclusive or comprehensive list, the following medical conditions are not, in and of themselves (i.e. in the absence of significant vascular or neurologic sequellae), considered to be of sufficient severity to make the patient at risk for nonprofessional care:   Diabetes without evidence of severe vascular or neurologic disease   End-stage renal disease   Kidney dialysis   History of organ transplantation, on immunosuppression   Hemorrhagic/bleeding conditions, including hemophilia   Use of blood thinners/anticoagulants (warfarin, Coumadin)   History of artificial joints, heart valves, or blood vessels   History of valvular heart disease, even if you have been advised to take antibiotics around the time of dental work   Cancer   Chemotherapy for cancer, or other health condition   HIV/Aids   Legal blindness   Inability to see and/or reach your own feet   Living alone   Mental retardation   History of stroke, spinal cord injury, or brain injury   Parkinson's Disease, or any medical condition associated with tremor of the hands or feet  Routine foot care is only a covered service in those patients with severe circulatory or sensory problems. For all other stated or unstated conditions, such care is not covered under Medicare. As such, patients are expected to perform the service themselves, or have the care provided by a family member or friend, or pay directly to have such care provided by a professional, such as a podiatrist. Payment for such care is a patient  responsibility (i.e. cash). In addition, as a non-covered service, Medicare's fee schedule does not apply.

## 2023-01-05 ENCOUNTER — TELEPHONE (OUTPATIENT)
Dept: OPHTHALMOLOGY | Facility: CLINIC | Age: 88
End: 2023-01-05

## 2023-01-05 ENCOUNTER — OFFICE VISIT (OUTPATIENT)
Dept: OPHTHALMOLOGY | Facility: CLINIC | Age: 88
End: 2023-01-05
Payer: MEDICARE

## 2023-01-05 DIAGNOSIS — H35.3134 ADVANCED ATROPHIC NONEXUDATIVE AGE-RELATED MACULAR DEGENERATION OF BOTH EYES WITH SUBFOVEAL INVOLVEMENT: Primary | ICD-10-CM

## 2023-01-05 DIAGNOSIS — H43.813 VITREOUS DEGENERATION OF BOTH EYES: ICD-10-CM

## 2023-01-05 DIAGNOSIS — H25.13 AGE-RELATED NUCLEAR CATARACT OF BOTH EYES: ICD-10-CM

## 2023-01-05 PROCEDURE — 99999 PR PBB SHADOW E&M-EST. PATIENT-LVL III: CPT | Mod: PBBFAC,,, | Performed by: OPHTHALMOLOGY

## 2023-01-05 PROCEDURE — 92014 COMPRE OPH EXAM EST PT 1/>: CPT | Mod: S$PBB,,, | Performed by: OPHTHALMOLOGY

## 2023-01-05 PROCEDURE — 92134 OCT, RETINA - OU - BOTH EYES: ICD-10-PCS | Mod: 26,S$PBB,, | Performed by: OPHTHALMOLOGY

## 2023-01-05 PROCEDURE — 92014 PR EYE EXAM, EST PATIENT,COMPREHESV: ICD-10-PCS | Mod: S$PBB,,, | Performed by: OPHTHALMOLOGY

## 2023-01-05 PROCEDURE — 92134 CPTRZ OPH DX IMG PST SGM RTA: CPT | Mod: PBBFAC | Performed by: OPHTHALMOLOGY

## 2023-01-05 PROCEDURE — 99999 PR PBB SHADOW E&M-EST. PATIENT-LVL III: ICD-10-PCS | Mod: PBBFAC,,, | Performed by: OPHTHALMOLOGY

## 2023-01-05 PROCEDURE — 99213 OFFICE O/P EST LOW 20 MIN: CPT | Mod: PBBFAC | Performed by: OPHTHALMOLOGY

## 2023-01-05 NOTE — PROGRESS NOTES
HPI    6 MTH DFE / OCT OU     CC: Pt here establishing care. Pt has history of AMD with Dr. Bower. Pt   has not has treatment for AMD. Pt has no seen a specialist since before   pandemic. Pt concerned about possible cataract progression. Pt states he   has cloudy va OU. Pt has regular use of Amsler grid with no wavy lines.   Dry OU. No flashes. No floaters. No eye pain. No photophobia. No glare.     GTTS: REFRESH PRN OU  Last edited by Gladis Guerrero on 1/5/2023  2:32 PM.          A/P    ICD-10-CM ICD-9-CM   1. 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 Vitreous degeneration of both eyes  H43.813 379.21   3. Age-related nuclear cataract of both eyes  H25.13 366.16       1. 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     2. Vitreous degeneration of both eyes  No RT/RD  Plan: Observation     3. Age-related nuclear cataract of both eyes  Has noticed incr haze in vision, likely 2/2 to progression of cataracts  Plan: discussed that CEIOL will not improve central vision but may help with general haze in limited vision and with peripheral, will refer for CEIOL eval for discussion     RTC Anay 6 mo DFE/OCTm OU  RTC Cataract eval    I saw and examined the patient and reviewed in detail the findings documented. The final examination findings, image interpretations, and plan as documented in the record represent my personal judgment and conclusions.    Fred Cueva MD  Vitreoretinal Surgery   [facility]

## 2023-01-05 NOTE — TELEPHONE ENCOUNTER
----- Message from Mariah Suárez sent at 1/5/2023  4:13 PM CST -----  Regarding: FW: CAT EVAL    ----- Message -----  From: Bharat Guerrero  Sent: 1/5/2023   3:34 PM CST  To: Makayla SHEA Staff, Isaiah JURADO Staff  Subject: CAT EVAL                                         PT NEEDS A CAT EVAL - THANK YOU KINDLY, BHARAT

## 2023-01-06 ENCOUNTER — TELEPHONE (OUTPATIENT)
Dept: OPHTHALMOLOGY | Facility: CLINIC | Age: 88
End: 2023-01-06
Payer: MEDICARE

## 2023-01-06 NOTE — TELEPHONE ENCOUNTER
----- Message from Pepper Martines sent at 1/6/2023  9:25 AM CST -----  Patient's wife is calling to see if there is a sooner appt available.  Please contact patient [phone] or [phone].

## 2023-01-26 ENCOUNTER — PATIENT MESSAGE (OUTPATIENT)
Dept: CARDIOLOGY | Facility: CLINIC | Age: 88
End: 2023-01-26
Payer: MEDICARE

## 2023-01-26 DIAGNOSIS — I10 HYPERTENSION, UNSPECIFIED TYPE: ICD-10-CM

## 2023-01-26 DIAGNOSIS — R00.0 TACHYCARDIA: ICD-10-CM

## 2023-01-26 DIAGNOSIS — I35.0 NONRHEUMATIC AORTIC VALVE STENOSIS: Chronic | ICD-10-CM

## 2023-01-26 RX ORDER — METOPROLOL SUCCINATE 25 MG/1
25 TABLET, EXTENDED RELEASE ORAL DAILY
Qty: 90 TABLET | Refills: 3 | Status: SHIPPED | OUTPATIENT
Start: 2023-01-26 | End: 2023-01-30 | Stop reason: SDUPTHER

## 2023-01-30 DIAGNOSIS — I35.0 NONRHEUMATIC AORTIC VALVE STENOSIS: Chronic | ICD-10-CM

## 2023-01-30 DIAGNOSIS — R00.0 TACHYCARDIA: ICD-10-CM

## 2023-01-30 DIAGNOSIS — I10 HYPERTENSION, UNSPECIFIED TYPE: ICD-10-CM

## 2023-01-30 RX ORDER — METOPROLOL SUCCINATE 25 MG/1
25 TABLET, EXTENDED RELEASE ORAL DAILY
Qty: 90 TABLET | Refills: 3 | Status: SHIPPED | OUTPATIENT
Start: 2023-01-30 | End: 2023-01-30 | Stop reason: SDUPTHER

## 2023-01-30 RX ORDER — METOPROLOL SUCCINATE 25 MG/1
25 TABLET, EXTENDED RELEASE ORAL DAILY
Qty: 90 TABLET | Refills: 3 | Status: ON HOLD | OUTPATIENT
Start: 2023-01-30 | End: 2023-07-02 | Stop reason: SDUPTHER

## 2023-02-01 RX ORDER — FUROSEMIDE 20 MG/1
20 TABLET ORAL 2 TIMES DAILY PRN
Qty: 180 TABLET | Refills: 3 | Status: SHIPPED | OUTPATIENT
Start: 2023-02-01 | End: 2023-11-22

## 2023-02-01 NOTE — TELEPHONE ENCOUNTER
----- Message from Emy Villegas sent at 2/1/2023 12:19 PM CST -----  Contact: [phone] @ [facility]  Requesting an RX refill or new RX.  Is this a refill or new RX:   RX name and strength furosemide (LASIX) 20 MG tablet  Is this a 30 day or 90 day RX: 90  Pharmacy name and phone # [facility] - [place], [place] - [address]  The doctors have asked that we provide their patients with the following 2 reminders -- prescription refills can take up to 72 hours, and a friendly reminder that in the future you can use your [facility] account to request refills:

## 2023-03-01 NOTE — PROGRESS NOTES
Vitamin D 46 2020  Creat 1.2  eGR 56  Calcium 8.8    OK to proceed with prolia   Needs appt this year

## 2023-03-02 ENCOUNTER — HOSPITAL ENCOUNTER (OUTPATIENT)
Dept: CARDIOLOGY | Facility: HOSPITAL | Age: 88
Discharge: HOME | End: 2023-03-02
Attending: PHYSICIAN ASSISTANT
Payer: MEDICARE

## 2023-03-02 ENCOUNTER — OFFICE VISIT (OUTPATIENT)
Dept: CARDIOLOGY | Facility: CLINIC | Age: 88
End: 2023-03-02
Payer: MEDICARE

## 2023-03-02 VITALS
DIASTOLIC BLOOD PRESSURE: 68 MMHG | SYSTOLIC BLOOD PRESSURE: 120 MMHG | WEIGHT: 182.13 LBS | HEIGHT: 70 IN | BODY MASS INDEX: 26.07 KG/M2 | OXYGEN SATURATION: 95 % | HEART RATE: 61 BPM

## 2023-03-02 VITALS
DIASTOLIC BLOOD PRESSURE: 68 MMHG | HEIGHT: 70 IN | SYSTOLIC BLOOD PRESSURE: 148 MMHG | WEIGHT: 180 LBS | BODY MASS INDEX: 25.77 KG/M2 | HEART RATE: 57 BPM

## 2023-03-02 DIAGNOSIS — Z95.2 S/P TAVR (TRANSCATHETER AORTIC VALVE REPLACEMENT): Primary | ICD-10-CM

## 2023-03-02 DIAGNOSIS — I70.0 AORTIC ATHEROSCLEROSIS: ICD-10-CM

## 2023-03-02 DIAGNOSIS — I50.32 CHRONIC DIASTOLIC CONGESTIVE HEART FAILURE: ICD-10-CM

## 2023-03-02 DIAGNOSIS — Z95.2 S/P TAVR (TRANSCATHETER AORTIC VALVE REPLACEMENT): ICD-10-CM

## 2023-03-02 LAB
ASCENDING AORTA: 3.76 CM
AV INDEX (PROSTH): 1.09
AV MEAN GRADIENT: 4 MMHG
AV PEAK GRADIENT: 8 MMHG
AV VALVE AREA: 3.67 CM2
AV VELOCITY RATIO: 0.92
BSA FOR ECHO PROCEDURE: 2.01 M2
CV ECHO LV RWT: 0.45 CM
DOP CALC AO PEAK VEL: 1.37 M/S
DOP CALC AO VTI: 28.75 CM
DOP CALC LVOT AREA: 3.4 CM2
DOP CALC LVOT DIAMETER: 2.07 CM
DOP CALC LVOT PEAK VEL: 1.26 M/S
DOP CALC LVOT STROKE VOLUME: 105.52 CM3
DOP CALC MV VTI: 79.65 CM
DOP CALCLVOT PEAK VEL VTI: 31.37 CM
E WAVE DECELERATION TIME: 540.47 MSEC
E/A RATIO: 1.41
E/E' RATIO: 68.8 M/S
ECHO LV POSTERIOR WALL: 1.09 CM (ref 0.6–1.1)
EJECTION FRACTION: 65 %
FRACTIONAL SHORTENING: 35 % (ref 28–44)
INTERVENTRICULAR SEPTUM: 1.25 CM (ref 0.6–1.1)
LA MAJOR: 5.37 CM
LA MINOR: 5.4 CM
LA WIDTH: 4.51 CM
LEFT ATRIUM SIZE: 3.73 CM
LEFT ATRIUM VOLUME INDEX MOD: 32.9 ML/M2
LEFT ATRIUM VOLUME INDEX: 38.5 ML/M2
LEFT ATRIUM VOLUME MOD: 65.86 CM3
LEFT ATRIUM VOLUME: 77 CM3
LEFT INTERNAL DIMENSION IN SYSTOLE: 3.18 CM (ref 2.1–4)
LEFT VENTRICLE DIASTOLIC VOLUME INDEX: 56.18 ML/M2
LEFT VENTRICLE DIASTOLIC VOLUME: 112.36 ML
LEFT VENTRICLE MASS INDEX: 109 G/M2
LEFT VENTRICLE SYSTOLIC VOLUME INDEX: 20.2 ML/M2
LEFT VENTRICLE SYSTOLIC VOLUME: 40.39 ML
LEFT VENTRICULAR INTERNAL DIMENSION IN DIASTOLE: 4.89 CM (ref 3.5–6)
LEFT VENTRICULAR MASS: 217.75 G
LV LATERAL E/E' RATIO: 86 M/S
LV SEPTAL E/E' RATIO: 57.33 M/S
MV MEAN GRADIENT: 8 MMHG
MV PEAK A VEL: 1.22 M/S
MV PEAK E VEL: 1.72 M/S
MV PEAK GRADIENT: 19 MMHG
MV STENOSIS PRESSURE HALF TIME: 132.19 MS
MV VALVE AREA BY CONTINUITY EQUATION: 1.32 CM2
MV VALVE AREA P 1/2 METHOD: 1.66 CM2
PISA TR MAX VEL: 3.49 M/S
RA MAJOR: 5.16 CM
RA PRESSURE: 8 MMHG
RA WIDTH: 3.87 CM
RIGHT VENTRICULAR END-DIASTOLIC DIMENSION: 3.73 CM
RV TISSUE DOPPLER FREE WALL SYSTOLIC VELOCITY 1 (APICAL 4 CHAMBER VIEW): 14.3 CM/S
SINUS: 3.13 CM
STJ: 2.51 CM
TDI LATERAL: 0.02 M/S
TDI SEPTAL: 0.03 M/S
TDI: 0.03 M/S
TR MAX PG: 49 MMHG
TRICUSPID ANNULAR PLANE SYSTOLIC EXCURSION: 2.25 CM
TV REST PULMONARY ARTERY PRESSURE: 57 MMHG

## 2023-03-02 PROCEDURE — 93306 TTE W/DOPPLER COMPLETE: CPT | Mod: 26,,, | Performed by: INTERNAL MEDICINE

## 2023-03-02 PROCEDURE — 93306 ECHO (CUPID ONLY): ICD-10-PCS | Mod: 26,,, | Performed by: INTERNAL MEDICINE

## 2023-03-02 PROCEDURE — 93306 TTE W/DOPPLER COMPLETE: CPT

## 2023-03-02 PROCEDURE — 99999 PR PBB SHADOW E&M-EST. PATIENT-LVL IV: CPT | Mod: PBBFAC,,, | Performed by: PHYSICIAN ASSISTANT

## 2023-03-02 PROCEDURE — 99999 PR PBB SHADOW E&M-EST. PATIENT-LVL IV: ICD-10-PCS | Mod: PBBFAC,,, | Performed by: PHYSICIAN ASSISTANT

## 2023-03-02 PROCEDURE — 99214 PR OFFICE/OUTPT VISIT, EST, LEVL IV, 30-39 MIN: ICD-10-PCS | Mod: S$PBB,,, | Performed by: PHYSICIAN ASSISTANT

## 2023-03-02 PROCEDURE — 99214 OFFICE O/P EST MOD 30 MIN: CPT | Mod: PBBFAC,25 | Performed by: PHYSICIAN ASSISTANT

## 2023-03-02 PROCEDURE — 99214 OFFICE O/P EST MOD 30 MIN: CPT | Mod: S$PBB,,, | Performed by: PHYSICIAN ASSISTANT

## 2023-03-02 NOTE — PROGRESS NOTES
Subjective:    Patient ID:  Misha Marte Jr. is a 89 y.o. male who presents for follow-up of aortic stenosis.     Referring Physician: Dr Cindy NUÑEZ  Misha Marte Jr. is a 89 y.o. male who presents 1 month s/p TAVR. He has a history of severe AS (NYHA Class 3 sx). Prior to TAVR he had a LHC without significant coronary disease.     TAVR Hospital Course:  Misha Marte Jr. was admitted and underwent successful placement of a 26 mm EvolutR TAVR via TF access under MAC sedation on 2/15/22. Please see full cath report for details. A transthoracic echo was performed immediately post procedure which showed no paravalvular leak. An aortic valve mean gradient of 5 mmHg and a maximum velocity through the aortic valve of 1.2 m/s. He was transported to the CCU in stable condition with a TVP and arterial line in place. He remained hemodynamically stable overnight. He had an incomplete RBBB which remained stable post TAVR. EP was not consulted. This morning, he ambulated without difficulty and was eager to go home. It was felt he was stable for discharge and will go home on ASA alone for antithrombotic therapy post TAVR.     Interval History:  Mr Marte has done very well since TAVR. He has noticed a resolution in his SOB and fatigue. He denies SOB, THOMAS, LE swelling, weight gain, and orthopnea. He follows closely with Dr White. Remains compliant with ASA.       NYHA: I CCS: 0    Review of Systems   Constitutional: Negative for chills and fever.   HENT:  Negative for sore throat.    Eyes:  Negative for blurred vision.   Cardiovascular:  Negative for chest pain, claudication, cyanosis, dyspnea on exertion, irregular heartbeat, leg swelling, near-syncope, orthopnea, palpitations, paroxysmal nocturnal dyspnea and syncope.   Respiratory:  Negative for cough and sputum production.    Hematologic/Lymphatic: Does not bruise/bleed easily.   Skin:  Negative for itching, rash and suspicious lesions.   Musculoskeletal:   Negative for falls.   Gastrointestinal:  Negative for abdominal pain and change in bowel habit.   Genitourinary:  Negative for dysuria.   Neurological:  Negative for disturbances in coordination, dizziness and loss of balance.   Psychiatric/Behavioral:  Negative for altered mental status.         Past Medical History:   Diagnosis Date    Arthritis     Cataract     CHF (congestive heart failure)     Digestive disorder     Heart murmur     Hyperlipidemia     Hypertension     Insomnia     Macular degeneration     Skin cancer        Current Outpatient Medications:     acetaminophen (TYLENOL) 500 MG tablet, Take 500-1,000 mg by mouth daily as needed for Pain., Disp: , Rfl:     ammonium lactate (AMLACTIN TOP), Apply topically. Apply topically daily as needed, Disp: , Rfl:     ascorbic acid, vitamin C, 250 mg Chew, 250 mg DAILY (route: oral), Disp: , Rfl:     aspirin 81 mg Tab, Take 81 mg by mouth once daily. , Disp: , Rfl:     CALCIUM CARBONATE/VITAMIN D3 (CALCIUM 600 + D,3, ORAL), Take 1,200 mg by mouth once daily. , Disp: , Rfl:     carboxymethylcellulose sodium (REFRESH OPHT), Apply 1 drop to eye daily as needed., Disp: , Rfl:     ceramides 1,3,6-II (CERAVE) Lotn, Apply topically daily as needed, Disp: , Rfl:     cholecalciferol, vitamin D3, (VITAMIN D3 ORAL), Take 1 capsule by mouth once daily., Disp: , Rfl:     coenzyme Q10 10 mg capsule, Take 20 mg by mouth once daily., Disp: , Rfl:     esomeprazole (NEXIUM) 40 MG capsule, Take 1 capsule (40 mg total) by mouth once daily., Disp: 90 capsule, Rfl: 0    ferrous sulfate (FEOSOL) 325 mg (65 mg iron) Tab tablet, TAKE 1 TABLET EVERY MORNING ON AN EMPTY STOMACH WITH YOUR VITAMIN C, Disp: 90 tablet, Rfl: 1    furosemide (LASIX) 20 MG tablet, Take 1 tablet (20 mg total) by mouth 2 (two) times daily as needed. One-2 daily or as directed, Disp: 180 tablet, Rfl: 3    losartan (COZAAR) 25 MG tablet, TAKE 1 TABLET EVERY DAY, Disp: 90 tablet, Rfl: 3    metoprolol succinate  "(TOPROL-XL) 25 MG 24 hr tablet, Take 1 tablet (25 mg total) by mouth once daily., Disp: 90 tablet, Rfl: 3    multivitamin-minerals-lutein Tab, Take 1 tablet by mouth once daily. , Disp: , Rfl:     polyethylene glycol (GLYCOLAX) 17 gram PwPk, Take 17 g by mouth 2 (two) times daily as needed (Constipation)., Disp: , Rfl: 0    pravastatin (PRAVACHOL) 40 MG tablet, TAKE 1 TABLET EVERY DAY, Disp: 90 tablet, Rfl: 3    turmeric root extract 500 mg Cap, Take 1 capsule by mouth once daily., Disp: , Rfl:     VIT A,C & E/LUTEIN/MINERALS (OCUVITE WITH LUTEIN ORAL), Take 1 capsule by mouth once daily., Disp: , Rfl:     amoxicillin (AMOXIL) 500 MG capsule, Take 4 capsules (2,000 mg total) by mouth as needed. 1 hour before dental work (Patient not taking: Reported on 3/2/2023), Disp: 12 capsule, Rfl: 3    Objective:    Physical Exam  Vitals reviewed.   Constitutional:       General: He is not in acute distress.     Appearance: He is well-developed. He is not diaphoretic.   HENT:      Head: Normocephalic and atraumatic.   Neck:      Vascular: No JVD.   Cardiovascular:      Rate and Rhythm: Normal rate and regular rhythm.      Pulses: Intact distal pulses.      Heart sounds: No murmur heard.  Pulmonary:      Effort: Pulmonary effort is normal. No respiratory distress.      Breath sounds: Normal breath sounds.   Musculoskeletal:      Cervical back: Normal range of motion.      Right lower leg: No edema.      Left lower leg: No edema.   Skin:     General: Skin is warm and dry.   Neurological:      Mental Status: He is alert and oriented to person, place, and time.           Vitals:    03/02/23 1103 03/02/23 1112   BP: 114/62 120/68   BP Location: Right arm Left arm   Patient Position: Sitting Sitting   BP Method: Large (Automatic) Large (Automatic)   Pulse: 61 61   SpO2: 95%    Weight: 82.6 kg (182 lb 1.6 oz)    Height: 5' 10" (1.778 m)        Body mass index is 26.13 kg/m².    Test(s) Reviewed  I have reviewed the following in " detail:  [] Stress test   [] Angiography   [] Echocardiogram   [] Labs:   [] Other:     TTE 3/2/23  The left ventricle is normal in size with concentric remodeling and normal systolic function.  The estimated ejection fraction is 65%.  Grade II left ventricular diastolic dysfunction.  Normal right ventricular size with normal right ventricular systolic function.  Mild left atrial enlargement.  There is a 26 mm Medtronic EvolutR transcutaneously-placed aortic bioprosthesis present. There is mild paravalvular aortic insufficiency present.  The aortic valve mean gradient is 4 mmHg with a dimensionless index of 1.09.  Intermediate central venous pressure (8 mmHg).  The estimated PA systolic pressure is 57 mmHg.  There is moderate pulmonary hypertension.  The ascending aorta is mildly dilated.  Mild right atrial enlargement.  Mild mitral regurgitation.  Mild tricuspid regurgitation.        Assessment:   S/P TAVR (transcatheter aortic valve replacement)  Successful transfemoral aortic valve replacement with a 26 mm EvolutR valve. TTE today shows mild PVL with MG decreased to 4 mmHg.    Chronic diastolic congestive heart failure  Chronic. Controlled. Follow up with Cardiology/PCP.    Aortic atherosclerosis  See CTA    Plan:     Follow up with [facility] as needed.  ASA indefinitely. .   SBE prophylaxis for life.           Angelita Blanco PA-C  Valve and Structural Heart Disease  [facility]

## 2023-03-07 ENCOUNTER — OFFICE VISIT (OUTPATIENT)
Dept: OPHTHALMOLOGY | Facility: CLINIC | Age: 88
End: 2023-03-07
Payer: MEDICARE

## 2023-03-07 DIAGNOSIS — H25.11 NUCLEAR SCLEROTIC CATARACT OF RIGHT EYE: ICD-10-CM

## 2023-03-07 DIAGNOSIS — H35.3134 ADVANCED ATROPHIC NONEXUDATIVE AGE-RELATED MACULAR DEGENERATION OF BOTH EYES WITH SUBFOVEAL INVOLVEMENT: ICD-10-CM

## 2023-03-07 DIAGNOSIS — H25.12 NUCLEAR SCLEROTIC CATARACT OF LEFT EYE: Primary | ICD-10-CM

## 2023-03-07 DIAGNOSIS — H43.813 VITREOUS DEGENERATION OF BOTH EYES: ICD-10-CM

## 2023-03-07 PROCEDURE — 99214 OFFICE O/P EST MOD 30 MIN: CPT | Mod: S$PBB,,, | Performed by: OPHTHALMOLOGY

## 2023-03-07 PROCEDURE — 99999 PR PBB SHADOW E&M-EST. PATIENT-LVL III: CPT | Mod: PBBFAC,,, | Performed by: OPHTHALMOLOGY

## 2023-03-07 PROCEDURE — 99999 PR PBB SHADOW E&M-EST. PATIENT-LVL III: ICD-10-PCS | Mod: PBBFAC,,, | Performed by: OPHTHALMOLOGY

## 2023-03-07 PROCEDURE — 76519 ECHO EXAM OF EYE: CPT | Mod: PBBFAC | Performed by: OPHTHALMOLOGY

## 2023-03-07 PROCEDURE — 99213 OFFICE O/P EST LOW 20 MIN: CPT | Mod: PBBFAC | Performed by: OPHTHALMOLOGY

## 2023-03-07 PROCEDURE — 76519 AXIAL EYE LENGTH  - OU - BOTH EYES: ICD-10-PCS | Mod: 26,S$PBB,LT, | Performed by: OPHTHALMOLOGY

## 2023-03-07 PROCEDURE — 99214 PR OFFICE/OUTPT VISIT, EST, LEVL IV, 30-39 MIN: ICD-10-PCS | Mod: S$PBB,,, | Performed by: OPHTHALMOLOGY

## 2023-03-07 NOTE — PROGRESS NOTES
HPI    Dr. Cueva / Sath[facility]    Advanced atrophic nonexudative age-related macular degeneration of both   eyes with subfoveal involvement  Vitreous degeneration of both eyes  Age-related nuclear cataract of both eyes     PXE    Pt presents to clinic for cataract eval. Pt states that vision is hazy in   OU. Pt has difficult with making out detail. Pt denies eye pain and   sensitivity to light.    Last edited by Bernadette Benavides MD on 3/7/2023  3:21 PM.            Assessment /Plan     For exam results, see Encounter Report.    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  Pt desires cataract surgery for Va rehabilitation.   - R/B/A discussed and pt agrees to proceed with surgery.   - IOL options discussed according to patient's goals and concomitant ocular pathology; and pt content with monofocal lens.    - Target: plano.    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

## 2023-03-13 PROBLEM — R55 POSTURAL DIZZINESS WITH PRESYNCOPE: Status: ACTIVE | Noted: 2023-03-13

## 2023-03-13 PROBLEM — I48.91 ATRIAL FIBRILLATION, NEW ONSET: Status: ACTIVE | Noted: 2023-03-13

## 2023-03-13 PROBLEM — R42 POSTURAL DIZZINESS WITH PRESYNCOPE: Status: ACTIVE | Noted: 2023-03-13

## 2023-03-14 ENCOUNTER — TELEPHONE (OUTPATIENT)
Dept: CARDIOLOGY | Facility: CLINIC | Age: 88
End: 2023-03-14
Payer: MEDICARE

## 2023-03-14 DIAGNOSIS — I48.91 ATRIAL FIBRILLATION, NEW ONSET: Primary | ICD-10-CM

## 2023-03-14 PROBLEM — R55 POSTURAL DIZZINESS WITH PRESYNCOPE: Status: RESOLVED | Noted: 2023-03-13 | Resolved: 2023-03-14

## 2023-03-14 PROBLEM — R42 POSTURAL DIZZINESS WITH PRESYNCOPE: Status: RESOLVED | Noted: 2023-03-13 | Resolved: 2023-03-14

## 2023-03-14 NOTE — TELEPHONE ENCOUNTER
----- Message from Cali Bella sent at 3/14/2023  4:30 PM CDT -----  Regarding: hospital d/c  PT needs a hospital f/u.  Pt's spouse stated that she wants him to see Dr. White only.  She requested to speak with Kate @ [phone] or [phone]        Thanks

## 2023-03-14 NOTE — TELEPHONE ENCOUNTER
Returned pt's wife's call. He was told he was going in an out of AFib and was given an appt to see Dr Gu on 3/20. Will ask Dr White when pt needs to see him.       Cali CARO Staff  Caller: Unspecified (Today,  4:30 PM)  PT needs a hospital f/u.  Pt's spouse stated that she wants him to see Dr. White only.  She requested to speak with Kate @ [phone] or [phone]         Thanks

## 2023-03-15 ENCOUNTER — TELEPHONE (OUTPATIENT)
Dept: INTERNAL MEDICINE | Facility: CLINIC | Age: 88
End: 2023-03-15
Payer: MEDICARE

## 2023-03-15 ENCOUNTER — PATIENT MESSAGE (OUTPATIENT)
Dept: INTERNAL MEDICINE | Facility: CLINIC | Age: 88
End: 2023-03-15
Payer: MEDICARE

## 2023-03-15 ENCOUNTER — PATIENT MESSAGE (OUTPATIENT)
Dept: CARDIOLOGY | Facility: CLINIC | Age: 88
End: 2023-03-15
Payer: MEDICARE

## 2023-03-15 NOTE — TELEPHONE ENCOUNTER
----- Message from Monse Dewitt sent at 3/15/2023  8:58 AM CDT -----  Contact: [phone] - [facility]  Danielle with [facility] in regards to receiving home health orders for pt, Checking to see if Dr Ivan will Sign and Follow Home Health Orders for pt    Please call and advise @  [phone]

## 2023-03-15 NOTE — TELEPHONE ENCOUNTER
----- Message from Monse Dewitt sent at 3/15/2023  3:03 PM CDT -----  Contact: [phone] - pt  Pt has appt on 3/24 at 10 am. Wants to know if appt could be later around 12 pm noon     Please call and advise @ [phone]

## 2023-03-17 ENCOUNTER — TELEPHONE (OUTPATIENT)
Dept: CARDIOLOGY | Facility: CLINIC | Age: 88
End: 2023-03-17
Payer: MEDICARE

## 2023-03-17 ENCOUNTER — PATIENT MESSAGE (OUTPATIENT)
Dept: INTERNAL MEDICINE | Facility: CLINIC | Age: 88
End: 2023-03-17
Payer: MEDICARE

## 2023-03-17 DIAGNOSIS — I49.9 IRREGULAR HEART BEATS: Primary | ICD-10-CM

## 2023-03-22 ENCOUNTER — TELEPHONE (OUTPATIENT)
Dept: DERMATOLOGY | Facility: CLINIC | Age: 88
End: 2023-03-22
Payer: MEDICARE

## 2023-03-22 PROCEDURE — G0180 PR HOME HEALTH MD CERTIFICATION: ICD-10-PCS | Mod: ,,, | Performed by: INTERNAL MEDICINE

## 2023-03-22 PROCEDURE — G0180 MD CERTIFICATION HHA PATIENT: HCPCS | Mod: ,,, | Performed by: INTERNAL MEDICINE

## 2023-03-22 NOTE — TELEPHONE ENCOUNTER
Called patient to inform them to have PCP look at [facility] per Dr. Hankins and PCP can determine if it is urgent. Left message for her to call back.

## 2023-03-22 NOTE — TELEPHONE ENCOUNTER
----- Message from Mindy Hankins MD sent at 3/22/2023  1:31 PM CDT -----  Contact: @[phone]  Is it emergent? Looks like i've seen him in the past for skin issues related to sun damage. A lesion between his buttocks? Maybe his primary care could see him first to determine the urgency.  JAM  ----- Message -----  From: Cecy Valdez LPN  Sent: 3/22/2023   1:19 PM CDT  To: Mindy Hankins MD    Spoke with patients wife, she was not happy that the appointment made was in May.  He has a lesion between his buttocks and reports that you told her that anytime he needed to be seen to let you know and we would get him in.  Please advise.  ----- Message -----  From: Cecy Best MA  Sent: 3/21/2023   5:20 PM CDT  To: Cecy Valdez LPN      ----- Message -----  From: Milady Arias  Sent: 3/21/2023   3:12 PM CDT  To: Cr Guillen Staff    Caller wife is calling in to get the pt an appt for a skin check, please call to discuss further. Caller states that she has been trying to get an appt. Caller states Tue, Wed, or Thurs mid day or later.

## 2023-03-24 ENCOUNTER — PATIENT MESSAGE (OUTPATIENT)
Dept: CARDIOLOGY | Facility: CLINIC | Age: 88
End: 2023-03-24
Payer: MEDICARE

## 2023-03-24 ENCOUNTER — HOSPITAL ENCOUNTER (OUTPATIENT)
Dept: RADIOLOGY | Facility: HOSPITAL | Age: 88
Discharge: HOME | End: 2023-03-24
Attending: INTERNAL MEDICINE
Payer: MEDICARE

## 2023-03-24 ENCOUNTER — OFFICE VISIT (OUTPATIENT)
Dept: INTERNAL MEDICINE | Facility: CLINIC | Age: 88
End: 2023-03-24
Payer: MEDICARE

## 2023-03-24 VITALS
OXYGEN SATURATION: 98 % | HEIGHT: 70 IN | BODY MASS INDEX: 26.22 KG/M2 | DIASTOLIC BLOOD PRESSURE: 60 MMHG | SYSTOLIC BLOOD PRESSURE: 142 MMHG | WEIGHT: 183.19 LBS | HEART RATE: 74 BPM

## 2023-03-24 DIAGNOSIS — J90 PLEURAL EFFUSION ON RIGHT: ICD-10-CM

## 2023-03-24 DIAGNOSIS — Z95.2 S/P TAVR (TRANSCATHETER AORTIC VALVE REPLACEMENT): Primary | ICD-10-CM

## 2023-03-24 DIAGNOSIS — R60.0 BILATERAL LOWER EXTREMITY EDEMA: ICD-10-CM

## 2023-03-24 DIAGNOSIS — I10 ESSENTIAL HYPERTENSION: ICD-10-CM

## 2023-03-24 PROCEDURE — 99214 OFFICE O/P EST MOD 30 MIN: CPT | Mod: PBBFAC,25 | Performed by: INTERNAL MEDICINE

## 2023-03-24 PROCEDURE — 99214 PR OFFICE/OUTPT VISIT, EST, LEVL IV, 30-39 MIN: ICD-10-PCS | Mod: S$PBB,,, | Performed by: INTERNAL MEDICINE

## 2023-03-24 PROCEDURE — 99214 OFFICE O/P EST MOD 30 MIN: CPT | Mod: S$PBB,,, | Performed by: INTERNAL MEDICINE

## 2023-03-24 PROCEDURE — 71046 X-RAY EXAM CHEST 2 VIEWS: CPT | Mod: TC

## 2023-03-24 PROCEDURE — 71046 XR CHEST PA AND LATERAL: ICD-10-PCS | Mod: 26,,, | Performed by: RADIOLOGY

## 2023-03-24 PROCEDURE — 99999 PR PBB SHADOW E&M-EST. PATIENT-LVL IV: CPT | Mod: PBBFAC,,, | Performed by: INTERNAL MEDICINE

## 2023-03-24 PROCEDURE — 99999 PR PBB SHADOW E&M-EST. PATIENT-LVL IV: ICD-10-PCS | Mod: PBBFAC,,, | Performed by: INTERNAL MEDICINE

## 2023-03-24 PROCEDURE — 71046 X-RAY EXAM CHEST 2 VIEWS: CPT | Mod: 26,,, | Performed by: RADIOLOGY

## 2023-03-25 NOTE — PROGRESS NOTES
CC:  Hospital follow-up     HPI:  Patient is a 89-year-old male status post TAVR procedure for aortic stenosis, chronic diastolic heart failure, hypertension, CKD stage 3, vitamin-D deficiency and iron deficiency anemia who presents today as a hospital follow-up.  The patient was admitted for presyncopal episode.  Prior to admit, patient had going to bathroom at his home.  When he came out of the bathroom, he felt very weak could not stand.  911 was contacted.  He missed did a rhythm strip was suggested atrial fibrillation.  The patient was taken to the hospital.  Patient was discharged on full-strength aspirin.  He was already on a beta-blocker.  His troponins remained stable.  Chest x-ray suggested that he had a right pleural effusion.  His EKG was read as a sinus rhythm with premature supraventricular complexes, incomplete right bundle branch block and anterior fascicular block.  Patient has been in contact with his cardiologist.  He was changed from full strength aspirin to a baby aspirin.  A 48 hour Holter has been ordered.      ROS:  Patient reports feeling great currently.  No chest pain.  No shortness of breath.  No weakness.  No nausea vomiting.  No abdominal pain.  He checks his blood pressure in both arms.  He has also been checking his pulse on a pulse oximeter.  His pulse readings have remained stable.    Physical exam:   General appearance:  No acute distress  HEENT: Trachea is midline without JVD   Pulmonary:  Good inspiratory, expiratory breath sounds were heard.  His lungs are clear to auscultation.  He had no crackles or wheezing.  Pulse oximeter taken by myself was 100% on room air.    Cardiovascular:  S1-S2, rhythm appears to be regular.  Pulse was 72 is stable.  Extremities shows 1+ edema up to mid ankles.      Assessment:  1. Episode of presyncope   2. Supra ventricular complexes verses a fib   3. Status post TAVR  4.  Chronic diastolic heart failure  5.  Chest x-ray showing small pleural  effusions     Plan:   1. No change in medications at this time   2.  Repeated chest x-ray today.  3.  The patient follow-up pending test results.

## 2023-03-27 DIAGNOSIS — I49.9 IRREGULAR HEART BEATS: Primary | ICD-10-CM

## 2023-03-28 ENCOUNTER — INFUSION (OUTPATIENT)
Dept: INFECTIOUS DISEASES | Facility: HOSPITAL | Age: 88
End: 2023-03-28
Attending: INTERNAL MEDICINE
Payer: MEDICARE

## 2023-03-28 VITALS
OXYGEN SATURATION: 99 % | WEIGHT: 181 LBS | HEART RATE: 72 BPM | TEMPERATURE: 98 F | BODY MASS INDEX: 25.97 KG/M2 | SYSTOLIC BLOOD PRESSURE: 168 MMHG | DIASTOLIC BLOOD PRESSURE: 75 MMHG

## 2023-03-28 DIAGNOSIS — M81.0 AGE RELATED OSTEOPOROSIS, UNSPECIFIED PATHOLOGICAL FRACTURE PRESENCE: Primary | ICD-10-CM

## 2023-03-28 PROCEDURE — 96372 THER/PROPH/DIAG INJ SC/IM: CPT

## 2023-03-28 PROCEDURE — 63600175 PHARM REV CODE 636 W HCPCS: Mod: JZ,JG | Performed by: INTERNAL MEDICINE

## 2023-03-28 RX ADMIN — DENOSUMAB 60 MG: 60 INJECTION SUBCUTANEOUS at 02:03

## 2023-04-04 ENCOUNTER — PATIENT MESSAGE (OUTPATIENT)
Dept: OTOLARYNGOLOGY | Facility: CLINIC | Age: 88
End: 2023-04-04
Payer: MEDICARE

## 2023-04-04 ENCOUNTER — TELEPHONE (OUTPATIENT)
Dept: OTOLARYNGOLOGY | Facility: CLINIC | Age: 88
End: 2023-04-04
Payer: MEDICARE

## 2023-04-04 NOTE — TELEPHONE ENCOUNTER
Contacted patient to schedule an appointment with ENT. Patient sent an appointment request to Dr.Van Garcia (who is now retired).    Patient asked to give the office a call back to schedule an appointment.

## 2023-04-05 ENCOUNTER — OFFICE VISIT (OUTPATIENT)
Dept: PODIATRY | Facility: CLINIC | Age: 88
End: 2023-04-05
Payer: MEDICARE

## 2023-04-05 DIAGNOSIS — Q84.5 ENLARGED AND HYPERTROPHIC NAILS: ICD-10-CM

## 2023-04-05 DIAGNOSIS — B35.1 ONYCHOMYCOSIS: ICD-10-CM

## 2023-04-05 DIAGNOSIS — I87.2 EDEMA OF BOTH LOWER LEGS DUE TO PERIPHERAL VENOUS INSUFFICIENCY: ICD-10-CM

## 2023-04-05 DIAGNOSIS — Z79.82 LONG TERM CURRENT USE OF ASPIRIN: Primary | ICD-10-CM

## 2023-04-05 DIAGNOSIS — R60.0 EDEMA OF BOTH LOWER LEGS DUE TO PERIPHERAL VENOUS INSUFFICIENCY: ICD-10-CM

## 2023-04-05 PROCEDURE — 17999 UNLISTD PX SKN MUC MEMB SUBQ: CPT | Mod: CSM,S$GLB,, | Performed by: PODIATRIST

## 2023-04-05 PROCEDURE — 99499 NO LOS: ICD-10-PCS | Mod: ,,, | Performed by: PODIATRIST

## 2023-04-05 PROCEDURE — 99499 UNLISTED E&M SERVICE: CPT | Mod: ,,, | Performed by: PODIATRIST

## 2023-04-05 PROCEDURE — 17999 PR NON-COVERED FOOT CARE: ICD-10-PCS | Mod: CSM,S$GLB,, | Performed by: PODIATRIST

## 2023-04-12 NOTE — PROGRESS NOTES
Subjective:      Patient ID: Misha Marte Jr. is a 89 y.o. male.    Chief Complaint: No chief complaint on file.    Misha is a 89 y.o. male who presents to the clinic, accompanied by wife & niece Heather, for nail care. Comes in with sister in law, Charo Ortez, who also has an appointment here. Wife wants him to come in regularly for nail care but does not qualify for routine foot care.  She has been applying Listerine topically to his nails for a few months but only thought that R hallux had fungus. Had not been treating other toes.    Is aware that it is a non-covered service Proc B $42; reduced interval between visits to 3 months appears better.    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   Constitutional:       Appearance: He is well-developed and normal weight.   Cardiovascular:      Pulses:           Dorsalis  pedis pulses are 1+ on the right side and 1+ on the left side.      Comments: Venous insufficiency edema R>L  Musculoskeletal:         General: No swelling or tenderness.      Right lower le+ Pitting Edema present.      Left lower le+ Pitting Edema present.   Feet:      Right foot:      Skin integrity: Skin integrity normal.      Toenail Condition: Right toenails are long and ingrown. Fungal disease present.     Left foot:      Skin integrity: Skin integrity normal.      Toenail Condition: Left toenails are long and ingrown. Fungal disease present.     Comments: All nails hypertrophic w/ cryptosis, symptomatic B/L hallux nail borders L>R as well as thickening & dystrophic appearance total L hallux, distal 1-5 R.  Skin:     General: Skin is warm and dry.      Capillary Refill: Capillary refill takes 2 to 3 seconds.      Findings: No lesion or rash.      Comments: Dry skin w/out breakdown B/L   Neurological:      Mental Status: He is alert and oriented to person, place, and time.      Sensory: No sensory deficit.      Motor: Weakness present.      Gait: Gait abnormal (Has a cane today as well as a wheelchair).   Psychiatric:         Mood and Affect: Mood and affect normal.         Behavior: Behavior normal. Behavior is cooperative.       Assessment:      Encounter Diagnoses   Name Primary?    Long term current use of aspirin Yes    Edema of both lower legs due to peripheral venous insufficiency     Onychomycosis        Plan:       Diagnoses and all orders for this visit:    Long term current use of aspirin    Edema of both lower legs due to peripheral venous insufficiency    Onychomycosis    Utilizing sterile toenail nippers, I aggressively debrided & reduced all the nails x10 to their soft tissue attachment mechanically. I then also cut back the offending nail borders approximately 3 mm from its edge and carried the nail plate incision down at an angle in order to wedge out the cryptotic portion of the nail  plate in toto. The area was cleansed with alcohol. Patient tolerated the procedure well and related significant relief.    Tubigrip stockinette size E for BLE.    Nail & callus care is a non-covered service as patient does not have the required diagnoses to allow for 'routine care' - as such, visit would be a Proc B (out of pocket expense of $42).   Medicare carriers only cover this service in the presence of severe vascular or sensory disease. Note that this is not limited to persons with diabetes.  Correspondingly, although not an exclusive or comprehensive list, the following medical conditions are not, in and of themselves (i.e. in the absence of significant vascular or neurologic sequellae), considered to be of sufficient severity to make the patient at risk for nonprofessional care:   Diabetes without evidence of severe vascular or neurologic disease   End-stage renal disease   Kidney dialysis   History of organ transplantation, on immunosuppression   Hemorrhagic/bleeding conditions, including hemophilia   Use of blood thinners/anticoagulants (warfarin, Coumadin)   History of artificial joints, heart valves, or blood vessels   History of valvular heart disease, even if you have been advised to take antibiotics around the time of dental work   Cancer   Chemotherapy for cancer, or other health condition   HIV/Aids   Legal blindness   Inability to see and/or reach your own feet   Living alone   Mental retardation   History of stroke, spinal cord injury, or brain injury   Parkinson's Disease, or any medical condition associated with tremor of the hands or feet  Routine foot care is only a covered service in those patients with severe circulatory or sensory problems. For all other stated or unstated conditions, such care is not covered under Medicare. As such, patients are expected to perform the service themselves, or have the care provided by a family member or friend, or pay directly to have such care provided  by a professional, such as a podiatrist. Payment for such care is a patient responsibility (i.e. cash). In addition, as a non-covered service, Medicare's fee schedule does not apply.

## 2023-04-13 ENCOUNTER — CLINICAL SUPPORT (OUTPATIENT)
Dept: CARDIOLOGY | Facility: HOSPITAL | Age: 88
End: 2023-04-13
Attending: INTERNAL MEDICINE
Payer: MEDICARE

## 2023-04-13 DIAGNOSIS — I49.9 IRREGULAR HEART BEATS: ICD-10-CM

## 2023-04-13 PROCEDURE — 93227 XTRNL ECG REC<48 HR R&I: CPT | Mod: ,,, | Performed by: INTERNAL MEDICINE

## 2023-04-13 PROCEDURE — 93227 HOLTER MONITOR - 48 HOUR (CUPID ONLY): ICD-10-PCS | Mod: ,,, | Performed by: INTERNAL MEDICINE

## 2023-04-13 PROCEDURE — 93225 XTRNL ECG REC<48 HRS REC: CPT

## 2023-04-21 ENCOUNTER — DOCUMENT SCAN (OUTPATIENT)
Dept: HOME HEALTH SERVICES | Facility: HOSPITAL | Age: 88
End: 2023-04-21
Payer: MEDICARE

## 2023-04-21 LAB
OHS CV EVENT MONITOR DAY: 0
OHS CV HOLTER LENGTH DECIMAL HOURS: 48
OHS CV HOLTER LENGTH HOURS: 48
OHS CV HOLTER LENGTH MINUTES: 0
OHS CV HOLTER SINUS AVERAGE HR: 66
OHS CV HOLTER SINUS MAX HR: 112
OHS CV HOLTER SINUS MIN HR: 44

## 2023-04-23 ENCOUNTER — TELEPHONE (OUTPATIENT)
Dept: OPHTHALMOLOGY | Facility: CLINIC | Age: 88
End: 2023-04-23
Payer: MEDICARE

## 2023-04-23 DIAGNOSIS — H25.12 NUCLEAR SCLEROTIC CATARACT OF LEFT EYE: Primary | ICD-10-CM

## 2023-04-24 DIAGNOSIS — H25.12 NUCLEAR SCLEROTIC CATARACT OF LEFT EYE: Primary | ICD-10-CM

## 2023-04-26 DIAGNOSIS — H25.12 NUCLEAR SCLEROTIC CATARACT OF LEFT EYE: Primary | ICD-10-CM

## 2023-05-03 DIAGNOSIS — Z71.89 COMPLEX CARE COORDINATION: ICD-10-CM

## 2023-05-07 ENCOUNTER — TELEPHONE (OUTPATIENT)
Dept: OPHTHALMOLOGY | Facility: CLINIC | Age: 88
End: 2023-05-07
Payer: MEDICARE

## 2023-05-07 DIAGNOSIS — H25.11 NUCLEAR SCLEROTIC CATARACT OF RIGHT EYE: Primary | ICD-10-CM

## 2023-05-10 ENCOUNTER — EXTERNAL HOME HEALTH (OUTPATIENT)
Dept: HOME HEALTH SERVICES | Facility: HOSPITAL | Age: 88
End: 2023-05-10
Payer: MEDICARE

## 2023-05-12 NOTE — H&P
History    Chief complaint:  Painless progressive vision loss    Present Ilness/Diagnosis: Nuclear sclerotic Cataract    Past Medical History:  has a past medical history of Arthritis, Cataract, CHF (congestive heart failure), Digestive disorder, Heart murmur, Hyperlipidemia, Hypertension, Insomnia, Macular degeneration, and Skin cancer.    Family History/Social History: refer to chart    Allergies:   Review of patient's allergies indicates:   Allergen Reactions    Cortisone Other (See Comments)     hiccups    Dexamethasone Other (See Comments)     Constant hiccups for days    Codeine Other (See Comments)     Other reaction(s): Nausea       Current Medications: No current facility-administered medications for this encounter.    Current Outpatient Medications:     acetaminophen (TYLENOL) 500 MG tablet, Take 500-1,000 mg by mouth daily as needed for Pain., Disp: , Rfl:     ammonium lactate (AMLACTIN TOP), Apply topically. Apply topically daily as needed, Disp: , Rfl:     ascorbic acid, vitamin C, 250 mg Chew, 250 mg DAILY (route: oral), Disp: , Rfl:     aspirin 325 MG tablet, Take 1 tablet (325 mg total) by mouth once daily., Disp: 30 tablet, Rfl: 2    CALCIUM CARBONATE/VITAMIN D3 (CALCIUM 600 + D,3, ORAL), Take 1,200 mg by mouth once daily. , Disp: , Rfl:     carboxymethylcellulose sodium (REFRESH OPHT), Apply 1 drop to eye daily as needed., Disp: , Rfl:     ceramides 1,3,6-II (CERAVE) Lotn, Apply topically daily as needed, Disp: , Rfl:     cholecalciferol, vitamin D3, (VITAMIN D3 ORAL), Take 1 capsule by mouth once daily., Disp: , Rfl:     coenzyme Q10 10 mg capsule, Take 20 mg by mouth once daily., Disp: , Rfl:     esomeprazole (NEXIUM) 40 MG capsule, Take 1 capsule (40 mg total) by mouth once daily., Disp: 90 capsule, Rfl: 0    ferrous sulfate (FEOSOL) 325 mg (65 mg iron) Tab tablet, TAKE 1 TABLET EVERY MORNING ON AN EMPTY STOMACH WITH YOUR VITAMIN C, Disp: 90 tablet, Rfl: 1    furosemide (LASIX) 20 MG tablet,  Take 1 tablet (20 mg total) by mouth 2 (two) times daily as needed. One-2 daily or as directed, Disp: 180 tablet, Rfl: 3    losartan (COZAAR) 25 MG tablet, TAKE 1 TABLET EVERY DAY, Disp: 90 tablet, Rfl: 3    metoprolol succinate (TOPROL-XL) 25 MG 24 hr tablet, Take 1 tablet (25 mg total) by mouth once daily., Disp: 90 tablet, Rfl: 3    multivitamin-minerals-lutein Tab, Take 1 tablet by mouth once daily. , Disp: , Rfl:     polyethylene glycol (GLYCOLAX) 17 gram PwPk, Take 17 g by mouth 2 (two) times daily as needed (Constipation)., Disp: , Rfl: 0    pravastatin (PRAVACHOL) 40 MG tablet, TAKE 1 TABLET EVERY DAY, Disp: 90 tablet, Rfl: 3    prednisolon/gatiflox/bromfenac (PREDNISOLONE-GATIFLOX-BROMFENAC) 1-0.5-0.09 % ophthalmic solution, Place 1 drop into the left eye 3 (three) times daily., Disp: 5 mL, Rfl: 3    prednisolon/gatiflox/bromfenac (PREDNISOLONE-GATIFLOX-BROMFENAC) 1-0.5-0.09 % ophthalmic solution, Place 1 drop into the left eye 3 (three) times daily., Disp: 5 mL, Rfl: 3    turmeric root extract 500 mg Cap, Take 1 capsule by mouth once daily., Disp: , Rfl:     VIT A,C & E/LUTEIN/MINERALS (OCUVITE WITH LUTEIN ORAL), Take 1 capsule by mouth once daily., Disp: , Rfl:     Physical Exam    BP: Vital signs stable  General: No apparent distress  HEENT: nuclear sclerotic cataract  Lungs: adequate respirations  Heart: + pulses  Abdomen: soft  Rectal/pelvic: deferred    Impression: Visually significant Cataract.    See previous clinic notes for surgical indications.    Plan: Phacoemulsification with implantation of Intraocular lens

## 2023-05-15 NOTE — PRE-PROCEDURE INSTRUCTIONS
- Nothing to eat or drink after midinight, except AM meds with small sips of water  - No Diabetic meds or Fluid pills  - All B/P meds are ok to take, except those that contain a fluid pill  - Hold all vits and herbal meds AM of surgery  - Use inhalers as needed and bring AM of surgery  - Use eye drops as directed  - Shower and wash face with dial soap for 3 mins PM prior and AM of surgery  - No powder, lotions, creams, (makeup),  or jewelry    - Wear comfortable clothing (button up shirt)    (Patients ride may not leave while patient is in surgery)    -- 2nd floor surgery ctr at [facility] @ [address] [facility] [place] [address]       Pt voiced understanding

## 2023-05-16 ENCOUNTER — ANESTHESIA EVENT (OUTPATIENT)
Dept: SURGERY | Facility: HOSPITAL | Age: 88
End: 2023-05-16
Payer: MEDICARE

## 2023-05-16 DIAGNOSIS — H25.11 NUCLEAR SCLEROTIC CATARACT OF RIGHT EYE: Primary | ICD-10-CM

## 2023-05-16 NOTE — ANESTHESIA PREPROCEDURE EVALUATION
05/16/2023  Misha Marte Jr. is a 89 y.o., male.      Pre-op Assessment    I have reviewed the Patient Summary Reports.    I have reviewed the NPO Status.   I have reviewed the Medications.     Review of Systems  Anesthesia Hx:  Denies Family Hx of Anesthesia complications.   Denies Personal Hx of Anesthesia complications.   Social:  Alcohol Use    Hematology/Oncology:         -- Anemia: --  Cancer in past history:  surgery  Oncology Comments: skin     EENT/Dental:   Eyes:   Cardiovascular:   Hypertension Valvular problems/Murmurs (s/p intervention 2022), AS Dysrhythmias (RBBB) CHF hyperlipidemia Great advanced age  Pulmonary HTN   Renal/:   Chronic Renal Disease, CKD    Musculoskeletal:   Arthritis     Neurological:   Neuromuscular Disease,    Psych:   insomnia      The left ventricle is normal in size with concentric remodeling and normal systolic function.   The estimated ejection fraction is 65%.   Grade II left ventricular diastolic dysfunction.   Normal right ventricular size with normal right ventricular systolic function.   Mild left atrial enlargement.   There is a 26 mm Medtronic EvolutR transcutaneously-placed aortic bioprosthesis present. There is mild paravalvular aortic insufficiency present.   The aortic valve mean gradient is 4 mmHg with a dimensionless index of 1.09.   Intermediate central venous pressure (8 mmHg).   The estimated PA systolic pressure is 57 mmHg.   There is moderate pulmonary hypertension.   The ascending aorta is mildly dilated.   Mild right atrial enlargement.   Mild mitral regurgitation.   Mild tricuspid regurgitation.      Physical Exam  General: Well nourished, Cooperative, Alert and Oriented    Airway:  Mallampati: II   Mouth Opening: Normal        Anesthesia Plan  Type of Anesthesia, risks & benefits discussed:    Anesthesia Type: MAC  Intra-op  Monitoring Plan: Standard ASA Monitors  Post Op Pain Control Plan: multimodal analgesia and IV/PO Opioids PRN  Induction:  IV  Informed Consent: Informed consent signed with the Patient and all parties understand the risks and agree with anesthesia plan.  All questions answered.   ASA Score: 4  Day of Surgery Review of History & Physical: H&P Update referred to the surgeon/provider.H&P completed by Anesthesiologist.    Ready For Surgery From Anesthesia Perspective.     .

## 2023-05-17 ENCOUNTER — HOSPITAL ENCOUNTER (OUTPATIENT)
Facility: HOSPITAL | Age: 88
Discharge: HOME | End: 2023-05-17
Attending: OPHTHALMOLOGY | Admitting: OPHTHALMOLOGY
Payer: MEDICARE

## 2023-05-17 ENCOUNTER — ANESTHESIA (OUTPATIENT)
Dept: SURGERY | Facility: HOSPITAL | Age: 88
End: 2023-05-17
Payer: MEDICARE

## 2023-05-17 VITALS
TEMPERATURE: 98 F | OXYGEN SATURATION: 97 % | DIASTOLIC BLOOD PRESSURE: 65 MMHG | RESPIRATION RATE: 18 BRPM | BODY MASS INDEX: 25.91 KG/M2 | HEIGHT: 70 IN | WEIGHT: 181 LBS | HEART RATE: 59 BPM | SYSTOLIC BLOOD PRESSURE: 146 MMHG

## 2023-05-17 DIAGNOSIS — H25.13 AGE-RELATED NUCLEAR CATARACT OF BOTH EYES: Primary | ICD-10-CM

## 2023-05-17 DIAGNOSIS — H25.12 AGE-RELATED NUCLEAR CATARACT, LEFT: ICD-10-CM

## 2023-05-17 PROCEDURE — 25000003 PHARM REV CODE 250: Performed by: OPHTHALMOLOGY

## 2023-05-17 PROCEDURE — 37000008 HC ANESTHESIA 1ST 15 MINUTES: Performed by: OPHTHALMOLOGY

## 2023-05-17 PROCEDURE — 37000009 HC ANESTHESIA EA ADD 15 MINS: Performed by: OPHTHALMOLOGY

## 2023-05-17 PROCEDURE — 66982 PR REMOVAL, CATARACT, W/INSRT INTRAOC LENS, W/O ENDO CYCLO, CMPLX: ICD-10-PCS | Mod: LT,,, | Performed by: OPHTHALMOLOGY

## 2023-05-17 PROCEDURE — 71000015 HC POSTOP RECOV 1ST HR: Performed by: OPHTHALMOLOGY

## 2023-05-17 PROCEDURE — D9220A PRA ANESTHESIA: Mod: ,,, | Performed by: ANESTHESIOLOGY

## 2023-05-17 PROCEDURE — 66982 XCAPSL CTRC RMVL CPLX WO ECP: CPT | Mod: LT,,, | Performed by: OPHTHALMOLOGY

## 2023-05-17 PROCEDURE — 36000707: Performed by: OPHTHALMOLOGY

## 2023-05-17 PROCEDURE — 94761 N-INVAS EAR/PLS OXIMETRY MLT: CPT

## 2023-05-17 PROCEDURE — 36000706: Performed by: OPHTHALMOLOGY

## 2023-05-17 PROCEDURE — 99900035 HC TECH TIME PER 15 MIN (STAT)

## 2023-05-17 PROCEDURE — V2632 POST CHMBR INTRAOCULAR LENS: HCPCS | Performed by: OPHTHALMOLOGY

## 2023-05-17 PROCEDURE — 27201423 OPTIME MED/SURG SUP & DEVICES STERILE SUPPLY: Performed by: OPHTHALMOLOGY

## 2023-05-17 PROCEDURE — D9220A PRA ANESTHESIA: ICD-10-PCS | Mod: ,,, | Performed by: ANESTHESIOLOGY

## 2023-05-17 PROCEDURE — 63600175 PHARM REV CODE 636 W HCPCS: Performed by: NURSE ANESTHETIST, CERTIFIED REGISTERED

## 2023-05-17 DEVICE — LENS EYHANCE +22.5D: Type: IMPLANTABLE DEVICE | Site: EYE | Status: FUNCTIONAL

## 2023-05-17 RX ORDER — PROPARACAINE HYDROCHLORIDE 5 MG/ML
1 SOLUTION/ DROPS OPHTHALMIC
Status: DISCONTINUED | OUTPATIENT
Start: 2023-05-17 | End: 2023-05-17 | Stop reason: HOSPADM

## 2023-05-17 RX ORDER — PHENYLEPHRINE HYDROCHLORIDE 100 MG/ML
1 SOLUTION/ DROPS OPHTHALMIC
Status: ACTIVE | OUTPATIENT
Start: 2023-05-17

## 2023-05-17 RX ORDER — MOXIFLOXACIN 5 MG/ML
SOLUTION/ DROPS OPHTHALMIC
Status: DISCONTINUED | OUTPATIENT
Start: 2023-05-17 | End: 2023-05-17 | Stop reason: HOSPADM

## 2023-05-17 RX ORDER — ACETAMINOPHEN 325 MG/1
650 TABLET ORAL EVERY 4 HOURS PRN
Status: DISCONTINUED | OUTPATIENT
Start: 2023-05-17 | End: 2023-05-17 | Stop reason: HOSPADM

## 2023-05-17 RX ORDER — MIDAZOLAM HYDROCHLORIDE 1 MG/ML
INJECTION, SOLUTION INTRAMUSCULAR; INTRAVENOUS
Status: DISCONTINUED | OUTPATIENT
Start: 2023-05-17 | End: 2023-05-17

## 2023-05-17 RX ORDER — CYCLOP/TROP/PROPA/PHEN/KET/WAT 1-1-0.1%
1 DROPS (EA) OPHTHALMIC (EYE)
Status: DISCONTINUED | OUTPATIENT
Start: 2023-05-17 | End: 2023-05-17 | Stop reason: HOSPADM

## 2023-05-17 RX ORDER — SODIUM CHLORIDE 0.9 % (FLUSH) 0.9 %
2 SYRINGE (ML) INJECTION
Status: ACTIVE | OUTPATIENT
Start: 2023-05-17

## 2023-05-17 RX ORDER — LIDOCAINE HYDROCHLORIDE 10 MG/ML
INJECTION, SOLUTION EPIDURAL; INFILTRATION; INTRACAUDAL; PERINEURAL
Status: DISCONTINUED | OUTPATIENT
Start: 2023-05-17 | End: 2023-05-17 | Stop reason: HOSPADM

## 2023-05-17 RX ORDER — MOXIFLOXACIN 5 MG/ML
1 SOLUTION/ DROPS OPHTHALMIC
Status: COMPLETED | OUTPATIENT
Start: 2023-05-17 | End: 2023-05-17

## 2023-05-17 RX ORDER — PREDNISOLONE ACETATE 10 MG/ML
SUSPENSION/ DROPS OPHTHALMIC
Status: DISCONTINUED | OUTPATIENT
Start: 2023-05-17 | End: 2023-05-17 | Stop reason: HOSPADM

## 2023-05-17 RX ORDER — TETRACAINE HYDROCHLORIDE 5 MG/ML
SOLUTION OPHTHALMIC
Status: DISCONTINUED | OUTPATIENT
Start: 2023-05-17 | End: 2023-05-17 | Stop reason: HOSPADM

## 2023-05-17 RX ORDER — CYCLOP/TROP/PROPA/PHEN/KET/WAT 1-1-0.1%
1 DROPS (EA) OPHTHALMIC (EYE)
Status: COMPLETED | OUTPATIENT
Start: 2023-05-17 | End: 2023-05-17

## 2023-05-17 RX ORDER — LIDOCAINE HYDROCHLORIDE 40 MG/ML
INJECTION, SOLUTION RETROBULBAR
Status: DISCONTINUED | OUTPATIENT
Start: 2023-05-17 | End: 2023-05-17 | Stop reason: HOSPADM

## 2023-05-17 RX ADMIN — MOXIFLOXACIN OPHTHALMIC 1 DROP: 5 SOLUTION/ DROPS OPHTHALMIC at 09:05

## 2023-05-17 RX ADMIN — Medication 1 DROP: at 09:05

## 2023-05-17 RX ADMIN — MIDAZOLAM HYDROCHLORIDE 0.5 MG: 1 INJECTION, SOLUTION INTRAMUSCULAR; INTRAVENOUS at 11:05

## 2023-05-17 RX ADMIN — MOXIFLOXACIN 1 DROP: 5 SOLUTION/ DROPS OPHTHALMIC at 11:05

## 2023-05-17 NOTE — DISCHARGE SUMMARY
[facility]  Discharge Note  Short Stay    Procedure(s) (LRB):  EXTRACTION, CATARACT, WITH IOL INSERTION (Left)    BRIEF DISCHARGE NOTE:    Date of discharge: 05/17/2023    Reason for hospitalization -  Cataract surgery     Final Diagnosis - Visually significant Cataract    Procedures and treatment provided - Status post phacoemulsification with placement of intraocular lens     Diet - Advance to regular as tolerated    Activity - as tolerated    Disposition at the end of the case - Good.    Discharge: to home    The patient tolerated the procedure well and knows to follow up with me tomorrow morning in the eye clinic, sooner if needed.    Patient and family instructions (as appropriate) - Given to patient on discharge    Bernadette Benavides MD

## 2023-05-17 NOTE — PLAN OF CARE
Pt in preop bay 1, VSS, meds given and IV inserted. Pt denies any open wounds on body. Pt ready to roll.

## 2023-05-17 NOTE — TRANSFER OF CARE
"Anesthesia Transfer of Care Note    Patient: Misha Marte Jr.    Procedure(s) Performed: Procedure(s) (LRB):  EXTRACTION, CATARACT, WITH IOL INSERTION (Left)    Patient location: PACU    Anesthesia Type: MAC    Transport from OR: Transported from OR on room air with adequate spontaneous ventilation    Post pain: adequate analgesia    Post assessment: no apparent anesthetic complications    Post vital signs: stable    Level of consciousness: responds to stimulation    Complications: none    Transfer of care protocol was followed      Last vitals:   Visit Vitals  BP (!) 183/79   Pulse 67   Temp 36.8 °C (98.2 °F) (Temporal)   Resp 18   Ht 5' 10" (1.778 m)   Wt 82.1 kg (181 lb)   SpO2 97%   BMI 25.97 kg/m²     "

## 2023-05-17 NOTE — PLAN OF CARE
Misha Marte Jr. has met all discharge criteria from Phase II. Vital Signs are stable, ambulating  without difficulty. Discharge instructions given, patient verbalized understanding. Discharged from facility via wheelchair in stable condition.

## 2023-05-17 NOTE — OP NOTE
DATE OF PROCEDURE: 05/17/2023    SURGEON: WENCESLAO VELARDE MD    PREOPERATIVE DIAGNOSIS:  1. Senile nuclear sclerotic cataract left eye. 2. Poor mydriasis secondary to floppy iris syndrome left eye    POSTOPERATIVE DIAGNOSIS: 1.Senile nuclear sclerotic cataract left eye. 2. Poor mydriasis secondary to floppy iris syndrome left eye    PROCEDURE PERFORMED:  Complex phacoemulsification with placement of intraocular lens, left eye, with placement of a Malyugin ring.    IMPLANT:  DIB00 22.5    ANESTHESIA:  Topical and MAC    COMPLICATIONS: none    ESTIMATED BLOOD LOSS: <1cc    SPECIMENS: none    INDICATIONS FOR PROCEDURE:   The patient has a history of painless progressive vision loss.  The patient has described difficulties with activities of daily living, which specifically include driving, which is secondary to cataract formation and progression. After we had a thorough discussion about risks, benefits, and alternatives to cataract surgery, the patient agreed to proceed with phacoemulsification and implantation of a lens in the left eye.  These risks include, but are not limited to, hemorrhage, pain, infection, need for additional surgery, need for glasses or contacts, loss of vision, or even loss of the eye.      PROCEDURE IN DETAIL:  The patient was met in the preop holding area.  Consent was confirmed to be signed.  The operative site was marked.  The patient was brought into the operating room by the anesthesia team and placed under monitored anesthesia care.  The left eye was prepped and draped in a sterile ophthalmic fashion.  A Zohra speculum was placed into the left eye.   A paracentesis site was made and 1% preservative-free lidocaine was injected into the anterior chamber.  Viscoelastic  material was injected into the anterior chamber.  A keratome blade was used to make a clear corneal incision. The malyugin ring was inserted and positioned into place, assisting with pupillary dilation.  A cystotome was  used to initiate the continuous curvilinear capsulorrhexis which was completed with Utrata forceps.  BSS on a valle cannula was used to perform hydrodissection.  The phacoemulsification tip was introduced into the eye and the nucleus was removed in a standard divide-and-conquer fashion.  Remaining cortical material was removed from the eye using irrigation-aspiration.  The capsular bag was filled with viscoelastic material and the intraocular lens was injected and positioned into place. The Malyugin ring was removed from the eye. Remaining viscoelastic material was removed from the eye using irrigation and aspiration.  The corneal wounds were hydrated.  The eye was filled to physiologic pressure. The wounds were found to be watertight. Drops of Vigamox and prednisilone were placed into the eye.  The eye was washed, dried, and shielded.  The patient tolerated the procedure well and knows to follow up with me tomorrow morning, sooner if needed.

## 2023-05-17 NOTE — ANESTHESIA POSTPROCEDURE EVALUATION
Anesthesia Post Evaluation    Patient: Misha Marte Jr.    Procedure(s) Performed: Procedure(s) (LRB):  EXTRACTION, CATARACT, WITH IOL INSERTION (Left)    Final Anesthesia Type: MAC      Patient location during evaluation: PACU  Patient participation: Yes- Able to Participate  Level of consciousness: awake and alert  Post-procedure vital signs: reviewed and stable  Pain management: adequate  Airway patency: patent    PONV status at discharge: No PONV  Anesthetic complications: no      Cardiovascular status: blood pressure returned to baseline  Respiratory status: unassisted, spontaneous ventilation and room air  Hydration status: euvolemic  Follow-up not needed.          Vitals Value Taken Time   /65 05/17/23 1145   Temp 36.8 °C (98.2 °F) 05/17/23 1130   Pulse 59 05/17/23 1145   Resp 18 05/17/23 1130   SpO2 97 % 05/17/23 1145         No case tracking events are documented in the log.      Pain/Raymond Score: Pain Rating Prior to Med Admin: 0 (5/17/2023 11:30 AM)  Pain Rating Post Med Admin: 0 (5/17/2023 11:30 AM)  Raymond Score: 10 (5/17/2023 11:45 AM)

## 2023-05-17 NOTE — DISCHARGE INSTRUCTIONS
Dr. Benavides     Cataract Post-Operative Instructions       Day of surgery:     -Resume drops THREE times daily into the operative eye.     -Do not rub your eye     -Wear protective sunglasses during the day.     -Resume moderate activity.     -Bathe/shower/wash face normally     -Do not apply makeup around the operative eye for 1 week.     -You should expect:     Blurry vision and halos for 24-48 hours     Dilated pupil for 24-48 hours     Scratchy feeling in the eye for 1-2 days     Curved shadow in your peripheral vision for 2-3 weeks     Occasional flickering of lights for up to 1 week     -If you experience severe pain or nausea, call Dr. Benavides or the on-call doctor at [phone].       Plan to see Dr. Benavides tomorrow at:     [facility] [address]     10TH FLOOR     [facility] [address]     **Most patients can drive the next morning.  If you do not feel comfortable driving, please arrange for transportation. **   Anesthesia: Monitored Anesthesia Care (MAC)    Anesthesia Safety  Have an adult family member or friend drive you home after the procedure.  For the first 24 hours after your surgery:  Do not drive or use heavy equipment.  Do not make important decisions or sign documents.  Avoid alcohol.  Have someone stay with you, if possible. They can watch for problems and help keep you safe.

## 2023-05-18 ENCOUNTER — OFFICE VISIT (OUTPATIENT)
Dept: OPHTHALMOLOGY | Facility: CLINIC | Age: 88
End: 2023-05-18
Payer: MEDICARE

## 2023-05-18 DIAGNOSIS — Z96.1 STATUS POST CATARACT EXTRACTION AND INSERTION OF INTRAOCULAR LENS, LEFT: Primary | ICD-10-CM

## 2023-05-18 DIAGNOSIS — Z98.42 STATUS POST CATARACT EXTRACTION AND INSERTION OF INTRAOCULAR LENS, LEFT: Primary | ICD-10-CM

## 2023-05-18 PROCEDURE — 99999 PR PBB SHADOW E&M-EST. PATIENT-LVL III: ICD-10-PCS | Mod: PBBFAC,,, | Performed by: OPHTHALMOLOGY

## 2023-05-18 PROCEDURE — 99024 POSTOP FOLLOW-UP VISIT: CPT | Mod: POP,,, | Performed by: OPHTHALMOLOGY

## 2023-05-18 PROCEDURE — 99213 OFFICE O/P EST LOW 20 MIN: CPT | Mod: PBBFAC | Performed by: OPHTHALMOLOGY

## 2023-05-18 PROCEDURE — 99999 PR PBB SHADOW E&M-EST. PATIENT-LVL III: CPT | Mod: PBBFAC,,, | Performed by: OPHTHALMOLOGY

## 2023-05-18 PROCEDURE — 99024 PR POST-OP FOLLOW-UP VISIT: ICD-10-PCS | Mod: POP,,, | Performed by: OPHTHALMOLOGY

## 2023-05-21 PROCEDURE — G0179 PR HOME HEALTH MD RECERTIFICATION: ICD-10-PCS | Mod: ,,, | Performed by: INTERNAL MEDICINE

## 2023-05-21 PROCEDURE — G0179 MD RECERTIFICATION HHA PT: HCPCS | Mod: ,,, | Performed by: INTERNAL MEDICINE

## 2023-05-26 ENCOUNTER — OFFICE VISIT (OUTPATIENT)
Dept: OPHTHALMOLOGY | Facility: CLINIC | Age: 88
End: 2023-05-26
Payer: MEDICARE

## 2023-05-26 DIAGNOSIS — H25.11 NUCLEAR SCLEROTIC CATARACT OF RIGHT EYE: ICD-10-CM

## 2023-05-26 DIAGNOSIS — Z98.42 STATUS POST CATARACT EXTRACTION AND INSERTION OF INTRAOCULAR LENS, LEFT: Primary | ICD-10-CM

## 2023-05-26 DIAGNOSIS — Z96.1 STATUS POST CATARACT EXTRACTION AND INSERTION OF INTRAOCULAR LENS, LEFT: Primary | ICD-10-CM

## 2023-05-26 PROCEDURE — 99024 PR POST-OP FOLLOW-UP VISIT: ICD-10-PCS | Mod: POP,,, | Performed by: OPHTHALMOLOGY

## 2023-05-26 PROCEDURE — 99024 POSTOP FOLLOW-UP VISIT: CPT | Mod: POP,,, | Performed by: OPHTHALMOLOGY

## 2023-05-26 PROCEDURE — 92136 IOL MASTER - OD - RIGHT EYE: ICD-10-PCS | Mod: 26,S$PBB,RT, | Performed by: OPHTHALMOLOGY

## 2023-05-26 PROCEDURE — 92136 OPHTHALMIC BIOMETRY: CPT | Mod: PBBFAC,RT | Performed by: OPHTHALMOLOGY

## 2023-05-26 PROCEDURE — 99999 PR PBB SHADOW E&M-EST. PATIENT-LVL III: ICD-10-PCS | Mod: PBBFAC,,, | Performed by: OPHTHALMOLOGY

## 2023-05-26 PROCEDURE — 99999 PR PBB SHADOW E&M-EST. PATIENT-LVL III: CPT | Mod: PBBFAC,,, | Performed by: OPHTHALMOLOGY

## 2023-05-26 PROCEDURE — 99213 OFFICE O/P EST LOW 20 MIN: CPT | Mod: PBBFAC | Performed by: OPHTHALMOLOGY

## 2023-05-26 NOTE — PROGRESS NOTES
HPI    Dr. Cueva/ [place]    S/p PCIOL OS - 5/17/2023  Advanced atrophic nonexudative age-related macular degeneration of both   eyes with subfoveal involvement   Vitreous degeneration of both eyes   Age-related nuclear cataract of both eyes   PXE     Eye Meds: Combo TID OS    Pt comes in for a 1 wk post op. Vision is ok.     Pt denies eye pain, floaters and flashes.       Last edited by Bernadette Benavides MD on 5/26/2023  3:02 PM.            Assessment /Plan     For exam results, see Encounter Report.    Status post cataract extraction and insertion of intraocular lens, left    Nuclear sclerotic cataract of right eye  -     IOL Master - OD - Right Eye      PO week #1 s/p phaco/IOL -    - doing well, no issues    Continue combo drops for a total of 1 month versus: d/c abx gtt, continue PF/ketorolocTID for total of 1 month    Visually significant nuclear sclerotic cataract   - Interfering with activities of daily living.  Pt desires cataract surgery for Va rehabilitation.   - R/B/A discussed and pt agrees to proceed with surgery.   - IOL options discussed according to patient's goals and concomitant ocular pathology; and pt content with monofocal lens.    - Target: plano.       (Diboo 22.0 OD)  Ring  vb    Pt understands limited BCVA OU in light of AMD    Advanced atrophic nonexudative age-related macular degeneration of both eyes with subfoveal involvement    Vitreous degeneration of both eyes      PXE                        This is 72 y/o male w/ PMHx of HTN, liver cirrhosis 2/2 EtOH abuse states he quit drinking 5 years ago, GERD, hypothyroidism seen as Trauma Alert s/p +HT, -LOC, -AC.  Trauma assessment in ED: ABCs intact , GCS 15 , AAOx3. Patient is complaining of right shoulder pain and left wrist pain. Obvious deformity of left wrist. Patient states he had a mechanical trip and fall while taking out the trash. He was able to press his life alert and EMS came. He was unable to stand on his own but states he usually walks with a cane by himself at home.     Primary and secondary surveys were performed.    AAO x3  GCS 15.  Neuro intact.  Neck: no step-offs  Chest: clear.  CV : rrr  Abdomen: soft, nontender  Extr: Right shoulder pain and left wrist pain.    All images and labs were reviewed.    ASSESSMENT:  72 y/o male, S/P Fall.  Closed head injury.  Closed right proximal humerus fracture.  Closed left distal radius and ulna fracture.  Liver cirrhosis.    PLAN:  Patient was evaluated by Ortho - left wrist placed in cast; right arm in sling; recommended f/u as outpatient.  Patient was observed over several hours and remained hemodynamically and neurologically stable.  Further disposition as per ED.    This note reflects my exam and care of this patient on 12/16/2022

## 2023-05-29 ENCOUNTER — OFFICE VISIT (OUTPATIENT)
Dept: DERMATOLOGY | Facility: CLINIC | Age: 88
End: 2023-05-29
Payer: MEDICARE

## 2023-05-29 DIAGNOSIS — L57.0 AK (ACTINIC KERATOSIS): Primary | ICD-10-CM

## 2023-05-29 PROCEDURE — 17003 DESTRUCT PREMALG LES 2-14: CPT | Mod: PBBFAC | Performed by: DERMATOLOGY

## 2023-05-29 PROCEDURE — 99499 UNLISTED E&M SERVICE: CPT | Mod: S$PBB,,, | Performed by: DERMATOLOGY

## 2023-05-29 PROCEDURE — 17000 DESTRUCT PREMALG LESION: CPT | Mod: S$PBB,,, | Performed by: DERMATOLOGY

## 2023-05-29 PROCEDURE — 17003 DESTRUCTION, PREMALIGNANT LESIONS; SECOND THROUGH 14 LESIONS: ICD-10-PCS | Mod: S$PBB,,, | Performed by: DERMATOLOGY

## 2023-05-29 PROCEDURE — 99213 OFFICE O/P EST LOW 20 MIN: CPT | Mod: PBBFAC | Performed by: DERMATOLOGY

## 2023-05-29 PROCEDURE — 99999 PR PBB SHADOW E&M-EST. PATIENT-LVL III: ICD-10-PCS | Mod: PBBFAC,,, | Performed by: DERMATOLOGY

## 2023-05-29 PROCEDURE — 17000 DESTRUCT PREMALG LESION: CPT | Mod: PBBFAC | Performed by: DERMATOLOGY

## 2023-05-29 PROCEDURE — 17000 PR DESTRUCTION(LASER SURGERY,CRYOSURGERY,CHEMOSURGERY),PREMALIGNANT LESIONS,FIRST LESION: ICD-10-PCS | Mod: S$PBB,,, | Performed by: DERMATOLOGY

## 2023-05-29 PROCEDURE — 99499 NO LOS: ICD-10-PCS | Mod: S$PBB,,, | Performed by: DERMATOLOGY

## 2023-05-29 PROCEDURE — 99999 PR PBB SHADOW E&M-EST. PATIENT-LVL III: CPT | Mod: PBBFAC,,, | Performed by: DERMATOLOGY

## 2023-05-29 PROCEDURE — 17003 DESTRUCT PREMALG LES 2-14: CPT | Mod: S$PBB,,, | Performed by: DERMATOLOGY

## 2023-05-29 NOTE — PROGRESS NOTES
Subjective:      Patient ID:  Misha Marte Jr. is a 89 y.o. male who presents for   Chief Complaint   Patient presents with    Actinic Keratosis     Follow up     History of Present Illness: The patient presents for follow up of skin check.    The patient was last seen on: 12/2022 for cryosurgery to actinic keratoses which have resolved.     Other skin complaints: lesion on left cheek and scalp + rough      This is a high risk patient here to check for the development of new lesions.     Accompanied by niece    Review of Systems   Skin:  Positive for daily sunscreen use, activity-related sunscreen use and wears hat (all the time). Negative for recent sunburn.   Hematologic/Lymphatic: Bruises/bleeds easily (aspirin).        Baby aspirin daily     Objective:   Physical Exam   Constitutional: He appears well-developed and well-nourished. He is in a wheelchair.  No distress.   Neurological: He is alert and oriented to person, place, and time. He is not disoriented.   Psychiatric: He has a normal mood and affect.   Skin:   Areas Examined (abnormalities noted in diagram):   Scalp / Hair Palpated and Inspected  Head / Face Inspection Performed          Diagram Legend     Erythematous scaling macule/papule c/w actinic keratosis       Vascular papule c/w angioma      Pigmented verrucoid papule/plaque c/w seborrheic keratosis      Yellow umbilicated papule c/w sebaceous hyperplasia      Irregularly shaped tan macule c/w lentigo     1-2 mm smooth white papules consistent with Milia      Movable subcutaneous cyst with punctum c/w epidermal inclusion cyst      Subcutaneous movable cyst c/w pilar cyst      Firm pink to brown papule c/w dermatofibroma      Pedunculated fleshy papule(s) c/w skin tag(s)      Evenly pigmented macule c/w junctional nevus     Mildly variegated pigmented, slightly irregular-bordered macule c/w mildly atypical nevus      Flesh colored to evenly pigmented papule c/w intradermal nevus       Pink  pearly papule/plaque c/w basal cell carcinoma      Erythematous hyperkeratotic cursted plaque c/w SCC      Surgical scar with no sign of skin cancer recurrence      Open and closed comedones      Inflammatory papules and pustules      Verrucoid papule consistent consistent with wart     Erythematous eczematous patches and plaques     Dystrophic onycholytic nail with subungual debris c/w onychomycosis     Umbilicated papule    Erythematous-base heme-crusted tan verrucoid plaque consistent with inflamed seborrheic keratosis     Erythematous Silvery Scaling Plaque c/w Psoriasis     See annotation      Assessment / Plan:        AK (actinic keratosis)  Cryosurgery Procedure Note    Verbal consent from the patient is obtained including, but not limited to, risk of hypopigmentation/hyperpigmentation, scar, recurrence of lesion. The patient is aware of the precancerous quality and need for treatment of these lesions. Liquid nitrogen cryosurgery is applied to the 5 actinic keratoses, as detailed in the physical exam, to produce a freeze injury. The patient is aware that blisters may form and is instructed on wound care with gentle cleansing and use of vaseline ointment to keep moist until healed. The patient is supplied a handout on cryosurgery and is instructed to call if lesions do not completely resolve.              Follow up in about 1 year (around 5/29/2024).

## 2023-05-29 NOTE — PATIENT INSTRUCTIONS

## 2023-05-31 ENCOUNTER — TELEPHONE (OUTPATIENT)
Dept: OPHTHALMOLOGY | Facility: CLINIC | Age: 88
End: 2023-05-31
Payer: MEDICARE

## 2023-05-31 ENCOUNTER — PATIENT MESSAGE (OUTPATIENT)
Dept: SURGERY | Facility: HOSPITAL | Age: 88
End: 2023-05-31
Payer: MEDICARE

## 2023-06-01 ENCOUNTER — PATIENT MESSAGE (OUTPATIENT)
Dept: INTERNAL MEDICINE | Facility: CLINIC | Age: 88
End: 2023-06-01
Payer: MEDICARE

## 2023-06-02 ENCOUNTER — EXTERNAL HOME HEALTH (OUTPATIENT)
Dept: HOME HEALTH SERVICES | Facility: HOSPITAL | Age: 88
End: 2023-06-02
Payer: MEDICARE

## 2023-06-13 ENCOUNTER — DOCUMENT SCAN (OUTPATIENT)
Dept: HOME HEALTH SERVICES | Facility: HOSPITAL | Age: 88
End: 2023-06-13
Payer: MEDICARE

## 2023-06-14 ENCOUNTER — TELEPHONE (OUTPATIENT)
Dept: OPHTHALMOLOGY | Facility: CLINIC | Age: 88
End: 2023-06-14
Payer: MEDICARE

## 2023-06-17 NOTE — PROGRESS NOTES
HPI    HPI  Dr. Cueva/ [place]    S/p PCIOL OS - 5/17/2023    Eye Meds: Combo TID OS    Pt comes in for a 1 day post op. Vision is ok. Pt denies eye pain,   floaters and flashes.   Last edited by Romy Mckinnon MA on 5/18/2023  2:25 PM.            Assessment /Plan     For exam results, see Encounter Report.    Status post cataract extraction and insertion of intraocular lens, left      Slit Lamp Exam  L/L - normal  C/s - quiet  Cornea - clear  A/C - 1+ cell  Lens - PCIOL    POD #1 s/p phaco/IOL  - doing well  - continue the following drops:    vigamox or ocuflox TID x 1 wk then stop  Pred forte TID x  4 wks  Ketorolac TID until runs out    Versus:    Combination drop - 1 drop TID x total of 1 month    Appropriate precautions and post op medications reviewed.  Patient instructed to call or come in if symptoms of redness, decreased vision, or pain are experienced.    -f/up 1-2 wks, sooner PRN. Or 4 wks with optom for mrx if needed.

## 2023-06-18 NOTE — H&P
History    Chief complaint:  Painless progressive vision loss    Present Ilness/Diagnosis: Nuclear sclerotic Cataract    Past Medical History:  has a past medical history of Arthritis, Cataract, CHF (congestive heart failure), Digestive disorder, Heart murmur, Hyperlipidemia, Hypertension, Insomnia, Macular degeneration, and Skin cancer.    Family History/Social History: refer to chart    Allergies:   Review of patient's allergies indicates:   Allergen Reactions    Cortisone Other (See Comments)     hiccups    Dexamethasone Other (See Comments)     Constant hiccups for days    Codeine Other (See Comments)     Other reaction(s): Nausea       Current Medications: No current facility-administered medications for this encounter.    Current Outpatient Medications:     acetaminophen (TYLENOL) 500 MG tablet, Take 500-1,000 mg by mouth daily as needed for Pain., Disp: , Rfl:     ammonium lactate (AMLACTIN TOP), Apply topically. Apply topically daily as needed, Disp: , Rfl:     ascorbic acid, vitamin C, 250 mg Chew, 250 mg DAILY (route: oral), Disp: , Rfl:     aspirin 325 MG tablet, Take 1 tablet (325 mg total) by mouth once daily., Disp: 30 tablet, Rfl: 2    CALCIUM CARBONATE/VITAMIN D3 (CALCIUM 600 + D,3, ORAL), Take 1,200 mg by mouth once daily. , Disp: , Rfl:     carboxymethylcellulose sodium (REFRESH OPHT), Apply 1 drop to eye daily as needed., Disp: , Rfl:     ceramides 1,3,6-II (CERAVE) Lotn, Apply topically daily as needed, Disp: , Rfl:     cholecalciferol, vitamin D3, (VITAMIN D3 ORAL), Take 1 capsule by mouth once daily., Disp: , Rfl:     coenzyme Q10 10 mg capsule, Take 20 mg by mouth once daily., Disp: , Rfl:     esomeprazole (NEXIUM) 40 MG capsule, Take 1 capsule (40 mg total) by mouth once daily., Disp: 90 capsule, Rfl: 0    ferrous sulfate (FEOSOL) 325 mg (65 mg iron) Tab tablet, TAKE 1 TABLET EVERY MORNING ON AN EMPTY STOMACH WITH YOUR VITAMIN C, Disp: 90 tablet, Rfl: 1    furosemide (LASIX) 20 MG tablet,  Take 1 tablet (20 mg total) by mouth 2 (two) times daily as needed. One-2 daily or as directed, Disp: 180 tablet, Rfl: 3    losartan (COZAAR) 25 MG tablet, TAKE 1 TABLET EVERY DAY, Disp: 90 tablet, Rfl: 3    metoprolol succinate (TOPROL-XL) 25 MG 24 hr tablet, Take 1 tablet (25 mg total) by mouth once daily. (Patient taking differently: Take 12.5 mg by mouth once daily.), Disp: 90 tablet, Rfl: 3    multivitamin-minerals-lutein Tab, Take 1 tablet by mouth once daily. , Disp: , Rfl:     polyethylene glycol (GLYCOLAX) 17 gram PwPk, Take 17 g by mouth 2 (two) times daily as needed (Constipation)., Disp: , Rfl: 0    pravastatin (PRAVACHOL) 40 MG tablet, TAKE 1 TABLET EVERY DAY, Disp: 90 tablet, Rfl: 3    prednisolon/gatiflox/bromfenac (PREDNISOLONE-GATIFLOX-BROMFENAC) 1-0.5-0.09 % ophthalmic solution, Place 1 drop into the left eye 3 (three) times daily., Disp: 5 mL, Rfl: 3    prednisolon/gatiflox/bromfenac (PREDNISOLONE-GATIFLOX-BROMFENAC) 1-0.5-0.09 % ophthalmic solution, Place 1 drop into the left eye 3 (three) times daily., Disp: 5 mL, Rfl: 3    prednisolon/gatiflox/bromfenac (PREDNISOLONE-GATIFLOX-BROMFENAC) 1-0.5-0.09 % ophthalmic solution, Place 1 drop into the right eye 3 (three) times daily., Disp: 5 mL, Rfl: 3    turmeric root extract 500 mg Cap, Take 1 capsule by mouth once daily., Disp: , Rfl:     VIT A,C & E/LUTEIN/MINERALS (OCUVITE WITH LUTEIN ORAL), Take 1 capsule by mouth once daily., Disp: , Rfl:     Facility-Administered Medications Ordered in Other Encounters:     balanced salt irrigation intra-ocular solution 1 drop, 1 drop, Left Eye, On Call Procedure, Bernadette Benavides MD    phenylephrine HCL 10% ophthalmic solution 1 drop, 1 drop, Left Eye, PRN, Bernadette Benavides MD    sodium chloride 0.9% flush 2 mL, 2 mL, Intravenous, PRN, Bernadette Benavides MD    Physical Exam    BP: Vital signs stable  General: No apparent distress  HEENT: nuclear sclerotic cataract  Lungs: adequate respirations  Heart: +  pulses  Abdomen: soft  Rectal/pelvic: deferred    Impression: Visually significant Cataract.    See previous clinic notes for surgical indications.    Plan: Phacoemulsification with implantation of Intraocular lens

## 2023-06-19 ENCOUNTER — TELEPHONE (OUTPATIENT)
Dept: OPHTHALMOLOGY | Facility: CLINIC | Age: 88
End: 2023-06-19
Payer: MEDICARE

## 2023-06-19 NOTE — PRE-PROCEDURE INSTRUCTIONS
- Nothing to eat or drink after midinight, except AM meds with small sips of water  - Hold all Diabetic meds AM of surgery  - Hold all Insulin AM of surgery  - Hold all Fluid pills AM of surgery  - Hold all non-insulin shots until after surgery (7 days prior)  - Take all B/P meds, except those that contain a fluid pill  - Hold all vits and herbal meds AM of surgery  - Use inhalers as needed and bring AM of surgery  - Take blood thinner meds AM of surgery  - Use eye drops as directed  - Shower and wash face with dial soap for 3 mins PM prior and AM of surgery  - No powder, lotions, creams, (makeup),  or jewelry    - Wear comfortable clothing (button up shirt)    (Patients ride may not leave while patient is in surgery)    -- 2nd floor surgery ctr at [facility] @ [address] [facility] [place] [address]       Pt voiced understanding

## 2023-06-20 ENCOUNTER — ANESTHESIA EVENT (OUTPATIENT)
Dept: SURGERY | Facility: HOSPITAL | Age: 88
End: 2023-06-20
Payer: MEDICARE

## 2023-06-20 NOTE — ANESTHESIA PREPROCEDURE EVALUATION
06/20/2023  Misha Marte Jr. is a 89 y.o., male.      Pre-op Assessment    I have reviewed the Patient Summary Reports.    I have reviewed the NPO Status.   I have reviewed the Medications.     Review of Systems  Anesthesia Hx:  Denies Family Hx of Anesthesia complications.   Denies Personal Hx of Anesthesia complications.   Social:  Alcohol Use    Hematology/Oncology:         -- Anemia: --  Cancer in past history:  surgery  Oncology Comments: skin     EENT/Dental:   Eyes:   Cardiovascular:   Hypertension Valvular problems/Murmurs (s/p intervention 2022), AS Dysrhythmias (RBBB) CHF hyperlipidemia Great advanced age  Pulmonary HTN   Renal/:   Chronic Renal Disease, CKD    Musculoskeletal:   Arthritis     Neurological:   Neuromuscular Disease,    Psych:   insomnia      The left ventricle is normal in size with concentric remodeling and normal systolic function.   The estimated ejection fraction is 65%.   Grade II left ventricular diastolic dysfunction.   Normal right ventricular size with normal right ventricular systolic function.   Mild left atrial enlargement.   There is a 26 mm Medtronic EvolutR transcutaneously-placed aortic bioprosthesis present. There is mild paravalvular aortic insufficiency present.   The aortic valve mean gradient is 4 mmHg with a dimensionless index of 1.09.   Intermediate central venous pressure (8 mmHg).   The estimated PA systolic pressure is 57 mmHg.   There is moderate pulmonary hypertension.   The ascending aorta is mildly dilated.   Mild right atrial enlargement.   Mild mitral regurgitation.   Mild tricuspid regurgitation.  Heart murmur Hypertension   Insomnia Hyperlipidemia   Arthritis CHF (congestive heart failure)   Skin cancer Digestive disorder   Cataract Macular degeneration     Surgical History    APPENDECTOMY COLONOSCOPY   TONSILLECTOMY,  ADENOIDECTOMY CARPAL TUNNEL RELEASE   CORONARY ANGIOGRAPHY TRANSCATHETER AORTIC VALVE REPLACEMENT (TAVR)   LEFT HEART CATHETERIZATION CARDIAC CATH COSURGEON         Physical Exam  General: Well nourished, Cooperative, Alert and Oriented    Airway:  Mallampati: II   Mouth Opening: Normal        Anesthesia Plan  Type of Anesthesia, risks & benefits discussed:    Anesthesia Type: MAC  Intra-op Monitoring Plan: Standard ASA Monitors  Post Op Pain Control Plan: multimodal analgesia and IV/PO Opioids PRN  Induction:  IV  Informed Consent: Informed consent signed with the Patient and all parties understand the risks and agree with anesthesia plan.  All questions answered.   ASA Score: 4  Day of Surgery Review of History & Physical: H&P Update referred to the surgeon/provider.    Ready For Surgery From Anesthesia Perspective.     .

## 2023-06-21 ENCOUNTER — ANESTHESIA (OUTPATIENT)
Dept: SURGERY | Facility: HOSPITAL | Age: 88
End: 2023-06-21
Payer: MEDICARE

## 2023-06-21 ENCOUNTER — HOSPITAL ENCOUNTER (OUTPATIENT)
Facility: HOSPITAL | Age: 88
Discharge: HOME | End: 2023-06-21
Attending: OPHTHALMOLOGY | Admitting: OPHTHALMOLOGY
Payer: MEDICARE

## 2023-06-21 ENCOUNTER — TELEPHONE (OUTPATIENT)
Dept: OPHTHALMOLOGY | Facility: CLINIC | Age: 88
End: 2023-06-21
Payer: MEDICARE

## 2023-06-21 VITALS
WEIGHT: 178 LBS | TEMPERATURE: 98 F | DIASTOLIC BLOOD PRESSURE: 71 MMHG | BODY MASS INDEX: 24.92 KG/M2 | SYSTOLIC BLOOD PRESSURE: 166 MMHG | OXYGEN SATURATION: 98 % | HEART RATE: 61 BPM | HEIGHT: 71 IN | RESPIRATION RATE: 16 BRPM

## 2023-06-21 DIAGNOSIS — H25.10 AGE-RELATED NUCLEAR CATARACT: ICD-10-CM

## 2023-06-21 DIAGNOSIS — H25.13 NUCLEAR SCLEROSIS OF BOTH EYES: Primary | ICD-10-CM

## 2023-06-21 PROCEDURE — 37000008 HC ANESTHESIA 1ST 15 MINUTES: Performed by: OPHTHALMOLOGY

## 2023-06-21 PROCEDURE — D9220A PRA ANESTHESIA: ICD-10-PCS | Mod: ANES,,, | Performed by: ANESTHESIOLOGY

## 2023-06-21 PROCEDURE — 71000015 HC POSTOP RECOV 1ST HR: Performed by: OPHTHALMOLOGY

## 2023-06-21 PROCEDURE — D9220A PRA ANESTHESIA: ICD-10-PCS | Mod: CRNA,,, | Performed by: REGISTERED NURSE

## 2023-06-21 PROCEDURE — 66982 XCAPSL CTRC RMVL CPLX WO ECP: CPT | Mod: 79,RT,, | Performed by: OPHTHALMOLOGY

## 2023-06-21 PROCEDURE — 27201423 OPTIME MED/SURG SUP & DEVICES STERILE SUPPLY: Performed by: OPHTHALMOLOGY

## 2023-06-21 PROCEDURE — 25000003 PHARM REV CODE 250: Performed by: OPHTHALMOLOGY

## 2023-06-21 PROCEDURE — 37000009 HC ANESTHESIA EA ADD 15 MINS: Performed by: OPHTHALMOLOGY

## 2023-06-21 PROCEDURE — 63600175 PHARM REV CODE 636 W HCPCS: Performed by: REGISTERED NURSE

## 2023-06-21 PROCEDURE — 63600175 PHARM REV CODE 636 W HCPCS: Performed by: OPHTHALMOLOGY

## 2023-06-21 PROCEDURE — D9220A PRA ANESTHESIA: Mod: CRNA,,, | Performed by: REGISTERED NURSE

## 2023-06-21 PROCEDURE — 36000706: Performed by: OPHTHALMOLOGY

## 2023-06-21 PROCEDURE — 36000707: Performed by: OPHTHALMOLOGY

## 2023-06-21 PROCEDURE — 66982 PR REMOVAL, CATARACT, W/INSRT INTRAOC LENS, W/O ENDO CYCLO, CMPLX: ICD-10-PCS | Mod: 79,RT,, | Performed by: OPHTHALMOLOGY

## 2023-06-21 PROCEDURE — 99900035 HC TECH TIME PER 15 MIN (STAT)

## 2023-06-21 PROCEDURE — D9220A PRA ANESTHESIA: Mod: ANES,,, | Performed by: ANESTHESIOLOGY

## 2023-06-21 PROCEDURE — 94761 N-INVAS EAR/PLS OXIMETRY MLT: CPT

## 2023-06-21 PROCEDURE — V2632 POST CHMBR INTRAOCULAR LENS: HCPCS | Performed by: OPHTHALMOLOGY

## 2023-06-21 DEVICE — IMPLANTABLE DEVICE: Type: IMPLANTABLE DEVICE | Site: EYE | Status: FUNCTIONAL

## 2023-06-21 RX ORDER — CYCLOP/TROP/PROPA/PHEN/KET/WAT 1-1-0.1%
1 DROPS (EA) OPHTHALMIC (EYE)
Status: COMPLETED | OUTPATIENT
Start: 2023-06-21 | End: 2023-06-21

## 2023-06-21 RX ORDER — ASPIRIN 81 MG/1
81 TABLET ORAL DAILY
Status: ON HOLD | COMMUNITY
End: 2023-07-02 | Stop reason: HOSPADM

## 2023-06-21 RX ORDER — PHENYLEPHRINE HYDROCHLORIDE 100 MG/ML
1 SOLUTION/ DROPS OPHTHALMIC
Status: ACTIVE | OUTPATIENT
Start: 2023-06-21

## 2023-06-21 RX ORDER — LIDOCAINE HYDROCHLORIDE 10 MG/ML
INJECTION, SOLUTION EPIDURAL; INFILTRATION; INTRACAUDAL; PERINEURAL
Status: DISCONTINUED | OUTPATIENT
Start: 2023-06-21 | End: 2023-06-21 | Stop reason: HOSPADM

## 2023-06-21 RX ORDER — ACETAZOLAMIDE 500 MG/1
500 CAPSULE, EXTENDED RELEASE ORAL ONCE
Status: COMPLETED | OUTPATIENT
Start: 2023-06-21 | End: 2023-06-21

## 2023-06-21 RX ORDER — LIDOCAINE HYDROCHLORIDE 40 MG/ML
INJECTION, SOLUTION RETROBULBAR
Status: DISCONTINUED | OUTPATIENT
Start: 2023-06-21 | End: 2023-06-21 | Stop reason: HOSPADM

## 2023-06-21 RX ORDER — ACETAMINOPHEN 325 MG/1
650 TABLET ORAL EVERY 4 HOURS PRN
Status: DISCONTINUED | OUTPATIENT
Start: 2023-06-21 | End: 2023-06-21 | Stop reason: HOSPADM

## 2023-06-21 RX ORDER — MOXIFLOXACIN 5 MG/ML
SOLUTION/ DROPS OPHTHALMIC
Status: DISCONTINUED | OUTPATIENT
Start: 2023-06-21 | End: 2023-06-21 | Stop reason: HOSPADM

## 2023-06-21 RX ORDER — MOXIFLOXACIN 5 MG/ML
1 SOLUTION/ DROPS OPHTHALMIC
Status: COMPLETED | OUTPATIENT
Start: 2023-06-21 | End: 2023-06-21

## 2023-06-21 RX ORDER — PROPARACAINE HYDROCHLORIDE 5 MG/ML
1 SOLUTION/ DROPS OPHTHALMIC
Status: DISCONTINUED | OUTPATIENT
Start: 2023-06-21 | End: 2023-06-21 | Stop reason: HOSPADM

## 2023-06-21 RX ORDER — SODIUM CHLORIDE 0.9 % (FLUSH) 0.9 %
2 SYRINGE (ML) INJECTION
Status: ACTIVE | OUTPATIENT
Start: 2023-06-21

## 2023-06-21 RX ORDER — PREDNISOLONE ACETATE 10 MG/ML
SUSPENSION/ DROPS OPHTHALMIC
Status: DISCONTINUED | OUTPATIENT
Start: 2023-06-21 | End: 2023-06-21 | Stop reason: HOSPADM

## 2023-06-21 RX ORDER — TETRACAINE HYDROCHLORIDE 5 MG/ML
1 SOLUTION OPHTHALMIC
Status: COMPLETED | OUTPATIENT
Start: 2023-06-21 | End: 2023-06-21

## 2023-06-21 RX ORDER — DEXAMETHASONE SODIUM PHOSPHATE 4 MG/ML
INJECTION, SOLUTION INTRA-ARTICULAR; INTRALESIONAL; INTRAMUSCULAR; INTRAVENOUS; SOFT TISSUE
Status: DISCONTINUED | OUTPATIENT
Start: 2023-06-21 | End: 2023-06-21 | Stop reason: HOSPADM

## 2023-06-21 RX ORDER — FENTANYL CITRATE 50 UG/ML
INJECTION, SOLUTION INTRAMUSCULAR; INTRAVENOUS
Status: DISCONTINUED | OUTPATIENT
Start: 2023-06-21 | End: 2023-06-21

## 2023-06-21 RX ORDER — ACETAZOLAMIDE 250 MG/1
500 TABLET ORAL
Status: DISCONTINUED | OUTPATIENT
Start: 2023-06-21 | End: 2023-07-02 | Stop reason: HOSPADM

## 2023-06-21 RX ORDER — ACETAZOLAMIDE 500 MG/1
500 CAPSULE, EXTENDED RELEASE ORAL 2 TIMES DAILY
Qty: 10 CAPSULE | Refills: 3 | Status: SHIPPED | OUTPATIENT
Start: 2023-06-21 | End: 2023-09-26 | Stop reason: HOSPADM

## 2023-06-21 RX ORDER — CEFAZOLIN SODIUM 1 G/3ML
INJECTION, POWDER, FOR SOLUTION INTRAMUSCULAR; INTRAVENOUS
Status: DISCONTINUED | OUTPATIENT
Start: 2023-06-21 | End: 2023-06-21 | Stop reason: HOSPADM

## 2023-06-21 RX ADMIN — Medication 1 DROP: at 11:06

## 2023-06-21 RX ADMIN — FENTANYL CITRATE 25 MCG: 50 INJECTION, SOLUTION INTRAMUSCULAR; INTRAVENOUS at 11:06

## 2023-06-21 RX ADMIN — ACETAZOLAMIDE 500 MG: 500 CAPSULE ORAL at 12:06

## 2023-06-21 RX ADMIN — FENTANYL CITRATE 25 MCG: 50 INJECTION, SOLUTION INTRAMUSCULAR; INTRAVENOUS at 12:06

## 2023-06-21 RX ADMIN — MOXIFLOXACIN 1 DROP: 5 SOLUTION/ DROPS OPHTHALMIC at 12:06

## 2023-06-21 RX ADMIN — MOXIFLOXACIN OPHTHALMIC 1 DROP: 5 SOLUTION/ DROPS OPHTHALMIC at 11:06

## 2023-06-21 NOTE — PLAN OF CARE
Discharge instructions given and explained to patient and family with verbalization of understanding all instructions. Prescription given and explained next time and dose of medication. VSS, denies n/v and tolerating PO, rates pain level tolerable, IV removed, and family at bedside for patient discharge home.

## 2023-06-21 NOTE — DISCHARGE SUMMARY
[facility]  Discharge Note  Short Stay    Procedure(s) (LRB):  EXTRACTION, CATARACT, WITH IOL INSERTION (Right)  VITRECTOMY, ANTERIOR APPROACH (Right)    BRIEF DISCHARGE NOTE:    Date of discharge: 06/21/2023    Reason for hospitalization -  Cataract surgery     Final Diagnosis - Visually significant Cataract    Procedures and treatment provided - Status post phacoemulsification with placement of intraocular lens     Diet - Advance to regular as tolerated    Activity - as tolerated    Disposition at the end of the case - Good.    Discharge: to home    The patient tolerated the procedure well and knows to follow up with me tomorrow morning in the eye clinic, sooner if needed.    Patient and family instructions (as appropriate) - Given to patient on discharge    Bernadette Benavides MD

## 2023-06-21 NOTE — OP NOTE
DATE OF PROCEDURE: 06/21/2023    SURGEON: WENCESLAO VELARDE MD    PREOPERATIVE DIAGNOSIS:  Mature brunescent senile nuclear sclerotic cataract right eye.     POSTOPERATIVE DIAGNOSIS: Mature brunescent senile nuclear sclerotic cataract right eye.     PROCEDURE PERFORMED: 1. Complex phacoemulsification with placement of intraocular lens, right eye, with trypan blue  2.  Anterior vitrectomy right eye    IMPLANT: ma60ac 21.5    ANESTHESIA:  Topical and MAC    COMPLICATIONS: none    ESTIMATED BLOOD LOSS: <1cc    SPECIMENS: none    INDICATIONS FOR PROCEDURE:   The patient has a history of painless progressive vision loss.  The patient has described difficulties with activities of daily living, which specifically include driving, which is secondary to cataract formation and progression. After we had a thorough discussion about risks, benefits, and alternatives to cataract surgery, the patient agreed to proceed with phacoemulsification and implantation of a lens in the right eye.  These risks include, but are not limited to, hemorrhage, pain, infection, need for additional surgery, need for glasses or contacts, loss of vision, or even loss of the eye.    PROCEDURE IN DETAIL:  The patient was met in the preop holding area.  Consent was confirmed to be signed.  The operative site was marked.  The patient was brought into the operating room by the anesthesia team and placed under monitored anesthesia care.  The right eye was prepped and draped in a sterile ophthalmic fashion.  A Zohra speculum was placed into the right eye.   A paracentesis site was made and 1% preservative-free lidocaine was injected into the anterior chamber.  Trypan blue was then injected and allowed to sit for 1 minute.  Then BSS was used to wash out the trypan blue. Viscoelastic material was injected into the anterior chamber.  A keratome blade was used to make a clear corneal incision.  A cystotome was used to initiate the continuous curvilinear  capsulorrhexis which was completed with Utrata forceps.  BSS on a valle cannula was used to perform hydrodissection.  The phacoemulsification tip was introduced into the eye and the nucleus was removed in a standard divide-and-conquer fashion.  Remaining cortical material was removed from the eye using irrigation-aspiration.  At this time a small hole in the posterior capsule was appreciated and viscoat was injected to help tamponade the vitreous.  The main incision was closed with a 10.0 nylon and a second paracentesis was made.  An anterior vitrectomy was performed without complication.  The sulcus was filled with viscoelastic material and the intraocular lens was injected and positioned into the sulcus. Remaining viscoelastic material was removed from the eye using irrigation and aspiration.  Miochol was injected and the pupil constricted symmetrically. The corneal wounds were hydrated.  The eye was filled to physiologic pressure. The wounds were found to be watertight. Drops of Vigamox and prednisilone were placed into the eye.  The eye was washed, dried, and shielded.  The patient tolerated the procedure well and knows to follow up with me tomorrow morning, sooner if needed.

## 2023-06-21 NOTE — TRANSFER OF CARE
"Anesthesia Transfer of Care Note    Patient: Misha Marte Jr.    Procedure(s) Performed: Procedure(s) (LRB):  EXTRACTION, CATARACT, WITH IOL INSERTION (Right)  VITRECTOMY, ANTERIOR APPROACH (Right)    Patient location: PACU    Anesthesia Type: MAC    Transport from OR: Transported from OR on room air with adequate spontaneous ventilation    Post pain: adequate analgesia    Post assessment: no apparent anesthetic complications    Post vital signs: stable    Level of consciousness: awake, alert and oriented    Nausea/Vomiting: no nausea/vomiting    Complications: none    Transfer of care protocol was followed      Last vitals:   Visit Vitals  BP (!) 186/74 (BP Location: Right arm, Patient Position: Lying)   Pulse 66   Temp 36.4 °C (97.6 °F) (Tympanic)   Resp 18   Ht 5' 11" (1.803 m)   Wt 80.7 kg (178 lb)   SpO2 98%   BMI 24.83 kg/m²     "

## 2023-06-21 NOTE — DISCHARGE INSTRUCTIONS
Dr. Benavides     Cataract Post-Operative Instructions       Day of surgery:     -Resume drops THREE times daily into the operative eye.     -Do not rub your eye     -Wear protective sunglasses during the day.     -Resume moderate activity.     -Bathe/shower/wash face normally     -Do not apply makeup around the operative eye for 1 week.     -You should expect:     Blurry vision and halos for 24-48 hours     Dilated pupil for 24-48 hours     Scratchy feeling in the eye for 1-2 days     Curved shadow in your peripheral vision for 2-3 weeks     Occasional flickering of lights for up to 1 week     -If you experience severe pain or nausea, call Dr. Benavides or the on-call doctor at [phone].       Plan to see Dr. Benavides tomorrow at:     [facility] [address]     [address]LO[address]     [facility] [id number]     **Most patients can drive the next morning.  If you do not feel comfortable driving, please arrange for transportation. **

## 2023-06-21 NOTE — ANESTHESIA POSTPROCEDURE EVALUATION
Anesthesia Post Evaluation    Patient: Misha Marte Jr.    Procedure(s) Performed: Procedure(s) (LRB):  EXTRACTION, CATARACT, WITH IOL INSERTION (Right)  VITRECTOMY, ANTERIOR APPROACH (Right)    Final Anesthesia Type: MAC      Patient location during evaluation: PACU  Patient participation: Yes- Able to Participate  Level of consciousness: awake and alert  Post-procedure vital signs: reviewed and stable  Pain management: adequate  Airway patency: patent    PONV status at discharge: No PONV  Anesthetic complications: no      Cardiovascular status: blood pressure returned to baseline  Respiratory status: unassisted  Hydration status: euvolemic            Vitals Value Taken Time   /67 06/21/23 1247   Temp 36.4 °C (97.6 °F) 06/21/23 1238   Pulse 65 06/21/23 1248   Resp 16 06/21/23 1238   SpO2 98 % 06/21/23 1248   Vitals shown include unvalidated device data.      No case tracking events are documented in the log.      Pain/Raymond Score: Raymond Score: 10 (6/21/2023 12:38 PM)

## 2023-06-22 ENCOUNTER — OFFICE VISIT (OUTPATIENT)
Dept: OPHTHALMOLOGY | Facility: CLINIC | Age: 88
End: 2023-06-22
Payer: MEDICARE

## 2023-06-22 ENCOUNTER — PATIENT MESSAGE (OUTPATIENT)
Dept: OPHTHALMOLOGY | Facility: CLINIC | Age: 88
End: 2023-06-22
Payer: MEDICARE

## 2023-06-22 DIAGNOSIS — Z98.42 STATUS POST CATARACT EXTRACTION AND INSERTION OF INTRAOCULAR LENS, LEFT: ICD-10-CM

## 2023-06-22 DIAGNOSIS — Z96.1 STATUS POST CATARACT EXTRACTION AND INSERTION OF INTRAOCULAR LENS, RIGHT: Primary | ICD-10-CM

## 2023-06-22 DIAGNOSIS — Z96.1 STATUS POST CATARACT EXTRACTION AND INSERTION OF INTRAOCULAR LENS, LEFT: ICD-10-CM

## 2023-06-22 DIAGNOSIS — Z98.41 STATUS POST CATARACT EXTRACTION AND INSERTION OF INTRAOCULAR LENS, RIGHT: Primary | ICD-10-CM

## 2023-06-22 PROCEDURE — 99024 PR POST-OP FOLLOW-UP VISIT: ICD-10-PCS | Mod: POP,,, | Performed by: OPHTHALMOLOGY

## 2023-06-22 PROCEDURE — 99999 PR PBB SHADOW E&M-EST. PATIENT-LVL III: ICD-10-PCS | Mod: PBBFAC,,, | Performed by: OPHTHALMOLOGY

## 2023-06-22 PROCEDURE — 99999 PR PBB SHADOW E&M-EST. PATIENT-LVL III: CPT | Mod: PBBFAC,,, | Performed by: OPHTHALMOLOGY

## 2023-06-22 PROCEDURE — 99213 OFFICE O/P EST LOW 20 MIN: CPT | Mod: PBBFAC | Performed by: OPHTHALMOLOGY

## 2023-06-22 PROCEDURE — 99024 POSTOP FOLLOW-UP VISIT: CPT | Mod: POP,,, | Performed by: OPHTHALMOLOGY

## 2023-06-22 NOTE — PROGRESS NOTES
HPI    Dr. Cueva/ [place]    S/p PCIOL OS - 5/17/2023  S/P PCIOL OD 6/21/2023  Advanced atrophic nonexudative age-related macular degeneration of both   eyes with subfoveal involvement   Vitreous degeneration of both eyes   Age-related nuclear cataract of both eyes   PXE     Eye Meds: Combo TID OD    Patient is here for 1 day po phaco/IOL OD.  He feels like vision has   improved and it is brighter.  Colors are deeper.        Last edited by Mariah Suárez on 6/22/2023 12:49 PM.            Assessment /Plan     For exam results, see Encounter Report.    Status post cataract extraction and insertion of intraocular lens, right    Status post cataract extraction and insertion of intraocular lens, left      Slit Lamp Exam  L/L - normal  C/s - quiet  Cornea - clear  A/C - 1+ cell, pupil round, no vit in a/c  Lens - PCIOL    POD #1 s/p phaco/IOL  - doing well  - continue the following drops:    vigamox or ocuflox TID x 1 wk then stop  Pred forte TID x  4 wks  Ketorolac TID until runs out    Versus:    Combination drop - 1 drop TID x total of 1 month    Appropriate precautions and post op medications reviewed.  Patient instructed to call or come in if symptoms of redness, decreased vision, or pain are experienced.    -f/up 1-2 wks, sooner PRN. Or 4 wks with optom for mrx if needed.    Va/iop ou

## 2023-06-25 ENCOUNTER — PATIENT MESSAGE (OUTPATIENT)
Dept: OPHTHALMOLOGY | Facility: CLINIC | Age: 88
End: 2023-06-25
Payer: MEDICARE

## 2023-06-27 NOTE — TELEPHONE ENCOUNTER
Called and spoke to pt to see how he was feeling - he says he's feeling better today.  Encouraged to eat a good diet and stay hydrated.  Pt reports Va improving as well.  Will see pt in clinic in 2 days

## 2023-06-29 ENCOUNTER — PATIENT MESSAGE (OUTPATIENT)
Dept: INTERNAL MEDICINE | Facility: CLINIC | Age: 88
End: 2023-06-29
Payer: MEDICARE

## 2023-06-29 ENCOUNTER — OFFICE VISIT (OUTPATIENT)
Dept: OPHTHALMOLOGY | Facility: CLINIC | Age: 88
End: 2023-06-29
Payer: MEDICARE

## 2023-06-29 DIAGNOSIS — H35.3134 ADVANCED ATROPHIC NONEXUDATIVE AGE-RELATED MACULAR DEGENERATION OF BOTH EYES WITH SUBFOVEAL INVOLVEMENT: ICD-10-CM

## 2023-06-29 DIAGNOSIS — Z96.1 STATUS POST CATARACT EXTRACTION AND INSERTION OF INTRAOCULAR LENS, LEFT: ICD-10-CM

## 2023-06-29 DIAGNOSIS — Z96.1 STATUS POST CATARACT EXTRACTION AND INSERTION OF INTRAOCULAR LENS, RIGHT: Primary | ICD-10-CM

## 2023-06-29 DIAGNOSIS — Z98.41 STATUS POST CATARACT EXTRACTION AND INSERTION OF INTRAOCULAR LENS, RIGHT: Primary | ICD-10-CM

## 2023-06-29 DIAGNOSIS — Z98.42 STATUS POST CATARACT EXTRACTION AND INSERTION OF INTRAOCULAR LENS, LEFT: ICD-10-CM

## 2023-06-29 PROCEDURE — 99024 PR POST-OP FOLLOW-UP VISIT: ICD-10-PCS | Mod: POP,,, | Performed by: OPHTHALMOLOGY

## 2023-06-29 PROCEDURE — 99213 OFFICE O/P EST LOW 20 MIN: CPT | Mod: PBBFAC | Performed by: OPHTHALMOLOGY

## 2023-06-29 PROCEDURE — 99999 PR PBB SHADOW E&M-EST. PATIENT-LVL III: CPT | Mod: PBBFAC,,, | Performed by: OPHTHALMOLOGY

## 2023-06-29 PROCEDURE — 99024 POSTOP FOLLOW-UP VISIT: CPT | Mod: POP,,, | Performed by: OPHTHALMOLOGY

## 2023-06-29 PROCEDURE — 99999 PR PBB SHADOW E&M-EST. PATIENT-LVL III: ICD-10-PCS | Mod: PBBFAC,,, | Performed by: OPHTHALMOLOGY

## 2023-06-29 NOTE — PROGRESS NOTES
HPI    Dr. Cueva/ [place]    S/p PCIOL OS - 5/17/2023  S/P PCIOL OD 6/21/2023  Advanced atrophic nonexudative age-related macular degeneration of both   eyes with subfoveal involvement   Vitreous degeneration of both eyes   Age-related nuclear cataract of both eyes   PXE     Eye Meds: Combo TID OD    Patient is here for 1 week po phaco/IOL OD.  Patient states he is seeing   much better all the way around. Colors are more vivid. Patient states he   was taking pills for is eye pressure and he had adverse affects.   (Weakness, can not walk very far or stand very long).       Last edited by Siena Lennon on 6/29/2023  2:56 PM.            Assessment /Plan     For exam results, see Encounter Report.    Status post cataract extraction and insertion of intraocular lens, right    Status post cataract extraction and insertion of intraocular lens, left    Advanced atrophic nonexudative age-related macular degeneration of both eyes with subfoveal involvement      S/p PCIOL OS - 5/17/2023  S/P PCIOL OD 6/21/2023    Doing well and pt notes Va improvement    IOP wnl OU    PO week #1 s/p phaco/IOL - OD    - doing well, no issues    Continue combo drops for a total of 1 month versus: d/c abx gtt, continue PF/ketorolocTID for total of 1 month    - f/up 3-4 wks for MRx, DFE with optom, sooner PRN.    Pt was on diamox post operatively x 4 days for prevention of elevated IOP and stopped after feeling very weak.  Pt says he's feeling better now but still unable to get around like he used to.    Will reach out to pt's PCP to see if he can see him next wk for CMP and eval.

## 2023-06-30 ENCOUNTER — TELEPHONE (OUTPATIENT)
Dept: INTERNAL MEDICINE | Facility: CLINIC | Age: 88
End: 2023-06-30
Payer: MEDICARE

## 2023-06-30 DIAGNOSIS — R53.1 WEAKNESS: Primary | ICD-10-CM

## 2023-06-30 DIAGNOSIS — R60.0 BILATERAL LOWER EXTREMITY EDEMA: ICD-10-CM

## 2023-06-30 NOTE — TELEPHONE ENCOUNTER
----- Message from Ike Ivan MD sent at 6/30/2023  2:42 PM CDT -----  Regarding: Blood tests  Please schedule a follow up with me on July 5th. Please schedule a CBC and CMP in [facility]. Thanks.  ----- Message -----  From: Sangeetha Ma MA  Sent: 6/30/2023   8:25 AM CDT  To: Ike Ivan MD  Subject: Please see note below -Sangeetha                       ----- Message -----  From: Bernadette Benavides MD  Sent: 6/29/2023   4:50 PM CDT  To: Ike Ivan MD, [facility] Staff    Mario Albertoy Dr. Ivan - hope you are well!    I did cataract surgery on this gosia gent.  He had massively dense cataracts in both eyes.  His left eye went well, his right eye was a little complicated but all in all looks great now, 1 wk out.     I did put him on diamox 500 mg BID post operatively to prevent eye pressure spikes.  He took it for 4 days and stopped after feeling weak. His wife is concerned, understandably.    He reports feeling okay, just not getting around as well as before.  He notes improvement in his vision.    His wife (Pat) has an appt with you next wk, she was wondering if Misha could see you instead?  And I mentioned you may want to look at his electrolytes and other blood work - if that is the case, could they get it done at [facility]?    LMK if this is okay or feel free to have your staff reach out to his wife.    Thank you, Bernadette

## 2023-06-30 NOTE — TELEPHONE ENCOUNTER
Spoke w/ pt pt understood. Pt apt scheduled for apt and labs. Pt wife also wanted me to let you know pt is on his way to [facility]. To w/o Dehydration.

## 2023-07-01 PROBLEM — I26.99 PULMONARY EMBOLISM: Status: ACTIVE | Noted: 2023-07-01

## 2023-07-01 PROBLEM — J90 PLEURAL EFFUSION ON RIGHT: Status: ACTIVE | Noted: 2023-07-01

## 2023-07-01 PROBLEM — I50.32 CHRONIC HEART FAILURE WITH PRESERVED EJECTION FRACTION (HFPEF): Status: ACTIVE | Noted: 2023-07-01

## 2023-07-01 PROBLEM — R53.1 WEAKNESS: Status: ACTIVE | Noted: 2023-07-01

## 2023-07-01 PROBLEM — R79.89 ELEVATED TROPONIN: Status: ACTIVE | Noted: 2023-07-01

## 2023-07-02 PROBLEM — R79.89 ELEVATED TROPONIN: Status: RESOLVED | Noted: 2023-07-01 | Resolved: 2023-07-02

## 2023-07-02 PROBLEM — I47.29 NSVT (NONSUSTAINED VENTRICULAR TACHYCARDIA): Status: ACTIVE | Noted: 2023-07-02

## 2023-07-03 ENCOUNTER — PATIENT OUTREACH (OUTPATIENT)
Dept: ADMINISTRATIVE | Facility: CLINIC | Age: 88
End: 2023-07-03
Payer: MEDICARE

## 2023-07-03 NOTE — PROGRESS NOTES
C3 nurse spoke with Misha Marte Jr. for a TCC post hospital discharge follow up call. The patient has a scheduled HOSFU appointment with Ike Ivan MD on 07/05/23 @ 9128.

## 2023-07-05 ENCOUNTER — OFFICE VISIT (OUTPATIENT)
Dept: INTERNAL MEDICINE | Facility: CLINIC | Age: 88
End: 2023-07-05
Payer: MEDICARE

## 2023-07-05 ENCOUNTER — LAB VISIT (OUTPATIENT)
Dept: LAB | Facility: HOSPITAL | Age: 88
End: 2023-07-05
Attending: INTERNAL MEDICINE
Payer: MEDICARE

## 2023-07-05 VITALS
BODY MASS INDEX: 25.06 KG/M2 | DIASTOLIC BLOOD PRESSURE: 72 MMHG | OXYGEN SATURATION: 98 % | HEART RATE: 69 BPM | HEIGHT: 71 IN | SYSTOLIC BLOOD PRESSURE: 116 MMHG

## 2023-07-05 DIAGNOSIS — E78.5 HYPERLIPIDEMIA, UNSPECIFIED HYPERLIPIDEMIA TYPE: ICD-10-CM

## 2023-07-05 DIAGNOSIS — Z95.2 S/P TAVR (TRANSCATHETER AORTIC VALVE REPLACEMENT): ICD-10-CM

## 2023-07-05 DIAGNOSIS — R60.0 BILATERAL LOWER EXTREMITY EDEMA: ICD-10-CM

## 2023-07-05 DIAGNOSIS — R53.1 WEAKNESS: ICD-10-CM

## 2023-07-05 DIAGNOSIS — I10 ESSENTIAL HYPERTENSION: ICD-10-CM

## 2023-07-05 DIAGNOSIS — I26.99 ACUTE PULMONARY EMBOLISM, UNSPECIFIED PULMONARY EMBOLISM TYPE, UNSPECIFIED WHETHER ACUTE COR PULMONALE PRESENT: Primary | ICD-10-CM

## 2023-07-05 LAB
ALBUMIN SERPL BCP-MCNC: 3.6 G/DL (ref 3.5–5.2)
ALP SERPL-CCNC: 42 U/L (ref 55–135)
ALT SERPL W/O P-5'-P-CCNC: 33 U/L (ref 10–44)
ANION GAP SERPL CALC-SCNC: 9 MMOL/L (ref 8–16)
AST SERPL-CCNC: 45 U/L (ref 10–40)
BASOPHILS # BLD AUTO: 0.11 K/UL (ref 0–0.2)
BASOPHILS NFR BLD: 1.7 % (ref 0–1.9)
BILIRUB SERPL-MCNC: 0.8 MG/DL (ref 0.1–1)
BUN SERPL-MCNC: 20 MG/DL (ref 10–30)
CALCIUM SERPL-MCNC: 8.7 MG/DL (ref 8.7–10.5)
CHLORIDE SERPL-SCNC: 105 MMOL/L (ref 95–110)
CO2 SERPL-SCNC: 24 MMOL/L (ref 23–29)
CREAT SERPL-MCNC: 1.3 MG/DL (ref 0.5–1.4)
DIFFERENTIAL METHOD: ABNORMAL
EOSINOPHIL # BLD AUTO: 0.2 K/UL (ref 0–0.5)
EOSINOPHIL NFR BLD: 2.4 % (ref 0–8)
ERYTHROCYTE [DISTWIDTH] IN BLOOD BY AUTOMATED COUNT: 14 % (ref 11.5–14.5)
EST. GFR  (NO RACE VARIABLE): 50.9 ML/MIN/1.73 M^2
GLUCOSE SERPL-MCNC: 92 MG/DL (ref 70–110)
HCT VFR BLD AUTO: 36.6 % (ref 40–54)
HGB BLD-MCNC: 12.2 G/DL (ref 14–18)
IMM GRANULOCYTES # BLD AUTO: 0.09 K/UL (ref 0–0.04)
IMM GRANULOCYTES NFR BLD AUTO: 1.4 % (ref 0–0.5)
LYMPHOCYTES # BLD AUTO: 0.8 K/UL (ref 1–4.8)
LYMPHOCYTES NFR BLD: 12.5 % (ref 18–48)
MCH RBC QN AUTO: 31.9 PG (ref 27–31)
MCHC RBC AUTO-ENTMCNC: 33.3 G/DL (ref 32–36)
MCV RBC AUTO: 96 FL (ref 82–98)
MONOCYTES # BLD AUTO: 0.7 K/UL (ref 0.3–1)
MONOCYTES NFR BLD: 10 % (ref 4–15)
NEUTROPHILS # BLD AUTO: 4.8 K/UL (ref 1.8–7.7)
NEUTROPHILS NFR BLD: 72 % (ref 38–73)
NRBC BLD-RTO: 0 /100 WBC
PLATELET # BLD AUTO: 225 K/UL (ref 150–450)
PMV BLD AUTO: 10.5 FL (ref 9.2–12.9)
POTASSIUM SERPL-SCNC: 4.1 MMOL/L (ref 3.5–5.1)
PROT SERPL-MCNC: 6.7 G/DL (ref 6–8.4)
RBC # BLD AUTO: 3.82 M/UL (ref 4.6–6.2)
SODIUM SERPL-SCNC: 138 MMOL/L (ref 136–145)
WBC # BLD AUTO: 6.62 K/UL (ref 3.9–12.7)

## 2023-07-05 PROCEDURE — 99214 OFFICE O/P EST MOD 30 MIN: CPT | Mod: PBBFAC | Performed by: INTERNAL MEDICINE

## 2023-07-05 PROCEDURE — 99999 PR PBB SHADOW E&M-EST. PATIENT-LVL IV: CPT | Mod: PBBFAC,,, | Performed by: INTERNAL MEDICINE

## 2023-07-05 PROCEDURE — 99215 PR OFFICE/OUTPT VISIT, EST, LEVL V, 40-54 MIN: ICD-10-PCS | Mod: S$PBB,,, | Performed by: INTERNAL MEDICINE

## 2023-07-05 PROCEDURE — 85025 COMPLETE CBC W/AUTO DIFF WBC: CPT | Performed by: INTERNAL MEDICINE

## 2023-07-05 PROCEDURE — 99999 PR PBB SHADOW E&M-EST. PATIENT-LVL IV: ICD-10-PCS | Mod: PBBFAC,,, | Performed by: INTERNAL MEDICINE

## 2023-07-05 PROCEDURE — 99215 OFFICE O/P EST HI 40 MIN: CPT | Mod: S$PBB,,, | Performed by: INTERNAL MEDICINE

## 2023-07-05 PROCEDURE — 36415 COLL VENOUS BLD VENIPUNCTURE: CPT | Performed by: INTERNAL MEDICINE

## 2023-07-05 PROCEDURE — 80053 COMPREHEN METABOLIC PANEL: CPT | Performed by: INTERNAL MEDICINE

## 2023-07-05 NOTE — PROGRESS NOTES
CC: Hospital follow-up     HPI:  The patient is a 89-year-old male with aortic stenosis status post TAVR procedure, chronic diastolic heart failure, hypertension, CKD stage 3, vitamin-D deficiency and iron deficiency anemia who presents today as a follow-up for PE.  The patient presented to the emergency room at [facility] with complaints generalized weakness for 5 days.  He had been placed Diamox for elevated pressure is high following cataract lens replacement.  When seen in the emergency department, a CT scan showed that he had a pulmonary embolism involving the right lower lobe and right upper lobe.  It also showed a large hiatal hernia, moderate right pleural effusion and gallstones.  EKG showed normal sinus rhythm.  The patient was placed on Eliquis and discharge.  He knows that he is almost back to baseline.  His wife indicates that he is not as strong as he professes.    ROS:  Weight is staying stable.  His vision is much improved status post lens replacement.  No chest pain.  No shortness of breath.  No nausea vomiting.  No abdominal pain.  His stools are dark from iron supplements.  He feels his strength is coming back.    Physical exam:   General appearance:  No acute distress   HEENT:  Conjunctiva is clear.  He has bilateral lens replacements.  He has hearing aids in place.  Nasal septum is midline without discharge.  Oropharynx without erythema.  Trachea is midline without JVD.    Pulmonary:  Good inspiratory, expiratory breath sounds are heard.  Lungs are clear to auscultation.    Cardiovascular:  S1-S2, rhythm is regular.  Extremities with 1+ ankle edema  GI: Patient normal bowel sounds   Comments:  Did discuss with the patient and his family about causes of PE.  Patient states he is very active at home.  His family indicates he does not drink very much fluid at home.    Assessment:  1.  Pulmonary embolism  2.  Status post TAVR procedure   3. Chronic diastolic heart failure  4. CKD stage  3  5.  Iron deficiency anemia     Plan:  1. The patient already has a CBC CMP and lab   2. The patient will follow-up in 1 month  3.  The patient increase his fluid intake 2 L a day.

## 2023-07-06 ENCOUNTER — PATIENT MESSAGE (OUTPATIENT)
Dept: CARDIOLOGY | Facility: CLINIC | Age: 88
End: 2023-07-06
Payer: MEDICARE

## 2023-07-11 ENCOUNTER — OFFICE VISIT (OUTPATIENT)
Dept: OTOLARYNGOLOGY | Facility: CLINIC | Age: 88
End: 2023-07-11
Payer: MEDICARE

## 2023-07-11 DIAGNOSIS — H61.23 IMPACTED CERUMEN, BILATERAL: Primary | ICD-10-CM

## 2023-07-11 DIAGNOSIS — H61.23 BILATERAL IMPACTED CERUMEN: ICD-10-CM

## 2023-07-11 PROCEDURE — 69210 REMOVE IMPACTED EAR WAX UNI: CPT | Mod: PBBFAC | Performed by: OTOLARYNGOLOGY

## 2023-07-11 PROCEDURE — 99499 UNLISTED E&M SERVICE: CPT | Mod: S$PBB,,, | Performed by: OTOLARYNGOLOGY

## 2023-07-11 PROCEDURE — 99499 NO LOS: ICD-10-PCS | Mod: S$PBB,,, | Performed by: OTOLARYNGOLOGY

## 2023-07-11 PROCEDURE — 69210 EAR CERUMEN REMOVAL: ICD-10-PCS | Mod: S$PBB,,, | Performed by: OTOLARYNGOLOGY

## 2023-07-11 NOTE — PROCEDURES
Ear Cerumen Removal    Date/Time: 7/11/2023 2:30 PM  Performed by: Harley Singleton MD  Authorized by: Harley Singleton MD     Consent Done?:  Yes (Verbal)  Location details:  Both ears  Procedure type: curette    Cerumen  Removal Results:  Cerumen completely removed

## 2023-07-13 ENCOUNTER — DOCUMENT SCAN (OUTPATIENT)
Dept: HOME HEALTH SERVICES | Facility: HOSPITAL | Age: 88
End: 2023-07-13
Payer: MEDICARE

## 2023-07-18 ENCOUNTER — TELEPHONE (OUTPATIENT)
Dept: CARDIOLOGY | Facility: CLINIC | Age: 88
End: 2023-07-18
Payer: MEDICARE

## 2023-07-18 ENCOUNTER — OFFICE VISIT (OUTPATIENT)
Dept: ENDOCRINOLOGY | Facility: CLINIC | Age: 88
End: 2023-07-18
Payer: MEDICARE

## 2023-07-18 VITALS
WEIGHT: 179 LBS | BODY MASS INDEX: 25.06 KG/M2 | OXYGEN SATURATION: 97 % | DIASTOLIC BLOOD PRESSURE: 70 MMHG | HEART RATE: 60 BPM | HEIGHT: 71 IN | SYSTOLIC BLOOD PRESSURE: 134 MMHG

## 2023-07-18 DIAGNOSIS — M81.0 AGE-RELATED OSTEOPOROSIS WITHOUT CURRENT PATHOLOGICAL FRACTURE: ICD-10-CM

## 2023-07-18 DIAGNOSIS — E55.9 VITAMIN D DEFICIENCY: ICD-10-CM

## 2023-07-18 PROCEDURE — 99214 OFFICE O/P EST MOD 30 MIN: CPT | Mod: S$PBB,,, | Performed by: INTERNAL MEDICINE

## 2023-07-18 PROCEDURE — 99999 PR PBB SHADOW E&M-EST. PATIENT-LVL IV: ICD-10-PCS | Mod: PBBFAC,,, | Performed by: INTERNAL MEDICINE

## 2023-07-18 PROCEDURE — 99999 PR PBB SHADOW E&M-EST. PATIENT-LVL IV: CPT | Mod: PBBFAC,,, | Performed by: INTERNAL MEDICINE

## 2023-07-18 PROCEDURE — 99214 PR OFFICE/OUTPT VISIT, EST, LEVL IV, 30-39 MIN: ICD-10-PCS | Mod: S$PBB,,, | Performed by: INTERNAL MEDICINE

## 2023-07-18 PROCEDURE — 99214 OFFICE O/P EST MOD 30 MIN: CPT | Mod: PBBFAC | Performed by: INTERNAL MEDICINE

## 2023-07-18 NOTE — Clinical Note
Dear Dr. White, Mr. Marte was admitted for PE a few weeks ago. Now on eliquis. He mentioned he needed a follow up appointment with you and wanted to send you a message. He is due sometime in September. Thank you,  SD

## 2023-07-18 NOTE — PROGRESS NOTES
"Subjective:      Patient ID: Misha Marte Jr. is a 89 y.o. male.    Chief Complaint:  Osteoporosis      History of Present Illness  Mr. Marte is a 89 year old gentleman with multinodular goiter, osteoporosis and vitamin D deficiency.     Admitted to [facility] for acute weakness, diagnosed with and treated for PE now on eliquis. Has home health twice weekly.   Improving but not back to baseline.   Denies falls   Unable to use walker in the bathroom. Had a near fall in the bathroom.   Now has railings.     For his osteoporosis, osteopenia with high FRAX calculation (TH -1.4 and FN -1.2) denies falls or fractures.   Last prolia injection 3/2023. Tolerated injection well, no muscle aches.    First injection 2/2018   Vitamin D levels 11/2020 --> 46    Last dxa scan 4/2022     Supplements:  Vitamin D 800 IUs daily   Calcium and D daily     Diet is pretty good, reports hunger. Chicken, fish, vegetables, milk shakes (twice a week)  [facility]      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 FNA was benign over ten years ago with benign cytology. Does not need regular f/u with US.   Denies obstructive symptoms.      Review of Systems  See HPI   Denies shortness of breath  Otherwise no recent URI symptoms    Objective:   Physical Exam  Cardiovascular:      Rate and Rhythm: Normal rate.   Pulmonary:      Effort: Pulmonary effort is normal. No respiratory distress.      Breath sounds: Normal breath sounds.   Musculoskeletal:         General: Swelling (2+ right > left to mid leg) present.   Skin:     General: Skin is warm.     Vitals:    07/18/23 1329   BP: 134/70   BP Location: Right arm   Patient Position: Sitting   BP Method: Medium (Manual)   Pulse: 60   SpO2: 97%   Weight: 81.2 kg (179 lb)   Height: 5' 11" (1.803 m)       BP Readings from Last 3 Encounters:   07/18/23 134/70   07/05/23 116/72   07/02/23 (!) 144/67     Wt Readings from Last " 1 Encounters:   07/18/23 1329 81.2 kg (179 lb)         Body mass index is 24.97 kg/m².    Lab Review:   Lab Results   Component Value Date    HGBA1C 5.4 03/14/2023     Lab Results   Component Value Date    CHOL 141 10/22/2022    HDL 58 10/22/2022    LDLCALC 69.2 10/22/2022    TRIG 69 10/22/2022    CHOLHDL 41.1 10/22/2022     Lab Results   Component Value Date     07/05/2023    K 4.1 07/05/2023     07/05/2023    CO2 24 07/05/2023    GLU 92 07/05/2023    BUN 20 07/05/2023    CREATININE 1.3 07/05/2023    CALCIUM 8.7 07/05/2023    PROT 6.7 07/05/2023    ALBUMIN 3.6 07/05/2023    BILITOT 0.8 07/05/2023    ALKPHOS 42 (L) 07/05/2023    AST 45 (H) 07/05/2023    ALT 33 07/05/2023    ANIONGAP 9 07/05/2023    ESTGFRAFRICA 59.9 (A) 03/22/2022    EGFRNONAA 51.8 (A) 03/22/2022    TSH 3.355 03/13/2023     Vitamin D 46 in 2020  FINDINGS:  Lumbar spine (L1-L4):              BMD is 1.331 g/cm2, T-score is 2.2, and Z-score is 3.5.     Total hip:                                BMD is 0.864 g/cm2, T-score is -1.1, and Z-score is 0.1.     Femoral neck:                          BMD is 0.788 g/cm2, T-score is -1.0, and Z-score is 0.7.     Distal 1/3 radius:                      Not applicable     FRAX:     11% risk of a major osteoporotic fracture in the next 10 years.     8% risk of hip fracture in the next 10 years.    Assessment and Plan     Age related osteoporosis  Risk factors: older age, high FRAX score at the hip, frailty    PLAN:  Continue PT and exercise   Encouraged use of walker  Bathroom has railings  Continue vitamin D and calcium supplements   Diet is good and weight is stable (maybe a little low)    DXA scan in 4/ 2024 after cardiac procedure.   Continue prolia every six months, now five years of use    OK to proceed with PRolia.     Vitamin D deficiency  Continue supplementation

## 2023-07-18 NOTE — ASSESSMENT & PLAN NOTE
Risk factors: older age, high FRAX score at the hip, frailty    PLAN:  Continue PT and exercise   Encouraged use of walker  Bathroom has railings  Continue vitamin D and calcium supplements   Diet is good and weight is stable (maybe a little low)    DXA scan in 4/ 2024 after cardiac procedure.   Continue prolia every six months, now five years of use    OK to proceed with PRolia.

## 2023-07-18 NOTE — TELEPHONE ENCOUNTER
Called pt and wife to see if they would accept virtual appt with Dr White  or appt with someone else but neither was available. Left msg with sitter - Will call again tomorrow.      MD Evelyn Dave MD; Elena Garcia MA; Sonia Glass RN  Yes I have a ton of patients now trying to get appointments getting messaged with many daily - we generally can get most in quickly to see someone , but many are asking just for me and I have been told no spots for a while,Chip           Previous Messages       ----- Message -----   From: Evelyn Beltran MD   Sent: 7/18/2023   2:11 PM CDT   To: Gregor White MD     Dear Dr. White, Mr. Marte was admitted for PE a few weeks ago. Now on eliquis. He mentioned he needed a follow up appointment with you and wanted to send you a message. He is due sometime in September. Thank you,   SD

## 2023-07-18 NOTE — TELEPHONE ENCOUNTER
----- Message from Gregor White MD sent at 7/18/2023  2:20 PM CDT -----  Yes I have a ton of patients now trying to get appointments getting messaged with many daily - we generally can get most in quickly to see someone , but many are asking just for me and I have been told no spots for a while,Chip  ----- Message -----  From: Evelyn Beltran MD  Sent: 7/18/2023   2:11 PM CDT  To: Gregor White MD    Dear Dr. White, Mr. Marte was admitted for PE a few weeks ago. Now on eliquis. He mentioned he needed a follow up appointment with you and wanted to send you a message. He is due sometime in September. Thank you,   SD

## 2023-07-19 ENCOUNTER — PATIENT MESSAGE (OUTPATIENT)
Dept: CARDIOLOGY | Facility: CLINIC | Age: 88
End: 2023-07-19
Payer: MEDICARE

## 2023-07-20 PROCEDURE — G0179 PR HOME HEALTH MD RECERTIFICATION: ICD-10-PCS | Mod: ,,, | Performed by: INTERNAL MEDICINE

## 2023-07-20 PROCEDURE — G0179 MD RECERTIFICATION HHA PT: HCPCS | Mod: ,,, | Performed by: INTERNAL MEDICINE

## 2023-07-28 ENCOUNTER — OFFICE VISIT (OUTPATIENT)
Dept: OPTOMETRY | Facility: CLINIC | Age: 88
End: 2023-07-28
Payer: MEDICARE

## 2023-07-28 DIAGNOSIS — Z98.41 STATUS POST CATARACT EXTRACTION OF BOTH EYES WITH INSERTION OF INTRAOCULAR LENS: Primary | ICD-10-CM

## 2023-07-28 DIAGNOSIS — Z96.1 STATUS POST CATARACT EXTRACTION OF BOTH EYES WITH INSERTION OF INTRAOCULAR LENS: Primary | ICD-10-CM

## 2023-07-28 DIAGNOSIS — Z98.42 STATUS POST CATARACT EXTRACTION OF BOTH EYES WITH INSERTION OF INTRAOCULAR LENS: Primary | ICD-10-CM

## 2023-07-28 PROCEDURE — 99213 OFFICE O/P EST LOW 20 MIN: CPT | Mod: PBBFAC | Performed by: OPTOMETRIST

## 2023-07-28 PROCEDURE — 99024 PR POST-OP FOLLOW-UP VISIT: ICD-10-PCS | Mod: POP,,, | Performed by: OPTOMETRIST

## 2023-07-28 PROCEDURE — 99024 POSTOP FOLLOW-UP VISIT: CPT | Mod: POP,,, | Performed by: OPTOMETRIST

## 2023-07-28 PROCEDURE — 99999 PR PBB SHADOW E&M-EST. PATIENT-LVL III: ICD-10-PCS | Mod: PBBFAC,,, | Performed by: OPTOMETRIST

## 2023-07-28 PROCEDURE — 99999 PR PBB SHADOW E&M-EST. PATIENT-LVL III: CPT | Mod: PBBFAC,,, | Performed by: OPTOMETRIST

## 2023-07-28 NOTE — PROGRESS NOTES
HPI    1 mo PO: S/P PCIOL OD - 6/21/2023, S/P PCIOL OS - 5/17/2023  Pt sts vision has improved tremendously and is happy with outcome. Fine   details have gotten better along with color definition. He still struggles   at times due to Mac Degen and could not read since several years ago but   overall he is happy. Denies any eye pain.   (-)Flashes (-)Floaters (-)Photophobia (-)Glare  POHx:  Advanced atrophic nonexudative age-related macular degeneration of both   eyes with subfoveal involvement   Vitreous degeneration of both eyes   Age-related nuclear cataract of both eyes   PXE   EYEMEDS:   Refresh BID   Last edited by Ginette Shirley, OD on 7/28/2023  3:38 PM.            Assessment /Plan     For exam results, see Encounter Report.    Status post cataract extraction of both eyes with insertion of intraocular lens      Educated on today's WNL findings OU. Eyes well healed from cataract surgery OU. Pt OK to discontinue post operative medications but to INCREASE Refresh use to TID OU.    Educated pt and pt's family members present today on Low Vision services offered by [facility]--pt declines referral at this time.    Pt to RTC for continued retinal care with Dr. Cueva -- sent message to his staff to get pt scheduled

## 2023-08-02 DIAGNOSIS — I35.0 NONRHEUMATIC AORTIC VALVE STENOSIS: ICD-10-CM

## 2023-08-02 RX ORDER — PRAVASTATIN SODIUM 40 MG/1
TABLET ORAL
Qty: 90 TABLET | Refills: 3 | Status: SHIPPED | OUTPATIENT
Start: 2023-08-02

## 2023-08-03 ENCOUNTER — DOCUMENT SCAN (OUTPATIENT)
Dept: HOME HEALTH SERVICES | Facility: HOSPITAL | Age: 88
End: 2023-08-03
Payer: MEDICARE

## 2023-08-08 ENCOUNTER — TELEPHONE (OUTPATIENT)
Dept: INTERNAL MEDICINE | Facility: CLINIC | Age: 88
End: 2023-08-08
Payer: MEDICARE

## 2023-08-08 NOTE — TELEPHONE ENCOUNTER
----- Message from Zehra Cordova sent at 8/8/2023 11:07 AM CDT -----  Contact: [phone] or [phone]  Pts wife is calling she is asking for leonard to call she states there was mix ups in her and the pts appts please advise and give return call

## 2023-08-10 ENCOUNTER — LAB VISIT (OUTPATIENT)
Dept: LAB | Facility: HOSPITAL | Age: 88
End: 2023-08-10
Attending: INTERNAL MEDICINE
Payer: MEDICARE

## 2023-08-10 ENCOUNTER — OFFICE VISIT (OUTPATIENT)
Dept: INTERNAL MEDICINE | Facility: CLINIC | Age: 88
End: 2023-08-10
Payer: MEDICARE

## 2023-08-10 VITALS
SYSTOLIC BLOOD PRESSURE: 124 MMHG | HEIGHT: 71 IN | DIASTOLIC BLOOD PRESSURE: 54 MMHG | WEIGHT: 176 LBS | BODY MASS INDEX: 24.64 KG/M2 | OXYGEN SATURATION: 98 % | HEART RATE: 66 BPM

## 2023-08-10 DIAGNOSIS — R60.0 BILATERAL LOWER EXTREMITY EDEMA: ICD-10-CM

## 2023-08-10 DIAGNOSIS — I26.99 ACUTE PULMONARY EMBOLISM, UNSPECIFIED PULMONARY EMBOLISM TYPE, UNSPECIFIED WHETHER ACUTE COR PULMONALE PRESENT: Primary | ICD-10-CM

## 2023-08-10 DIAGNOSIS — Z79.01 ANTICOAGULATED: ICD-10-CM

## 2023-08-10 DIAGNOSIS — Z95.2 S/P TAVR (TRANSCATHETER AORTIC VALVE REPLACEMENT): ICD-10-CM

## 2023-08-10 DIAGNOSIS — I10 ESSENTIAL HYPERTENSION: ICD-10-CM

## 2023-08-10 DIAGNOSIS — I26.99 ACUTE PULMONARY EMBOLISM, UNSPECIFIED PULMONARY EMBOLISM TYPE, UNSPECIFIED WHETHER ACUTE COR PULMONALE PRESENT: ICD-10-CM

## 2023-08-10 LAB
BASOPHILS # BLD AUTO: 0.08 K/UL (ref 0–0.2)
BASOPHILS NFR BLD: 1.3 % (ref 0–1.9)
DIFFERENTIAL METHOD: ABNORMAL
EOSINOPHIL # BLD AUTO: 0.1 K/UL (ref 0–0.5)
EOSINOPHIL NFR BLD: 1.6 % (ref 0–8)
ERYTHROCYTE [DISTWIDTH] IN BLOOD BY AUTOMATED COUNT: 13.5 % (ref 11.5–14.5)
HCT VFR BLD AUTO: 36.4 % (ref 40–54)
HGB BLD-MCNC: 11.9 G/DL (ref 14–18)
IMM GRANULOCYTES # BLD AUTO: 0.08 K/UL (ref 0–0.04)
IMM GRANULOCYTES NFR BLD AUTO: 1.3 % (ref 0–0.5)
LYMPHOCYTES # BLD AUTO: 0.8 K/UL (ref 1–4.8)
LYMPHOCYTES NFR BLD: 12.5 % (ref 18–48)
MCH RBC QN AUTO: 31.5 PG (ref 27–31)
MCHC RBC AUTO-ENTMCNC: 32.7 G/DL (ref 32–36)
MCV RBC AUTO: 96 FL (ref 82–98)
MONOCYTES # BLD AUTO: 0.5 K/UL (ref 0.3–1)
MONOCYTES NFR BLD: 8.2 % (ref 4–15)
NEUTROPHILS # BLD AUTO: 4.7 K/UL (ref 1.8–7.7)
NEUTROPHILS NFR BLD: 75.1 % (ref 38–73)
NRBC BLD-RTO: 0 /100 WBC
PLATELET # BLD AUTO: 255 K/UL (ref 150–450)
PMV BLD AUTO: 10.1 FL (ref 9.2–12.9)
RBC # BLD AUTO: 3.78 M/UL (ref 4.6–6.2)
WBC # BLD AUTO: 6.31 K/UL (ref 3.9–12.7)

## 2023-08-10 PROCEDURE — 99214 OFFICE O/P EST MOD 30 MIN: CPT | Mod: S$PBB,,, | Performed by: INTERNAL MEDICINE

## 2023-08-10 PROCEDURE — 99215 OFFICE O/P EST HI 40 MIN: CPT | Mod: PBBFAC | Performed by: INTERNAL MEDICINE

## 2023-08-10 PROCEDURE — 99999 PR PBB SHADOW E&M-EST. PATIENT-LVL V: ICD-10-PCS | Mod: PBBFAC,,, | Performed by: INTERNAL MEDICINE

## 2023-08-10 PROCEDURE — 85025 COMPLETE CBC W/AUTO DIFF WBC: CPT | Performed by: INTERNAL MEDICINE

## 2023-08-10 PROCEDURE — 36415 COLL VENOUS BLD VENIPUNCTURE: CPT | Performed by: INTERNAL MEDICINE

## 2023-08-10 PROCEDURE — 99999 PR PBB SHADOW E&M-EST. PATIENT-LVL V: CPT | Mod: PBBFAC,,, | Performed by: INTERNAL MEDICINE

## 2023-08-10 PROCEDURE — 99214 PR OFFICE/OUTPT VISIT, EST, LEVL IV, 30-39 MIN: ICD-10-PCS | Mod: S$PBB,,, | Performed by: INTERNAL MEDICINE

## 2023-08-10 NOTE — PROGRESS NOTES
CC:  Follow-up     HPI:  The patient is a 89-year-old male with aortic stenosis status post TAVR, chronic diastolic heart failure, hypertension, CKD stage 3, vitamin-D deficiency, iron-deficiency anemia and pulmonary embolism who presents today as a follow-up.  The patient is currently on apixaban b.i.d..  He completed physical therapy as of last week.  He is still doing exercises as he was shown once a day.    ROS:  Patient reports weight is stable.  No chest pain.  No shortness of breath.  He feels like he is physically back to his baseline.  No nausea vomiting.  No abdominal pain.  No bowel changes.  No black stools.    Physical exam:  General appearance:  No acute distress   HEENT:  Trachea is midline without JVD   Pulmonary:  Good inspiratory, expiratory breath sounds are heard.  Lungs are clear to auscultation.    Cardiovascular:  S1-S2, rhythm was regular.  Extremities with 1+ edema up to mid calf.  GI: Abdomen was nontender, he would normal bowel sounds.    Assessment:  1. Pulmonary embolism   2.  Status post TAVR procedure   3.  Chronic diastolic heart failure  4. CKD stage 3  5.  Iron-deficiency anemia    Plan:   1. Will check a CBC today   2. Will schedule ultrasound of the lower extremities.

## 2023-08-11 ENCOUNTER — HOSPITAL ENCOUNTER (OUTPATIENT)
Dept: RADIOLOGY | Facility: HOSPITAL | Age: 88
Discharge: HOME | End: 2023-08-11
Attending: INTERNAL MEDICINE
Payer: MEDICARE

## 2023-08-11 DIAGNOSIS — R60.0 BILATERAL LOWER EXTREMITY EDEMA: ICD-10-CM

## 2023-08-11 DIAGNOSIS — I26.99 ACUTE PULMONARY EMBOLISM, UNSPECIFIED PULMONARY EMBOLISM TYPE, UNSPECIFIED WHETHER ACUTE COR PULMONALE PRESENT: ICD-10-CM

## 2023-08-11 PROCEDURE — 93970 EXTREMITY STUDY: CPT | Mod: TC

## 2023-08-11 PROCEDURE — 93970 US LOWER EXTREMITY VEINS BILATERAL: ICD-10-PCS | Mod: 26,,, | Performed by: STUDENT IN AN ORGANIZED HEALTH CARE EDUCATION/TRAINING PROGRAM

## 2023-08-11 PROCEDURE — 93970 EXTREMITY STUDY: CPT | Mod: 26,,, | Performed by: STUDENT IN AN ORGANIZED HEALTH CARE EDUCATION/TRAINING PROGRAM

## 2023-08-14 ENCOUNTER — TELEPHONE (OUTPATIENT)
Dept: INTERNAL MEDICINE | Facility: CLINIC | Age: 88
End: 2023-08-14
Payer: MEDICARE

## 2023-08-14 NOTE — TELEPHONE ENCOUNTER
----- Message from Ike Ivan MD sent at 8/11/2023  3:50 PM CDT -----  No blood clots were seen in his legs. He will need a follow up with me in 3 months.

## 2023-08-16 ENCOUNTER — PATIENT MESSAGE (OUTPATIENT)
Dept: OPHTHALMOLOGY | Facility: CLINIC | Age: 88
End: 2023-08-16
Payer: MEDICARE

## 2023-08-24 ENCOUNTER — OFFICE VISIT (OUTPATIENT)
Dept: PODIATRY | Facility: CLINIC | Age: 88
End: 2023-08-24
Payer: MEDICARE

## 2023-08-24 VITALS
BODY MASS INDEX: 24.54 KG/M2 | WEIGHT: 175.94 LBS | SYSTOLIC BLOOD PRESSURE: 133 MMHG | DIASTOLIC BLOOD PRESSURE: 59 MMHG

## 2023-08-24 DIAGNOSIS — L60.0 ONYCHOCRYPTOSIS: Primary | ICD-10-CM

## 2023-08-24 DIAGNOSIS — R60.0 EDEMA OF BOTH LOWER LEGS DUE TO PERIPHERAL VENOUS INSUFFICIENCY: ICD-10-CM

## 2023-08-24 DIAGNOSIS — Z86.711 HISTORY OF PULMONARY EMBOLISM: ICD-10-CM

## 2023-08-24 DIAGNOSIS — Z79.01 LONG TERM CURRENT USE OF ANTICOAGULANT: ICD-10-CM

## 2023-08-24 DIAGNOSIS — I87.2 EDEMA OF BOTH LOWER LEGS DUE TO PERIPHERAL VENOUS INSUFFICIENCY: ICD-10-CM

## 2023-08-24 PROCEDURE — 99213 PR OFFICE/OUTPT VISIT, EST, LEVL III, 20-29 MIN: ICD-10-PCS | Mod: S$PBB,,, | Performed by: PODIATRIST

## 2023-08-24 PROCEDURE — 99213 OFFICE O/P EST LOW 20 MIN: CPT | Mod: 25,PBBFAC,PN | Performed by: PODIATRIST

## 2023-08-24 PROCEDURE — 99999 PR PBB SHADOW E&M-EST. PATIENT-LVL III: ICD-10-PCS | Mod: PBBFAC,,, | Performed by: PODIATRIST

## 2023-08-24 PROCEDURE — 99999 PR PBB SHADOW E&M-EST. PATIENT-LVL III: CPT | Mod: PBBFAC,,, | Performed by: PODIATRIST

## 2023-08-24 PROCEDURE — 11721 DEBRIDE NAIL 6 OR MORE: CPT | Mod: PBBFAC

## 2023-08-24 PROCEDURE — 99213 OFFICE O/P EST LOW 20 MIN: CPT | Mod: S$PBB,,, | Performed by: PODIATRIST

## 2023-08-28 DIAGNOSIS — R00.0 TACHYCARDIA: ICD-10-CM

## 2023-08-28 DIAGNOSIS — I10 HYPERTENSION, UNSPECIFIED TYPE: ICD-10-CM

## 2023-08-28 DIAGNOSIS — I35.0 NONRHEUMATIC AORTIC VALVE STENOSIS: Chronic | ICD-10-CM

## 2023-08-28 RX ORDER — METOPROLOL SUCCINATE 25 MG/1
25 TABLET, EXTENDED RELEASE ORAL DAILY
Qty: 90 TABLET | Refills: 3 | Status: SHIPPED | OUTPATIENT
Start: 2023-08-28 | End: 2023-08-28 | Stop reason: SDUPTHER

## 2023-08-28 RX ORDER — METOPROLOL SUCCINATE 25 MG/1
TABLET, EXTENDED RELEASE ORAL
Qty: 90 TABLET | Refills: 3 | Status: SHIPPED | OUTPATIENT
Start: 2023-08-28

## 2023-08-31 NOTE — PROGRESS NOTES
Subjective:      Patient ID: Misha Marte Jr. is a 89 y.o. male.    Chief Complaint: Nail Care    Misha is a 89 y.o. male who presents to the clinic, accompanied by wife (usually also here w/ niece Heather, & sister in law, Charo Ortez - will reschedule her appt.) Last in this clinic over 4-1/2 months ago as hospitalized for blood clot to lungs. On anticoagulant. Has home health.   C/o IGTN again.    Is aware that it is a non-covered service Proc B $42; reduced interval between visits to 3 months appears better.    PCP: Moncho Ramires MD  DOLV: 7/5/23  Endo: Evelyn Beltran MD 7/18/23     Recent hospitalization for weakness - on Eliquis - h/o PE.   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    CHF (congestive heart failure)    Iron deficiency anemia    LOUIE (acute kidney injury)    Chronic diastolic congestive heart failure    Nonrheumatic mitral valve stenosis    S/P TAVR (transcatheter aortic valve replacement)    Incomplete RBBB    Aortic atherosclerosis    Pseudophakia of both eyes    Vitreous degeneration of both eyes    Age-related nuclear cataract of both eyes    Hyponatremia    Atrial fibrillation, new onset    Weakness    Pleural effusion on right    Pulmonary embolism    Chronic heart failure with preserved ejection fraction (HFpEF)    NSVT (nonsustained ventricular tachycardia)     Objective:      Physical Exam  Vitals reviewed.    Constitutional:       Appearance: He is well-developed and normal weight.   Cardiovascular:      Pulses:           Dorsalis pedis pulses are 1+ on the right side and 1+ on the left side.      Comments: Venous insufficiency edema R>L  Musculoskeletal:         General: No swelling or tenderness.      Right lower le+ Pitting Edema present.      Left lower le+ Pitting Edema present.   Feet:      Right foot:      Skin integrity: Skin integrity normal.      Toenail Condition: Right toenails are long and ingrown. Fungal disease present.     Left foot:      Skin integrity: Skin integrity normal.      Toenail Condition: Left toenails are long and ingrown. Fungal disease present.     Comments: All nails hypertrophic w/ cryptosis, symptomatic B/L hallux nail borders L>R as well as thickening & dystrophic appearance total L hallux, distal 1-5 R.  Skin:     General: Skin is warm and dry.      Capillary Refill: Capillary refill takes 2 to 3 seconds.      Findings: No lesion or rash.      Comments: Dry skin w/out breakdown B/L   Neurological:      Mental Status: He is alert and oriented to person, place, and time.      Sensory: No sensory deficit.      Motor: Weakness present.      Gait: Gait abnormal (Has a cane today as well as a wheelchair).   Psychiatric:         Mood and Affect: Mood and affect normal.         Behavior: Behavior normal. Behavior is cooperative.         Assessment:      Encounter Diagnoses   Name Primary?    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    Diagnoses and all orders for this visit:    Onychocryptosis  -     Nail debridement    Long term current use of anticoagulant  -     Nail debridement    History of pulmonary embolism  -     Nail debridement    Edema of both lower legs due to peripheral venous  insufficiency    Utilizing sterile toenail nippers, I aggressively debrided & reduced all the nails x10 to their soft tissue attachment mechanically. I then also cut back the offending nail borders approximately 3 mm from its edge and carried the nail plate incision down at an angle in order to wedge out the cryptotic portion of the nail plate in toto. The area was cleansed with alcohol. Patient tolerated the procedure well and related significant relief.    - Shoe inspection. Diabetic Foot Education. Patient reminded of the importance of good nutrition and blood sugar control to help prevent podiatric complications of diabetes. Patient instructed on proper foot hygeine. We discussed wearing proper shoe gear, daily foot inspections, never walking without protective shoe gear, annual DM foot exam, sooner prn.       Tubigrip stockinette size E for BLE.    Nail & callus care is a non-covered service as patient does not have the required diagnoses to allow for 'routine care' - as such, visit would be a Proc B (out of pocket expense of $42).   Medicare carriers only cover this service in the presence of severe vascular or sensory disease. Note that this is not limited to persons with diabetes.  Correspondingly, although not an exclusive or comprehensive list, the following medical conditions are not, in and of themselves (i.e. in the absence of significant vascular or neurologic sequellae), considered to be of sufficient severity to make the patient at risk for nonprofessional care:   Diabetes without evidence of severe vascular or neurologic disease   End-stage renal disease   Kidney dialysis   History of organ transplantation, on immunosuppression   Hemorrhagic/bleeding conditions, including hemophilia   Use of blood thinners/anticoagulants (warfarin, Coumadin)   History of artificial joints, heart valves, or blood vessels   History of valvular heart disease, even if you have been advised to take antibiotics around the  time of dental work   Cancer   Chemotherapy for cancer, or other health condition   HIV/Aids   Legal blindness   Inability to see and/or reach your own feet   Living alone   Mental retardation   History of stroke, spinal cord injury, or brain injury   Parkinson's Disease, or any medical condition associated with tremor of the hands or feet  Routine foot care is only a covered service in those patients with severe circulatory or sensory problems. For all other stated or unstated conditions, such care is not covered under Medicare. As such, patients are expected to perform the service themselves, or have the care provided by a family member or friend, or pay directly to have such care provided by a professional, such as a podiatrist. Payment for such care is a patient responsibility (i.e. cash). In addition, as a non-covered service, Medicare's fee schedule does not apply.         A total of 20 mins.was spent on chart review, patient visit & documentation.

## 2023-08-31 NOTE — PROCEDURES
Nail debridement    Date/Time: 8/24/2023 3:30 PM    Performed by: Ale Paredes DPM  Authorized by: Ale Paredes DPM    Consent Done?:  Yes (Verbal)    Nail Care Type:  Debride  Location(s): All  (Left 1st Toe, Left 3rd Toe, Left 2nd Toe, Left 4th Toe, Left 5th Toe, Right 1st Toe, Right 2nd Toe, Right 3rd Toe, Right 4th Toe and Right 5th Toe)  Patient tolerance:  Patient tolerated the procedure well with no immediate complications

## 2023-09-07 ENCOUNTER — TELEPHONE (OUTPATIENT)
Dept: INTERNAL MEDICINE | Facility: CLINIC | Age: 88
End: 2023-09-07
Payer: MEDICARE

## 2023-09-07 NOTE — TELEPHONE ENCOUNTER
----- Message from Monse Dewitt sent at 9/7/2023  2:28 PM CDT -----  Contact: [phone]  Thu - pt  nurse calling in regards to needing orders for occupational physical therapy at home     Please send order Ot PT at Home to [facility]     Fax : [phone] - Thu

## 2023-09-07 NOTE — TELEPHONE ENCOUNTER
See msg to Please send order OT/PT at Home to [facility].  Review and respond.

## 2023-09-08 DIAGNOSIS — I50.9 CONGESTIVE HEART FAILURE, UNSPECIFIED HF CHRONICITY, UNSPECIFIED HEART FAILURE TYPE: ICD-10-CM

## 2023-09-08 DIAGNOSIS — R60.0 BILATERAL LOWER EXTREMITY EDEMA: ICD-10-CM

## 2023-09-08 DIAGNOSIS — I26.99 ACUTE PULMONARY EMBOLISM, UNSPECIFIED PULMONARY EMBOLISM TYPE, UNSPECIFIED WHETHER ACUTE COR PULMONALE PRESENT: Primary | ICD-10-CM

## 2023-09-11 ENCOUNTER — PATIENT MESSAGE (OUTPATIENT)
Dept: OPHTHALMOLOGY | Facility: CLINIC | Age: 88
End: 2023-09-11
Payer: MEDICARE

## 2023-09-13 ENCOUNTER — TELEPHONE (OUTPATIENT)
Dept: INTERNAL MEDICINE | Facility: CLINIC | Age: 88
End: 2023-09-13
Payer: MEDICARE

## 2023-09-13 NOTE — TELEPHONE ENCOUNTER
Attempted to contact Amanda with [facility] no success, left voice message. The referral was placed for strength and balance.

## 2023-09-13 NOTE — TELEPHONE ENCOUNTER
----- Message from Balbir Hampton MA sent at 9/13/2023  8:53 AM CDT -----  Contact: Amanda heaton/[facility] [phone]    ----- Message -----  From: Ike Ivan MD  Sent: 9/12/2023   7:00 PM CDT  To: Balbir Hampton MA    I would like physical therapy for strength and balance.  ----- Message -----  From: Balbir Hampton MA  Sent: 9/11/2023   2:34 PM CDT  To: Ike Ivan MD    Please advise.     ----- Message -----  From: Deborah Luis  Sent: 9/11/2023   1:08 PM CDT  To: Moncho Patel is calling in regards to the referral they received. They are a little confused as to what Dr Ivan wants. Please give them a call.           Thank you

## 2023-09-20 ENCOUNTER — EXTERNAL HOME HEALTH (OUTPATIENT)
Dept: HOME HEALTH SERVICES | Facility: HOSPITAL | Age: 88
End: 2023-09-20
Payer: MEDICARE

## 2023-09-20 ENCOUNTER — DOCUMENT SCAN (OUTPATIENT)
Dept: HOME HEALTH SERVICES | Facility: HOSPITAL | Age: 88
End: 2023-09-20
Payer: MEDICARE

## 2023-09-22 DIAGNOSIS — I10 ESSENTIAL HYPERTENSION: ICD-10-CM

## 2023-09-23 RX ORDER — LOSARTAN POTASSIUM 25 MG/1
TABLET ORAL
Qty: 90 TABLET | Refills: 3 | Status: SHIPPED | OUTPATIENT
Start: 2023-09-23

## 2023-09-23 NOTE — TELEPHONE ENCOUNTER
No care due was identified.  [facility] Embedded Care Due Messages. Reference number: [id number].   9/23/2023 11:08:15 AM CDT

## 2023-09-23 NOTE — TELEPHONE ENCOUNTER
Refill Routing Note   Medication(s) are not appropriate for processing by [facility] for the following reason(s):      Medication outside of protocol    [facility] action(s):  Route  Approve None identified        Medication reconciliation completed: No     Appointments  past 12m or future 3m with PCP    Date Provider   Last Visit   8/10/2023 Ike Ivan MD   Next Visit   11/17/2023 Ike Ivan MD   ED visits in past 90 days: 0        Note composed:1:32 PM 09/23/2023

## 2023-09-24 RX ORDER — FERROUS SULFATE 325(65) MG
TABLET ORAL
Qty: 90 TABLET | Refills: 1 | Status: SHIPPED | OUTPATIENT
Start: 2023-09-24

## 2023-09-25 ENCOUNTER — PATIENT MESSAGE (OUTPATIENT)
Dept: INTERNAL MEDICINE | Facility: CLINIC | Age: 88
End: 2023-09-25
Payer: MEDICARE

## 2023-09-26 ENCOUNTER — TELEPHONE (OUTPATIENT)
Dept: OPTOMETRY | Facility: CLINIC | Age: 88
End: 2023-09-26
Payer: MEDICARE

## 2023-09-26 ENCOUNTER — OFFICE VISIT (OUTPATIENT)
Dept: OPHTHALMOLOGY | Facility: CLINIC | Age: 88
End: 2023-09-26
Payer: MEDICARE

## 2023-09-26 DIAGNOSIS — H43.813 VITREOUS DEGENERATION OF BOTH EYES: ICD-10-CM

## 2023-09-26 DIAGNOSIS — H35.3134 ADVANCED ATROPHIC NONEXUDATIVE AGE-RELATED MACULAR DEGENERATION OF BOTH EYES WITH SUBFOVEAL INVOLVEMENT: Primary | ICD-10-CM

## 2023-09-26 DIAGNOSIS — Z96.1 PSEUDOPHAKIA OF BOTH EYES: ICD-10-CM

## 2023-09-26 PROCEDURE — 99999 PR PBB SHADOW E&M-EST. PATIENT-LVL III: CPT | Mod: PBBFAC,,, | Performed by: OPHTHALMOLOGY

## 2023-09-26 PROCEDURE — 92202 OPSCPY EXTND ON/MAC DRAW: CPT | Mod: 59,S$PBB,, | Performed by: OPHTHALMOLOGY

## 2023-09-26 PROCEDURE — 99999 PR PBB SHADOW E&M-EST. PATIENT-LVL III: ICD-10-PCS | Mod: PBBFAC,,, | Performed by: OPHTHALMOLOGY

## 2023-09-26 PROCEDURE — 92014 COMPRE OPH EXAM EST PT 1/>: CPT | Mod: S$PBB,,, | Performed by: OPHTHALMOLOGY

## 2023-09-26 PROCEDURE — 99213 OFFICE O/P EST LOW 20 MIN: CPT | Mod: PBBFAC | Performed by: OPHTHALMOLOGY

## 2023-09-26 PROCEDURE — 92202 OPSCPY EXTND ON/MAC DRAW: CPT | Mod: 59,PBBFAC | Performed by: OPHTHALMOLOGY

## 2023-09-26 PROCEDURE — 92202 PR OPHTHALMOSCOPY, EXT, W/DRAW OPTIC NERVE/MACULA, I&R, UNI/BI: ICD-10-PCS | Mod: 59,S$PBB,, | Performed by: OPHTHALMOLOGY

## 2023-09-26 PROCEDURE — 92014 PR EYE EXAM, EST PATIENT,COMPREHESV: ICD-10-PCS | Mod: S$PBB,,, | Performed by: OPHTHALMOLOGY

## 2023-09-26 PROCEDURE — 92134 OCT, RETINA - OU - BOTH EYES: ICD-10-PCS | Mod: 26,S$PBB,, | Performed by: OPHTHALMOLOGY

## 2023-09-26 PROCEDURE — 92134 CPTRZ OPH DX IMG PST SGM RTA: CPT | Mod: PBBFAC | Performed by: OPHTHALMOLOGY

## 2023-09-26 NOTE — TELEPHONE ENCOUNTER
----- Message from Barbara Muro sent at 9/26/2023  3:05 PM CDT -----  Regarding: appt LVA  Pt needs appt for LVA.

## 2023-09-26 NOTE — PROGRESS NOTES
HPI    6 mo DFE OU/OCTm OU    DLS 01/05/2023 by Dr. RUTHANN Cueva MD          06/29/2023 by Dr. FABRIZIO Benavides MD    Cc: pt reports: vision is better    ++Blurred Va  --Diplopia  --Eye Pain   --Flashes/Floaters  --Headaches  --Curtains/Shadows/Veils    Eye Meds: Refresh Tears OU prn    POHx:   1. NSC OU  S/P PCIOL OD 6/21/2023  Status post cataract extraction and insertion of intraocular lens, right     S/p PCIOL OS - 5/17/2023  Status post cataract extraction and insertion of intraocular lens, left     2. 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 Vitreous Deg. OU    Last edited by Vesta Cleaning MA on 9/26/2023  1:19 PM.          A/P    ICD-10-CM ICD-9-CM   1. 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 Vitreous degeneration of both eyes  H43.813 379.21   3. Pseudophakia of both eyes  Z96.1 V43.1         1. Advanced atrophic nonexudative age-related macular degeneration of both eyes with subfoveal involvement  F/u for AMD changes OU  Pt feels vision better after CEIOL, still poor 20/800-CF OU due to central GA, but peripheral vision and colors are better per pt  Plan: Observation, not a good candidate for complement inhibition therapy, observe, will refer to low vision    Amsler precautions  Recommend Antioxidants, lutein, omega 3, brown sunglasses (blue blockers).     2. Vitreous degeneration of both eyes  No RT/RD  Plan: Observation     3. Pseudophakia of both eyes  Good lens position OU  Plan: Observation, refer to low vision      RTC Anay 12 mo DFE/OCTm OU  RTC Low vision eval     I saw and examined the patient and reviewed in detail the findings documented. The final examination findings, image interpretations, and plan as documented in the record represent my personal judgment and conclusions.    Fred Cueva MD  Vitreoretinal Surgery   [facility]

## 2023-10-02 PROBLEM — I26.99 PULMONARY EMBOLISM: Status: RESOLVED | Noted: 2023-07-01 | Resolved: 2023-10-02

## 2023-10-03 ENCOUNTER — INFUSION (OUTPATIENT)
Dept: INFECTIOUS DISEASES | Facility: HOSPITAL | Age: 88
End: 2023-10-03
Payer: MEDICARE

## 2023-10-03 VITALS
HEART RATE: 73 BPM | WEIGHT: 176.38 LBS | BODY MASS INDEX: 24.6 KG/M2 | OXYGEN SATURATION: 97 % | RESPIRATION RATE: 16 BRPM | DIASTOLIC BLOOD PRESSURE: 65 MMHG | TEMPERATURE: 98 F | SYSTOLIC BLOOD PRESSURE: 138 MMHG

## 2023-10-03 DIAGNOSIS — M81.0 AGE RELATED OSTEOPOROSIS, UNSPECIFIED PATHOLOGICAL FRACTURE PRESENCE: Primary | ICD-10-CM

## 2023-10-03 PROCEDURE — 63600175 PHARM REV CODE 636 W HCPCS: Mod: JZ,JG | Performed by: INTERNAL MEDICINE

## 2023-10-03 PROCEDURE — 96372 THER/PROPH/DIAG INJ SC/IM: CPT

## 2023-10-03 RX ADMIN — DENOSUMAB 60 MG: 60 INJECTION SUBCUTANEOUS at 02:10

## 2023-10-03 NOTE — TELEPHONE ENCOUNTER
Refill Routing Note   Medication(s) are not appropriate for processing by [facility] for the following reason(s):      Medication outside of protocol    [facility] action(s):  Route Care Due:  None identified              Appointments  past 12m or future 3m with PCP    Date Provider   Last Visit   8/10/2023 Ike Ivan MD   Next Visit   11/17/2023 Ike Ivan MD   ED visits in past 90 days: 0        Note composed:4:27 PM 10/03/2023

## 2023-10-03 NOTE — PROGRESS NOTES
Patient received prolia 60mg injection sub-Q in left  arm. Patient tolerated injection well, left in NAD.     Patient is taking vitamin D and Calcium. Pt denies any dental drilling in the last 3 months. Return appt provided.

## 2023-10-03 NOTE — TELEPHONE ENCOUNTER
----- Message from Deborah Luis sent at 10/3/2023  3:39 PM CDT -----  Contact: Memo heaton/[employer] [phone]  [facility] is calling in regards to a new prescription for pt's apixaban (ELIQUIS) 5 mg Tab 60 tablet. Please specify strength.                 Thank you

## 2023-10-04 ENCOUNTER — EXTERNAL HOME HEALTH (OUTPATIENT)
Dept: HOME HEALTH SERVICES | Facility: HOSPITAL | Age: 88
End: 2023-10-04
Payer: MEDICARE

## 2023-10-15 DIAGNOSIS — R60.0 BILATERAL LOWER EXTREMITY EDEMA: ICD-10-CM

## 2023-10-15 DIAGNOSIS — I50.9 CONGESTIVE HEART FAILURE, UNSPECIFIED HF CHRONICITY, UNSPECIFIED HEART FAILURE TYPE: ICD-10-CM

## 2023-10-15 DIAGNOSIS — I26.99 ACUTE PULMONARY EMBOLISM, UNSPECIFIED PULMONARY EMBOLISM TYPE, UNSPECIFIED WHETHER ACUTE COR PULMONALE PRESENT: Primary | ICD-10-CM

## 2023-10-31 ENCOUNTER — OFFICE VISIT (OUTPATIENT)
Dept: OPTOMETRY | Facility: CLINIC | Age: 88
End: 2023-10-31
Payer: MEDICARE

## 2023-10-31 DIAGNOSIS — H54.3 LOW VISION, BOTH EYES: Primary | ICD-10-CM

## 2023-10-31 PROCEDURE — 92015 PR REFRACTION: ICD-10-PCS | Mod: ,,, | Performed by: OPTOMETRIST

## 2023-10-31 PROCEDURE — 99202 PR OFFICE/OUTPT VISIT, NEW, LEVL II, 15-29 MIN: ICD-10-PCS | Mod: S$PBB,,, | Performed by: OPTOMETRIST

## 2023-10-31 PROCEDURE — 99999 PR PBB SHADOW E&M-EST. PATIENT-LVL III: ICD-10-PCS | Mod: PBBFAC,,, | Performed by: OPTOMETRIST

## 2023-10-31 PROCEDURE — 99202 OFFICE O/P NEW SF 15 MIN: CPT | Mod: S$PBB,,, | Performed by: OPTOMETRIST

## 2023-10-31 PROCEDURE — 92015 DETERMINE REFRACTIVE STATE: CPT | Mod: ,,, | Performed by: OPTOMETRIST

## 2023-10-31 PROCEDURE — 99999 PR PBB SHADOW E&M-EST. PATIENT-LVL III: CPT | Mod: PBBFAC,,, | Performed by: OPTOMETRIST

## 2023-10-31 PROCEDURE — 99213 OFFICE O/P EST LOW 20 MIN: CPT | Mod: PBBFAC,PO | Performed by: OPTOMETRIST

## 2023-11-07 ENCOUNTER — TELEPHONE (OUTPATIENT)
Dept: PODIATRY | Facility: CLINIC | Age: 88
End: 2023-11-07
Payer: MEDICARE

## 2023-11-17 ENCOUNTER — OFFICE VISIT (OUTPATIENT)
Dept: INTERNAL MEDICINE | Facility: CLINIC | Age: 88
End: 2023-11-17
Payer: MEDICARE

## 2023-11-17 VITALS
OXYGEN SATURATION: 95 % | HEART RATE: 64 BPM | WEIGHT: 177.25 LBS | DIASTOLIC BLOOD PRESSURE: 66 MMHG | HEIGHT: 71 IN | BODY MASS INDEX: 24.81 KG/M2 | SYSTOLIC BLOOD PRESSURE: 140 MMHG

## 2023-11-17 DIAGNOSIS — Z79.01 ANTICOAGULATED: ICD-10-CM

## 2023-11-17 DIAGNOSIS — Z95.2 S/P TAVR (TRANSCATHETER AORTIC VALVE REPLACEMENT): ICD-10-CM

## 2023-11-17 DIAGNOSIS — I50.9 CONGESTIVE HEART FAILURE, UNSPECIFIED HF CHRONICITY, UNSPECIFIED HEART FAILURE TYPE: Primary | ICD-10-CM

## 2023-11-17 DIAGNOSIS — R60.0 BILATERAL LOWER EXTREMITY EDEMA: ICD-10-CM

## 2023-11-17 PROCEDURE — 99999 PR PBB SHADOW E&M-EST. PATIENT-LVL IV: ICD-10-PCS | Mod: PBBFAC,,, | Performed by: INTERNAL MEDICINE

## 2023-11-17 PROCEDURE — 99999 PR PBB SHADOW E&M-EST. PATIENT-LVL IV: CPT | Mod: PBBFAC,,, | Performed by: INTERNAL MEDICINE

## 2023-11-17 PROCEDURE — 99214 OFFICE O/P EST MOD 30 MIN: CPT | Mod: S$PBB,,, | Performed by: INTERNAL MEDICINE

## 2023-11-17 PROCEDURE — 99214 PR OFFICE/OUTPT VISIT, EST, LEVL IV, 30-39 MIN: ICD-10-PCS | Mod: S$PBB,,, | Performed by: INTERNAL MEDICINE

## 2023-11-17 PROCEDURE — 99214 OFFICE O/P EST MOD 30 MIN: CPT | Mod: PBBFAC,25 | Performed by: INTERNAL MEDICINE

## 2023-11-17 PROCEDURE — G0179 PR HOME HEALTH MD RECERTIFICATION: ICD-10-PCS | Mod: ,,, | Performed by: INTERNAL MEDICINE

## 2023-11-17 PROCEDURE — G0179 MD RECERTIFICATION HHA PT: HCPCS | Mod: ,,, | Performed by: INTERNAL MEDICINE

## 2023-11-18 NOTE — PROGRESS NOTES
CC:  Follow-up     HPI:  The patient is a 89-year-old male with aortic stenosis status post TAVR, chronic diastolic heart failure, hypertension, CKD stage 3, vitamin-D deficiency, iron-deficiency anemia, and pulmonary embolism presents today for follow-up.  The patient is currently on apixaban twice a day.  He is still doing home exercises shown by Physical therapy.  The patient was started on apixaban for his PE in July this year.  He needs to be on this medication for 6 months.    ROS:  Patient reports decreased energy.  He does get up every 2-3 hours to urinate.  He does take Lasix once a day.  Med card has that his for twice a day; but he is only taking it once a day and in the morning.    Physical exam:   General appearance:  No acute distress  HEENT: Trachea is midline without JVD   Pulmonary:  Good inspiratory, expiratory breath sounds are heard.  Lungs are clear to auscultation.  Cardiovascular:  S1-S2, rhythm is regular.  Extremities with 1+ edema up to mid calf.  This appears to be less than last seen.  On days he comes to clinic, he does not take his Lasix in the morning.    Ortho:  The patient's left foot was evaluated.  He has a callus in the medial aspect his MTP.  He is going to see Podiatry in the very near future    Assessment:  1. Pulmonary embolism currently on apixaban   2. Chronic CHF   3.  Status post TAVR  4.  Bilateral lower extremity edema   5. Anticoagulated on apixaban    Plan:   1. No change in treatment at this time  2. The patient his wife were instructed stop apixaban in January.

## 2023-11-22 RX ORDER — FUROSEMIDE 20 MG/1
TABLET ORAL
Qty: 180 TABLET | Refills: 3 | Status: SHIPPED | OUTPATIENT
Start: 2023-11-22

## 2023-12-03 DIAGNOSIS — Z71.89 COMPLEX CARE COORDINATION: ICD-10-CM

## 2023-12-12 ENCOUNTER — OFFICE VISIT (OUTPATIENT)
Dept: PODIATRY | Facility: CLINIC | Age: 88
End: 2023-12-12
Payer: MEDICARE

## 2023-12-12 VITALS
HEIGHT: 71 IN | DIASTOLIC BLOOD PRESSURE: 62 MMHG | BODY MASS INDEX: 24.72 KG/M2 | HEART RATE: 70 BPM | SYSTOLIC BLOOD PRESSURE: 129 MMHG

## 2023-12-12 DIAGNOSIS — B35.1 ONYCHOMYCOSIS WITH INGROWN TOENAIL: Primary | ICD-10-CM

## 2023-12-12 DIAGNOSIS — L60.0 ONYCHOMYCOSIS WITH INGROWN TOENAIL: Primary | ICD-10-CM

## 2023-12-12 DIAGNOSIS — L82.1 SEBORRHEIC KERATOSIS: ICD-10-CM

## 2023-12-12 DIAGNOSIS — R60.0 EDEMA OF BOTH LOWER LEGS DUE TO PERIPHERAL VENOUS INSUFFICIENCY: ICD-10-CM

## 2023-12-12 DIAGNOSIS — I87.2 EDEMA OF BOTH LOWER LEGS DUE TO PERIPHERAL VENOUS INSUFFICIENCY: ICD-10-CM

## 2023-12-12 DIAGNOSIS — Z86.711 HISTORY OF PULMONARY EMBOLISM: ICD-10-CM

## 2023-12-12 DIAGNOSIS — R26.9 GAIT ABNORMALITY: Chronic | ICD-10-CM

## 2023-12-12 DIAGNOSIS — R53.1 WEAKNESS: ICD-10-CM

## 2023-12-12 DIAGNOSIS — Z79.01 LONG TERM CURRENT USE OF ANTICOAGULANT: ICD-10-CM

## 2023-12-12 PROCEDURE — 11721 DEBRIDE NAIL 6 OR MORE: CPT | Mod: Q7,PBBFAC,PN | Performed by: PODIATRIST

## 2023-12-12 PROCEDURE — 11721 DEBRIDE NAIL 6 OR MORE: CPT | Mod: S$PBB,,, | Performed by: PODIATRIST

## 2023-12-12 PROCEDURE — 99213 OFFICE O/P EST LOW 20 MIN: CPT | Mod: 25,S$PBB,, | Performed by: PODIATRIST

## 2023-12-12 PROCEDURE — 99999 PR PBB SHADOW E&M-EST. PATIENT-LVL III: CPT | Mod: PBBFAC,,, | Performed by: PODIATRIST

## 2023-12-12 PROCEDURE — 99213 PR OFFICE/OUTPT VISIT, EST, LEVL III, 20-29 MIN: ICD-10-PCS | Mod: 25,S$PBB,, | Performed by: PODIATRIST

## 2023-12-12 PROCEDURE — 11721 PR DEBRIDEMENT OF NAILS, 6 OR MORE: ICD-10-PCS | Mod: S$PBB,,, | Performed by: PODIATRIST

## 2023-12-12 PROCEDURE — 99999 PR PBB SHADOW E&M-EST. PATIENT-LVL III: ICD-10-PCS | Mod: PBBFAC,,, | Performed by: PODIATRIST

## 2023-12-12 PROCEDURE — 99213 OFFICE O/P EST LOW 20 MIN: CPT | Mod: PBBFAC,PN | Performed by: PODIATRIST

## 2023-12-13 DIAGNOSIS — E78.49 OTHER HYPERLIPIDEMIA: Primary | Chronic | ICD-10-CM

## 2023-12-19 ENCOUNTER — OFFICE VISIT (OUTPATIENT)
Dept: DERMATOLOGY | Facility: CLINIC | Age: 88
End: 2023-12-19
Payer: MEDICARE

## 2023-12-19 DIAGNOSIS — L82.1 SK (SEBORRHEIC KERATOSIS): ICD-10-CM

## 2023-12-19 DIAGNOSIS — Z85.828 HISTORY OF NONMELANOMA SKIN CANCER: ICD-10-CM

## 2023-12-19 DIAGNOSIS — L57.0 AK (ACTINIC KERATOSIS): Primary | ICD-10-CM

## 2023-12-19 PROCEDURE — 99499 NO LOS: ICD-10-PCS | Mod: S$PBB,,, | Performed by: DERMATOLOGY

## 2023-12-19 PROCEDURE — 17003 DESTRUCT PREMALG LES 2-14: CPT | Mod: S$PBB,,, | Performed by: DERMATOLOGY

## 2023-12-19 PROCEDURE — 17000 PR DESTRUCTION(LASER SURGERY,CRYOSURGERY,CHEMOSURGERY),PREMALIGNANT LESIONS,FIRST LESION: ICD-10-PCS | Mod: S$PBB,,, | Performed by: DERMATOLOGY

## 2023-12-19 PROCEDURE — 99999 PR PBB SHADOW E&M-EST. PATIENT-LVL III: CPT | Mod: PBBFAC,,, | Performed by: DERMATOLOGY

## 2023-12-19 PROCEDURE — 17003 DESTRUCT PREMALG LES 2-14: CPT | Mod: PBBFAC | Performed by: DERMATOLOGY

## 2023-12-19 PROCEDURE — 99499 UNLISTED E&M SERVICE: CPT | Mod: S$PBB,,, | Performed by: DERMATOLOGY

## 2023-12-19 PROCEDURE — 99213 OFFICE O/P EST LOW 20 MIN: CPT | Mod: PBBFAC | Performed by: DERMATOLOGY

## 2023-12-19 PROCEDURE — 17000 DESTRUCT PREMALG LESION: CPT | Mod: PBBFAC | Performed by: DERMATOLOGY

## 2023-12-19 PROCEDURE — 17003 DESTRUCTION, PREMALIGNANT LESIONS; SECOND THROUGH 14 LESIONS: ICD-10-PCS | Mod: S$PBB,,, | Performed by: DERMATOLOGY

## 2023-12-19 PROCEDURE — 99999 PR PBB SHADOW E&M-EST. PATIENT-LVL III: ICD-10-PCS | Mod: PBBFAC,,, | Performed by: DERMATOLOGY

## 2023-12-19 PROCEDURE — 17000 DESTRUCT PREMALG LESION: CPT | Mod: S$PBB,,, | Performed by: DERMATOLOGY

## 2023-12-19 NOTE — PATIENT INSTRUCTIONS

## 2023-12-19 NOTE — PROGRESS NOTES
Subjective:      Patient ID:  Misha Marte Jr. is a 89 y.o. male who presents for No chief complaint on file.    History of Present Illness: The patient presents for follow up of skin check.    The patient was last seen on: 5/29/2023 for cryosurgery to actinic keratoses which have resolved.   This is a high risk patient here to check for the development of new lesions.      Other skin complaints:  Patient with new complaint of lesion(s)  Location: Scalp and face  Duration: since last visit  Symptoms: none  Relieving factors/Previous treatments: none            Review of Systems   Skin:  Positive for daily sunscreen use, activity-related sunscreen use and wears hat (all the time). Negative for recent sunburn.   Hematologic/Lymphatic: Bruises/bleeds easily (on Eliquis).        Baby aspirin daily       Objective:   Physical Exam   Constitutional: He appears well-developed and well-nourished. No distress.   Neurological: He is alert and oriented to person, place, and time. He is not disoriented.   Psychiatric: He has a normal mood and affect.   Skin:   Areas Examined (abnormalities noted in diagram):   Scalp / Hair Palpated and Inspected  Head / Face Inspection Performed  Neck Inspection Performed  Chest / Axilla Inspection Performed  Back Inspection Performed  RUE Inspected  LUE Inspection Performed                 Diagram Legend     Erythematous scaling macule/papule c/w actinic keratosis       Vascular papule c/w angioma      Pigmented verrucoid papule/plaque c/w seborrheic keratosis      Yellow umbilicated papule c/w sebaceous hyperplasia      Irregularly shaped tan macule c/w lentigo     1-2 mm smooth white papules consistent with Milia      Movable subcutaneous cyst with punctum c/w epidermal inclusion cyst      Subcutaneous movable cyst c/w pilar cyst      Firm pink to brown papule c/w dermatofibroma      Pedunculated fleshy papule(s) c/w skin tag(s)      Evenly pigmented macule c/w junctional nevus      Mildly variegated pigmented, slightly irregular-bordered macule c/w mildly atypical nevus      Flesh colored to evenly pigmented papule c/w intradermal nevus       Pink pearly papule/plaque c/w basal cell carcinoma      Erythematous hyperkeratotic cursted plaque c/w SCC      Surgical scar with no sign of skin cancer recurrence      Open and closed comedones      Inflammatory papules and pustules      Verrucoid papule consistent consistent with wart     Erythematous eczematous patches and plaques     Dystrophic onycholytic nail with subungual debris c/w onychomycosis     Umbilicated papule    Erythematous-base heme-crusted tan verrucoid plaque consistent with inflamed seborrheic keratosis     Erythematous Silvery Scaling Plaque c/w Psoriasis     See annotation      Assessment / Plan:        AK (actinic keratosis)  Cryosurgery Procedure Note    Verbal consent from the patient is obtained including, but not limited to, risk of hypopigmentation/hyperpigmentation, scar, recurrence of lesion. The patient is aware of the precancerous quality and need for treatment of these lesions. Liquid nitrogen cryosurgery is applied to the 4 actinic keratoses, as detailed in the physical exam, to produce a freeze injury. The patient is aware that blisters may form and is instructed on wound care with gentle cleansing and use of vaseline ointment to keep moist until healed. The patient is supplied a handout on cryosurgery and is instructed to call if lesions do not completely resolve.    SK (seborrheic keratosis)  Reassurance given to patient. No treatment is necessary.   Treatment of benign, asymptomatic lesions may be considered cosmetic.    History of nonmelanoma skin cancer  Area(s) of previous NMSC evaluated with no signs of recurrence.    Upper body skin examination performed today including at least 6 points as noted in physical examination. No lesions suspicious for malignancy noted.    Recommend daily sun protection/avoidance  and use of at least SPF 30, broad spectrum sunscreen (OTC drug).              No follow-ups on file.

## 2023-12-20 ENCOUNTER — EXTERNAL HOME HEALTH (OUTPATIENT)
Dept: HOME HEALTH SERVICES | Facility: HOSPITAL | Age: 88
End: 2023-12-20
Payer: MEDICARE

## 2023-12-21 ENCOUNTER — DOCUMENT SCAN (OUTPATIENT)
Dept: HOME HEALTH SERVICES | Facility: HOSPITAL | Age: 88
End: 2023-12-21
Payer: MEDICARE

## 2023-12-26 NOTE — PROGRESS NOTES
Subjective:      Patient ID: Misha Marte Jr. is a 89 y.o. male.    Chief Complaint: Nail Care    Patient is accompanied by Pat (wife) & niece. He is now considered high risk since PE w/ in last 6 months as well as other PMH; on anticoagulant. C/o pain d/t IGTN B/L great toes again but  mainly R (was L last visit). Questions re: edema BLE as well. Presents in wheelchair today d/t weakness.  8/24/23  Misha is a 89 y.o. male who presents to the clinic, accompanied by wife (usually also here w/ niece Heather, & sister in law, Charo Ortez - will reschedule her appt.) Last in this clinic over 4-1/2 months ago as hospitalized for blood clot to lungs. On anticoagulant. Has home health.   C/o IGTN again.    PCP: Moncho Ramires MD  DOLV: 11/17/23  Endo: Evelyn Beltran MD 7/18/23     Recent hospitalization for weakness - on Eliquis - h/o PE.   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    CHF (congestive heart failure)    Iron deficiency anemia    LOUIE (acute kidney injury)    Chronic diastolic congestive heart failure    Nonrheumatic mitral valve stenosis    S/P TAVR (transcatheter aortic valve replacement)    Incomplete RBBB    Aortic atherosclerosis    Pseudophakia of both eyes    Vitreous degeneration of both eyes    Age-related nuclear cataract of both eyes    Hyponatremia    Atrial fibrillation, new onset    Weakness    Pleural effusion  on right    Chronic heart failure with preserved ejection fraction (HFpEF)    NSVT (nonsustained ventricular tachycardia)     Objective:      Physical Exam  Vitals reviewed.   Constitutional:       Appearance: He is well-developed and normal weight.   Cardiovascular:      Pulses:           Dorsalis pedis pulses are 1+ on the right side and 1+ on the left side.      Comments: Venous insufficiency edema R>L  Musculoskeletal:         General: No swelling, tenderness or signs of injury.      Right lower le+ Pitting Edema present.      Left lower le+ Pitting Edema present.   Feet:      Right foot:      Skin integrity: Skin integrity normal.      Toenail Condition: Right toenails are long and ingrown. Fungal disease present.     Left foot:      Skin integrity: Skin integrity normal.      Toenail Condition: Left toenails are long and ingrown. Fungal disease present.     Comments: All nails hypertrophic & cryptosis, symptomatic B/L hallux nail borders R>L as well as thickening & dystrophic appearance entire L hallux nail plate, distal 1-5 R. No periungual abnormality.  Skin:     General: Skin is warm and dry.      Capillary Refill: Capillary refill takes 2 to 3 seconds.      Findings: No bruising, erythema, lesion or rash.      Comments: Dry skin w/out breakdown B/L; no intertriginous maceration.  Seborrheic keratosis B/L    Minimal stasis changes.    Neurological:      Mental Status: He is alert and oriented to person, place, and time.      Sensory: No sensory deficit.      Motor: Weakness present.      Gait: Gait abnormal (Has a cane today as well as a wheelchair).   Psychiatric:         Mood and Affect: Mood and affect normal.         Behavior: Behavior normal. Behavior is cooperative.         Assessment:      Encounter Diagnoses   Name Primary?    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 I then also cut back the offending nail borders approximately 3 mm from its edge & carried the nail plate incision down @ an angle in order to wedge out the cryptotic portion of the nail plate in toto. The area was cleansed w/ alcohol. Patient tolerated the procedure well and related significant relief.    - Shoe inspection. Diabetic Foot Education. Patient reminded of the importance of good nutrition and blood sugar control to help prevent podiatric complications of diabetes. Patient instructed on proper foot hygeine. We discussed wearing proper shoe gear, daily foot inspections, never walking w/out protective shoe gear, routine DM foot care q 3-4 months, annual DM foot exam, sooner prn.       Tubigrip  stockinette size E for BLE.    Defer to derm for seborrheic keratoses.        A total of 20 mins.was spent on chart review, patient visit & documentation.

## 2023-12-28 ENCOUNTER — TELEPHONE (OUTPATIENT)
Dept: INTERNAL MEDICINE | Facility: CLINIC | Age: 88
End: 2023-12-28
Payer: MEDICARE

## 2023-12-28 NOTE — TELEPHONE ENCOUNTER
----- Message from Bethanie Swenson sent at 12/28/2023  8:36 AM CST -----  Contact: Carol Maradiaga @ [facility] [phone]  Would like to receive medical advice.      Would they like a call back or a response via [place]:  call back    Additional information:  Carol Maradiaga with [facility] is calling to speak to the provider or staff on behalf of the pt and orders // referral not being sign for the pt. Carol states they are sending them through the portal but they are not being sent to the provider or being signed. Please call Carol back for advice

## 2023-12-29 ENCOUNTER — DOCUMENT SCAN (OUTPATIENT)
Dept: HOME HEALTH SERVICES | Facility: HOSPITAL | Age: 88
End: 2023-12-29
Payer: MEDICARE

## 2024-01-01 ENCOUNTER — OFFICE VISIT (OUTPATIENT)
Dept: SURGERY | Facility: CLINIC | Age: 89
End: 2024-01-01
Payer: MEDICARE

## 2024-01-01 ENCOUNTER — PATIENT MESSAGE (OUTPATIENT)
Dept: INTERNAL MEDICINE | Facility: CLINIC | Age: 89
End: 2024-01-01
Payer: MEDICARE

## 2024-01-01 ENCOUNTER — HOSPITAL ENCOUNTER (INPATIENT)
Facility: HOSPITAL | Age: 89
LOS: 3 days | DRG: 377 | End: 2024-10-18
Attending: EMERGENCY MEDICINE | Admitting: HOSPITALIST
Payer: MEDICARE

## 2024-01-01 ENCOUNTER — PATIENT MESSAGE (OUTPATIENT)
Dept: DERMATOLOGY | Facility: CLINIC | Age: 89
End: 2024-01-01
Payer: MEDICARE

## 2024-01-01 ENCOUNTER — TELEPHONE (OUTPATIENT)
Dept: DERMATOLOGY | Facility: CLINIC | Age: 89
End: 2024-01-01
Payer: MEDICARE

## 2024-01-01 ENCOUNTER — OFFICE VISIT (OUTPATIENT)
Dept: PODIATRY | Facility: CLINIC | Age: 89
End: 2024-01-01
Payer: MEDICARE

## 2024-01-01 ENCOUNTER — PATIENT MESSAGE (OUTPATIENT)
Dept: SURGERY | Facility: CLINIC | Age: 89
End: 2024-01-01
Payer: MEDICARE

## 2024-01-01 VITALS
SYSTOLIC BLOOD PRESSURE: 61 MMHG | RESPIRATION RATE: 12 BRPM | OXYGEN SATURATION: 72 % | HEIGHT: 71 IN | TEMPERATURE: 97 F | HEART RATE: 132 BPM | BODY MASS INDEX: 18.2 KG/M2 | WEIGHT: 130 LBS | DIASTOLIC BLOOD PRESSURE: 42 MMHG

## 2024-01-01 VITALS
HEIGHT: 71 IN | BODY MASS INDEX: 23.71 KG/M2 | SYSTOLIC BLOOD PRESSURE: 112 MMHG | DIASTOLIC BLOOD PRESSURE: 59 MMHG | HEART RATE: 76 BPM

## 2024-01-01 DIAGNOSIS — B35.1 ONYCHOMYCOSIS WITH INGROWN TOENAIL: Primary | ICD-10-CM

## 2024-01-01 DIAGNOSIS — M79.676 PAIN AROUND TOENAIL: ICD-10-CM

## 2024-01-01 DIAGNOSIS — Z86.711 HISTORY OF PULMONARY EMBOLISM: ICD-10-CM

## 2024-01-01 DIAGNOSIS — K92.0 GASTROINTESTINAL HEMORRHAGE WITH HEMATEMESIS: ICD-10-CM

## 2024-01-01 DIAGNOSIS — Z87.19 HISTORY OF HEMATEMESIS: ICD-10-CM

## 2024-01-01 DIAGNOSIS — R07.9 CHEST PAIN: ICD-10-CM

## 2024-01-01 DIAGNOSIS — I50.9 HEART FAILURE: ICD-10-CM

## 2024-01-01 DIAGNOSIS — L60.0 ONYCHOMYCOSIS WITH INGROWN TOENAIL: Primary | ICD-10-CM

## 2024-01-01 DIAGNOSIS — Z79.01 LONG TERM CURRENT USE OF ANTICOAGULANT: ICD-10-CM

## 2024-01-01 DIAGNOSIS — T14.8XXA CHRONIC WOUND: Primary | ICD-10-CM

## 2024-01-01 DIAGNOSIS — K92.2 GASTROINTESTINAL HEMORRHAGE, UNSPECIFIED GASTROINTESTINAL HEMORRHAGE TYPE: Primary | ICD-10-CM

## 2024-01-01 DIAGNOSIS — I48.91 A-FIB: ICD-10-CM

## 2024-01-01 DIAGNOSIS — K44.9 HIATAL HERNIA: ICD-10-CM

## 2024-01-01 DIAGNOSIS — R11.10 VOMITING, UNSPECIFIED VOMITING TYPE, UNSPECIFIED WHETHER NAUSEA PRESENT: ICD-10-CM

## 2024-01-01 LAB
ABO + RH BLD: NORMAL
ALBUMIN SERPL BCP-MCNC: 2.7 G/DL (ref 3.5–5.2)
ALBUMIN SERPL BCP-MCNC: 3.1 G/DL (ref 3.5–5.2)
ALBUMIN SERPL BCP-MCNC: 3.4 G/DL (ref 3.5–5.2)
ALBUMIN SERPL BCP-MCNC: 3.7 G/DL (ref 3.5–5.2)
ALBUMIN SERPL BCP-MCNC: 3.7 G/DL (ref 3.5–5.2)
ALLENS TEST: ABNORMAL
ALP SERPL-CCNC: 46 U/L (ref 55–135)
ALP SERPL-CCNC: 49 U/L (ref 55–135)
ALP SERPL-CCNC: 54 U/L (ref 55–135)
ALP SERPL-CCNC: 55 U/L (ref 55–135)
ALT SERPL W/O P-5'-P-CCNC: 18 U/L (ref 10–44)
ALT SERPL W/O P-5'-P-CCNC: 20 U/L (ref 10–44)
ALT SERPL W/O P-5'-P-CCNC: 28 U/L (ref 10–44)
ALT SERPL W/O P-5'-P-CCNC: 34 U/L (ref 10–44)
ANION GAP SERPL CALC-SCNC: 13 MMOL/L (ref 8–16)
ANION GAP SERPL CALC-SCNC: 14 MMOL/L (ref 8–16)
ANION GAP SERPL CALC-SCNC: 16 MMOL/L (ref 8–16)
ANION GAP SERPL CALC-SCNC: 17 MMOL/L (ref 8–16)
ANION GAP SERPL CALC-SCNC: 17 MMOL/L (ref 8–16)
ANION GAP SERPL CALC-SCNC: 18 MMOL/L (ref 8–16)
ANISOCYTOSIS BLD QL SMEAR: SLIGHT
ANISOCYTOSIS BLD QL SMEAR: SLIGHT
APTT PPP: 26 SEC (ref 21–32)
APTT PPP: 27.1 SEC (ref 21–32)
APTT PPP: 27.6 SEC (ref 21–32)
APTT PPP: 37.3 SEC (ref 21–32)
APTT PPP: 45.8 SEC (ref 21–32)
APTT PPP: 71.1 SEC (ref 21–32)
AST SERPL-CCNC: 28 U/L (ref 10–40)
AST SERPL-CCNC: 34 U/L (ref 10–40)
AST SERPL-CCNC: 82 U/L (ref 10–40)
AST SERPL-CCNC: 91 U/L (ref 10–40)
AV AREA BY CONTINUOUS VTI: 3.9 CM2
AV INDEX (PROSTH): 1.21
AV LVOT MEAN GRADIENT: 4 MMHG
AV LVOT PEAK GRADIENT: 6 MMHG
AV MEAN GRADIENT: 4.1 MMHG
AV PEAK GRADIENT: 6.8 MMHG
AV VALVE AREA BY VELOCITY RATIO: 2.9 CM²
AV VALVE AREA: 3.8 CM2
AV VELOCITY RATIO: 0.92
BACTERIA #/AREA URNS AUTO: NORMAL /HPF
BASOPHILS # BLD AUTO: 0.01 K/UL (ref 0–0.2)
BASOPHILS # BLD AUTO: 0.02 K/UL (ref 0–0.2)
BASOPHILS # BLD AUTO: 0.02 K/UL (ref 0–0.2)
BASOPHILS # BLD AUTO: 0.03 K/UL (ref 0–0.2)
BASOPHILS # BLD AUTO: 0.03 K/UL (ref 0–0.2)
BASOPHILS # BLD AUTO: 0.04 K/UL (ref 0–0.2)
BASOPHILS # BLD AUTO: 0.04 K/UL (ref 0–0.2)
BASOPHILS # BLD AUTO: 0.05 K/UL (ref 0–0.2)
BASOPHILS # BLD AUTO: 0.06 K/UL (ref 0–0.2)
BASOPHILS NFR BLD: 0 % (ref 0–1.9)
BASOPHILS NFR BLD: 0.1 % (ref 0–1.9)
BASOPHILS NFR BLD: 0.1 % (ref 0–1.9)
BASOPHILS NFR BLD: 0.2 % (ref 0–1.9)
BASOPHILS NFR BLD: 0.3 % (ref 0–1.9)
BASOPHILS NFR BLD: 0.5 % (ref 0–1.9)
BILIRUB SERPL-MCNC: 0.6 MG/DL (ref 0.1–1)
BILIRUB SERPL-MCNC: 0.8 MG/DL (ref 0.1–1)
BILIRUB SERPL-MCNC: 1 MG/DL (ref 0.1–1)
BILIRUB SERPL-MCNC: 1 MG/DL (ref 0.1–1)
BILIRUB UR QL STRIP: NEGATIVE
BLD GP AB SCN CELLS X3 SERPL QL: NORMAL
BNP SERPL-MCNC: 905 PG/ML (ref 0–99)
BSA FOR ECHO PROCEDURE: 1.72 M2
BUN SERPL-MCNC: 26 MG/DL (ref 10–30)
BUN SERPL-MCNC: 27 MG/DL (ref 10–30)
BUN SERPL-MCNC: 27 MG/DL (ref 10–30)
BUN SERPL-MCNC: 34 MG/DL (ref 10–30)
BUN SERPL-MCNC: 39 MG/DL (ref 10–30)
BUN SERPL-MCNC: 55 MG/DL (ref 10–30)
BURR CELLS BLD QL SMEAR: ABNORMAL
CALCIUM SERPL-MCNC: 8.2 MG/DL (ref 8.7–10.5)
CALCIUM SERPL-MCNC: 8.5 MG/DL (ref 8.7–10.5)
CALCIUM SERPL-MCNC: 8.9 MG/DL (ref 8.7–10.5)
CALCIUM SERPL-MCNC: 9.2 MG/DL (ref 8.7–10.5)
CALCIUM SERPL-MCNC: 9.2 MG/DL (ref 8.7–10.5)
CALCIUM SERPL-MCNC: 9.4 MG/DL (ref 8.7–10.5)
CHLORIDE SERPL-SCNC: 102 MMOL/L (ref 95–110)
CHLORIDE SERPL-SCNC: 102 MMOL/L (ref 95–110)
CHLORIDE SERPL-SCNC: 96 MMOL/L (ref 95–110)
CHLORIDE SERPL-SCNC: 98 MMOL/L (ref 95–110)
CHLORIDE SERPL-SCNC: 99 MMOL/L (ref 95–110)
CHLORIDE SERPL-SCNC: 99 MMOL/L (ref 95–110)
CLARITY UR REFRACT.AUTO: CLEAR
CO2 SERPL-SCNC: 23 MMOL/L (ref 23–29)
CO2 SERPL-SCNC: 25 MMOL/L (ref 23–29)
CO2 SERPL-SCNC: 26 MMOL/L (ref 23–29)
CO2 SERPL-SCNC: 27 MMOL/L (ref 23–29)
COLOR UR AUTO: YELLOW
CREAT SERPL-MCNC: 1.3 MG/DL (ref 0.5–1.4)
CREAT SERPL-MCNC: 1.3 MG/DL (ref 0.5–1.4)
CREAT SERPL-MCNC: 1.5 MG/DL (ref 0.5–1.4)
CREAT SERPL-MCNC: 1.6 MG/DL (ref 0.5–1.4)
CREAT SERPL-MCNC: 2.1 MG/DL (ref 0.5–1.4)
CREAT SERPL-MCNC: 3.6 MG/DL (ref 0.5–1.4)
CV ECHO LV RWT: 0.55 CM
DELSYS: ABNORMAL
DIFFERENTIAL METHOD BLD: ABNORMAL
DOP CALC AO PEAK VEL: 1.3 M/S
DOP CALC AO VTI: 19 CM
DOP CALC LVOT AREA: 3.1 CM2
DOP CALC LVOT DIAMETER: 2 CM
DOP CALC LVOT PEAK VEL: 1.2 M/S
DOP CALC LVOT STROKE VOLUME: 72.2 CM3
DOP CALCLVOT PEAK VEL VTI: 23 CM
ECHO EF ESTIMATED: 45 %
ECHO LV POSTERIOR WALL: 1.1 CM (ref 0.6–1.1)
EJECTION FRACTION: 63 %
EOSINOPHIL # BLD AUTO: 0 K/UL (ref 0–0.5)
EOSINOPHIL # BLD AUTO: 0.1 K/UL (ref 0–0.5)
EOSINOPHIL NFR BLD: 0 % (ref 0–8)
EOSINOPHIL NFR BLD: 0.1 % (ref 0–8)
EOSINOPHIL NFR BLD: 0.1 % (ref 0–8)
EOSINOPHIL NFR BLD: 0.5 % (ref 0–8)
ERYTHROCYTE [DISTWIDTH] IN BLOOD BY AUTOMATED COUNT: 13.5 % (ref 11.5–14.5)
ERYTHROCYTE [DISTWIDTH] IN BLOOD BY AUTOMATED COUNT: 13.6 % (ref 11.5–14.5)
ERYTHROCYTE [DISTWIDTH] IN BLOOD BY AUTOMATED COUNT: 13.7 % (ref 11.5–14.5)
ERYTHROCYTE [DISTWIDTH] IN BLOOD BY AUTOMATED COUNT: 13.8 % (ref 11.5–14.5)
ERYTHROCYTE [DISTWIDTH] IN BLOOD BY AUTOMATED COUNT: 14 % (ref 11.5–14.5)
ERYTHROCYTE [DISTWIDTH] IN BLOOD BY AUTOMATED COUNT: 14.1 % (ref 11.5–14.5)
ERYTHROCYTE [DISTWIDTH] IN BLOOD BY AUTOMATED COUNT: 14.2 % (ref 11.5–14.5)
EST. GFR  (NO RACE VARIABLE): 14.9 ML/MIN/1.73 M^2
EST. GFR  (NO RACE VARIABLE): 28.5 ML/MIN/1.73 M^2
EST. GFR  (NO RACE VARIABLE): 39.4 ML/MIN/1.73 M^2
EST. GFR  (NO RACE VARIABLE): 42.6 ML/MIN/1.73 M^2
EST. GFR  (NO RACE VARIABLE): 50.6 ML/MIN/1.73 M^2
EST. GFR  (NO RACE VARIABLE): 50.6 ML/MIN/1.73 M^2
FLOW: 2
FRACTIONAL SHORTENING: 22.5 % (ref 28–44)
GLUCOSE SERPL-MCNC: 115 MG/DL (ref 70–110)
GLUCOSE SERPL-MCNC: 132 MG/DL (ref 70–110)
GLUCOSE SERPL-MCNC: 133 MG/DL (ref 70–110)
GLUCOSE SERPL-MCNC: 141 MG/DL (ref 70–110)
GLUCOSE SERPL-MCNC: 173 MG/DL (ref 70–110)
GLUCOSE SERPL-MCNC: 180 MG/DL (ref 70–110)
GLUCOSE UR QL STRIP: NEGATIVE
HCO3 UR-SCNC: 28 MMOL/L (ref 24–28)
HCO3 UR-SCNC: 30.2 MMOL/L (ref 24–28)
HCT VFR BLD AUTO: 36.2 % (ref 40–54)
HCT VFR BLD AUTO: 37.3 % (ref 40–54)
HCT VFR BLD AUTO: 37.6 % (ref 40–54)
HCT VFR BLD AUTO: 38.9 % (ref 40–54)
HCT VFR BLD AUTO: 39.2 % (ref 40–54)
HCT VFR BLD AUTO: 39.5 % (ref 40–54)
HCT VFR BLD AUTO: 40.1 % (ref 40–54)
HCT VFR BLD AUTO: 41 % (ref 40–54)
HCT VFR BLD AUTO: 41 % (ref 40–54)
HCT VFR BLD AUTO: 41.6 % (ref 40–54)
HCT VFR BLD AUTO: 43.1 % (ref 40–54)
HCT VFR BLD CALC: 37 %PCV (ref 36–54)
HGB BLD-MCNC: 11.8 G/DL (ref 14–18)
HGB BLD-MCNC: 12.2 G/DL (ref 14–18)
HGB BLD-MCNC: 12.3 G/DL (ref 14–18)
HGB BLD-MCNC: 12.8 G/DL (ref 14–18)
HGB BLD-MCNC: 12.9 G/DL (ref 14–18)
HGB BLD-MCNC: 13 G/DL (ref 14–18)
HGB BLD-MCNC: 13.1 G/DL (ref 14–18)
HGB BLD-MCNC: 13.5 G/DL (ref 14–18)
HGB BLD-MCNC: 13.6 G/DL (ref 14–18)
HGB BLD-MCNC: 13.6 G/DL (ref 14–18)
HGB BLD-MCNC: 14.6 G/DL (ref 14–18)
HGB UR QL STRIP: NEGATIVE
HYALINE CASTS UR QL AUTO: 0 /LPF
HYPOCHROMIA BLD QL SMEAR: ABNORMAL
IMM GRANULOCYTES # BLD AUTO: 0.02 K/UL (ref 0–0.04)
IMM GRANULOCYTES # BLD AUTO: 0.04 K/UL (ref 0–0.04)
IMM GRANULOCYTES # BLD AUTO: 0.05 K/UL (ref 0–0.04)
IMM GRANULOCYTES # BLD AUTO: 0.05 K/UL (ref 0–0.04)
IMM GRANULOCYTES # BLD AUTO: 0.06 K/UL (ref 0–0.04)
IMM GRANULOCYTES # BLD AUTO: 0.07 K/UL (ref 0–0.04)
IMM GRANULOCYTES # BLD AUTO: 0.07 K/UL (ref 0–0.04)
IMM GRANULOCYTES # BLD AUTO: ABNORMAL K/UL (ref 0–0.04)
IMM GRANULOCYTES NFR BLD AUTO: 0.2 % (ref 0–0.5)
IMM GRANULOCYTES NFR BLD AUTO: 0.3 % (ref 0–0.5)
IMM GRANULOCYTES NFR BLD AUTO: 0.4 % (ref 0–0.5)
IMM GRANULOCYTES NFR BLD AUTO: 0.4 % (ref 0–0.5)
IMM GRANULOCYTES NFR BLD AUTO: 0.5 % (ref 0–0.5)
IMM GRANULOCYTES NFR BLD AUTO: ABNORMAL % (ref 0–0.5)
INR PPP: 1 (ref 0.8–1.2)
INR PPP: 1.1 (ref 0.8–1.2)
INR PPP: 1.1 (ref 0.8–1.2)
INTERVENTRICULAR SEPTUM: 1 CM (ref 0.6–1.1)
IVC DIAMETER: 1.23 CM
KETONES UR QL STRIP: ABNORMAL
LA MAJOR: 6.13 CM
LA WIDTH: 4.66 CM
LACTATE SERPL-SCNC: 1.9 MMOL/L (ref 0.5–2.2)
LACTATE SERPL-SCNC: 2.3 MMOL/L (ref 0.5–2.2)
LACTATE SERPL-SCNC: 2.4 MMOL/L (ref 0.5–2.2)
LACTATE SERPL-SCNC: 3.3 MMOL/L (ref 0.5–2.2)
LACTATE SERPL-SCNC: 3.5 MMOL/L (ref 0.5–2.2)
LACTATE SERPL-SCNC: 3.5 MMOL/L (ref 0.5–2.2)
LDH SERPL L TO P-CCNC: 2.48 MMOL/L (ref 0.5–2.2)
LEFT ATRIUM SIZE: 4.65 CM
LEFT INTERNAL DIMENSION IN SYSTOLE: 3.1 CM (ref 2.1–4)
LEFT VENTRICLE DIASTOLIC VOLUME INDEX: 39.07 ML/M2
LEFT VENTRICLE DIASTOLIC VOLUME: 68.76 ML
LEFT VENTRICLE MASS INDEX: 77.4 G/M2
LEFT VENTRICLE SYSTOLIC VOLUME INDEX: 21.3 ML/M2
LEFT VENTRICLE SYSTOLIC VOLUME: 37.56 ML
LEFT VENTRICULAR INTERNAL DIMENSION IN DIASTOLE: 4 CM (ref 3.5–6)
LEFT VENTRICULAR MASS: 136.2 G
LEUKOCYTE ESTERASE UR QL STRIP: NEGATIVE
LYMPHOCYTES # BLD AUTO: 0.3 K/UL (ref 1–4.8)
LYMPHOCYTES # BLD AUTO: 0.4 K/UL (ref 1–4.8)
LYMPHOCYTES # BLD AUTO: 0.5 K/UL (ref 1–4.8)
LYMPHOCYTES # BLD AUTO: 0.6 K/UL (ref 1–4.8)
LYMPHOCYTES # BLD AUTO: 0.6 K/UL (ref 1–4.8)
LYMPHOCYTES # BLD AUTO: 0.7 K/UL (ref 1–4.8)
LYMPHOCYTES NFR BLD: 2.2 % (ref 18–48)
LYMPHOCYTES NFR BLD: 2.7 % (ref 18–48)
LYMPHOCYTES NFR BLD: 3.1 % (ref 18–48)
LYMPHOCYTES NFR BLD: 3.3 % (ref 18–48)
LYMPHOCYTES NFR BLD: 3.5 % (ref 18–48)
LYMPHOCYTES NFR BLD: 3.8 % (ref 18–48)
LYMPHOCYTES NFR BLD: 4 % (ref 18–48)
LYMPHOCYTES NFR BLD: 5.5 % (ref 18–48)
LYMPHOCYTES NFR BLD: 5.5 % (ref 18–48)
LYMPHOCYTES NFR BLD: 5.9 % (ref 18–48)
MAGNESIUM SERPL-MCNC: 2.3 MG/DL (ref 1.6–2.6)
MCH RBC QN AUTO: 31.1 PG (ref 27–31)
MCH RBC QN AUTO: 31.4 PG (ref 27–31)
MCH RBC QN AUTO: 31.5 PG (ref 27–31)
MCH RBC QN AUTO: 31.5 PG (ref 27–31)
MCH RBC QN AUTO: 31.6 PG (ref 27–31)
MCH RBC QN AUTO: 31.7 PG (ref 27–31)
MCH RBC QN AUTO: 31.9 PG (ref 27–31)
MCH RBC QN AUTO: 31.9 PG (ref 27–31)
MCH RBC QN AUTO: 32 PG (ref 27–31)
MCH RBC QN AUTO: 32.3 PG (ref 27–31)
MCH RBC QN AUTO: 32.5 PG (ref 27–31)
MCHC RBC AUTO-ENTMCNC: 32.4 G/DL (ref 32–36)
MCHC RBC AUTO-ENTMCNC: 32.5 G/DL (ref 32–36)
MCHC RBC AUTO-ENTMCNC: 32.6 G/DL (ref 32–36)
MCHC RBC AUTO-ENTMCNC: 32.7 G/DL (ref 32–36)
MCHC RBC AUTO-ENTMCNC: 32.9 G/DL (ref 32–36)
MCHC RBC AUTO-ENTMCNC: 33 G/DL (ref 32–36)
MCHC RBC AUTO-ENTMCNC: 33.2 G/DL (ref 32–36)
MCHC RBC AUTO-ENTMCNC: 33.2 G/DL (ref 32–36)
MCHC RBC AUTO-ENTMCNC: 33.9 G/DL (ref 32–36)
MCV RBC AUTO: 95 FL (ref 82–98)
MCV RBC AUTO: 95 FL (ref 82–98)
MCV RBC AUTO: 96 FL (ref 82–98)
MCV RBC AUTO: 97 FL (ref 82–98)
MCV RBC AUTO: 99 FL (ref 82–98)
MICROSCOPIC COMMENT: NORMAL
MODE: ABNORMAL
MONOCYTES # BLD AUTO: 0.4 K/UL (ref 0.3–1)
MONOCYTES # BLD AUTO: 0.5 K/UL (ref 0.3–1)
MONOCYTES # BLD AUTO: 0.6 K/UL (ref 0.3–1)
MONOCYTES # BLD AUTO: 0.6 K/UL (ref 0.3–1)
MONOCYTES # BLD AUTO: 0.7 K/UL (ref 0.3–1)
MONOCYTES # BLD AUTO: 0.8 K/UL (ref 0.3–1)
MONOCYTES # BLD AUTO: 0.8 K/UL (ref 0.3–1)
MONOCYTES NFR BLD: 3 % (ref 4–15)
MONOCYTES NFR BLD: 4 % (ref 4–15)
MONOCYTES NFR BLD: 4.4 % (ref 4–15)
MONOCYTES NFR BLD: 4.6 % (ref 4–15)
MONOCYTES NFR BLD: 4.6 % (ref 4–15)
MONOCYTES NFR BLD: 4.7 % (ref 4–15)
MONOCYTES NFR BLD: 4.9 % (ref 4–15)
MONOCYTES NFR BLD: 5.2 % (ref 4–15)
MONOCYTES NFR BLD: 5.6 % (ref 4–15)
MONOCYTES NFR BLD: 7 % (ref 4–15)
MRSA SCREEN BY PCR: NOT DETECTED
MV MEAN GRADIENT: 12 MMHG
MV PEAK GRADIENT: 12 MMHG
MV STENOSIS PRESSURE HALF TIME: 120 MS
MV VALVE AREA P 1/2 METHOD: 1.83 CM2
NEUTROPHILS # BLD AUTO: 10.1 K/UL (ref 1.8–7.7)
NEUTROPHILS # BLD AUTO: 10.3 K/UL (ref 1.8–7.7)
NEUTROPHILS # BLD AUTO: 10.3 K/UL (ref 1.8–7.7)
NEUTROPHILS # BLD AUTO: 11.7 K/UL (ref 1.8–7.7)
NEUTROPHILS # BLD AUTO: 12.2 K/UL (ref 1.8–7.7)
NEUTROPHILS # BLD AUTO: 12.6 K/UL (ref 1.8–7.7)
NEUTROPHILS # BLD AUTO: 14.8 K/UL (ref 1.8–7.7)
NEUTROPHILS # BLD AUTO: 9.3 K/UL (ref 1.8–7.7)
NEUTROPHILS # BLD AUTO: 9.4 K/UL (ref 1.8–7.7)
NEUTROPHILS NFR BLD: 86 % (ref 38–73)
NEUTROPHILS NFR BLD: 89.3 % (ref 38–73)
NEUTROPHILS NFR BLD: 89.3 % (ref 38–73)
NEUTROPHILS NFR BLD: 89.9 % (ref 38–73)
NEUTROPHILS NFR BLD: 91.1 % (ref 38–73)
NEUTROPHILS NFR BLD: 91.5 % (ref 38–73)
NEUTROPHILS NFR BLD: 92 % (ref 38–73)
NEUTROPHILS NFR BLD: 92.3 % (ref 38–73)
NEUTROPHILS NFR BLD: 92.3 % (ref 38–73)
NEUTROPHILS NFR BLD: 92.5 % (ref 38–73)
NITRITE UR QL STRIP: NEGATIVE
NRBC BLD-RTO: 0 /100 WBC
OHS CV RV/LV RATIO: 0.78 CM
OHS QRS DURATION: 104 MS
OHS QRS DURATION: 110 MS
OHS QRS DURATION: 112 MS
OHS QTC CALCULATION: 447 MS
OHS QTC CALCULATION: 489 MS
OHS QTC CALCULATION: 517 MS
OVALOCYTES BLD QL SMEAR: ABNORMAL
OVALOCYTES BLD QL SMEAR: ABNORMAL
PCO2 BLDA: 36.6 MMHG (ref 35–45)
PCO2 BLDA: 47.2 MMHG (ref 35–45)
PH SMN: 7.41 [PH] (ref 7.35–7.45)
PH SMN: 7.49 [PH] (ref 7.35–7.45)
PH UR STRIP: 6 [PH] (ref 5–8)
PHOSPHATE SERPL-MCNC: 3.4 MG/DL (ref 2.7–4.5)
PHOSPHATE SERPL-MCNC: 3.5 MG/DL (ref 2.7–4.5)
PHOSPHATE SERPL-MCNC: 3.6 MG/DL (ref 2.7–4.5)
PHOSPHATE SERPL-MCNC: 3.6 MG/DL (ref 2.7–4.5)
PHOSPHATE SERPL-MCNC: 3.8 MG/DL (ref 2.7–4.5)
PISA TR MAX VEL: 3.17 M/S
PLATELET # BLD AUTO: 195 K/UL (ref 150–450)
PLATELET # BLD AUTO: 195 K/UL (ref 150–450)
PLATELET # BLD AUTO: 211 K/UL (ref 150–450)
PLATELET # BLD AUTO: 211 K/UL (ref 150–450)
PLATELET # BLD AUTO: 232 K/UL (ref 150–450)
PLATELET # BLD AUTO: 238 K/UL (ref 150–450)
PLATELET # BLD AUTO: 239 K/UL (ref 150–450)
PLATELET # BLD AUTO: 240 K/UL (ref 150–450)
PLATELET BLD QL SMEAR: ABNORMAL
PMV BLD AUTO: 10.5 FL (ref 9.2–12.9)
PMV BLD AUTO: 10.6 FL (ref 9.2–12.9)
PMV BLD AUTO: 10.9 FL (ref 9.2–12.9)
PMV BLD AUTO: 11.1 FL (ref 9.2–12.9)
PMV BLD AUTO: 11.2 FL (ref 9.2–12.9)
PMV BLD AUTO: 11.2 FL (ref 9.2–12.9)
PMV BLD AUTO: 11.4 FL (ref 9.2–12.9)
PMV BLD AUTO: 11.4 FL (ref 9.2–12.9)
PMV BLD AUTO: ABNORMAL FL (ref 9.2–12.9)
PO2 BLDA: 32 MMHG (ref 40–60)
PO2 BLDA: 66 MMHG (ref 80–100)
POC BE: 5 MMOL/L
POC BE: 6 MMOL/L
POC IONIZED CALCIUM: 1.01 MMOL/L (ref 1.06–1.42)
POC SATURATED O2: 62 % (ref 95–100)
POC SATURATED O2: 94 % (ref 95–100)
POC TCO2: 29 MMOL/L (ref 23–27)
POC TCO2: 32 MMOL/L (ref 24–29)
POCT GLUCOSE: 161 MG/DL (ref 70–110)
POIKILOCYTOSIS BLD QL SMEAR: SLIGHT
POIKILOCYTOSIS BLD QL SMEAR: SLIGHT
POLYCHROMASIA BLD QL SMEAR: ABNORMAL
POTASSIUM BLD-SCNC: 3.7 MMOL/L (ref 3.5–5.1)
POTASSIUM SERPL-SCNC: 3.4 MMOL/L (ref 3.5–5.1)
POTASSIUM SERPL-SCNC: 3.5 MMOL/L (ref 3.5–5.1)
POTASSIUM SERPL-SCNC: 4 MMOL/L (ref 3.5–5.1)
POTASSIUM SERPL-SCNC: 4 MMOL/L (ref 3.5–5.1)
POTASSIUM SERPL-SCNC: 4.1 MMOL/L (ref 3.5–5.1)
POTASSIUM SERPL-SCNC: 4.3 MMOL/L (ref 3.5–5.1)
PROT SERPL-MCNC: 6.1 G/DL (ref 6–8.4)
PROT SERPL-MCNC: 6.5 G/DL (ref 6–8.4)
PROT SERPL-MCNC: 6.9 G/DL (ref 6–8.4)
PROT SERPL-MCNC: 7.2 G/DL (ref 6–8.4)
PROT UR QL STRIP: ABNORMAL
PROTHROMBIN TIME: 11.2 SEC (ref 9–12.5)
PROTHROMBIN TIME: 11.8 SEC (ref 9–12.5)
PROTHROMBIN TIME: 12.3 SEC (ref 9–12.5)
RA MAJOR: 4.05 CM
RA PRESSURE ESTIMATED: 3 MMHG
RA WIDTH: 2.83 CM
RBC # BLD AUTO: 3.75 M/UL (ref 4.6–6.2)
RBC # BLD AUTO: 3.84 M/UL (ref 4.6–6.2)
RBC # BLD AUTO: 3.87 M/UL (ref 4.6–6.2)
RBC # BLD AUTO: 3.97 M/UL (ref 4.6–6.2)
RBC # BLD AUTO: 4.08 M/UL (ref 4.6–6.2)
RBC # BLD AUTO: 4.1 M/UL (ref 4.6–6.2)
RBC # BLD AUTO: 4.14 M/UL (ref 4.6–6.2)
RBC # BLD AUTO: 4.27 M/UL (ref 4.6–6.2)
RBC # BLD AUTO: 4.27 M/UL (ref 4.6–6.2)
RBC # BLD AUTO: 4.34 M/UL (ref 4.6–6.2)
RBC # BLD AUTO: 4.52 M/UL (ref 4.6–6.2)
RBC #/AREA URNS AUTO: 2 /HPF (ref 0–4)
RIGHT ATRIAL AREA: 12.1 CM2
RIGHT VENTRICLE DIASTOLIC BASEL DIMENSION: 3.1 CM
RV TB RVSP: 6 MMHG
RV TISSUE DOPPLER FREE WALL SYSTOLIC VELOCITY 1 (APICAL 4 CHAMBER VIEW): 12.07 CM/S
SAMPLE: ABNORMAL
SINUS: 2.75 CM
SITE: ABNORMAL
SODIUM BLD-SCNC: 138 MMOL/L (ref 136–145)
SODIUM SERPL-SCNC: 138 MMOL/L (ref 136–145)
SODIUM SERPL-SCNC: 140 MMOL/L (ref 136–145)
SODIUM SERPL-SCNC: 140 MMOL/L (ref 136–145)
SODIUM SERPL-SCNC: 141 MMOL/L (ref 136–145)
SODIUM SERPL-SCNC: 141 MMOL/L (ref 136–145)
SODIUM SERPL-SCNC: 142 MMOL/L (ref 136–145)
SP GR UR STRIP: >1.03 (ref 1–1.03)
SP02: 94
SPECIMEN OUTDATE: NORMAL
SPHEROCYTES BLD QL SMEAR: ABNORMAL
SPHEROCYTES BLD QL SMEAR: ABNORMAL
SQUAMOUS #/AREA URNS AUTO: 0 /HPF
TDI LATERAL: 0.04 M/S
TDI SEPTAL: 0.02 M/S
TDI: 0.03 M/S
TR MAX PG: 40 MMHG
TRICUSPID ANNULAR PLANE SYSTOLIC EXCURSION: 1.73 CM
TROPONIN I SERPL DL<=0.01 NG/ML-MCNC: 0.18 NG/ML (ref 0–0.03)
TROPONIN I SERPL DL<=0.01 NG/ML-MCNC: 0.22 NG/ML (ref 0–0.03)
TROPONIN I SERPL DL<=0.01 NG/ML-MCNC: 0.24 NG/ML (ref 0–0.03)
TV PEAK GRADIENT: 40 MMHG
TV REST PULMONARY ARTERY PRESSURE: 43 MMHG
URN SPEC COLLECT METH UR: ABNORMAL
WBC # BLD AUTO: 10.38 K/UL (ref 3.9–12.7)
WBC # BLD AUTO: 10.97 K/UL (ref 3.9–12.7)
WBC # BLD AUTO: 11.07 K/UL (ref 3.9–12.7)
WBC # BLD AUTO: 11.54 K/UL (ref 3.9–12.7)
WBC # BLD AUTO: 11.54 K/UL (ref 3.9–12.7)
WBC # BLD AUTO: 11.92 K/UL (ref 3.9–12.7)
WBC # BLD AUTO: 12.65 K/UL (ref 3.9–12.7)
WBC # BLD AUTO: 13.03 K/UL (ref 3.9–12.7)
WBC # BLD AUTO: 13.24 K/UL (ref 3.9–12.7)
WBC # BLD AUTO: 13.77 K/UL (ref 3.9–12.7)
WBC # BLD AUTO: 16.03 K/UL (ref 3.9–12.7)
WBC #/AREA URNS AUTO: 1 /HPF (ref 0–5)
Z-SCORE OF LEFT VENTRICULAR DIMENSION IN END DIASTOLE: -1.96
Z-SCORE OF LEFT VENTRICULAR DIMENSION IN END SYSTOLE: 0.23

## 2024-01-01 PROCEDURE — 12000002 HC ACUTE/MED SURGE SEMI-PRIVATE ROOM

## 2024-01-01 PROCEDURE — 81001 URINALYSIS AUTO W/SCOPE: CPT | Performed by: EMERGENCY MEDICINE

## 2024-01-01 PROCEDURE — 80053 COMPREHEN METABOLIC PANEL: CPT

## 2024-01-01 PROCEDURE — 85025 COMPLETE CBC W/AUTO DIFF WBC: CPT | Performed by: HOSPITALIST

## 2024-01-01 PROCEDURE — 25500020 PHARM REV CODE 255: Performed by: INTERNAL MEDICINE

## 2024-01-01 PROCEDURE — 85730 THROMBOPLASTIN TIME PARTIAL: CPT | Mod: 91 | Performed by: STUDENT IN AN ORGANIZED HEALTH CARE EDUCATION/TRAINING PROGRAM

## 2024-01-01 PROCEDURE — 85027 COMPLETE CBC AUTOMATED: CPT | Performed by: HOSPITALIST

## 2024-01-01 PROCEDURE — 85025 COMPLETE CBC W/AUTO DIFF WBC: CPT | Performed by: STUDENT IN AN ORGANIZED HEALTH CARE EDUCATION/TRAINING PROGRAM

## 2024-01-01 PROCEDURE — 63600175 PHARM REV CODE 636 W HCPCS

## 2024-01-01 PROCEDURE — 99233 SBSQ HOSP IP/OBS HIGH 50: CPT | Mod: ,,, | Performed by: STUDENT IN AN ORGANIZED HEALTH CARE EDUCATION/TRAINING PROGRAM

## 2024-01-01 PROCEDURE — 99233 SBSQ HOSP IP/OBS HIGH 50: CPT | Mod: GC,,, | Performed by: INTERNAL MEDICINE

## 2024-01-01 PROCEDURE — 83735 ASSAY OF MAGNESIUM: CPT

## 2024-01-01 PROCEDURE — 99900035 HC TECH TIME PER 15 MIN (STAT)

## 2024-01-01 PROCEDURE — 63600175 PHARM REV CODE 636 W HCPCS: Performed by: STUDENT IN AN ORGANIZED HEALTH CARE EDUCATION/TRAINING PROGRAM

## 2024-01-01 PROCEDURE — 51798 US URINE CAPACITY MEASURE: CPT

## 2024-01-01 PROCEDURE — 94761 N-INVAS EAR/PLS OXIMETRY MLT: CPT

## 2024-01-01 PROCEDURE — 25500020 PHARM REV CODE 255: Performed by: EMERGENCY MEDICINE

## 2024-01-01 PROCEDURE — 63600175 PHARM REV CODE 636 W HCPCS: Performed by: INTERNAL MEDICINE

## 2024-01-01 PROCEDURE — 80048 BASIC METABOLIC PNL TOTAL CA: CPT | Mod: XB | Performed by: HOSPITALIST

## 2024-01-01 PROCEDURE — 83735 ASSAY OF MAGNESIUM: CPT | Performed by: EMERGENCY MEDICINE

## 2024-01-01 PROCEDURE — 84295 ASSAY OF SERUM SODIUM: CPT

## 2024-01-01 PROCEDURE — 94799 UNLISTED PULMONARY SVC/PX: CPT

## 2024-01-01 PROCEDURE — 83605 ASSAY OF LACTIC ACID: CPT

## 2024-01-01 PROCEDURE — 85025 COMPLETE CBC W/AUTO DIFF WBC: CPT | Mod: 91

## 2024-01-01 PROCEDURE — 86900 BLOOD TYPING SEROLOGIC ABO: CPT | Performed by: INTERNAL MEDICINE

## 2024-01-01 PROCEDURE — 86920 COMPATIBILITY TEST SPIN: CPT

## 2024-01-01 PROCEDURE — 99213 OFFICE O/P EST LOW 20 MIN: CPT | Mod: PBBFAC,PN,25 | Performed by: PODIATRIST

## 2024-01-01 PROCEDURE — 25000003 PHARM REV CODE 250: Performed by: STUDENT IN AN ORGANIZED HEALTH CARE EDUCATION/TRAINING PROGRAM

## 2024-01-01 PROCEDURE — 82803 BLOOD GASES ANY COMBINATION: CPT

## 2024-01-01 PROCEDURE — 25000003 PHARM REV CODE 250: Performed by: INTERNAL MEDICINE

## 2024-01-01 PROCEDURE — 85610 PROTHROMBIN TIME: CPT | Performed by: STUDENT IN AN ORGANIZED HEALTH CARE EDUCATION/TRAINING PROGRAM

## 2024-01-01 PROCEDURE — 85007 BL SMEAR W/DIFF WBC COUNT: CPT | Performed by: HOSPITALIST

## 2024-01-01 PROCEDURE — 96375 TX/PRO/DX INJ NEW DRUG ADDON: CPT

## 2024-01-01 PROCEDURE — 85027 COMPLETE CBC AUTOMATED: CPT | Mod: 91 | Performed by: HOSPITALIST

## 2024-01-01 PROCEDURE — 85730 THROMBOPLASTIN TIME PARTIAL: CPT | Performed by: STUDENT IN AN ORGANIZED HEALTH CARE EDUCATION/TRAINING PROGRAM

## 2024-01-01 PROCEDURE — 83605 ASSAY OF LACTIC ACID: CPT | Performed by: EMERGENCY MEDICINE

## 2024-01-01 PROCEDURE — 94640 AIRWAY INHALATION TREATMENT: CPT

## 2024-01-01 PROCEDURE — 99223 1ST HOSP IP/OBS HIGH 75: CPT | Mod: ,,, | Performed by: STUDENT IN AN ORGANIZED HEALTH CARE EDUCATION/TRAINING PROGRAM

## 2024-01-01 PROCEDURE — 25000003 PHARM REV CODE 250: Performed by: HOSPITALIST

## 2024-01-01 PROCEDURE — 85730 THROMBOPLASTIN TIME PARTIAL: CPT | Mod: 91

## 2024-01-01 PROCEDURE — 63600175 PHARM REV CODE 636 W HCPCS: Performed by: HOSPITALIST

## 2024-01-01 PROCEDURE — 80053 COMPREHEN METABOLIC PANEL: CPT | Performed by: EMERGENCY MEDICINE

## 2024-01-01 PROCEDURE — 84484 ASSAY OF TROPONIN QUANT: CPT | Performed by: HOSPITALIST

## 2024-01-01 PROCEDURE — 85730 THROMBOPLASTIN TIME PARTIAL: CPT | Performed by: HOSPITALIST

## 2024-01-01 PROCEDURE — 82330 ASSAY OF CALCIUM: CPT

## 2024-01-01 PROCEDURE — 82800 BLOOD PH: CPT

## 2024-01-01 PROCEDURE — 83735 ASSAY OF MAGNESIUM: CPT | Mod: 91

## 2024-01-01 PROCEDURE — 83605 ASSAY OF LACTIC ACID: CPT | Performed by: HOSPITALIST

## 2024-01-01 PROCEDURE — 76937 US GUIDE VASCULAR ACCESS: CPT

## 2024-01-01 PROCEDURE — 99498 ADVNCD CARE PLAN ADDL 30 MIN: CPT | Mod: ,,, | Performed by: STUDENT IN AN ORGANIZED HEALTH CARE EDUCATION/TRAINING PROGRAM

## 2024-01-01 PROCEDURE — 86850 RBC ANTIBODY SCREEN: CPT | Performed by: INTERNAL MEDICINE

## 2024-01-01 PROCEDURE — 99497 ADVNCD CARE PLAN 30 MIN: CPT | Mod: ,,, | Performed by: STUDENT IN AN ORGANIZED HEALTH CARE EDUCATION/TRAINING PROGRAM

## 2024-01-01 PROCEDURE — 84100 ASSAY OF PHOSPHORUS: CPT

## 2024-01-01 PROCEDURE — 25000003 PHARM REV CODE 250

## 2024-01-01 PROCEDURE — 84484 ASSAY OF TROPONIN QUANT: CPT | Mod: 91

## 2024-01-01 PROCEDURE — 27000221 HC OXYGEN, UP TO 24 HOURS

## 2024-01-01 PROCEDURE — 99499 UNLISTED E&M SERVICE: CPT | Mod: S$PBB,,, | Performed by: PODIATRIST

## 2024-01-01 PROCEDURE — 84132 ASSAY OF SERUM POTASSIUM: CPT

## 2024-01-01 PROCEDURE — 87040 BLOOD CULTURE FOR BACTERIA: CPT | Mod: 59 | Performed by: PHYSICIAN ASSISTANT

## 2024-01-01 PROCEDURE — 87641 MR-STAPH DNA AMP PROBE: CPT

## 2024-01-01 PROCEDURE — 25000242 PHARM REV CODE 250 ALT 637 W/ HCPCS

## 2024-01-01 PROCEDURE — 83880 ASSAY OF NATRIURETIC PEPTIDE: CPT

## 2024-01-01 PROCEDURE — 96376 TX/PRO/DX INJ SAME DRUG ADON: CPT

## 2024-01-01 PROCEDURE — 20000000 HC ICU ROOM

## 2024-01-01 PROCEDURE — 93005 ELECTROCARDIOGRAM TRACING: CPT

## 2024-01-01 PROCEDURE — 96360 HYDRATION IV INFUSION INIT: CPT | Mod: 59

## 2024-01-01 PROCEDURE — 85610 PROTHROMBIN TIME: CPT | Performed by: HOSPITALIST

## 2024-01-01 PROCEDURE — 99285 EMERGENCY DEPT VISIT HI MDM: CPT | Mod: 25

## 2024-01-01 PROCEDURE — 5A0935A ASSISTANCE WITH RESPIRATORY VENTILATION, LESS THAN 24 CONSECUTIVE HOURS, HIGH NASAL FLOW/VELOCITY: ICD-10-PCS | Performed by: SURGERY

## 2024-01-01 PROCEDURE — 99291 CRITICAL CARE FIRST HOUR: CPT | Mod: GC,,, | Performed by: STUDENT IN AN ORGANIZED HEALTH CARE EDUCATION/TRAINING PROGRAM

## 2024-01-01 PROCEDURE — 93010 ELECTROCARDIOGRAM REPORT: CPT | Mod: ,,, | Performed by: INTERNAL MEDICINE

## 2024-01-01 PROCEDURE — 11721 DEBRIDE NAIL 6 OR MORE: CPT | Mod: Q9,PBBFAC,PN | Performed by: PODIATRIST

## 2024-01-01 PROCEDURE — 85025 COMPLETE CBC W/AUTO DIFF WBC: CPT

## 2024-01-01 PROCEDURE — 99999 PR PBB SHADOW E&M-EST. PATIENT-LVL III: CPT | Mod: PBBFAC,,, | Performed by: PODIATRIST

## 2024-01-01 PROCEDURE — 85025 COMPLETE CBC W/AUTO DIFF WBC: CPT | Performed by: EMERGENCY MEDICINE

## 2024-01-01 PROCEDURE — 84100 ASSAY OF PHOSPHORUS: CPT | Performed by: EMERGENCY MEDICINE

## 2024-01-01 PROCEDURE — 0D9670Z DRAINAGE OF STOMACH WITH DRAINAGE DEVICE, VIA NATURAL OR ARTIFICIAL OPENING: ICD-10-PCS | Performed by: SURGERY

## 2024-01-01 PROCEDURE — 85610 PROTHROMBIN TIME: CPT | Mod: 91

## 2024-01-01 PROCEDURE — 83735 ASSAY OF MAGNESIUM: CPT | Performed by: HOSPITALIST

## 2024-01-01 PROCEDURE — 85014 HEMATOCRIT: CPT

## 2024-01-01 PROCEDURE — 86901 BLOOD TYPING SEROLOGIC RH(D): CPT | Performed by: INTERNAL MEDICINE

## 2024-01-01 PROCEDURE — 83605 ASSAY OF LACTIC ACID: CPT | Mod: 91 | Performed by: INTERNAL MEDICINE

## 2024-01-01 PROCEDURE — 99497 ADVNCD CARE PLAN 30 MIN: CPT | Mod: 25,,, | Performed by: STUDENT IN AN ORGANIZED HEALTH CARE EDUCATION/TRAINING PROGRAM

## 2024-01-01 PROCEDURE — 11721 DEBRIDE NAIL 6 OR MORE: CPT | Mod: Q9,S$PBB,, | Performed by: PODIATRIST

## 2024-01-01 PROCEDURE — 84484 ASSAY OF TROPONIN QUANT: CPT | Performed by: EMERGENCY MEDICINE

## 2024-01-01 PROCEDURE — 27100171 HC OXYGEN HIGH FLOW UP TO 24 HOURS

## 2024-01-01 PROCEDURE — 99222 1ST HOSP IP/OBS MODERATE 55: CPT | Mod: GC,,, | Performed by: INTERNAL MEDICINE

## 2024-01-01 PROCEDURE — 96365 THER/PROPH/DIAG IV INF INIT: CPT

## 2024-01-01 PROCEDURE — 36600 WITHDRAWAL OF ARTERIAL BLOOD: CPT

## 2024-01-01 PROCEDURE — 80069 RENAL FUNCTION PANEL: CPT | Performed by: HOSPITALIST

## 2024-01-01 RX ORDER — CALCIUM CARBONATE 500(1250)
TABLET ORAL DAILY
Status: ON HOLD | COMMUNITY
End: 2024-01-01

## 2024-01-01 RX ORDER — PHENYLEPHRINE HCL IN 0.9% NACL 20MG/250ML
0-5 PLASTIC BAG, INJECTION (ML) INTRAVENOUS CONTINUOUS
Status: DISCONTINUED | OUTPATIENT
Start: 2024-01-01 | End: 2024-01-01

## 2024-01-01 RX ORDER — HYDROMORPHONE HYDROCHLORIDE 1 MG/ML
0.5 INJECTION, SOLUTION INTRAMUSCULAR; INTRAVENOUS; SUBCUTANEOUS EVERY 5 MIN PRN
Status: DISCONTINUED | OUTPATIENT
Start: 2024-01-01 | End: 2024-01-01 | Stop reason: HOSPADM

## 2024-01-01 RX ORDER — LORAZEPAM 2 MG/ML
1 INJECTION INTRAMUSCULAR EVERY 30 MIN PRN
Status: DISCONTINUED | OUTPATIENT
Start: 2024-01-01 | End: 2024-01-01

## 2024-01-01 RX ORDER — PANTOPRAZOLE SODIUM 40 MG/10ML
40 INJECTION, POWDER, LYOPHILIZED, FOR SOLUTION INTRAVENOUS 2 TIMES DAILY
Status: DISCONTINUED | OUTPATIENT
Start: 2024-01-01 | End: 2024-01-01

## 2024-01-01 RX ORDER — NOREPINEPHRINE BITARTRATE/D5W 4MG/250ML
0-3 PLASTIC BAG, INJECTION (ML) INTRAVENOUS CONTINUOUS
Status: DISCONTINUED | OUTPATIENT
Start: 2024-01-01 | End: 2024-01-01

## 2024-01-01 RX ORDER — PANTOPRAZOLE SODIUM 40 MG/10ML
40 INJECTION, POWDER, LYOPHILIZED, FOR SOLUTION INTRAVENOUS
Status: COMPLETED | OUTPATIENT
Start: 2024-01-01 | End: 2024-01-01

## 2024-01-01 RX ORDER — POTASSIUM CHLORIDE 7.45 MG/ML
10 INJECTION INTRAVENOUS
Status: DISCONTINUED | OUTPATIENT
Start: 2024-01-01 | End: 2024-01-01

## 2024-01-01 RX ORDER — GLUCAGON 1 MG
1 KIT INJECTION
Status: DISCONTINUED | OUTPATIENT
Start: 2024-01-01 | End: 2024-01-01

## 2024-01-01 RX ORDER — PRAVASTATIN SODIUM 10 MG/1
10 TABLET ORAL DAILY
Status: DISCONTINUED | OUTPATIENT
Start: 2024-01-01 | End: 2024-01-01

## 2024-01-01 RX ORDER — IPRATROPIUM BROMIDE AND ALBUTEROL SULFATE 2.5; .5 MG/3ML; MG/3ML
3 SOLUTION RESPIRATORY (INHALATION)
Status: DISCONTINUED | OUTPATIENT
Start: 2024-01-01 | End: 2024-01-01

## 2024-01-01 RX ORDER — SODIUM CHLORIDE 0.9 % (FLUSH) 0.9 %
10 SYRINGE (ML) INJECTION EVERY 6 HOURS PRN
Status: DISCONTINUED | OUTPATIENT
Start: 2024-01-01 | End: 2024-01-01 | Stop reason: HOSPADM

## 2024-01-01 RX ORDER — METOPROLOL TARTRATE 1 MG/ML
5 INJECTION, SOLUTION INTRAVENOUS ONCE
Status: COMPLETED | OUTPATIENT
Start: 2024-01-01 | End: 2024-01-01

## 2024-01-01 RX ORDER — POTASSIUM CHLORIDE 7.45 MG/ML
10 INJECTION INTRAVENOUS
Status: CANCELLED | OUTPATIENT
Start: 2024-01-01

## 2024-01-01 RX ORDER — ASPIRIN 81 MG/1
81 TABLET ORAL DAILY
Status: DISCONTINUED | OUTPATIENT
Start: 2024-01-01 | End: 2024-01-01

## 2024-01-01 RX ORDER — IBUPROFEN 200 MG
16 TABLET ORAL
Status: DISCONTINUED | OUTPATIENT
Start: 2024-01-01 | End: 2024-01-01

## 2024-01-01 RX ORDER — HYDROMORPHONE HYDROCHLORIDE 1 MG/ML
0.5 INJECTION, SOLUTION INTRAMUSCULAR; INTRAVENOUS; SUBCUTANEOUS EVERY 30 MIN PRN
Status: DISCONTINUED | OUTPATIENT
Start: 2024-01-01 | End: 2024-01-01

## 2024-01-01 RX ORDER — ACETYLCYSTEINE 200 MG/ML
2 SOLUTION ORAL; RESPIRATORY (INHALATION) 4 TIMES DAILY
Status: DISCONTINUED | OUTPATIENT
Start: 2024-01-01 | End: 2024-01-01

## 2024-01-01 RX ORDER — IBUPROFEN 200 MG
24 TABLET ORAL
Status: DISCONTINUED | OUTPATIENT
Start: 2024-01-01 | End: 2024-01-01

## 2024-01-01 RX ORDER — DIGOXIN 0.25 MG/ML
250 INJECTION INTRAMUSCULAR; INTRAVENOUS ONCE
Status: COMPLETED | OUTPATIENT
Start: 2024-01-01 | End: 2024-01-01

## 2024-01-01 RX ORDER — PROCHLORPERAZINE EDISYLATE 5 MG/ML
5 INJECTION INTRAMUSCULAR; INTRAVENOUS EVERY 6 HOURS PRN
Status: DISCONTINUED | OUTPATIENT
Start: 2024-01-01 | End: 2024-01-01 | Stop reason: HOSPADM

## 2024-01-01 RX ORDER — HEPARIN SODIUM,PORCINE/D5W 25000/250
0-40 INTRAVENOUS SOLUTION INTRAVENOUS CONTINUOUS
Status: DISCONTINUED | OUTPATIENT
Start: 2024-01-01 | End: 2024-01-01

## 2024-01-01 RX ORDER — PRAVASTATIN SODIUM 40 MG/1
40 TABLET ORAL DAILY
Status: DISCONTINUED | OUTPATIENT
Start: 2024-01-01 | End: 2024-01-01

## 2024-01-01 RX ORDER — LORAZEPAM 2 MG/ML
1 INJECTION INTRAMUSCULAR EVERY 5 MIN PRN
Status: DISCONTINUED | OUTPATIENT
Start: 2024-01-01 | End: 2024-01-01 | Stop reason: HOSPADM

## 2024-01-01 RX ORDER — LORAZEPAM 2 MG/ML
1 INJECTION INTRAMUSCULAR ONCE
Status: COMPLETED | OUTPATIENT
Start: 2024-01-01 | End: 2024-01-01

## 2024-01-01 RX ORDER — HYDROCODONE BITARTRATE AND ACETAMINOPHEN 500; 5 MG/1; MG/1
TABLET ORAL
Status: DISCONTINUED | OUTPATIENT
Start: 2024-01-01 | End: 2024-01-01

## 2024-01-01 RX ORDER — METOPROLOL TARTRATE 1 MG/ML
5 INJECTION, SOLUTION INTRAVENOUS EVERY 5 MIN PRN
Status: COMPLETED | OUTPATIENT
Start: 2024-01-01 | End: 2024-01-01

## 2024-01-01 RX ORDER — SODIUM CHLORIDE 0.9 % (FLUSH) 0.9 %
10 SYRINGE (ML) INJECTION
Status: DISCONTINUED | OUTPATIENT
Start: 2024-01-01 | End: 2024-01-01 | Stop reason: HOSPADM

## 2024-01-01 RX ORDER — ETOMIDATE 2 MG/ML
INJECTION INTRAVENOUS
Status: DISCONTINUED
Start: 2024-01-01 | End: 2024-01-01 | Stop reason: WASHOUT

## 2024-01-01 RX ORDER — METOPROLOL TARTRATE 1 MG/ML
5 INJECTION, SOLUTION INTRAVENOUS EVERY 5 MIN PRN
Status: DISCONTINUED | OUTPATIENT
Start: 2024-01-01 | End: 2024-01-01

## 2024-01-01 RX ORDER — NALOXONE HCL 0.4 MG/ML
0.02 VIAL (ML) INJECTION
Status: DISCONTINUED | OUTPATIENT
Start: 2024-01-01 | End: 2024-01-01

## 2024-01-01 RX ORDER — MORPHINE SULFATE 2 MG/ML
2 INJECTION, SOLUTION INTRAMUSCULAR; INTRAVENOUS EVERY 4 HOURS PRN
Status: DISCONTINUED | OUTPATIENT
Start: 2024-01-01 | End: 2024-01-01

## 2024-01-01 RX ORDER — POTASSIUM CHLORIDE 7.45 MG/ML
10 INJECTION INTRAVENOUS
Status: COMPLETED | OUTPATIENT
Start: 2024-01-01 | End: 2024-01-01

## 2024-01-01 RX ORDER — METOPROLOL TARTRATE 1 MG/ML
5 INJECTION, SOLUTION INTRAVENOUS
Status: DISCONTINUED | OUTPATIENT
Start: 2024-01-01 | End: 2024-01-01

## 2024-01-01 RX ORDER — ROCURONIUM BROMIDE 10 MG/ML
INJECTION, SOLUTION INTRAVENOUS
Status: DISCONTINUED
Start: 2024-01-01 | End: 2024-01-01 | Stop reason: WASHOUT

## 2024-01-01 RX ORDER — PHENYLEPHRINE HCL IN 0.9% NACL 20MG/250ML
PLASTIC BAG, INJECTION (ML) INTRAVENOUS
Status: DISPENSED
Start: 2024-01-01 | End: 2024-01-01

## 2024-01-01 RX ORDER — SUCCINYLCHOLINE CHLORIDE 20 MG/ML
INJECTION INTRAMUSCULAR; INTRAVENOUS
Status: DISCONTINUED
Start: 2024-01-01 | End: 2024-01-01 | Stop reason: WASHOUT

## 2024-01-01 RX ORDER — PROPOFOL 10 MG/ML
VIAL (ML) INTRAVENOUS
Status: DISCONTINUED
Start: 2024-01-01 | End: 2024-01-01 | Stop reason: WASHOUT

## 2024-01-01 RX ORDER — ONDANSETRON HYDROCHLORIDE 2 MG/ML
4 INJECTION, SOLUTION INTRAVENOUS EVERY 8 HOURS PRN
Status: DISCONTINUED | OUTPATIENT
Start: 2024-01-01 | End: 2024-01-01 | Stop reason: HOSPADM

## 2024-01-01 RX ADMIN — HYPROMELLOSE 2910 1 DROP: 5 SOLUTION/ DROPS OPHTHALMIC at 09:10

## 2024-01-01 RX ADMIN — LORAZEPAM 1 MG: 2 INJECTION INTRAMUSCULAR; INTRAVENOUS at 09:10

## 2024-01-01 RX ADMIN — HYPROMELLOSE 2910 1 DROP: 5 SOLUTION/ DROPS OPHTHALMIC at 02:10

## 2024-01-01 RX ADMIN — PIPERACILLIN SODIUM AND TAZOBACTAM SODIUM 4.5 G: 4; .5 INJECTION, POWDER, FOR SOLUTION INTRAVENOUS at 08:10

## 2024-01-01 RX ADMIN — LORAZEPAM 1 MG: 2 INJECTION INTRAMUSCULAR; INTRAVENOUS at 04:10

## 2024-01-01 RX ADMIN — METOROPROLOL TARTRATE 5 MG: 5 INJECTION, SOLUTION INTRAVENOUS at 11:10

## 2024-01-01 RX ADMIN — HYDROMORPHONE HYDROCHLORIDE 0.5 MG: 1 INJECTION, SOLUTION INTRAMUSCULAR; INTRAVENOUS; SUBCUTANEOUS at 06:10

## 2024-01-01 RX ADMIN — ACETYLCYSTEINE 2 ML: 200 SOLUTION ORAL; RESPIRATORY (INHALATION) at 08:10

## 2024-01-01 RX ADMIN — PIPERACILLIN SODIUM AND TAZOBACTAM SODIUM 4.5 G: 4; .5 INJECTION, POWDER, FOR SOLUTION INTRAVENOUS at 09:10

## 2024-01-01 RX ADMIN — IOHEXOL 75 ML: 350 INJECTION, SOLUTION INTRAVENOUS at 08:10

## 2024-01-01 RX ADMIN — HYDROMORPHONE HYDROCHLORIDE 0.5 MG: 1 INJECTION, SOLUTION INTRAMUSCULAR; INTRAVENOUS; SUBCUTANEOUS at 04:10

## 2024-01-01 RX ADMIN — PANTOPRAZOLE SODIUM 40 MG: 40 INJECTION, POWDER, LYOPHILIZED, FOR SOLUTION INTRAVENOUS at 09:10

## 2024-01-01 RX ADMIN — AMIODARONE HYDROCHLORIDE 1 MG/MIN: 1.8 INJECTION, SOLUTION INTRAVENOUS at 01:10

## 2024-01-01 RX ADMIN — VANCOMYCIN HYDROCHLORIDE 1000 MG: 1 INJECTION, POWDER, LYOPHILIZED, FOR SOLUTION INTRAVENOUS at 07:10

## 2024-01-01 RX ADMIN — LORAZEPAM 1 MG: 2 INJECTION INTRAMUSCULAR; INTRAVENOUS at 06:10

## 2024-01-01 RX ADMIN — METOROPROLOL TARTRATE 5 MG: 5 INJECTION, SOLUTION INTRAVENOUS at 07:10

## 2024-01-01 RX ADMIN — POTASSIUM CHLORIDE 10 MEQ: 7.46 INJECTION, SOLUTION INTRAVENOUS at 06:10

## 2024-01-01 RX ADMIN — LORAZEPAM 1 MG: 2 INJECTION INTRAMUSCULAR; INTRAVENOUS at 10:10

## 2024-01-01 RX ADMIN — POTASSIUM CHLORIDE 10 MEQ: 7.46 INJECTION, SOLUTION INTRAVENOUS at 07:10

## 2024-01-01 RX ADMIN — AMIODARONE HYDROCHLORIDE 1 MG/MIN: 1.8 INJECTION, SOLUTION INTRAVENOUS at 10:10

## 2024-01-01 RX ADMIN — PIPERACILLIN SODIUM AND TAZOBACTAM SODIUM 4.5 G: 4; .5 INJECTION, POWDER, FOR SOLUTION INTRAVENOUS at 04:10

## 2024-01-01 RX ADMIN — HYDROMORPHONE HYDROCHLORIDE 0.5 MG: 1 INJECTION, SOLUTION INTRAMUSCULAR; INTRAVENOUS; SUBCUTANEOUS at 05:10

## 2024-01-01 RX ADMIN — AMIODARONE HYDROCHLORIDE 1 MG/MIN: 1.8 INJECTION, SOLUTION INTRAVENOUS at 12:10

## 2024-01-01 RX ADMIN — DOXYCYCLINE 100 MG: 100 INJECTION, POWDER, LYOPHILIZED, FOR SOLUTION INTRAVENOUS at 08:10

## 2024-01-01 RX ADMIN — HYDROMORPHONE HYDROCHLORIDE 0.5 MG: 1 INJECTION, SOLUTION INTRAMUSCULAR; INTRAVENOUS; SUBCUTANEOUS at 03:10

## 2024-01-01 RX ADMIN — LORAZEPAM 1 MG: 2 INJECTION INTRAMUSCULAR; INTRAVENOUS at 05:10

## 2024-01-01 RX ADMIN — ACETYLCYSTEINE 2 ML: 200 SOLUTION ORAL; RESPIRATORY (INHALATION) at 07:10

## 2024-01-01 RX ADMIN — LORAZEPAM 1 MG: 2 INJECTION INTRAMUSCULAR; INTRAVENOUS at 08:10

## 2024-01-01 RX ADMIN — POTASSIUM CHLORIDE 10 MEQ: 7.46 INJECTION, SOLUTION INTRAVENOUS at 10:10

## 2024-01-01 RX ADMIN — IPRATROPIUM BROMIDE AND ALBUTEROL SULFATE 3 ML: 2.5; .5 SOLUTION RESPIRATORY (INHALATION) at 02:10

## 2024-01-01 RX ADMIN — ACETYLCYSTEINE 2 ML: 200 SOLUTION ORAL; RESPIRATORY (INHALATION) at 11:10

## 2024-01-01 RX ADMIN — AMIODARONE HYDROCHLORIDE 150 MG: 1.5 INJECTION, SOLUTION INTRAVENOUS at 10:10

## 2024-01-01 RX ADMIN — IOHEXOL 75 ML: 350 INJECTION, SOLUTION INTRAVENOUS at 01:10

## 2024-01-01 RX ADMIN — IPRATROPIUM BROMIDE AND ALBUTEROL SULFATE 3 ML: 2.5; .5 SOLUTION RESPIRATORY (INHALATION) at 09:10

## 2024-01-01 RX ADMIN — LORAZEPAM 1 MG: 2 INJECTION INTRAMUSCULAR; INTRAVENOUS at 03:10

## 2024-01-01 RX ADMIN — DOXYCYCLINE 100 MG: 100 INJECTION, POWDER, LYOPHILIZED, FOR SOLUTION INTRAVENOUS at 06:10

## 2024-01-01 RX ADMIN — AMIODARONE HYDROCHLORIDE 1 MG/MIN: 1.8 INJECTION, SOLUTION INTRAVENOUS at 07:10

## 2024-01-01 RX ADMIN — NOREPINEPHRINE BITARTRATE 0.11 MCG/KG/MIN: 1 INJECTION, SOLUTION, CONCENTRATE INTRAVENOUS at 11:10

## 2024-01-01 RX ADMIN — AMIODARONE HYDROCHLORIDE 0.5 MG/MIN: 1.8 INJECTION, SOLUTION INTRAVENOUS at 04:10

## 2024-01-01 RX ADMIN — IPRATROPIUM BROMIDE AND ALBUTEROL SULFATE 3 ML: 2.5; .5 SOLUTION RESPIRATORY (INHALATION) at 08:10

## 2024-01-01 RX ADMIN — AMIODARONE HYDROCHLORIDE 1 MG/MIN: 1.8 INJECTION, SOLUTION INTRAVENOUS at 02:10

## 2024-01-01 RX ADMIN — POTASSIUM CHLORIDE 10 MEQ: 7.46 INJECTION, SOLUTION INTRAVENOUS at 11:10

## 2024-01-01 RX ADMIN — METOROPROLOL TARTRATE 5 MG: 5 INJECTION, SOLUTION INTRAVENOUS at 01:10

## 2024-01-01 RX ADMIN — IPRATROPIUM BROMIDE AND ALBUTEROL SULFATE 3 ML: 2.5; .5 SOLUTION RESPIRATORY (INHALATION) at 11:10

## 2024-01-01 RX ADMIN — IPRATROPIUM BROMIDE AND ALBUTEROL SULFATE 3 ML: 2.5; .5 SOLUTION RESPIRATORY (INHALATION) at 07:10

## 2024-01-01 RX ADMIN — HYDROMORPHONE HYDROCHLORIDE 0.5 MG: 1 INJECTION, SOLUTION INTRAMUSCULAR; INTRAVENOUS; SUBCUTANEOUS at 10:10

## 2024-01-01 RX ADMIN — ACETYLCYSTEINE 2 ML: 200 SOLUTION ORAL; RESPIRATORY (INHALATION) at 02:10

## 2024-01-01 RX ADMIN — POTASSIUM CHLORIDE 10 MEQ: 7.46 INJECTION, SOLUTION INTRAVENOUS at 02:10

## 2024-01-01 RX ADMIN — LORAZEPAM 1 MG: 2 INJECTION INTRAMUSCULAR; INTRAVENOUS at 02:10

## 2024-01-01 RX ADMIN — DIGOXIN 250 MCG: 0.25 INJECTION INTRAMUSCULAR; INTRAVENOUS at 02:10

## 2024-01-01 RX ADMIN — SODIUM CHLORIDE 500 ML: 9 INJECTION, SOLUTION INTRAVENOUS at 08:10

## 2024-01-01 RX ADMIN — PIPERACILLIN SODIUM AND TAZOBACTAM SODIUM 4.5 G: 4; .5 INJECTION, POWDER, FOR SOLUTION INTRAVENOUS at 12:10

## 2024-01-01 RX ADMIN — POTASSIUM CHLORIDE 10 MEQ: 7.46 INJECTION, SOLUTION INTRAVENOUS at 01:10

## 2024-01-01 RX ADMIN — HYDROMORPHONE HYDROCHLORIDE 0.5 MG: 1 INJECTION, SOLUTION INTRAMUSCULAR; INTRAVENOUS; SUBCUTANEOUS at 01:10

## 2024-01-01 RX ADMIN — PRAVASTATIN SODIUM 10 MG: 10 TABLET ORAL at 09:10

## 2024-01-01 RX ADMIN — HEPARIN SODIUM 12 UNITS/KG/HR: 10000 INJECTION, SOLUTION INTRAVENOUS at 11:10

## 2024-01-01 RX ADMIN — IPRATROPIUM BROMIDE AND ALBUTEROL SULFATE 3 ML: 2.5; .5 SOLUTION RESPIRATORY (INHALATION) at 06:10

## 2024-01-01 RX ADMIN — HYPROMELLOSE 2910 1 DROP: 5 SOLUTION/ DROPS OPHTHALMIC at 08:10

## 2024-01-01 RX ADMIN — ACETYLCYSTEINE 2 ML: 200 SOLUTION ORAL; RESPIRATORY (INHALATION) at 09:10

## 2024-01-01 RX ADMIN — NOREPINEPHRINE BITARTRATE 0.12 MCG/KG/MIN: 1 INJECTION, SOLUTION, CONCENTRATE INTRAVENOUS at 12:10

## 2024-01-01 RX ADMIN — HYDROMORPHONE HYDROCHLORIDE 0.5 MG: 1 INJECTION, SOLUTION INTRAMUSCULAR; INTRAVENOUS; SUBCUTANEOUS at 11:10

## 2024-01-01 RX ADMIN — ACETYLCYSTEINE 2 ML: 200 SOLUTION ORAL; RESPIRATORY (INHALATION) at 06:10

## 2024-01-01 RX ADMIN — HYDROMORPHONE HYDROCHLORIDE 0.5 MG: 1 INJECTION, SOLUTION INTRAMUSCULAR; INTRAVENOUS; SUBCUTANEOUS at 09:10

## 2024-01-01 RX ADMIN — SODIUM CHLORIDE 500 ML: 9 INJECTION, SOLUTION INTRAVENOUS at 05:10

## 2024-01-01 RX ADMIN — HYDROMORPHONE HYDROCHLORIDE 0.5 MG: 1 INJECTION, SOLUTION INTRAMUSCULAR; INTRAVENOUS; SUBCUTANEOUS at 08:10

## 2024-01-01 RX ADMIN — PANTOPRAZOLE SODIUM 40 MG: 40 INJECTION, POWDER, LYOPHILIZED, FOR SOLUTION INTRAVENOUS at 10:10

## 2024-01-01 RX ADMIN — NOREPINEPHRINE BITARTRATE 0.04 MCG/KG/MIN: 1 INJECTION, SOLUTION, CONCENTRATE INTRAVENOUS at 05:10

## 2024-01-01 RX ADMIN — PANTOPRAZOLE SODIUM 40 MG: 40 INJECTION, POWDER, LYOPHILIZED, FOR SOLUTION INTRAVENOUS at 11:10

## 2024-01-01 RX ADMIN — PRAVASTATIN SODIUM 40 MG: 40 TABLET ORAL at 09:10

## 2024-01-01 RX ADMIN — HYDROMORPHONE HYDROCHLORIDE 0.5 MG: 1 INJECTION, SOLUTION INTRAMUSCULAR; INTRAVENOUS; SUBCUTANEOUS at 02:10

## 2024-01-01 RX ADMIN — POTASSIUM CHLORIDE 10 MEQ: 7.46 INJECTION, SOLUTION INTRAVENOUS at 09:10

## 2024-01-01 RX ADMIN — POTASSIUM CHLORIDE 10 MEQ: 7.46 INJECTION, SOLUTION INTRAVENOUS at 08:10

## 2024-01-01 RX ADMIN — LORAZEPAM 1 MG: 2 INJECTION INTRAMUSCULAR; INTRAVENOUS at 01:10

## 2024-01-01 RX ADMIN — LORAZEPAM 1 MG: 2 INJECTION INTRAMUSCULAR; INTRAVENOUS at 11:10

## 2024-01-16 PROCEDURE — G0179 MD RECERTIFICATION HHA PT: HCPCS | Mod: ,,, | Performed by: INTERNAL MEDICINE

## 2024-01-22 ENCOUNTER — TELEPHONE (OUTPATIENT)
Dept: DERMATOLOGY | Facility: CLINIC | Age: 89
End: 2024-01-22
Payer: MEDICARE

## 2024-01-22 NOTE — TELEPHONE ENCOUNTER
----- Message from Zehra Cordova sent at 1/22/2024  2:50 PM CST -----  Contact: [phone] or [phone]  [email or web address]     Patient is calling for Medical Advice regarding: left eye area dry scaly bump     How long has patient had these symptoms: a month     Pharmacy name and phone#:    Would like response via uliket:  no     Comments:  Pts wife is calling in regards to getting an appt for the pts eye please give return call

## 2024-01-25 ENCOUNTER — PATIENT MESSAGE (OUTPATIENT)
Dept: INTERNAL MEDICINE | Facility: CLINIC | Age: 89
End: 2024-01-25
Payer: MEDICARE

## 2024-01-29 ENCOUNTER — PATIENT MESSAGE (OUTPATIENT)
Dept: CARDIOLOGY | Facility: CLINIC | Age: 89
End: 2024-01-29
Payer: MEDICARE

## 2024-01-31 ENCOUNTER — EXTERNAL HOME HEALTH (OUTPATIENT)
Dept: HOME HEALTH SERVICES | Facility: HOSPITAL | Age: 89
End: 2024-01-31
Payer: MEDICARE

## 2024-02-05 ENCOUNTER — OFFICE VISIT (OUTPATIENT)
Dept: DERMATOLOGY | Facility: CLINIC | Age: 89
End: 2024-02-05
Payer: MEDICARE

## 2024-02-05 DIAGNOSIS — L70.0 COMEDONE: ICD-10-CM

## 2024-02-05 DIAGNOSIS — L57.0 AK (ACTINIC KERATOSIS): Primary | ICD-10-CM

## 2024-02-05 DIAGNOSIS — L82.1 SK (SEBORRHEIC KERATOSIS): ICD-10-CM

## 2024-02-05 DIAGNOSIS — Z85.828 HISTORY OF NONMELANOMA SKIN CANCER: ICD-10-CM

## 2024-02-05 PROCEDURE — 99213 OFFICE O/P EST LOW 20 MIN: CPT | Mod: PBBFAC | Performed by: DERMATOLOGY

## 2024-02-05 PROCEDURE — 99999 PR PBB SHADOW E&M-EST. PATIENT-LVL III: CPT | Mod: PBBFAC,,, | Performed by: DERMATOLOGY

## 2024-02-05 PROCEDURE — 99213 OFFICE O/P EST LOW 20 MIN: CPT | Mod: 25,S$PBB,, | Performed by: DERMATOLOGY

## 2024-02-05 PROCEDURE — 17000 DESTRUCT PREMALG LESION: CPT | Mod: S$PBB,,, | Performed by: DERMATOLOGY

## 2024-02-05 PROCEDURE — 17000 DESTRUCT PREMALG LESION: CPT | Mod: PBBFAC | Performed by: DERMATOLOGY

## 2024-02-05 NOTE — PATIENT INSTRUCTIONS

## 2024-02-05 NOTE — PROGRESS NOTES
Subjective:      Patient ID:  Misha Marte Jr. is a 89 y.o. male who presents for   Chief Complaint   Patient presents with    Skin Check     Ubse     History of Present Illness: The patient presents for follow up of skin check.    The patient was last seen on: 12/19/2023 for cryosurgery to actinic keratoses which have resolved. Pt wants face, scalp and forearms checked today.  This is a high risk patient here to check for the development of new lesions.      Other skin complaints:  Patient with new complaint of lesion(s)  Location: Left inner eye  Duration: since last visit  Symptoms: none  Relieving factors/Previous treatments: none              Review of Systems   Skin:  Positive for daily sunscreen use, activity-related sunscreen use and wears hat (all the time). Negative for recent sunburn.   Hematologic/Lymphatic: Bruises/bleeds easily (on asa).        Baby aspirin daily       Objective:   Physical Exam   Constitutional: He appears well-developed and well-nourished. No distress.   Neurological: He is alert and oriented to person, place, and time. He is not disoriented.   Psychiatric: He has a normal mood and affect.   Skin:   Areas Examined (abnormalities noted in diagram):   Scalp / Hair Palpated and Inspected  Head / Face Inspection Performed  Neck Inspection Performed  RUE Inspected  LUE Inspection Performed  Nails and Digits Inspection Performed            Diagram Legend     Erythematous scaling macule/papule c/w actinic keratosis       Vascular papule c/w angioma      Pigmented verrucoid papule/plaque c/w seborrheic keratosis      Yellow umbilicated papule c/w sebaceous hyperplasia      Irregularly shaped tan macule c/w lentigo     1-2 mm smooth white papules consistent with Milia      Movable subcutaneous cyst with punctum c/w epidermal inclusion cyst      Subcutaneous movable cyst c/w pilar cyst      Firm pink to brown papule c/w dermatofibroma      Pedunculated fleshy papule(s) c/w skin tag(s)       Evenly pigmented macule c/w junctional nevus     Mildly variegated pigmented, slightly irregular-bordered macule c/w mildly atypical nevus      Flesh colored to evenly pigmented papule c/w intradermal nevus       Pink pearly papule/plaque c/w basal cell carcinoma      Erythematous hyperkeratotic cursted plaque c/w SCC      Surgical scar with no sign of skin cancer recurrence      Open and closed comedones      Inflammatory papules and pustules      Verrucoid papule consistent consistent with wart     Erythematous eczematous patches and plaques     Dystrophic onycholytic nail with subungual debris c/w onychomycosis     Umbilicated papule    Erythematous-base heme-crusted tan verrucoid plaque consistent with inflamed seborrheic keratosis     Erythematous Silvery Scaling Plaque c/w Psoriasis     See annotation      Assessment / Plan:        AK (actinic keratosis)  Cryosurgery Procedure Note    Verbal consent from the patient is obtained including, but not limited to, risk of hypopigmentation/hyperpigmentation, scar, recurrence of lesion. The patient is aware of the precancerous quality and need for treatment of these lesions. Liquid nitrogen cryosurgery is applied to the 1 actinic keratoses, as detailed in the physical exam, to produce a freeze injury. The patient is aware that blisters may form and is instructed on wound care with gentle cleansing and use of vaseline ointment to keep moist until healed. The patient is supplied a handout on cryosurgery and is instructed to call if lesions do not completely resolve.    Open comedone- left lower cheek  -No treatment necessary     SK (seborrheic keratosis)   - minor problem and chronic.   No treatment necessary.        History of nonmelanoma skin cancer  Area(s) of previous NMSC evaluated with no signs of recurrence.    Upper body skin examination performed today including at least 6 points as noted in physical examination. No lesions suspicious for malignancy  noted.    Recommend daily sun protection/avoidance and use of at least SPF 30, broad spectrum sunscreen (OTC drug).              Follow up in 1 year (on 2/5/2025) for UBSE.

## 2024-02-12 ENCOUNTER — PATIENT MESSAGE (OUTPATIENT)
Dept: DERMATOLOGY | Facility: CLINIC | Age: 89
End: 2024-02-12
Payer: MEDICARE

## 2024-02-15 NOTE — PROGRESS NOTES
Kidney function stable.   Previous vitamin D levels done 2020, 46 --> normal  OK to proceed with prolia injections   Needs appointment with NP or me in six - twelve months.

## 2024-02-22 ENCOUNTER — PATIENT MESSAGE (OUTPATIENT)
Dept: CARDIOLOGY | Facility: CLINIC | Age: 89
End: 2024-02-22
Payer: MEDICARE

## 2024-02-23 ENCOUNTER — OFFICE VISIT (OUTPATIENT)
Dept: OPHTHALMOLOGY | Facility: CLINIC | Age: 89
End: 2024-02-23
Payer: MEDICARE

## 2024-02-23 DIAGNOSIS — H35.3134 ADVANCED ATROPHIC NONEXUDATIVE AGE-RELATED MACULAR DEGENERATION OF BOTH EYES WITH SUBFOVEAL INVOLVEMENT: Primary | ICD-10-CM

## 2024-02-23 DIAGNOSIS — H43.813 VITREOUS DEGENERATION OF BOTH EYES: ICD-10-CM

## 2024-02-23 DIAGNOSIS — Z96.1 PSEUDOPHAKIA OF BOTH EYES: ICD-10-CM

## 2024-02-23 PROCEDURE — 99999 PR PBB SHADOW E&M-EST. PATIENT-LVL III: CPT | Mod: PBBFAC,,, | Performed by: OPHTHALMOLOGY

## 2024-02-23 PROCEDURE — 92202 OPSCPY EXTND ON/MAC DRAW: CPT | Mod: 59,PBBFAC | Performed by: OPHTHALMOLOGY

## 2024-02-23 PROCEDURE — 92202 OPSCPY EXTND ON/MAC DRAW: CPT | Mod: 59,S$PBB,, | Performed by: OPHTHALMOLOGY

## 2024-02-23 PROCEDURE — 99213 OFFICE O/P EST LOW 20 MIN: CPT | Mod: PBBFAC | Performed by: OPHTHALMOLOGY

## 2024-02-23 PROCEDURE — 92134 CPTRZ OPH DX IMG PST SGM RTA: CPT | Mod: PBBFAC | Performed by: OPHTHALMOLOGY

## 2024-02-23 PROCEDURE — 92014 COMPRE OPH EXAM EST PT 1/>: CPT | Mod: S$PBB,,, | Performed by: OPHTHALMOLOGY

## 2024-02-23 NOTE — PROGRESS NOTES
HPI     5  month follow up   ARMD       Additional comments: ARMADRIA   DFE     OCT     VITREOUS  DEGERATION            Comments    89 Y/o male is here for low vision eye exam with C/o   Pt denies pain and discomfort   No f/f  09/26/23  Eye med: Refresh OU BID           Last edited by Doris Ross on 2/23/2024  1:04 PM.          A/P    ICD-10-CM ICD-9-CM   1. 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 Vitreous degeneration of both eyes  H43.813 379.21   3. Pseudophakia of both eyes  Z96.1 V43.1          1. Advanced atrophic nonexudative age-related macular degeneration of both eyes with subfoveal involvement  F/u for AMD changes OU    Pt feels vision better after CEIOL    VA TODAY Still poor CF OU due to central [place]  Plan: Observation, not a good candidate for complement inhibition therapy, observe    Amsler precautions  Recommend Antioxidants, lutein, omega 3, brown sunglasses (blue blockers).     2. Vitreous degeneration of both eyes  No RT/RD, few floaters   Plan: Observation     3. Pseudophakia of both eyes  Good lens position OU  Saw Dr. Rivera in Oct 2023 - Recent notes reviewed   Plan: Observation       RTC Anay 12 mo DFE/OCTm OU       I saw and examined the patient and reviewed in detail the findings documented. The final examination findings, image interpretations, and plan as documented in the record represent my personal judgment and conclusions.    Fred Cueva MD  Vitreoretinal Surgery   [facility]

## 2024-02-29 DIAGNOSIS — I10 HYPERTENSION, UNSPECIFIED TYPE: Primary | Chronic | ICD-10-CM

## 2024-02-29 DIAGNOSIS — I50.30 DIASTOLIC CONGESTIVE HEART FAILURE, UNSPECIFIED HF CHRONICITY: ICD-10-CM

## 2024-03-07 ENCOUNTER — PATIENT MESSAGE (OUTPATIENT)
Dept: CARDIOLOGY | Facility: CLINIC | Age: 89
End: 2024-03-07

## 2024-03-07 ENCOUNTER — OFFICE VISIT (OUTPATIENT)
Dept: CARDIOLOGY | Facility: CLINIC | Age: 89
End: 2024-03-07
Payer: MEDICARE

## 2024-03-07 ENCOUNTER — HOSPITAL ENCOUNTER (OUTPATIENT)
Dept: CARDIOLOGY | Facility: CLINIC | Age: 89
Discharge: HOME | End: 2024-03-07
Payer: MEDICARE

## 2024-03-07 VITALS
DIASTOLIC BLOOD PRESSURE: 70 MMHG | SYSTOLIC BLOOD PRESSURE: 140 MMHG | BODY MASS INDEX: 24.74 KG/M2 | HEART RATE: 68 BPM | WEIGHT: 172.81 LBS | HEIGHT: 70 IN

## 2024-03-07 DIAGNOSIS — I45.10 INCOMPLETE RBBB: ICD-10-CM

## 2024-03-07 DIAGNOSIS — I10 HYPERTENSION, UNSPECIFIED TYPE: Chronic | ICD-10-CM

## 2024-03-07 DIAGNOSIS — E78.49 OTHER HYPERLIPIDEMIA: Chronic | ICD-10-CM

## 2024-03-07 DIAGNOSIS — I50.9 CONGESTIVE HEART FAILURE, UNSPECIFIED HF CHRONICITY, UNSPECIFIED HEART FAILURE TYPE: ICD-10-CM

## 2024-03-07 DIAGNOSIS — I34.2 NONRHEUMATIC MITRAL VALVE STENOSIS: ICD-10-CM

## 2024-03-07 DIAGNOSIS — I70.0 AORTIC ATHEROSCLEROSIS: ICD-10-CM

## 2024-03-07 DIAGNOSIS — I65.29 STENOSIS OF CAROTID ARTERY, UNSPECIFIED LATERALITY: Chronic | ICD-10-CM

## 2024-03-07 DIAGNOSIS — Z95.2 S/P TAVR (TRANSCATHETER AORTIC VALVE REPLACEMENT): Primary | ICD-10-CM

## 2024-03-07 DIAGNOSIS — I50.33 ACUTE ON CHRONIC DIASTOLIC (CONGESTIVE) HEART FAILURE: ICD-10-CM

## 2024-03-07 DIAGNOSIS — I47.29 OTHER VENTRICULAR TACHYCARDIA: ICD-10-CM

## 2024-03-07 DIAGNOSIS — I47.29 NSVT (NONSUSTAINED VENTRICULAR TACHYCARDIA): ICD-10-CM

## 2024-03-07 DIAGNOSIS — I50.30 DIASTOLIC CONGESTIVE HEART FAILURE, UNSPECIFIED HF CHRONICITY: ICD-10-CM

## 2024-03-07 PROCEDURE — 93005 ELECTROCARDIOGRAM TRACING: CPT | Mod: PBBFAC | Performed by: INTERNAL MEDICINE

## 2024-03-07 PROCEDURE — 99215 OFFICE O/P EST HI 40 MIN: CPT | Mod: S$PBB,,, | Performed by: INTERNAL MEDICINE

## 2024-03-07 PROCEDURE — 99215 OFFICE O/P EST HI 40 MIN: CPT | Mod: PBBFAC,25 | Performed by: INTERNAL MEDICINE

## 2024-03-07 PROCEDURE — 99999 PR PBB SHADOW E&M-EST. PATIENT-LVL V: CPT | Mod: PBBFAC,,, | Performed by: INTERNAL MEDICINE

## 2024-03-07 PROCEDURE — 93010 ELECTROCARDIOGRAM REPORT: CPT | Mod: S$PBB,,, | Performed by: INTERNAL MEDICINE

## 2024-03-07 RX ORDER — ASPIRIN 81 MG/1
81 TABLET ORAL DAILY
COMMUNITY

## 2024-03-07 NOTE — PROGRESS NOTES
Subjective:   Patient ID:  Misha Marte Jr. is a 89 y.o. male who presents for follow-up of VHD/CHF    HPI:  Patient is here for valvular heart disease.Patient is here for congestive heart failure.    The patient has no chest pain, SOB, TIA, palpitations, syncope or pre-syncope.Patient does not exercise but active.        Review of Systems   Constitutional: Negative for chills, decreased appetite, diaphoresis, fever, malaise/fatigue, night sweats, weight gain and weight loss.   HENT:  Negative for congestion, hoarse voice, nosebleeds, sore throat and tinnitus.    Eyes:  Negative for blurred vision, double vision, vision loss in left eye, vision loss in right eye, visual disturbance and visual halos.   Cardiovascular:  Negative for chest pain, claudication, cyanosis, dyspnea on exertion, irregular heartbeat, leg swelling, near-syncope, orthopnea, palpitations, paroxysmal nocturnal dyspnea and syncope.   Respiratory:  Negative for cough, hemoptysis, shortness of breath, sleep disturbances due to breathing, snoring, sputum production and wheezing.    Endocrine: Negative for cold intolerance, heat intolerance, polydipsia, polyphagia and polyuria.   Hematologic/Lymphatic: Negative for adenopathy and bleeding problem. Does not bruise/bleed easily.   Skin:  Negative for color change, dry skin, flushing, itching, nail changes, poor wound healing, rash, skin cancer, suspicious lesions and unusual hair distribution.   Musculoskeletal:  Negative for arthritis, back pain, falls, gout, joint pain, joint swelling, muscle cramps, muscle weakness, myalgias and stiffness.   Gastrointestinal:  Negative for abdominal pain, anorexia, change in bowel habit, constipation, diarrhea, dysphagia, heartburn, hematemesis, hematochezia, melena and vomiting.   Genitourinary:  Negative for decreased libido, dysuria, hematuria, hesitancy and urgency.   Neurological:  Negative for excessive daytime sleepiness, dizziness, focal weakness,  "headaches, light-headedness, loss of balance, numbness, paresthesias, seizures, sensory change, tremors, vertigo and weakness.   Psychiatric/Behavioral:  Negative for altered mental status, depression, hallucinations, memory loss, substance abuse and suicidal ideas. The patient does not have insomnia and is not nervous/anxious.    Allergic/Immunologic: Negative for environmental allergies and hives.       Objective: BP (!) 140/70   Pulse 68   Ht 5' 10" (1.778 m)   Wt 78.4 kg (172 lb 13.5 oz)   BMI 24.80 kg/m²      Physical Exam  Constitutional:       General: He is not in acute distress.     Appearance: He is well-developed. He is not diaphoretic.   HENT:      Head: Normocephalic.   Eyes:      Pupils: Pupils are equal, round, and reactive to light.   Neck:      Thyroid: No thyromegaly.   Cardiovascular:      Rate and Rhythm: Normal rate and regular rhythm. Frequent Extrasystoles are present.     Pulses: Intact distal pulses.           Carotid pulses are 3+ on the right side and 3+ on the left side.       Radial pulses are 3+ on the right side and 3+ on the left side.        Femoral pulses are 3+ on the right side and 3+ on the left side.       Popliteal pulses are 3+ on the right side and 3+ on the left side.        Dorsalis pedis pulses are 3+ on the right side and 3+ on the left side.        Posterior tibial pulses are 3+ on the right side and 3+ on the left side.      Heart sounds: Murmur heard.      Harsh midsystolic murmur is present with a grade of 1/6 at the upper right sternal border radiating to the neck.      No friction rub. No gallop.   Pulmonary:      Effort: Pulmonary effort is normal. No respiratory distress.      Breath sounds: Normal breath sounds. No wheezing or rales.   Chest:      Chest wall: No tenderness.   Abdominal:      General: There is no distension.      Palpations: Abdomen is soft. There is no mass.      Tenderness: There is no abdominal tenderness.   Musculoskeletal:         " General: Normal range of motion.      Cervical back: Normal range of motion.   Lymphadenopathy:      Cervical: No cervical adenopathy.   Skin:     General: Skin is warm.      Nails: There is no clubbing.   Neurological:      Mental Status: He is alert and oriented to person, place, and time.   Psychiatric:         Speech: Speech normal.         Behavior: Behavior normal.         Thought Content: Thought content normal.         Judgment: Judgment normal.         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 Other ventricular tachycardia    3. Congestive heart failure, unspecified HF chronicity, unspecified heart failure type    4. Aortic atherosclerosis    5. Acute on chronic diastolic (congestive) heart failure    6. Nonrheumatic mitral valve stenosis    7. Stenosis of carotid artery, unspecified laterality    8. Incomplete RBBB    9. Other hyperlipidemia    10. Hypertension, unspecified type    11. NSVT (nonsustained ventricular tachycardia)        Plan:   Discussed diet , achieving and maintaining ideal body weight, and exercise.   We reviewed meds in detail.  Reassured-Discussed goals, options, plan.  Omega-3 > 800 mg/d combined EPA/DHA.  Goal BP< 135-140/85-90.  We have discussed the need for endocarditis prophylaxis.             Misha was seen today for congestive heart failure and hypertension.    Diagnoses and all orders for this visit:    S/P TAVR (transcatheter aortic valve replacement)  -     TSH; Future; Expected date: 03/08/2024  -     CBC Auto Differential; Future; Expected date: 03/08/2024  -     BNP; Future; Expected date: 03/08/2024  -     Echo; Future; Expected date: 03/08/2024    Other ventricular tachycardia  -     CBC Auto Differential; Future; Expected date: 03/08/2024    Congestive heart failure, unspecified HF chronicity, unspecified heart failure type  -     TSH; Future; Expected date: 03/08/2024  -     BNP; Future; Expected date: 03/08/2024  -     Echo; Future;  Expected date: 03/08/2024    Aortic atherosclerosis  -     TSH; Future; Expected date: 03/08/2024    Acute on chronic diastolic (congestive) heart failure    Nonrheumatic mitral valve stenosis  -     TSH; Future; Expected date: 03/08/2024  -     CBC Auto Differential; Future; Expected date: 03/08/2024  -     BNP; Future; Expected date: 03/08/2024  -     Echo; Future; Expected date: 03/08/2024    Stenosis of carotid artery, unspecified laterality  -     TSH; Future; Expected date: 03/08/2024  -     CBC Auto Differential; Future; Expected date: 03/08/2024    Incomplete RBBB    Other hyperlipidemia    Hypertension, unspecified type    NSVT (nonsustained ventricular tachycardia)  -     CBC Auto Differential; Future; Expected date: 03/08/2024    Other orders  -     aspirin (ECOTRIN) 81 MG EC tablet; Take 81 mg by mouth once daily.            Follow up for Labs now ; WILL White to read soon.

## 2024-03-07 NOTE — PATIENT INSTRUCTIONS
Discussed diet , achieving and maintaining ideal body weight, and exercise.   We reviewed meds in detail.  Reassured-Discussed goals, options, plan.  Omega-3 > 800 mg/d combined EPA/DHA.  Goal BP< 135-140/85-90.  We have discussed the need for endocarditis prophylaxis.

## 2024-03-08 LAB
OHS QRS DURATION: 110 MS
OHS QTC CALCULATION: 463 MS

## 2024-03-13 ENCOUNTER — OFFICE VISIT (OUTPATIENT)
Dept: PODIATRY | Facility: CLINIC | Age: 89
End: 2024-03-13
Payer: MEDICARE

## 2024-03-13 VITALS
HEIGHT: 71 IN | SYSTOLIC BLOOD PRESSURE: 140 MMHG | DIASTOLIC BLOOD PRESSURE: 58 MMHG | HEART RATE: 68 BPM | BODY MASS INDEX: 24.11 KG/M2

## 2024-03-13 DIAGNOSIS — L84 CORN OR CALLUS: ICD-10-CM

## 2024-03-13 DIAGNOSIS — L60.0 INGROWN RIGHT BIG TOENAIL: ICD-10-CM

## 2024-03-13 DIAGNOSIS — Z86.711 HISTORY OF PULMONARY EMBOLISM: ICD-10-CM

## 2024-03-13 DIAGNOSIS — R60.0 EDEMA OF BOTH LOWER LEGS DUE TO PERIPHERAL VENOUS INSUFFICIENCY: ICD-10-CM

## 2024-03-13 DIAGNOSIS — B35.1 ONYCHOMYCOSIS WITH INGROWN TOENAIL: ICD-10-CM

## 2024-03-13 DIAGNOSIS — L60.0 ONYCHOMYCOSIS WITH INGROWN TOENAIL: ICD-10-CM

## 2024-03-13 DIAGNOSIS — I87.2 EDEMA OF BOTH LOWER LEGS DUE TO PERIPHERAL VENOUS INSUFFICIENCY: ICD-10-CM

## 2024-03-13 DIAGNOSIS — Z79.01 LONG TERM CURRENT USE OF ANTICOAGULANT: Primary | ICD-10-CM

## 2024-03-13 DIAGNOSIS — L60.0 INGROWN LEFT BIG TOENAIL: ICD-10-CM

## 2024-03-13 PROCEDURE — 11055 PARING/CUTG B9 HYPRKER LES 1: CPT | Mod: Q9,PBBFAC,PN | Performed by: PODIATRIST

## 2024-03-13 PROCEDURE — 11721 DEBRIDE NAIL 6 OR MORE: CPT | Mod: 59,Q9,S$PBB, | Performed by: PODIATRIST

## 2024-03-13 PROCEDURE — 11721 DEBRIDE NAIL 6 OR MORE: CPT | Mod: Q9,PBBFAC,PN | Performed by: PODIATRIST

## 2024-03-13 PROCEDURE — 99213 OFFICE O/P EST LOW 20 MIN: CPT | Mod: PBBFAC,PN | Performed by: PODIATRIST

## 2024-03-13 PROCEDURE — 99999 PR PBB SHADOW E&M-EST. PATIENT-LVL III: CPT | Mod: PBBFAC,,, | Performed by: PODIATRIST

## 2024-03-13 PROCEDURE — 99213 OFFICE O/P EST LOW 20 MIN: CPT | Mod: 25,S$PBB,, | Performed by: PODIATRIST

## 2024-03-13 NOTE — PROGRESS NOTES
Subjective:      Patient ID: Misha Marte Jr. is a 89 y.o. male.    Chief Complaint: Nail Care    Patient is here in wheelchair, accompanied by wife Pat & niece Heather who 'is aunt Pat's memory'. States he has no acute concerns w/ LE. Had lesions off face only. Has corns L foot & B/L IGTN hallux.  12/12/23  Patient is accompanied by Pat (wife) & niece. He is now considered high risk since PE w/ in last 6 months as well as other PMH; on anticoagulant. C/o pain d/t IGTN B/L great toes again but  mainly R (was L last visit). Questions re: edema BLE as well. Presents in wheelchair today d/t weakness.  8/24/23  Misha is a 89 y.o. male who presents to the clinic, accompanied by wife (usually also here w/ niece Heather, & sister in law, Charo Ortez - will reschedule her appt.) Last in this clinic over 4-1/2 months ago as hospitalized for blood clot to lungs. On anticoagulant. Has home health.   C/o IGTN again.    PCP: Moncho Ramires MD  DOLV: 11/17/23  Endo: Evelyn Beltran MD 7/18/23     Recent hospitalization for weakness - on Eliquis - h/o PE.   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    CHF (congestive heart failure)    Iron deficiency anemia    LOUIE (acute kidney injury)    Acute on chronic diastolic (congestive) heart failure    Nonrheumatic mitral valve stenosis    S/P TAVR (transcatheter aortic valve replacement)     Incomplete RBBB    Aortic atherosclerosis    Pseudophakia of both eyes    Vitreous degeneration of both eyes    Age-related nuclear cataract of both eyes    Hyponatremia    Weakness    Pleural effusion on right    Chronic heart failure with preserved ejection fraction (HFpEF)    NSVT (nonsustained ventricular tachycardia)     Objective:      Physical Exam  Vitals reviewed.   Constitutional:       Appearance: He is well-developed and normal weight.   Cardiovascular:      Pulses:           Dorsalis pedis pulses are 1+ on the right side and 1+ on the left side.      Comments: Venous insufficiency edema R>L  Musculoskeletal:         General: No swelling or tenderness.      Right lower le+ Pitting Edema present.      Left lower le+ Pitting Edema present.   Feet:      Right foot:      Skin integrity: Skin integrity normal.      Toenail Condition: Right toenails are long and ingrown. Fungal disease present.     Left foot:      Skin integrity: Skin integrity normal.      Toenail Condition: Left toenails are long and ingrown. Fungal disease present.     Comments: All nails hypertrophic & cryptotic w/ symptomatic B/L hallux nail borders R>L as well as thickening & dystrophic appearance entire L hallux nail plate, distal 1st - 5th R. No periungual abnormality.  Skin:     General: Skin is warm and dry.      Capillary Refill: Capillary refill takes 2 to 3 seconds.      Findings: Lesion present. No bruising, erythema or rash.      Comments: Dry skin w/out breakdown B/L; no intertriginous maceration.  Seborrheic keratosis B/L.    Minimal stasis changes.     IPK medial 1st met.head L.   Neurological:      Mental Status: He is alert and oriented to person, place, and time.      Sensory: No sensory deficit.      Motor: Weakness present. No abnormal muscle tone.      Gait: Gait abnormal (Has a cane today as well as a wheelchair).   Psychiatric:         Mood and Affect: Mood and affect normal.         Behavior: Behavior normal.  Behavior is cooperative.         Assessment:      Encounter Diagnoses   Name Primary?    Long term current use of anticoagulant Yes    Corn or callus     Onychomycosis with ingrown toenail     Ingrown left big toenail     Ingrown right big toenail     Edema of both lower legs due to peripheral venous insufficiency     History of pulmonary embolism          Problem List Items Addressed This Visit          Cardiac/Vascular    Edema of both lower legs due to peripheral venous insufficiency    Overview     Right> left         Relevant Orders    Routine Foot Care     Other Visit Diagnoses       Long term current use of anticoagulant    -  Primary    Relevant Orders    Routine Foot Care    Myrtle or callus        Relevant Orders    Routine Foot Care    Onychomycosis with ingrown toenail        Relevant Orders    Routine Foot Care    Ingrown left big toenail        Relevant Orders    Routine Foot Care    Ingrown right big toenail        Relevant Orders    Routine Foot Care    History of pulmonary embolism                 Plan:       Misha was seen today for nail care.    Diagnoses and all orders for this visit:    Long term current use of anticoagulant  -     Routine Foot Care    Myrtle or callus  -     Routine Foot Care    Onychomycosis with ingrown toenail  -     Routine Foot Care    Ingrown left big toenail  -     Routine Foot Care    Ingrown right big toenail  -     Routine Foot Care    Edema of both lower legs due to peripheral venous insufficiency  -     Routine Foot Care    History of pulmonary embolism    - Shoe inspection. Patient instructed on proper foot hygeine. We discussed wearing proper shoe gear, daily foot inspections, never walking w/out protective shoe gear, annual foot exam, sooner prn.     Utilizing sterile toenail nippers, I aggressively debrided & reduced all the nails x 10 to their soft tissue attachment mechanically. I then also cut back the offending nail borders approximately 3 mm from its edge &  carried the nail plate incision down @ an angle in order to wedge out the cryptotic portion of the nail plate in toto. The area was cleansed w/ alcohol. Patient tolerated the procedure well and related significant relief.  Utilizing a #10 scalpel, I trimmed the calluses L foot.      The patient will continue to monitor the areas daily, inspect the feet, wear protective shoe gear when ambulatory, and moisturizer to maintain skin integrity.    Tubigrip stockinette size E dispensed for BLE.    Defer to derm for seborrheic keratoses prn.        A total of 24 mins.was spent on chart review, patient visit & documentation.

## 2024-03-16 PROCEDURE — G0179 MD RECERTIFICATION HHA PT: HCPCS | Mod: ,,, | Performed by: INTERNAL MEDICINE

## 2024-03-22 NOTE — PROCEDURES
Routine Foot Care    Date/Time: 3/13/2024 2:30 PM    Performed by: Ale Paredes DPM  Authorized by: Ael Paredes DPM    Consent Done?:  Yes (Verbal)  Hyperkeratotic Skin Lesions?: Yes    Number of trimmed lesions:  1  Location(s):  Left 1st Metatarsal Head    Nail Care Type:  Debride  Location(s): All  (Left 1st Toe, Left 3rd Toe, Left 2nd Toe, Left 4th Toe, Left 5th Toe, Right 1st Toe, Right 2nd Toe, Right 3rd Toe, Right 4th Toe and Right 5th Toe)  Patient tolerance:  Patient tolerated the procedure well with no immediate complications

## 2024-03-26 ENCOUNTER — HOSPITAL ENCOUNTER (OUTPATIENT)
Dept: CARDIOLOGY | Facility: HOSPITAL | Age: 89
Discharge: HOME | End: 2024-03-26
Attending: INTERNAL MEDICINE
Payer: MEDICARE

## 2024-03-26 VITALS
DIASTOLIC BLOOD PRESSURE: 75 MMHG | WEIGHT: 172 LBS | BODY MASS INDEX: 24.08 KG/M2 | HEART RATE: 63 BPM | HEIGHT: 71 IN | SYSTOLIC BLOOD PRESSURE: 140 MMHG

## 2024-03-26 DIAGNOSIS — Z95.2 S/P TAVR (TRANSCATHETER AORTIC VALVE REPLACEMENT): ICD-10-CM

## 2024-03-26 DIAGNOSIS — I50.9 CONGESTIVE HEART FAILURE, UNSPECIFIED HF CHRONICITY, UNSPECIFIED HEART FAILURE TYPE: ICD-10-CM

## 2024-03-26 DIAGNOSIS — I34.2 NONRHEUMATIC MITRAL VALVE STENOSIS: ICD-10-CM

## 2024-03-26 LAB
ASCENDING AORTA: 2.9 CM
AV INDEX (PROSTH): 0.91
AV MEAN GRADIENT: 4 MMHG
AV PEAK GRADIENT: 6 MMHG
AV VALVE AREA BY VELOCITY RATIO: 3.26 CM²
AV VALVE AREA: 3.5 CM²
AV VELOCITY RATIO: 0.85
BSA FOR ECHO PROCEDURE: 1.98 M2
CV ECHO LV RWT: 0.48 CM
DOP CALC AO PEAK VEL: 1.2 M/S
DOP CALC AO VTI: 24.95 CM
DOP CALC LVOT AREA: 3.8 CM2
DOP CALC LVOT DIAMETER: 2.21 CM
DOP CALC LVOT PEAK VEL: 1.02 M/S
DOP CALC LVOT STROKE VOLUME: 87.26 CM3
DOP CALCLVOT PEAK VEL VTI: 22.76 CM
E WAVE DECELERATION TIME: 547.92 MSEC
E/A RATIO: 1.6
E/E' RATIO: 32.91 M/S
ECHO LV POSTERIOR WALL: 1.2 CM (ref 0.6–1.1)
FRACTIONAL SHORTENING: 35 % (ref 28–44)
GLOBAL LONGITUIDAL STRAIN: 12 %
HR MV ECHO: 66 BPM
INTERVENTRICULAR SEPTUM: 1.2 CM (ref 0.6–1.1)
LA MAJOR: 5.79 CM
LA MINOR: 5.82 CM
LA WIDTH: 5.21 CM
LEFT ATRIUM SIZE: 4.76 CM
LEFT ATRIUM VOLUME INDEX MOD: 47.4 ML/M2
LEFT ATRIUM VOLUME INDEX: 61.8 ML/M2
LEFT ATRIUM VOLUME MOD: 93.83 CM3
LEFT ATRIUM VOLUME: 122.37 CM3
LEFT INTERNAL DIMENSION IN SYSTOLE: 3.26 CM (ref 2.1–4)
LEFT VENTRICLE DIASTOLIC VOLUME INDEX: 60.15 ML/M2
LEFT VENTRICLE DIASTOLIC VOLUME: 119.09 ML
LEFT VENTRICLE MASS INDEX: 119 G/M2
LEFT VENTRICLE SYSTOLIC VOLUME INDEX: 21.7 ML/M2
LEFT VENTRICLE SYSTOLIC VOLUME: 42.91 ML
LEFT VENTRICULAR INTERNAL DIMENSION IN DIASTOLE: 5.02 CM (ref 3.5–6)
LEFT VENTRICULAR MASS: 235.23 G
LV LATERAL E/E' RATIO: 36.2 M/S
LV SEPTAL E/E' RATIO: 30.17 M/S
MV MEAN GRADIENT: 9 MMHG
MV PEAK A VEL: 1.13 M/S
MV PEAK E VEL: 1.81 M/S
MV STENOSIS PRESSURE HALF TIME: 158.9 MS
MV VALVE AREA P 1/2 METHOD: 1.38 CM2
PISA MRMAX VEL: 0.02 M/S
PISA TR MAX VEL: 2.83 M/S
RA MAJOR: 5.17 CM
RA PRESSURE ESTIMATED: 3 MMHG
RA WIDTH: 5.06 CM
RIGHT VENTRICULAR END-DIASTOLIC DIMENSION: 4.6 CM
RV TB RVSP: 6 MMHG
SINUS: 2.89 CM
STJ: 2.81 CM
TDI LATERAL: 0.05 M/S
TDI SEPTAL: 0.06 M/S
TDI: 0.06 M/S
TR MAX PG: 32 MMHG
TV REST PULMONARY ARTERY PRESSURE: 35 MMHG
Z-SCORE OF LEFT VENTRICULAR DIMENSION IN END DIASTOLE: -1.3
Z-SCORE OF LEFT VENTRICULAR DIMENSION IN END SYSTOLE: -0.59

## 2024-03-26 PROCEDURE — 93306 TTE W/DOPPLER COMPLETE: CPT

## 2024-03-26 PROCEDURE — 93306 TTE W/DOPPLER COMPLETE: CPT | Mod: 26,,, | Performed by: INTERNAL MEDICINE

## 2024-03-26 NOTE — PROGRESS NOTES
Please contact the patient and let them know that their results were fine and do not require any change in treatment.Heart looks good.

## 2024-03-27 ENCOUNTER — EXTERNAL HOME HEALTH (OUTPATIENT)
Dept: HOME HEALTH SERVICES | Facility: HOSPITAL | Age: 89
End: 2024-03-27
Payer: MEDICARE

## 2024-04-09 ENCOUNTER — PATIENT MESSAGE (OUTPATIENT)
Dept: UROLOGY | Facility: CLINIC | Age: 89
End: 2024-04-09
Payer: MEDICARE

## 2024-04-09 ENCOUNTER — DOCUMENT SCAN (OUTPATIENT)
Dept: HOME HEALTH SERVICES | Facility: HOSPITAL | Age: 89
End: 2024-04-09
Payer: MEDICARE

## 2024-04-09 ENCOUNTER — INFUSION (OUTPATIENT)
Dept: INFECTIOUS DISEASES | Facility: HOSPITAL | Age: 89
End: 2024-04-09
Payer: MEDICARE

## 2024-04-09 ENCOUNTER — OFFICE VISIT (OUTPATIENT)
Dept: OTOLARYNGOLOGY | Facility: CLINIC | Age: 89
End: 2024-04-09
Payer: MEDICARE

## 2024-04-09 VITALS
HEART RATE: 62 BPM | SYSTOLIC BLOOD PRESSURE: 136 MMHG | WEIGHT: 172 LBS | HEIGHT: 70 IN | BODY MASS INDEX: 24.62 KG/M2 | TEMPERATURE: 98 F | DIASTOLIC BLOOD PRESSURE: 63 MMHG | RESPIRATION RATE: 16 BRPM | OXYGEN SATURATION: 99 %

## 2024-04-09 DIAGNOSIS — H61.21 IMPACTED CERUMEN, RIGHT EAR: Primary | ICD-10-CM

## 2024-04-09 DIAGNOSIS — M81.0 AGE RELATED OSTEOPOROSIS, UNSPECIFIED PATHOLOGICAL FRACTURE PRESENCE: Primary | ICD-10-CM

## 2024-04-09 PROCEDURE — 96372 THER/PROPH/DIAG INJ SC/IM: CPT

## 2024-04-09 PROCEDURE — 63600175 PHARM REV CODE 636 W HCPCS: Mod: JZ,JG | Performed by: INTERNAL MEDICINE

## 2024-04-09 PROCEDURE — 99499 UNLISTED E&M SERVICE: CPT | Mod: S$PBB,,, | Performed by: OTOLARYNGOLOGY

## 2024-04-09 PROCEDURE — 69210 REMOVE IMPACTED EAR WAX UNI: CPT | Mod: PBBFAC | Performed by: OTOLARYNGOLOGY

## 2024-04-09 RX ADMIN — DENOSUMAB 60 MG: 60 INJECTION SUBCUTANEOUS at 01:04

## 2024-04-09 NOTE — PROGRESS NOTES
Patient arrives for Prolia injection - confirms use of calcium and vitamin D supplements and denies dental procedures over past 3 months - administered per guidelines.    Next appt scheduled. Limited head-to-toe assessment due to privacy issues and visit reason though the opportunity was given for patient to express any concerns.    Pt seeing Oral surgeon in a few weeks. Instructed pt and pt's wife to let oral surgeon know he is on Prolia. Last shot received today April 9, 2024.

## 2024-04-09 NOTE — PROCEDURES
Ear Cerumen Removal    Date/Time: 4/9/2024 2:45 PM    Performed by: Harley Singleton MD  Authorized by: Harley Singleton MD    Consent Done?:  Yes (Verbal)  Location details:  Right ear  Procedure type: curette    Cerumen  Removal Results:  Cerumen completely removed  Patient tolerance:  Patient tolerated the procedure well with no immediate complications

## 2024-05-01 ENCOUNTER — DOCUMENT SCAN (OUTPATIENT)
Dept: HOME HEALTH SERVICES | Facility: HOSPITAL | Age: 89
End: 2024-05-01
Payer: MEDICARE

## 2024-05-09 ENCOUNTER — OFFICE VISIT (OUTPATIENT)
Dept: INTERNAL MEDICINE | Facility: CLINIC | Age: 89
End: 2024-05-09
Payer: MEDICARE

## 2024-05-09 ENCOUNTER — HOSPITAL ENCOUNTER (OUTPATIENT)
Dept: RADIOLOGY | Facility: HOSPITAL | Age: 89
Discharge: HOME | End: 2024-05-09
Attending: INTERNAL MEDICINE
Payer: MEDICARE

## 2024-05-09 VITALS
DIASTOLIC BLOOD PRESSURE: 60 MMHG | HEART RATE: 68 BPM | HEIGHT: 70 IN | SYSTOLIC BLOOD PRESSURE: 130 MMHG | BODY MASS INDEX: 24.68 KG/M2 | OXYGEN SATURATION: 94 %

## 2024-05-09 DIAGNOSIS — G45.9 TRANSIENT CEREBRAL ISCHEMIA, UNSPECIFIED TYPE: ICD-10-CM

## 2024-05-09 DIAGNOSIS — Z95.2 S/P TAVR (TRANSCATHETER AORTIC VALVE REPLACEMENT): ICD-10-CM

## 2024-05-09 DIAGNOSIS — E78.5 HYPERLIPIDEMIA, UNSPECIFIED HYPERLIPIDEMIA TYPE: ICD-10-CM

## 2024-05-09 DIAGNOSIS — G45.9 TRANSIENT CEREBRAL ISCHEMIA, UNSPECIFIED TYPE: Primary | ICD-10-CM

## 2024-05-09 DIAGNOSIS — L02.91 ABSCESS: ICD-10-CM

## 2024-05-09 DIAGNOSIS — I50.9 CONGESTIVE HEART FAILURE, UNSPECIFIED HF CHRONICITY, UNSPECIFIED HEART FAILURE TYPE: ICD-10-CM

## 2024-05-09 DIAGNOSIS — Z79.01 ANTICOAGULATED: ICD-10-CM

## 2024-05-09 DIAGNOSIS — R60.0 BILATERAL LOWER EXTREMITY EDEMA: ICD-10-CM

## 2024-05-09 PROCEDURE — 99214 OFFICE O/P EST MOD 30 MIN: CPT | Mod: S$PBB,,, | Performed by: INTERNAL MEDICINE

## 2024-05-09 PROCEDURE — 99214 OFFICE O/P EST MOD 30 MIN: CPT | Mod: PBBFAC,25 | Performed by: INTERNAL MEDICINE

## 2024-05-09 PROCEDURE — 70450 CT HEAD/BRAIN W/O DYE: CPT | Mod: 26,,, | Performed by: STUDENT IN AN ORGANIZED HEALTH CARE EDUCATION/TRAINING PROGRAM

## 2024-05-09 PROCEDURE — 70450 CT HEAD/BRAIN W/O DYE: CPT | Mod: TC

## 2024-05-09 PROCEDURE — 99999 PR PBB SHADOW E&M-EST. PATIENT-LVL IV: CPT | Mod: PBBFAC,,, | Performed by: INTERNAL MEDICINE

## 2024-05-09 RX ORDER — DOXYCYCLINE HYCLATE 100 MG
100 TABLET ORAL 2 TIMES DAILY
Qty: 14 TABLET | Refills: 0 | Status: SHIPPED | OUTPATIENT
Start: 2024-05-09 | End: 2024-05-16

## 2024-05-09 NOTE — PROGRESS NOTES
CC:  Possible TIA    HPI:  The patient is a 89-year-old male with a history aortic stenosis status post TAVR, chronic diastolic heart failure, hypertension, CKD stage 3, vitamin-D deficiency, iron-deficiency anemia and history of a pulmonary embolism who presents today for evaluation for an episode of weakness yesterday.  The patient is extremely tired he was noted to be drooling as well as leaning to his left side.  He felt this was related to a poor night sleep.  He and his wife were up most of the night after they received a phone call from hospice concerning his sister-in-law.  He reports having no pain yesterday feels much better today after having a decent night sleep.  His wife reports he normally legs more to the right but now he is leaning more to the left.    ROS:  Patient reports no fever chills.  He was diagnosed with macular degeneration.  He was getting injections in his eyes.  He was status post bilateral lens replacements.  He does wear hearing aids periods his blood pressure at home has been in the 130s systolic.  He reports his O2 saturations in the 90s.  No chest pain or shortness of breath.  No nausea vomiting.  No abdominal pain.  Does use MiraLax on occasion.  He does have a bump on his right shoulder which showed there cardiologist a month ago.  He is now red and tender.  The patient is stressed about his sister-in-law who was in hospice.    Physical exam:   General appearance:  No acute distress  HEENT: Conjunctiva is clear.  He is bilateral lens replacements.  Hearing aids in place.  Nasal septum is midline without discharge.  Oropharynx is without erythema.  Trachea is midline without JVD or thyromegaly.    Pulmonary:  Good inspiratory, expiratory breath sounds are heard.  Lungs are clear to auscultation.    Cardiovascular:  S1-S2, rhythm is regular.  Extremities with trace to 1+ ankle edema.    GI:  Patient normoactive bowel sounds    Assessment:  1. Questionable TIA   2. Abscess involving  the right shoulder   3. Chronic diastolic heart failure    4. Bilateral lower extremity edema  6.  Anticoagulated    Plan:  1.  The patient's lab results were reviewed  2.  Will get CT of the head without contrast.  3. Will put the patient on doxycycline 100 mg b.i.d.   4. The patient has follow-up in 2 weeks

## 2024-05-12 ENCOUNTER — PATIENT MESSAGE (OUTPATIENT)
Dept: INTERNAL MEDICINE | Facility: CLINIC | Age: 89
End: 2024-05-12
Payer: MEDICARE

## 2024-05-13 NOTE — TELEPHONE ENCOUNTER
Dr Ivan, your noted from 5/9 visit state that pt has f/u appt in 2 weeks. I do not see any appt made. Did you want pt to f/u with you in 2 weeks?

## 2024-05-15 ENCOUNTER — PATIENT MESSAGE (OUTPATIENT)
Dept: INTERNAL MEDICINE | Facility: CLINIC | Age: 89
End: 2024-05-15
Payer: MEDICARE

## 2024-05-15 PROCEDURE — G0179 MD RECERTIFICATION HHA PT: HCPCS | Mod: ,,, | Performed by: INTERNAL MEDICINE

## 2024-05-22 ENCOUNTER — OFFICE VISIT (OUTPATIENT)
Dept: INTERNAL MEDICINE | Facility: CLINIC | Age: 89
End: 2024-05-22
Payer: MEDICARE

## 2024-05-22 VITALS
HEART RATE: 72 BPM | BODY MASS INDEX: 24.68 KG/M2 | HEIGHT: 70 IN | OXYGEN SATURATION: 98 % | DIASTOLIC BLOOD PRESSURE: 70 MMHG | SYSTOLIC BLOOD PRESSURE: 120 MMHG

## 2024-05-22 DIAGNOSIS — L08.9 INFECTED SEBACEOUS CYST: Primary | ICD-10-CM

## 2024-05-22 DIAGNOSIS — L72.3 INFECTED SEBACEOUS CYST: Primary | ICD-10-CM

## 2024-05-22 DIAGNOSIS — L84 PRE-ULCERATIVE CORN OR CALLOUS: ICD-10-CM

## 2024-05-22 PROCEDURE — 99213 OFFICE O/P EST LOW 20 MIN: CPT | Mod: S$PBB,,, | Performed by: INTERNAL MEDICINE

## 2024-05-22 PROCEDURE — 99999 PR PBB SHADOW E&M-EST. PATIENT-LVL V: CPT | Mod: PBBFAC,,, | Performed by: INTERNAL MEDICINE

## 2024-05-22 PROCEDURE — 99215 OFFICE O/P EST HI 40 MIN: CPT | Mod: PBBFAC | Performed by: INTERNAL MEDICINE

## 2024-05-22 NOTE — PROGRESS NOTES
CC: Follow-up    HPI: The patient is a 89-year-old male with a history of aortic stenosis status post TAVR, chronic diastolic heart failure, hypertension, CKD stage 3, vitamin-D deficiency, iron-deficiency anemia and history of a PE who presents today for follow-up inflamed cyst on the patient's right shoulder.  The patient was placed on doxycycline.  His wife reports no drainage.  It does have a scab on it.  It was nontender.      ROS: Patient reports otherwise feeling well.  He did have constipation which resolved as soon as he stopped the antibiotic    Physical exam:   General appearance: No acute distress   Pulmonary: Good inspiratory, expiratory breath sounds were heard.  His lungs are clear to auscultation.    Cardiovascular: S1-S2  GI: Abdomen was nontender.  The patient has normal bowel sounds  Extremities: Patient did have 1+ ankle edema  Ortho: The patient is shoulder was evaluated.  The patient has a quarter-size cysts. The patient also has a 5-6 mm corn over the medial aspect of his MTP.      Assessment:    Inflamed sebaceous cyst   Plan: We will refer the patient to general surgery and Podiatry.

## 2024-06-03 ENCOUNTER — OFFICE VISIT (OUTPATIENT)
Dept: SURGERY | Facility: CLINIC | Age: 89
End: 2024-06-03
Payer: MEDICARE

## 2024-06-03 DIAGNOSIS — L72.3 INFECTED SEBACEOUS CYST: Primary | ICD-10-CM

## 2024-06-03 DIAGNOSIS — L08.9 INFECTED SEBACEOUS CYST: Primary | ICD-10-CM

## 2024-06-03 PROCEDURE — 99999 PR PBB SHADOW E&M-EST. PATIENT-LVL IV: CPT | Mod: PBBFAC,,, | Performed by: SURGERY

## 2024-06-03 PROCEDURE — 99204 OFFICE O/P NEW MOD 45 MIN: CPT | Mod: S$PBB,,, | Performed by: SURGERY

## 2024-06-03 PROCEDURE — 99214 OFFICE O/P EST MOD 30 MIN: CPT | Mod: PBBFAC,PN | Performed by: SURGERY

## 2024-06-03 RX ORDER — SULFAMETHOXAZOLE AND TRIMETHOPRIM 800; 160 MG/1; MG/1
1 TABLET ORAL 2 TIMES DAILY
Qty: 10 TABLET | Refills: 0 | Status: SHIPPED | OUTPATIENT
Start: 2024-06-03 | End: 2024-06-08

## 2024-06-03 RX ORDER — SODIUM CHLORIDE 9 MG/ML
INJECTION, SOLUTION INTRAVENOUS CONTINUOUS
Status: CANCELLED | OUTPATIENT
Start: 2024-06-03

## 2024-06-03 RX ORDER — AMOXICILLIN 500 MG/1
TABLET, FILM COATED ORAL
COMMUNITY
Start: 2024-05-14 | End: 2024-06-04 | Stop reason: CLARIF

## 2024-06-03 RX ORDER — CEFAZOLIN SODIUM 2 G/50ML
2 SOLUTION INTRAVENOUS
Status: CANCELLED | OUTPATIENT
Start: 2024-06-03

## 2024-06-04 ENCOUNTER — EXTERNAL HOME HEALTH (OUTPATIENT)
Dept: HOME HEALTH SERVICES | Facility: HOSPITAL | Age: 89
End: 2024-06-04
Payer: MEDICARE

## 2024-06-04 NOTE — PROGRESS NOTES
History & Physical    Subjective     History of Present Illness:  Patient is a 89 y.o. male presents withAn infected right shoulder sebaceous cyst  Patient was on antibiotics for 10 days however stopped few days ago.    Comes to me for evaluation and possible Intervention.    Discussed in detail with patient and family options of attempting to remove this in clinic versus in the OR.    We will set up for surgical intervention in the OR with minimal anesthesia due to patient's age and comorbidities.        No chief complaint on file.      Review of patient's allergies indicates:   Allergen Reactions    Cortisone Other (See Comments)     hiccups    Dexamethasone Other (See Comments)     Constant hiccups for days    Codeine Other (See Comments)     Other reaction(s): Nausea       Current Outpatient Medications   Medication Sig Dispense Refill    acetaminophen (TYLENOL) 500 MG tablet Take 500-1,000 mg by mouth daily as needed for Pain.      ascorbic acid, vitamin C, 250 mg Chew 250 mg DAILY (route: oral)      aspirin (ECOTRIN) 81 MG EC tablet Take 81 mg by mouth once daily.      CALCIUM CARBONATE/VITAMIN D3 (CALCIUM 600 + D,3, ORAL) Take 1,200 mg by mouth once daily.       carboxymethylcellulose sodium (REFRESH OPHT) Apply 1 drop to eye daily as needed.      cholecalciferol, vitamin D3, (VITAMIN D3 ORAL) Take 1 capsule by mouth once daily.      coenzyme Q10 10 mg capsule Take 20 mg by mouth once daily.      esomeprazole (NEXIUM) 40 MG capsule Take 1 capsule (40 mg total) by mouth once daily. 90 capsule 0    FEOSOL 325 mg (65 mg iron) Tab tablet TAKE 1 TABLET EVERY MORNING ON AN EMPTY STOMACH  WITH  YOUR  VITAMIN  C 90 tablet 1    furosemide (LASIX) 20 MG tablet TAKE 1 TO 2 TABLETS EVERY DAY AS NEEDED OR AS DIRECTED 180 tablet 3    losartan (COZAAR) 25 MG tablet TAKE 1 TABLET EVERY DAY 90 tablet 3    metoprolol succinate (TOPROL-XL) 25 MG 24 hr tablet One half tablet twice daily 90 tablet 3     multivitamin-minerals-lutein Tab Take 1 tablet by mouth once daily.       polyethylene glycol (GLYCOLAX) 17 gram PwPk Take 17 g by mouth 2 (two) times daily as needed (Constipation).  0    pravastatin (PRAVACHOL) 40 MG tablet TAKE 1 TABLET EVERY DAY 90 tablet 3    VIT A,C & E/LUTEIN/MINERALS (OCUVITE WITH LUTEIN ORAL) Take 1 capsule by mouth once daily.      ammonium lactate (AMLACTIN TOP) Apply topically. Apply topically daily as needed (Patient not taking: Reported on 6/3/2024)      amoxicillin (AMOXIL) 500 MG Tab SMARTSI Tablet(s) By Mouth (Patient not taking: Reported on 6/3/2024)      apixaban (ELIQUIS) 5 mg Tab Take 1 tablet (5 mg total) by mouth 2 (two) times daily. (Patient not taking: Reported on 6/3/2024) 180 tablet 3    ceramides 1,3,6-II (CERAVE) Lotn Apply topically daily as needed (Patient not taking: Reported on 6/3/2024)      sulfamethoxazole-trimethoprim 800-160mg (BACTRIM DS) 800-160 mg Tab Take 1 tablet by mouth 2 (two) times daily. for 5 days 10 tablet 0     No current facility-administered medications for this visit.     Facility-Administered Medications Ordered in Other Visits   Medication Dose Route Frequency Provider Last Rate Last Admin    balanced salt irrigation intra-ocular solution 1 drop  1 drop Left Eye On Call Procedure Bernadette Benavides MD        balanced salt irrigation intra-ocular solution 1 drop  1 drop Right Eye On Call Procedure Bernadette Benavides MD        phenylephrine HCL 10% ophthalmic solution 1 drop  1 drop Left Eye PRN Bernadette Benavides MD        phenylephrine HCL 10% ophthalmic solution 1 drop  1 drop Right Eye PRN Bernadette Benavides MD        sodium chloride 0.9% flush 2 mL  2 mL Intravenous PRN Bernadette Benavides MD        sodium chloride 0.9% flush 2 mL  2 mL Intravenous PRN Bernadette Benavides MD           Past Medical History:   Diagnosis Date    Arthritis     Cataract     CHF (congestive heart failure)     Digestive disorder     Heart murmur     Hyperlipidemia      Hypertension     Insomnia     Macular degeneration     Skin cancer      Past Surgical History:   Procedure Laterality Date    ANTERIOR VITRECTOMY Right 2023    Procedure: VITRECTOMY, ANTERIOR APPROACH;  Surgeon: Bernadette Benavides MD;  Location: [facility] OR;  Service: Ophthalmology;  Laterality: Right;    APPENDECTOMY      CARDIAC CATH COSURGEON N/A 2/15/2022    Procedure: Cardiac Cath Cosurgeon;  Surgeon: Talat Dunaway MD;  [place] [facility] [place] [facility] [facility];  Service: Cardiovascular;  Laterality: N/A;    CARPAL TUNNEL RELEASE      CATARACT EXTRACTION W/  INTRAOCULAR LENS IMPLANT Left 2023    Procedure: EXTRACTION, CATARACT, WITH IOL INSERTION;  Surgeon: Bernadette Benavides MD;  Location: [facility] OR;  Service: Ophthalmology;  Laterality: Left;    CATARACT EXTRACTION W/  INTRAOCULAR LENS IMPLANT Right 2023    Procedure: EXTRACTION, CATARACT, WITH IOL INSERTION;  Surgeon: Bernadette Benavides MD;  Location: [facility] OR;  Service: Ophthalmology;  Laterality: Right;    COLONOSCOPY      CORONARY ANGIOGRAPHY N/A 2022    Procedure: ANGIOGRAM, CORONARY ARTERY;  Surgeon: Tushar Swann MD;  [place] [facility] [place] [facility];  Service: Cardiology;  Laterality: N/A;    LEFT HEART CATHETERIZATION Left 2/15/2022    Procedure: Left heart cath;  Surgeon: Tushar Swann MD;  Location: [facility];  Service: Cardiology;  Laterality: Left;    TONSILLECTOMY, ADENOIDECTOMY      TRANSCATHETER AORTIC VALVE REPLACEMENT (TAVR) N/A 2/15/2022    Procedure: REPLACEMENT, AORTIC VALVE, TRANSCATHETER (TAVR);  Surgeon: Tushar Swann MD;  [place] [facility] [place] [facility];  Service: Cardiology;  Laterality: N/A;     Family History   Problem Relation Name Age of Onset    Heart disease Mother  at 77     Heart attack Mother  at 77     Stroke Father  at 77     Hypertension Neg Hx      Melanoma Neg Hx       Social History     Tobacco Use    Smoking status: Never    Smokeless tobacco: Never   Substance Use Topics    Alcohol use: Yes      Alcohol/week: 2.0 standard drinks of alcohol     Types: 2 Glasses of wine per week     Comment: socially    Drug use: No        Review of Systems:  Review of Systems   Constitutional:  Negative for appetite change, fatigue, fever and unexpected weight change.   HENT:  Negative for sore throat and trouble swallowing.    Eyes: Negative.    Respiratory:  Negative for cough, shortness of breath and wheezing.    Cardiovascular:  Negative for chest pain and leg swelling.   Gastrointestinal:  Negative for abdominal distention, abdominal pain, blood in stool, constipation, diarrhea, nausea and vomiting.   Endocrine: Negative.    Genitourinary: Negative.    Musculoskeletal:  Negative for back pain.   Skin:  Positive for wound. Negative for rash.   Allergic/Immunologic: Negative.    Neurological: Negative.    Hematological: Negative.    Psychiatric/Behavioral:  Negative for confusion.         Objective     Vital Signs (Most Recent)              Physical Exam:  Physical Exam  Vitals and nursing note reviewed.   Constitutional:       Appearance: He is well-developed.   HENT:      Head: Normocephalic and atraumatic.   Cardiovascular:      Rate and Rhythm: Normal rate.      Heart sounds: Normal heart sounds.   Pulmonary:      Effort: Pulmonary effort is normal.   Abdominal:      General: Bowel sounds are normal. There is no distension.      Palpations: Abdomen is soft.      Tenderness: There is no abdominal tenderness.   Musculoskeletal:         General: Normal range of motion.      Cervical back: Normal range of motion.   Skin:     General: Skin is warm and dry.      Capillary Refill: Capillary refill takes less than 2 seconds.             Comments: 3 cm infected sebaceous cyst with a skin ulcer   Neurological:      Mental Status: He is alert and oriented to person, place, and time.   Psychiatric:         Behavior: Behavior normal.     Laboratory  CBC: Reviewed  CMP: Reviewed           Assessment and Plan    89-year-old male  with a infected sebaceous cyst of the right shoulder   Recommend removal in the OR   All risks and benefits discussed with the patient.

## 2024-06-10 ENCOUNTER — PATIENT MESSAGE (OUTPATIENT)
Dept: INTERNAL MEDICINE | Facility: CLINIC | Age: 89
End: 2024-06-10
Payer: MEDICARE

## 2024-06-10 ENCOUNTER — PATIENT MESSAGE (OUTPATIENT)
Dept: SURGERY | Facility: CLINIC | Age: 89
End: 2024-06-10
Payer: MEDICARE

## 2024-06-10 NOTE — TELEPHONE ENCOUNTER
Refill Routing Note   Medication(s) are not appropriate for processing by [facility] for the following reason(s):        Outside of protocol    [facility] action(s):  Route               Appointments  past 12m or future 3m with PCP    Date Provider   Last Visit   5/22/2024 Ike Ivan MD   Next Visit   Visit date not found Ike Ivan MD   ED visits in past 90 days: 0        Note composed:1:59 PM 06/10/2024

## 2024-06-11 RX ORDER — FERROUS SULFATE 325(65) MG
TABLET ORAL
Qty: 90 TABLET | Refills: 3 | Status: SHIPPED | OUTPATIENT
Start: 2024-06-11

## 2024-06-17 ENCOUNTER — TELEPHONE (OUTPATIENT)
Dept: SURGERY | Facility: CLINIC | Age: 89
End: 2024-06-17
Payer: MEDICARE

## 2024-06-18 ENCOUNTER — OFFICE VISIT (OUTPATIENT)
Dept: SURGERY | Facility: CLINIC | Age: 89
End: 2024-06-18
Payer: MEDICARE

## 2024-06-18 VITALS — DIASTOLIC BLOOD PRESSURE: 66 MMHG | HEART RATE: 80 BPM | SYSTOLIC BLOOD PRESSURE: 136 MMHG

## 2024-06-18 DIAGNOSIS — Z98.890 POST-OPERATIVE STATE: Primary | ICD-10-CM

## 2024-06-18 PROCEDURE — 99999 PR PBB SHADOW E&M-EST. PATIENT-LVL III: CPT | Mod: PBBFAC,,, | Performed by: SURGERY

## 2024-06-18 PROCEDURE — 99213 OFFICE O/P EST LOW 20 MIN: CPT | Mod: PBBFAC,PN | Performed by: SURGERY

## 2024-06-18 PROCEDURE — 99024 POSTOP FOLLOW-UP VISIT: CPT | Mod: S$PBB,,, | Performed by: SURGERY

## 2024-06-18 RX ORDER — AMOXICILLIN AND CLAVULANATE POTASSIUM 875; 125 MG/1; MG/1
1 TABLET, FILM COATED ORAL 2 TIMES DAILY
Qty: 14 TABLET | Refills: 0 | Status: SHIPPED | OUTPATIENT
Start: 2024-06-18 | End: 2024-06-25

## 2024-06-20 NOTE — PROGRESS NOTES
Misha Marte Jr. is a 89 y.o. male patient.   Weeks status post right arm mass excision   Patient had an infected cyst in his right deltoid area.    Was left loosely approximated however did not drain appropriately at home.    Several sutures removed in clinic today and some thick exudate was pushed out and the incision was washed out with saline.    Packing was placed after.      No diagnosis found.  Past Medical History:   Diagnosis Date    Arthritis     Cataract     CHF (congestive heart failure)     Digestive disorder     Heart murmur     Hyperlipidemia     Hypertension     Insomnia     Macular degeneration     Skin cancer      Past Surgical History Pertinent Negatives:   Procedure Date Noted    CATARACT EXTRACTION 06/07/2022    CORNEAL TRANSPLANT 06/07/2022    RETINAL DETACHMENT SURGERY 06/07/2022    STRABISMUS SURGERY 06/07/2022     Scheduled Meds:  Continuous Infusions:  PRN Meds:    Review of patient's allergies indicates:   Allergen Reactions    Cortisone Other (See Comments)     hiccups    Dexamethasone Other (See Comments)     Constant hiccups for days    Codeine Other (See Comments)     Other reaction(s): Nausea     There are no hospital problems to display for this patient.    Blood pressure 136/66, pulse 80.    Subjective:   Diet: Adequate intake.  Patient reports no nausea.    Activity level: Returning to normal.    Pain control: Partially controlled.    Wound: Painful.    Objective:  Vital signs (most recent): Blood pressure 136/66, pulse 80.  General appearance: Comfortable.    Lungs:  Normal effort.    Heart: Normal rate.    Abdomen: Abdomen is soft.    Bowel sounds:  Bowel sounds are normal.    Tenderness: There is no abdominal tenderness tenderness.    Wound:  Clean, warm and pink.  Positive for dehiscence.  There is purulent drainage.    Extremities: There is normal range of motion.    Neurological: The patient is alert.    Assessment & Plan  Status post shoulder mass excision   Return to  clinic in 1 week   Okay to shower with soap and water.      Lasha Pérez MD  6/20/2024

## 2024-06-27 ENCOUNTER — OFFICE VISIT (OUTPATIENT)
Dept: SURGERY | Facility: CLINIC | Age: 89
End: 2024-06-27
Payer: MEDICARE

## 2024-06-27 ENCOUNTER — PATIENT MESSAGE (OUTPATIENT)
Dept: SURGERY | Facility: CLINIC | Age: 89
End: 2024-06-27

## 2024-06-27 VITALS — HEART RATE: 90 BPM | SYSTOLIC BLOOD PRESSURE: 147 MMHG | DIASTOLIC BLOOD PRESSURE: 58 MMHG

## 2024-06-27 DIAGNOSIS — S41.101D ARM WOUND, RIGHT, SUBSEQUENT ENCOUNTER: Primary | ICD-10-CM

## 2024-06-27 PROCEDURE — 99214 OFFICE O/P EST MOD 30 MIN: CPT | Mod: PBBFAC,PN | Performed by: SURGERY

## 2024-06-27 PROCEDURE — 99999 PR PBB SHADOW E&M-EST. PATIENT-LVL IV: CPT | Mod: PBBFAC,,, | Performed by: SURGERY

## 2024-07-03 DIAGNOSIS — Z71.89 COMPLEX CARE COORDINATION: ICD-10-CM

## 2024-07-05 NOTE — PROGRESS NOTES
Misha Marte Jr. is a 89 y.o. male patient.   1. Arm wound, right, subsequent encounter    Wound looking much better this visit.    No more purulent or thick drainage.    Granulation tissue starting to form.    Wounds still not adhering to muscle yet so recommend keeping suture in place for 1-2 more weeks so wound was not open up completely.      Past Medical History:   Diagnosis Date    Arthritis     Cataract     CHF (congestive heart failure)     Digestive disorder     Heart murmur     Hyperlipidemia     Hypertension     Insomnia     Macular degeneration     Skin cancer      Past Surgical History Pertinent Negatives:   Procedure Date Noted    CATARACT EXTRACTION 06/07/2022    CORNEAL TRANSPLANT 06/07/2022    RETINAL DETACHMENT SURGERY 06/07/2022    STRABISMUS SURGERY 06/07/2022     Scheduled Meds:  Continuous Infusions:  PRN Meds:    Review of patient's allergies indicates:   Allergen Reactions    Cortisone Other (See Comments)     hiccups    Dexamethasone Other (See Comments)     Constant hiccups for days    Codeine Other (See Comments)     Other reaction(s): Nausea     There are no hospital problems to display for this patient.    Blood pressure (!) 147/58, pulse 90.    Subjective:   Diet: Adequate intake.  Patient reports no nausea.    Activity level: Returning to normal.    Pain control: Well controlled.    Objective:  Vital signs (most recent): Blood pressure (!) 147/58, pulse 90.  General appearance: Comfortable.    Lungs:  Normal effort.    Abdomen: Abdomen is soft.    Bowel sounds:  Bowel sounds are normal.    Tenderness: There is no abdominal tenderness tenderness.    Wound:  Clean, draining and pink.  There is clear drainage.    Assessment & Plan  Status post arm mass excision   Return to clinic in 1-2 weeks for suture removal     Lasha Pérez MD  7/5/2024

## 2024-07-08 ENCOUNTER — PATIENT MESSAGE (OUTPATIENT)
Dept: SURGERY | Facility: CLINIC | Age: 89
End: 2024-07-08
Payer: MEDICARE

## 2024-07-08 NOTE — PROGRESS NOTES
Subjective:      Patient ID: Misha Marte Jr. is a 89 y.o. male.    Chief Complaint: Callouses    Patient comes in today, accompanied by both his wife & niece c/o of pain to the medial L foot in area of bunion.  Also great toe R feels ingrown.  In a wheelchair today but gets around at home w/ a walker.  3/13/24  Patient is here in wheelchair, accompanied by wife Pat & niece Heather who 'is aunt Pat's memory'. States he has no acute concerns w/ LE. Had lesions off face only. Has corns L foot & B/L IGTN hallux.  12/12/23  Patient is accompanied by Pat (wife) & niece. He is now considered high risk since PE w/ in last 6 months as well as other PMH; on anticoagulant. C/o pain d/t IGTN B/L great toes again but  mainly R (was L last visit). Questions re: edema BLE as well. Presents in wheelchair today d/t weakness.  8/24/23  Misha is a 89 y.o. male who presents to the clinic, accompanied by wife (usually also here w/ niece Heather, & sister in law, Charo Ortez - will reschedule her appt.) Last in this clinic over 4-1/2 months ago as hospitalized for blood clot to lungs. On anticoagulant. Has home health.   C/o IGTN again.    PCP: Moncho Ramires MD 05/22/2024  Endo: Evelyn Beltran MD 7/18/23    Past Medical History:   Diagnosis Date    Arthritis     Cataract     CHF (congestive heart failure)     Digestive disorder     Heart murmur     Hyperlipidemia     Hypertension     Insomnia     Macular degeneration     Skin cancer       Recent hospitalization for weakness - on Eliquis - h/o PE.   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    NS (nuclear sclerosis)    Overweight (BMI 25.0-29.9)    Carpal tunnel syndrome of left wrist    Gait abnormality    Age  related osteoporosis    Other hyperlipidemia    Pain and swelling of lower leg, right    Left trigger finger    AK (actinic keratosis)    Edema of both lower legs due to peripheral venous insufficiency    CHF (congestive heart failure)    Iron deficiency anemia    LOUIE (acute kidney injury)    Acute on chronic diastolic (congestive) heart failure    Nonrheumatic mitral valve stenosis    S/P TAVR (transcatheter aortic valve replacement)    Incomplete RBBB    Aortic atherosclerosis    Pseudophakia of both eyes    Vitreous degeneration of both eyes    Age-related nuclear cataract of both eyes    Hyponatremia    Weakness    Pleural effusion on right    Chronic heart failure with preserved ejection fraction (HFpEF)    NSVT (nonsustained ventricular tachycardia)     Objective:      Physical Exam  Vitals reviewed.   Constitutional:       Appearance: He is well-developed and normal weight.   Cardiovascular:      Pulses:           Dorsalis pedis pulses are 1+ on the right side and 1+ on the left side.      Comments: Venous insufficiency edema R>L.  Musculoskeletal:         General: Tenderness and deformity present. No swelling.      Right lower le+ Pitting Edema present.      Left lower le+ Pitting Edema present.      Right foot: No bunion.      Left foot: Bunion present.        Feet:    Feet:      Right foot:      Skin integrity: Skin integrity normal.      Toenail Condition: Right toenails are long and ingrown. Fungal disease present.     Left foot:      Skin integrity: Skin integrity normal.      Toenail Condition: Left toenails are long and ingrown. Fungal disease present.     Comments: All nails B/L 1st-5th are hypertrophic & cryptotic w/ symptomatic B/L hallux nail borders R>L. Thickening & dystrophic appearance entire L hallux nail plate & distal 1st - 5th R. Tenderness to distal nail plate pressure of great toe R medial >lat; no periungual abnormality except for hypertrophy of ungual labia lateral nail  border.  Skin:     General: Skin is warm and dry.      Capillary Refill: Capillary refill takes 2 to 3 seconds.      Findings: Lesion present. No bruising, erythema or rash.      Comments: Dry skin w/out breakdown B/L; no intertriginous maceration.  Seborrheic keratosis B/L.    Minimal stasis changes.     Porokeratotic lesion dorsal medial 1st met.head L.    Hypertrophy of ungual labia w/out erythema lateral R hallux nail border.   Neurological:      Mental Status: He is alert and oriented to person, place, and time.      Sensory: No sensory deficit.      Motor: Weakness present. No abnormal muscle tone.      Gait: Gait abnormal (Walker or wheelchair).   Psychiatric:         Mood and Affect: Mood and affect normal.         Behavior: Behavior normal. Behavior is cooperative.         Assessment:      Encounter Diagnoses   Name Primary?    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     Transient cerebral ischemia, unspecified type     History of pulmonary embolism     Onychomycosis with ingrown toenail     Pre-ulcerative corn or callous Yes    Valgus deformity of left great toe     Pain of right great toe     Onychia of toe, right     Pain of left great toe          Problem List Items Addressed This Visit          Cardiac/Vascular    Edema of both lower legs due to peripheral venous insufficiency    Overview     Right> left          Other Visit Diagnoses       Pre-ulcerative corn or callous    -  Primary    Relevant Orders    Routine Foot Care    Long term current use of anticoagulant        Relevant Orders    Routine Foot Care    Transient cerebral ischemia, unspecified type        History of pulmonary embolism        Onychomycosis with ingrown toenail        Relevant Orders    Routine Foot Care    Valgus deformity of left great toe        Pain of right great toe        Relevant Orders    Routine Foot Care    Onychia of toe, right        Relevant Orders     Routine Foot Care    Pain of left great toe               Plan:       Misha was seen today for callouses.    Diagnoses and all orders for this visit:    Pre-ulcerative corn or callous  -     Ambulatory referral/consult to Podiatry  -     Routine Foot Care    Long term current use of anticoagulant  -     Routine Foot Care    Edema of both lower legs due to peripheral venous insufficiency    Transient cerebral ischemia, unspecified type    History of pulmonary embolism    Onychomycosis with ingrown toenail  -     Routine Foot Care    Valgus deformity of left great toe    Pain of right great toe  -     Routine Foot Care    Onychia of toe, right  -     Routine Foot Care    Pain of left great toe    - Shoe inspection. Patient instructed on proper foot hygeine. We discussed wearing proper shoe gear, daily foot inspections, never walking w/out protective shoe gear, annual foot exam, sooner prn.     Utilizing sterile toenail nippers, I aggressively debrided & reduced all the nails x 10 to their soft tissue attachment mechanically. I then also cut back the offending nail borders approximately 3 mm from its edge & carried the nail plate incision down @ an angle in order to wedge out the cryptotic portion of the nail plate in toto. The area was cleansed w/ alcohol. Patient tolerated the procedure well & related significant relief.  Utilizing a tissue nipper, I trimmed the porokeratotic lesion L foot.        Tubigrip stockinette size E dispensed for BLE whenever out of bed.        A total of 31 mins.was spent on chart review, patient visit & documentation.

## 2024-07-09 ENCOUNTER — OFFICE VISIT (OUTPATIENT)
Dept: PODIATRY | Facility: CLINIC | Age: 89
End: 2024-07-09
Payer: MEDICARE

## 2024-07-09 VITALS — BODY MASS INDEX: 23.71 KG/M2 | HEIGHT: 71 IN | SYSTOLIC BLOOD PRESSURE: 131 MMHG | DIASTOLIC BLOOD PRESSURE: 62 MMHG

## 2024-07-09 DIAGNOSIS — I87.2 EDEMA OF BOTH LOWER LEGS DUE TO PERIPHERAL VENOUS INSUFFICIENCY: ICD-10-CM

## 2024-07-09 DIAGNOSIS — L03.031 ONYCHIA OF TOE, RIGHT: ICD-10-CM

## 2024-07-09 DIAGNOSIS — B35.1 ONYCHOMYCOSIS WITH INGROWN TOENAIL: ICD-10-CM

## 2024-07-09 DIAGNOSIS — G45.9 TRANSIENT CEREBRAL ISCHEMIA, UNSPECIFIED TYPE: ICD-10-CM

## 2024-07-09 DIAGNOSIS — M79.674 PAIN OF RIGHT GREAT TOE: ICD-10-CM

## 2024-07-09 DIAGNOSIS — L60.0 ONYCHOMYCOSIS WITH INGROWN TOENAIL: ICD-10-CM

## 2024-07-09 DIAGNOSIS — L84 PRE-ULCERATIVE CORN OR CALLOUS: Primary | ICD-10-CM

## 2024-07-09 DIAGNOSIS — Z86.711 HISTORY OF PULMONARY EMBOLISM: ICD-10-CM

## 2024-07-09 DIAGNOSIS — R60.0 EDEMA OF BOTH LOWER LEGS DUE TO PERIPHERAL VENOUS INSUFFICIENCY: ICD-10-CM

## 2024-07-09 DIAGNOSIS — M79.675 PAIN OF LEFT GREAT TOE: ICD-10-CM

## 2024-07-09 DIAGNOSIS — Z79.01 LONG TERM CURRENT USE OF ANTICOAGULANT: ICD-10-CM

## 2024-07-09 DIAGNOSIS — M20.12 VALGUS DEFORMITY OF LEFT GREAT TOE: ICD-10-CM

## 2024-07-09 PROCEDURE — 11055 PARING/CUTG B9 HYPRKER LES 1: CPT | Mod: Q9,PBBFAC,PN | Performed by: PODIATRIST

## 2024-07-09 PROCEDURE — 99999 PR PBB SHADOW E&M-EST. PATIENT-LVL IV: CPT | Mod: PBBFAC,,, | Performed by: PODIATRIST

## 2024-07-09 PROCEDURE — 11721 DEBRIDE NAIL 6 OR MORE: CPT | Mod: Q9,PBBFAC,PN | Performed by: PODIATRIST

## 2024-07-09 PROCEDURE — 11721 DEBRIDE NAIL 6 OR MORE: CPT | Mod: 59,Q9,S$PBB, | Performed by: PODIATRIST

## 2024-07-09 PROCEDURE — 99213 OFFICE O/P EST LOW 20 MIN: CPT | Mod: 25,S$PBB,, | Performed by: PODIATRIST

## 2024-07-09 PROCEDURE — 99214 OFFICE O/P EST MOD 30 MIN: CPT | Mod: PBBFAC,PN | Performed by: PODIATRIST

## 2024-07-11 ENCOUNTER — DOCUMENT SCAN (OUTPATIENT)
Dept: HOME HEALTH SERVICES | Facility: HOSPITAL | Age: 89
End: 2024-07-11
Payer: MEDICARE

## 2024-07-14 PROCEDURE — G0179 MD RECERTIFICATION HHA PT: HCPCS | Mod: ,,, | Performed by: INTERNAL MEDICINE

## 2024-07-17 NOTE — PROCEDURES
Routine Foot Care    Date/Time: 7/9/2024 1:30 PM    Performed by: Ale Paredes DPM  Authorized by: Ale Paredes DPM    Consent Done?:  Yes (Verbal)  Hyperkeratotic Skin Lesions?: Yes    Number of trimmed lesions:  1  Location(s):  Left 1st Metatarsal Head    Nail Care Type:  Debride  Location(s): All  (Left 1st Toe, Left 3rd Toe, Left 2nd Toe, Left 4th Toe, Left 5th Toe, Right 1st Toe, Right 2nd Toe, Right 3rd Toe, Right 4th Toe and Right 5th Toe)  Patient tolerance:  Patient tolerated the procedure well with no immediate complications

## 2024-07-22 ENCOUNTER — OFFICE VISIT (OUTPATIENT)
Dept: SURGERY | Facility: CLINIC | Age: 89
End: 2024-07-22
Payer: MEDICARE

## 2024-07-22 VITALS — HEART RATE: 75 BPM | SYSTOLIC BLOOD PRESSURE: 121 MMHG | DIASTOLIC BLOOD PRESSURE: 74 MMHG

## 2024-07-22 DIAGNOSIS — Z98.890 POST-OPERATIVE STATE: Primary | ICD-10-CM

## 2024-07-22 PROCEDURE — 99024 POSTOP FOLLOW-UP VISIT: CPT | Mod: S$PBB,,, | Performed by: SURGERY

## 2024-07-22 PROCEDURE — 99213 OFFICE O/P EST LOW 20 MIN: CPT | Mod: PBBFAC,PN | Performed by: SURGERY

## 2024-07-22 PROCEDURE — 99999 PR PBB SHADOW E&M-EST. PATIENT-LVL III: CPT | Mod: PBBFAC,,, | Performed by: SURGERY

## 2024-07-26 NOTE — PROGRESS NOTES
Misha Marte Jr. is a 89 y.o. male patient.   One month status post arm mass excision.    Still having some slow wound healing.    Part of the deep tissue has closed however still a 1 cm opening on the skin.    All sutures removed in clinic today.    Told to keep with shower with soap and water and daily dressing changes.    Patient and family expressed understanding.    No sign of infection.      No diagnosis found.  Past Medical History:   Diagnosis Date    Arthritis     Cataract     CHF (congestive heart failure)     Digestive disorder     Heart murmur     Hyperlipidemia     Hypertension     Insomnia     Macular degeneration     Skin cancer      Past Surgical History Pertinent Negatives:   Procedure Date Noted    CATARACT EXTRACTION 06/07/2022    CORNEAL TRANSPLANT 06/07/2022    RETINAL DETACHMENT SURGERY 06/07/2022    STRABISMUS SURGERY 06/07/2022     Scheduled Meds:  Continuous Infusions:  PRN Meds:    Review of patient's allergies indicates:   Allergen Reactions    Cortisone Other (See Comments)     hiccups    Dexamethasone Other (See Comments)     Constant hiccups for days    Codeine Other (See Comments)     Other reaction(s): Nausea     There are no hospital problems to display for this patient.    Blood pressure 121/74, pulse 75.    Subjective:   Diet: Adequate intake.  Patient reports no nausea.    Activity level: Returning to normal.    Pain control: Well controlled.    Objective:  Vital signs (most recent): Blood pressure 121/74, pulse 75.  General appearance: Comfortable.    Lungs:  Normal effort.    Heart: Normal rate.    Abdomen: Abdomen is soft.    Bowel sounds:  Bowel sounds are normal.    Tenderness: There is no abdominal tenderness tenderness.    Wound:  Clean.    Extremities: There is normal range of motion.    Assessment & Plan  Status post shoulder mass excision   Return to clinic ronny Pérez MD  7/26/2024

## 2024-07-30 ENCOUNTER — PATIENT MESSAGE (OUTPATIENT)
Dept: SURGERY | Facility: CLINIC | Age: 89
End: 2024-07-30
Payer: MEDICARE

## 2024-07-31 ENCOUNTER — DOCUMENT SCAN (OUTPATIENT)
Dept: HOME HEALTH SERVICES | Facility: HOSPITAL | Age: 89
End: 2024-07-31
Payer: MEDICARE

## 2024-08-03 DIAGNOSIS — I35.0 NONRHEUMATIC AORTIC VALVE STENOSIS: ICD-10-CM

## 2024-08-05 RX ORDER — PRAVASTATIN SODIUM 40 MG/1
TABLET ORAL
Qty: 90 TABLET | Refills: 3 | Status: SHIPPED | OUTPATIENT
Start: 2024-08-05

## 2024-08-06 ENCOUNTER — TELEPHONE (OUTPATIENT)
Dept: INTERNAL MEDICINE | Facility: CLINIC | Age: 89
End: 2024-08-06
Payer: MEDICARE

## 2024-08-06 DIAGNOSIS — M79.89 PAIN AND SWELLING OF LOWER LEG, RIGHT: ICD-10-CM

## 2024-08-06 DIAGNOSIS — M79.661 PAIN AND SWELLING OF LOWER LEG, RIGHT: ICD-10-CM

## 2024-08-06 DIAGNOSIS — R53.1 WEAKNESS: ICD-10-CM

## 2024-08-06 DIAGNOSIS — R26.9 GAIT ABNORMALITY: Primary | Chronic | ICD-10-CM

## 2024-08-09 ENCOUNTER — DOCUMENT SCAN (OUTPATIENT)
Dept: HOME HEALTH SERVICES | Facility: HOSPITAL | Age: 89
End: 2024-08-09
Payer: MEDICARE

## 2024-08-12 ENCOUNTER — PATIENT MESSAGE (OUTPATIENT)
Dept: SURGERY | Facility: CLINIC | Age: 89
End: 2024-08-12
Payer: MEDICARE

## 2024-08-14 ENCOUNTER — DOCUMENT SCAN (OUTPATIENT)
Dept: HOME HEALTH SERVICES | Facility: HOSPITAL | Age: 89
End: 2024-08-14
Payer: MEDICARE

## 2024-08-15 ENCOUNTER — OFFICE VISIT (OUTPATIENT)
Dept: SURGERY | Facility: CLINIC | Age: 89
End: 2024-08-15
Payer: MEDICARE

## 2024-08-15 VITALS — HEART RATE: 79 BPM | SYSTOLIC BLOOD PRESSURE: 108 MMHG | DIASTOLIC BLOOD PRESSURE: 61 MMHG

## 2024-08-15 DIAGNOSIS — S41.101D WOUND OF RIGHT UPPER EXTREMITY, SUBSEQUENT ENCOUNTER: Primary | ICD-10-CM

## 2024-08-15 PROCEDURE — 15271 SKIN SUB GRAFT TRNK/ARM/LEG: CPT | Mod: PBBFAC,PN | Performed by: SURGERY

## 2024-08-15 PROCEDURE — 99213 OFFICE O/P EST LOW 20 MIN: CPT | Mod: PBBFAC,PN | Performed by: SURGERY

## 2024-08-15 PROCEDURE — 99999 PR PBB SHADOW E&M-EST. PATIENT-LVL III: CPT | Mod: PBBFAC,,, | Performed by: SURGERY

## 2024-08-20 ENCOUNTER — TELEPHONE (OUTPATIENT)
Dept: SURGERY | Facility: CLINIC | Age: 89
End: 2024-08-20
Payer: MEDICARE

## 2024-08-20 NOTE — TELEPHONE ENCOUNTER
Spoke with patient wife (Pat). She was advise as per Dr Pérez instruction to keep area (Wound) clean and covered with a bandage and Marlene. She verbalized understanding.

## 2024-08-20 NOTE — TELEPHONE ENCOUNTER
----- Message from Ariana Hennessy sent at 8/20/2024  2:13 PM CDT -----  Regarding: pt advice  Contact: stephanie @ [phone]  Pt is returning missed call and is requesting a call back from Natali in the office. Please call to advise further. Thank you for all you are doing.

## 2024-08-23 ENCOUNTER — DOCUMENT SCAN (OUTPATIENT)
Dept: HOME HEALTH SERVICES | Facility: HOSPITAL | Age: 89
End: 2024-08-23
Payer: MEDICARE

## 2024-08-23 NOTE — PROGRESS NOTES
Misha Marte Jr. is a 89 y.o. male patient.   Over 1 month status post or mass removal.    Patient wounds still having trouble completely sealing.    Used axiofill in clinic placed this into the wound to see if this would help achieve quicker wound healing.  Dressing placed on top of this.    Patient given instructions to keeps axial fill in place and just change the dressing every 3-4 days.      No diagnosis found.  Past Medical History:   Diagnosis Date    Arthritis     Cataract     CHF (congestive heart failure)     Digestive disorder     Heart murmur     Hyperlipidemia     Hypertension     Insomnia     Macular degeneration     Skin cancer      Past Surgical History Pertinent Negatives:   Procedure Date Noted    CATARACT EXTRACTION 06/07/2022    CORNEAL TRANSPLANT 06/07/2022    RETINAL DETACHMENT SURGERY 06/07/2022    STRABISMUS SURGERY 06/07/2022     Scheduled Meds:  Continuous Infusions:  PRN Meds:    Review of patient's allergies indicates:   Allergen Reactions    Cortisone Other (See Comments)     hiccups    Dexamethasone Other (See Comments)     Constant hiccups for days    Codeine Other (See Comments)     Other reaction(s): Nausea     There are no hospital problems to display for this patient.    Blood pressure 108/61, pulse 79.    Subjective:   Diet: Adequate intake.  Patient reports no nausea.    Activity level: Returning to normal.    Pain control: Well controlled.    Objective:  Vital signs (most recent): Blood pressure 108/61, pulse 79.  General appearance: Comfortable.    Lungs:  Normal effort.    Heart: Normal rate.    Abdomen: Abdomen is soft.    Bowel sounds:  Bowel sounds are normal.    Tenderness: There is no abdominal tenderness tenderness.    Wound:  Clean.    Extremities: There is normal range of motion.    Neurological: The patient is alert.    Assessment:   Condition: In stable condition.     Status post arm mass excision with persistent difficulty with wound healing.    Axial fill  used in clinic today.    Encourage patient to continue with local wound care.    Return to clinic in 1-2 months.      Lasha Pérez MD  8/23/2024

## 2024-08-30 ENCOUNTER — EXTERNAL HOME HEALTH (OUTPATIENT)
Dept: HOME HEALTH SERVICES | Facility: HOSPITAL | Age: 89
End: 2024-08-30
Payer: MEDICARE

## 2024-08-30 ENCOUNTER — DOCUMENT SCAN (OUTPATIENT)
Dept: HOME HEALTH SERVICES | Facility: HOSPITAL | Age: 89
End: 2024-08-30
Payer: MEDICARE

## 2024-09-12 RX ORDER — FUROSEMIDE 20 MG/1
TABLET ORAL
Qty: 180 TABLET | Refills: 3 | Status: SHIPPED | OUTPATIENT
Start: 2024-09-12

## 2024-09-21 DIAGNOSIS — I35.0 NONRHEUMATIC AORTIC VALVE STENOSIS: Chronic | ICD-10-CM

## 2024-09-21 DIAGNOSIS — I10 HYPERTENSION, UNSPECIFIED TYPE: ICD-10-CM

## 2024-09-21 DIAGNOSIS — R00.0 TACHYCARDIA: ICD-10-CM

## 2024-09-23 RX ORDER — METOPROLOL SUCCINATE 25 MG/1
TABLET, EXTENDED RELEASE ORAL
Qty: 90 TABLET | Refills: 3 | Status: SHIPPED | OUTPATIENT
Start: 2024-09-23

## 2024-09-30 ENCOUNTER — EXTERNAL HOME HEALTH (OUTPATIENT)
Dept: HOME HEALTH SERVICES | Facility: HOSPITAL | Age: 89
End: 2024-09-30
Payer: MEDICARE

## 2024-10-04 ENCOUNTER — PATIENT MESSAGE (OUTPATIENT)
Dept: INTERNAL MEDICINE | Facility: CLINIC | Age: 89
End: 2024-10-04
Payer: MEDICARE

## 2024-10-04 NOTE — TELEPHONE ENCOUNTER
Pt has been treated several times for an infected cyst that is not healing, pt states provider will no longer see him. Asking suggestions from Dr. Ivan, informed him he may need to visit the wound care clinic. Images of wound attached to pt's message

## 2024-10-06 DIAGNOSIS — Z98.890 H/O REMOVAL OF CYST: Primary | ICD-10-CM

## 2024-10-07 ENCOUNTER — TELEPHONE (OUTPATIENT)
Dept: DERMATOLOGY | Facility: CLINIC | Age: 89
End: 2024-10-07
Payer: MEDICARE

## 2024-10-07 NOTE — TELEPHONE ENCOUNTER
----- Message from Tech Jasimine sent at 10/7/2024 12:03 PM CDT -----  Regarding: Re: scheduling MOHS  Contact: [phone]  Diane Elise caregiver is calling to schedule MOHS for lesion on his face. Dr Asher sent in a referral but I do not see it on file.  Please contact patient to further discuss thank you.

## 2024-10-07 NOTE — TELEPHONE ENCOUNTER
Let caregiver know I just received everything from Dr. Asher and gave it to Dr. Evangelista. After she reviews I will call back to get pt scheduled. Confirmed understanding and thanked me for the call.

## 2024-10-08 ENCOUNTER — TELEPHONE (OUTPATIENT)
Dept: WOUND CARE | Facility: HOSPITAL | Age: 89
End: 2024-10-08
Payer: MEDICARE

## 2024-10-08 ENCOUNTER — TELEPHONE (OUTPATIENT)
Dept: DERMATOLOGY | Facility: CLINIC | Age: 89
End: 2024-10-08
Payer: MEDICARE

## 2024-10-08 DIAGNOSIS — C44.329 SQUAMOUS CELL CARCINOMA OF SKIN OF LEFT CHEEK: Primary | ICD-10-CM

## 2024-10-08 NOTE — TELEPHONE ENCOUNTER
Spoke to patient's wife in regards to wound care referral. Wife stated that she spoke to her 's surgeon and the plan is to go back to surgery and try to close the wound. Wife stated she will hold off for now with scheduling a wound care appointment.

## 2024-10-08 NOTE — TELEPHONE ENCOUNTER
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 Scheduled for Mohs on 10/17 at 8. Pt confirmed date, time, and location. Reminder sent in portal.

## 2024-10-09 ENCOUNTER — PATIENT OUTREACH (OUTPATIENT)
Dept: ADMINISTRATIVE | Facility: HOSPITAL | Age: 89
End: 2024-10-09
Payer: MEDICARE

## 2024-10-09 NOTE — PROGRESS NOTES
Subjective:      Patient ID: Misha Marte Jr. is a 89 y.o. male.    Chief Complaint: Diabetes Mellitus and Nail Care    Patient is brought in by his niece, Heather & accompanied by his wife, Pat. Here for tenderness d/t IGTN B/L great toes but does not complain much about anything. Due for Moh's of squamous cell on face 10/17/24.  7/9/24  Patient comes in today, accompanied by both his wife & niece c/o of pain to the medial L foot in area of bunion.  Also great toe R feels ingrown.  In a wheelchair today but gets around at home w/ a walker.  3/13/24  Patient is here in wheelchair, accompanied by wife Pat & niece Heather who 'is aunt Pat's memory'. States he has no acute concerns w/ LE. Had lesions off face only. Has corns L foot & B/L IGTN hallux.  12/12/23  Patient is accompanied by Pat (wife) & niece. He is now considered high risk since PE w/ in last 6 months as well as other PMH; on anticoagulant. C/o pain d/t IGTN B/L great toes again but  mainly R (was L last visit). Questions re: edema BLE as well. Presents in wheelchair today d/t weakness.  8/24/23  Misha is a 89 y.o. male who presents to the clinic, accompanied by wife (usually also here w/ niece Heather, & sister in law, Charo Ortez - will reschedule her appt.) Last in this clinic over 4-1/2 months ago as hospitalized for blood clot to lungs. On anticoagulant. Has home health.   C/o IGTN again.    PCP: Moncho Ramires MD 05/22/2024    Past Medical History:   Diagnosis Date    Acute on chronic diastolic (congestive) heart failure 01/11/2022    Aortic stenosis 06/28/2013    Now moderate      Arthritis     Cataract     CHF (congestive heart failure)     Digestive disorder     Heart murmur     Hyperlipidemia     Hypertension     Insomnia     Macular degeneration     Skin cancer       Recent hospitalization for weakness - on Eliquis - h/o PE.   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    NS (nuclear sclerosis)    Overweight (BMI 25.0-29.9)    Carpal tunnel syndrome of left wrist    Gait abnormality    Age related osteoporosis    Other hyperlipidemia    Pain and swelling of lower leg, right    Left trigger finger    AK (actinic keratosis)    Edema of both lower legs due to peripheral venous insufficiency    CHF (congestive heart failure)    Iron deficiency anemia    LOUIE (acute kidney injury)    Nonrheumatic mitral valve stenosis    S/P TAVR (transcatheter aortic valve replacement)    Incomplete RBBB    Aortic atherosclerosis    Pseudophakia of both eyes    Vitreous degeneration of both eyes    Age-related nuclear cataract of both eyes    Weakness    Pleural effusion on right    Chronic heart failure with preserved ejection fraction (HFpEF)    NSVT (nonsustained ventricular tachycardia)    Nausea & vomiting    Gastrointestinal hemorrhage with hematemesis    Longstanding persistent atrial fibrillation    Paraesophageal hernia    Acute hypoxic respiratory failure    Lactic acidosis    Leukocytosis    Hiatal hernia    Vomiting    Paroxysmal atrial fibrillation    Comfort measures only status     Objective:      Physical Exam  Vitals reviewed.   Constitutional:       Appearance: He is well-developed and underweight.   Cardiovascular:      Pulses:           Dorsalis pedis pulses are 1+ on the right side and 1+ on the left side.      Comments: Venous insufficiency edema R>L.  Musculoskeletal:         General: Tenderness and deformity present. No swelling.      Right lower le+ Pitting Edema present.      Left lower le+ Pitting Edema present.      Right foot: No bunion.      Left foot: Bunion present.        Feet:    Feet:      Right foot:      Skin integrity: Skin integrity normal. No erythema or callus.      Toenail Condition: Right toenails are long and  ingrown. Fungal disease present.     Left foot:      Skin integrity: Callus present. No erythema.      Toenail Condition: Left toenails are long and ingrown. Fungal disease present.     Comments: All nails B/L 1st-5th are hypertrophic & cryptotic w/ symptomatic B/L hallux nail borders. Thickening & dystrophic appearance entire L hallux nail plate & distal 1st - 5th R. Tenderness to distal nail plate pressure of great toe B/L medial >lat; no periungual abnormality except for hypertrophy of ungual labia.  Skin:     General: Skin is warm and dry.      Capillary Refill: Capillary refill takes 2 to 3 seconds.      Findings: No bruising, erythema, lesion or rash.      Comments: Dry skin w/out breakdown B/L; no intertriginous maceration.  Seborrheic keratosis B/L.    Minimal stasis changes.     Porokeratotic lesion dorsal medial 1st met.head L.    Hypertrophy of ungual labia w/out erythema medial B/L & lateral L hallux nail border.   Neurological:      Mental Status: He is alert and oriented to person, place, and time.      Sensory: No sensory deficit.      Motor: Weakness present. No abnormal muscle tone.      Gait: Gait abnormal (Walker or wheelchair).   Psychiatric:         Mood and Affect: Mood and affect normal.         Behavior: Behavior normal. Behavior is cooperative.         Assessment:      Encounter Diagnoses   Name Primary?    Onychomycosis with ingrown toenail Yes    Pain around toenail     Long term current use of anticoagulant     History of pulmonary embolism            Problem List Items Addressed This Visit    None  Visit Diagnoses       Onychomycosis with ingrown toenail    -  Primary    Relevant Orders    Routine Foot Care    Pain around toenail        Relevant Orders    Routine Foot Care    Long term current use of anticoagulant        Relevant Orders    Routine Foot Care    History of pulmonary embolism                 Plan:       Misha was seen today for diabetes mellitus and nail  care.    Diagnoses and all orders for this visit:    Onychomycosis with ingrown toenail  -     Routine Foot Care    Pain around toenail  -     Routine Foot Care    Long term current use of anticoagulant  -     Routine Foot Care    History of pulmonary embolism    - Shoe inspection. Patient instructed on proper foot hygeine. We discussed wearing proper shoe gear, daily foot inspections, never walking w/out protective shoe gear, annual foot exam, sooner prn.     Utilizing sterile toenail nippers, I aggressively debrided & reduced all the nails x 10 to their soft tissue attachment mechanically. I then also cut back the offending nail borders approximately 3 mm from its edge & carried the nail plate incision down @ an angle in order to wedge out the cryptotic portion of the nail plate in toto. The area was cleansed w/ alcohol. Patient tolerated the procedure well & related significant relief.  Utilizing a tissue nipper, I trimmed the porokeratotic lesion L foot.        Continue Tubigrip stockinette size BLE whenever out of bed.        A total of 20 mins.was spent on chart review, patient visit & documentation.

## 2024-10-09 NOTE — PROGRESS NOTES
Health Maintenance Due   Topic Date Due    RSV Vaccine (Age 60+ and Pregnant patients) (1 - 1-dose 75+ series) Never done    DEXA Scan  04/14/2024    Influenza Vaccine (1) 09/01/2024    COVID-19 Vaccine (8 - 2024-25 season) 09/01/2024       Chart reviewed and updated. Reconciled immunizations.    Estrellita Hull LPN   Clinical Care Coordinator  [facility]

## 2024-10-10 ENCOUNTER — INFUSION (OUTPATIENT)
Dept: INFECTIOUS DISEASES | Facility: HOSPITAL | Age: 89
End: 2024-10-10
Payer: MEDICARE

## 2024-10-10 VITALS
HEART RATE: 67 BPM | OXYGEN SATURATION: 98 % | RESPIRATION RATE: 18 BRPM | HEIGHT: 71 IN | SYSTOLIC BLOOD PRESSURE: 137 MMHG | DIASTOLIC BLOOD PRESSURE: 64 MMHG | WEIGHT: 160 LBS | TEMPERATURE: 98 F | BODY MASS INDEX: 22.4 KG/M2

## 2024-10-10 DIAGNOSIS — M81.0 AGE RELATED OSTEOPOROSIS, UNSPECIFIED PATHOLOGICAL FRACTURE PRESENCE: Primary | ICD-10-CM

## 2024-10-10 PROCEDURE — 96372 THER/PROPH/DIAG INJ SC/IM: CPT

## 2024-10-10 PROCEDURE — 63600175 PHARM REV CODE 636 W HCPCS: Mod: JZ,JG | Performed by: INTERNAL MEDICINE

## 2024-10-10 RX ADMIN — DENOSUMAB 60 MG: 60 INJECTION SUBCUTANEOUS at 02:10

## 2024-10-10 NOTE — TELEPHONE ENCOUNTER
Pt states that he has surgery scheduled on 10/17 and was instructed to stop his daily baby aspiring x 14 days    Pt would like to make sure PCP is in agreement with that and pt would like to know if antibiotic is needed prior to the procedure as he typically does before dental procedures

## 2024-10-14 PROBLEM — K92.0 GASTROINTESTINAL HEMORRHAGE WITH HEMATEMESIS: Status: ACTIVE | Noted: 2024-01-01

## 2024-10-14 PROBLEM — R11.2 NAUSEA & VOMITING: Status: ACTIVE | Noted: 2024-01-01

## 2024-10-15 PROBLEM — J96.01 ACUTE HYPOXIC RESPIRATORY FAILURE: Status: ACTIVE | Noted: 2024-01-01

## 2024-10-15 PROBLEM — R11.10 VOMITING: Status: ACTIVE | Noted: 2024-01-01

## 2024-10-15 PROBLEM — D72.829 LEUKOCYTOSIS: Status: ACTIVE | Noted: 2024-01-01

## 2024-10-15 PROBLEM — I48.11 LONGSTANDING PERSISTENT ATRIAL FIBRILLATION: Status: ACTIVE | Noted: 2024-01-01

## 2024-10-15 PROBLEM — E87.20 LACTIC ACIDOSIS: Status: ACTIVE | Noted: 2024-01-01

## 2024-10-15 PROBLEM — I50.33 ACUTE ON CHRONIC DIASTOLIC (CONGESTIVE) HEART FAILURE: Status: RESOLVED | Noted: 2022-01-11 | Resolved: 2024-01-01

## 2024-10-15 PROBLEM — N17.9 AKI (ACUTE KIDNEY INJURY): Status: RESOLVED | Noted: 2021-12-15 | Resolved: 2024-01-01

## 2024-10-15 PROBLEM — K44.9 HIATAL HERNIA: Status: ACTIVE | Noted: 2024-01-01

## 2024-10-15 PROBLEM — E87.1 HYPONATREMIA: Status: RESOLVED | Noted: 2022-10-25 | Resolved: 2024-01-01

## 2024-10-15 PROBLEM — K44.9 PARAESOPHAGEAL HERNIA: Status: ACTIVE | Noted: 2024-01-01

## 2024-10-16 PROBLEM — I48.0 PAROXYSMAL ATRIAL FIBRILLATION: Status: ACTIVE | Noted: 2024-01-01

## 2024-10-16 NOTE — TELEPHONE ENCOUNTER
Spoke to pt's wife. As he is still in the ICU and they do not know when he will be discharged, rescheduled Mohs for 11/5 at 7:50. Pt's wife confirmed new date and time and said she would let us know should they need to reschedule again.

## 2024-10-16 NOTE — TELEPHONE ENCOUNTER
----- Message from Nilo Evangelista MD sent at 10/15/2024 12:02 PM CDT -----  I received a message from ER on this patient.  He is being admitted tonight for hiatal hernia but expects to be discharged tomorrow.  He has Mohs scheduled on Thursday.  Let's call patient or his wife tomorrow to see if he is well enough to proceed with Mohs on Thursday, or if we need to reschedule.  Okay to call wife on her cell phone tomorrow.      Thanks.

## 2024-10-17 PROBLEM — Z51.5 COMFORT MEASURES ONLY STATUS: Status: ACTIVE | Noted: 2024-01-01

## 2024-10-17 NOTE — TELEPHONE ENCOUNTER
Pt was scheduled for Mohs for SCC L inferior central malar cheek on 11/5. Referral from Dr. Asher. Dr. Evangelista got message from his doctors that he is decompensating and will be put on comfort care, so Mohs appt was cancelled.

## 2024-10-19 LAB
BLD PROD TYP BPU: NORMAL
BLD PROD TYP BPU: NORMAL
BLOOD UNIT EXPIRATION DATE: NORMAL
BLOOD UNIT EXPIRATION DATE: NORMAL
BLOOD UNIT TYPE CODE: 6200
BLOOD UNIT TYPE CODE: 6200
BLOOD UNIT TYPE: NORMAL
BLOOD UNIT TYPE: NORMAL
CODING SYSTEM: NORMAL
CODING SYSTEM: NORMAL
CROSSMATCH INTERPRETATION: NORMAL
CROSSMATCH INTERPRETATION: NORMAL
DISPENSE STATUS: NORMAL
DISPENSE STATUS: NORMAL
TRANS ERYTHROCYTES VOL PATIENT: NORMAL ML
TRANS ERYTHROCYTES VOL PATIENT: NORMAL ML

## 2024-10-20 LAB
BACTERIA BLD CULT: NORMAL
BACTERIA BLD CULT: NORMAL

## 2024-10-21 ENCOUNTER — DOCUMENT SCAN (OUTPATIENT)
Dept: HOME HEALTH SERVICES | Facility: HOSPITAL | Age: 89
End: 2024-10-21
Payer: MEDICARE

## 2024-10-21 NOTE — PROCEDURES
Routine Foot Care    Date/Time: 10/9/2024 1:30 PM    Performed by: Ale Paredes DPM  Authorized by: Ale Paredes DPM    Consent Done?:  Yes (Verbal)    Nail Care Type:  Debride  Location(s): All  (Left 1st Toe, Left 3rd Toe, Left 2nd Toe, Left 4th Toe, Left 5th Toe, Right 1st Toe, Right 2nd Toe, Right 3rd Toe, Right 4th Toe and Right 5th Toe)  Patient tolerance:  Patient tolerated the procedure well with no immediate complications

## (undated) DEVICE — DRESSING N ADH OIL EMUL 3X3

## (undated) DEVICE — CATH MPA2 INFINITI 4FR 100CM

## (undated) DEVICE — SKINMARKER & RULER REGULAR X-F

## (undated) DEVICE — DRESSING TRANS 2X2 TEGADERM

## (undated) DEVICE — SHEATH INTRODUCER 6FR 11CM

## (undated) DEVICE — SPIKE CONTRAST CONTROLLER

## (undated) DEVICE — PAD DEFIB CADENCE ADULT R2

## (undated) DEVICE — GUIDEWIRE STF .035X180CM ANG

## (undated) DEVICE — BANDAGE KERLIX P/P 2.25IN STER

## (undated) DEVICE — GUIDEWIRE EMERALD 150CM PTFE

## (undated) DEVICE — CATH ALII 4FR INFINITY

## (undated) DEVICE — GLOVE BIOGEL ECLIPSE SZ 6.5

## (undated) DEVICE — KIT MICROINTRO 4F .018X40X7CM

## (undated) DEVICE — DEVICE PERCLOSE SUT CLSR 6FR

## (undated) DEVICE — GUIDEWIRE X SPORT .014IN 190CM

## (undated) DEVICE — GUIDEWIRE CONFIDA BECKER CURVE

## (undated) DEVICE — OMNIPAQUE 350 200ML

## (undated) DEVICE — CATH TEMP PACER 5.0FR

## (undated) DEVICE — TOURNIQUET SB QC SP 18X4IN

## (undated) DEVICE — HEMOSTAT VASC BAND REG 24CM

## (undated) DEVICE — BANDAGE ACE NON LATEX 2IN

## (undated) DEVICE — GUIDEWIRE SUPRA CORE 035 190CM

## (undated) DEVICE — CATH JACKY RADIAL 5FR 100CM

## (undated) DEVICE — EXPANDER PUPIL 7.0MM

## (undated) DEVICE — COVER BAND BAG 40 X 40

## (undated) DEVICE — CATH DXTERITY PG145 110CM 6FR

## (undated) DEVICE — SEE MEDLINE ITEM 156894

## (undated) DEVICE — SUT 4/0 18IN ETHILON BL P3

## (undated) DEVICE — KIT PERCUTANEOUS SHEATH

## (undated) DEVICE — LINE 60IN PRESSURE MON.

## (undated) DEVICE — GLASSES EYE PROTECTIVE

## (undated) DEVICE — CATH DXTERITY AL20 100CM 6FR

## (undated) DEVICE — SYR 10CC LUER LOCK

## (undated) DEVICE — DRAPE OPHTHALMIC 48X62 FEN

## (undated) DEVICE — SOL BETADINE 5%

## (undated) DEVICE — NDL HYPO A BEVEL 22X1 1/2

## (undated) DEVICE — CATH DXTERITY IMA 100CM 6FR

## (undated) DEVICE — PROBE VITRECTOMY CENTURION 23G

## (undated) DEVICE — CATH DXTERITY JL35 100CM 6FR

## (undated) DEVICE — CABLE PACER

## (undated) DEVICE — KIT CUSTOM MANIFOLD

## (undated) DEVICE — DRAPE STERI-DRAPE 1000 17X11IN

## (undated) DEVICE — CATH DXTERITY JR40 100CM 6FR

## (undated) DEVICE — DRESSING N ADH OIL EMUL 3X8 3S

## (undated) DEVICE — SEE MEDLINE ITEM 157131

## (undated) DEVICE — SEE MEDLINE ITEM 157187

## (undated) DEVICE — Device

## (undated) DEVICE — SHEATH INTRODUCER 8FR 11CM

## (undated) DEVICE — SYR SLIP TIP 1CC

## (undated) DEVICE — SEE MEDLINE ITEM 107746

## (undated) DEVICE — PROTECTION STATION PLUS

## (undated) DEVICE — CATH DXTERITY JL50 100CM 6FR

## (undated) DEVICE — GAUZE SPONGE 4X4 12PLY

## (undated) DEVICE — GUIDEWIRE STF .035X260CM STR

## (undated) DEVICE — BLLN CATH TRUE DILATION 18MM

## (undated) DEVICE — CATH DXTERITY JL40 100CM 6FR

## (undated) DEVICE — NDL 18GA X1 1/2 REG BEVEL

## (undated) DEVICE — SUT ETHILON 10/0 CS160-6

## (undated) DEVICE — CATH INFINITI JUDKINS JR4

## (undated) DEVICE — BANDAGE ELASTIC ACE 2IN 10/CA

## (undated) DEVICE — KIT GLIDESHEATH SLEND 6FR 10CM

## (undated) DEVICE — FLEXSHEATH DRYSEAL 14FR 33CM

## (undated) DEVICE — STOPCOCK 3-WAY